# Patient Record
Sex: FEMALE | Race: WHITE | NOT HISPANIC OR LATINO | Employment: OTHER | URBAN - METROPOLITAN AREA
[De-identification: names, ages, dates, MRNs, and addresses within clinical notes are randomized per-mention and may not be internally consistent; named-entity substitution may affect disease eponyms.]

---

## 2017-02-02 ENCOUNTER — LAB VISIT (OUTPATIENT)
Dept: LAB | Facility: OTHER | Age: 82
End: 2017-02-02
Attending: INTERNAL MEDICINE
Payer: MEDICARE

## 2017-02-02 DIAGNOSIS — R53.83 LETHARGY: ICD-10-CM

## 2017-02-02 DIAGNOSIS — E53.8 VITAMIN B12 DEFICIENCY: ICD-10-CM

## 2017-02-02 LAB
ALBUMIN SERPL BCP-MCNC: 3.5 G/DL
ALP SERPL-CCNC: 64 U/L
ALT SERPL W/O P-5'-P-CCNC: 8 U/L
ANION GAP SERPL CALC-SCNC: 10 MMOL/L
AST SERPL-CCNC: 14 U/L
BASOPHILS # BLD AUTO: 0.01 K/UL
BASOPHILS NFR BLD: 0.1 %
BILIRUB SERPL-MCNC: 0.3 MG/DL
BUN SERPL-MCNC: 18 MG/DL
CALCIUM SERPL-MCNC: 8.9 MG/DL
CHLORIDE SERPL-SCNC: 106 MMOL/L
CO2 SERPL-SCNC: 23 MMOL/L
CREAT SERPL-MCNC: 0.8 MG/DL
DIFFERENTIAL METHOD: ABNORMAL
EOSINOPHIL # BLD AUTO: 0.3 K/UL
EOSINOPHIL NFR BLD: 4.1 %
ERYTHROCYTE [DISTWIDTH] IN BLOOD BY AUTOMATED COUNT: 16.7 %
EST. GFR  (AFRICAN AMERICAN): >60 ML/MIN/1.73 M^2
EST. GFR  (NON AFRICAN AMERICAN): >60 ML/MIN/1.73 M^2
GLUCOSE SERPL-MCNC: 94 MG/DL
HCT VFR BLD AUTO: 33 %
HGB BLD-MCNC: 10.3 G/DL
LYMPHOCYTES # BLD AUTO: 2 K/UL
LYMPHOCYTES NFR BLD: 29.6 %
MCH RBC QN AUTO: 27 PG
MCHC RBC AUTO-ENTMCNC: 31.2 %
MCV RBC AUTO: 86 FL
MONOCYTES # BLD AUTO: 0.4 K/UL
MONOCYTES NFR BLD: 5.8 %
NEUTROPHILS # BLD AUTO: 4.2 K/UL
NEUTROPHILS NFR BLD: 60.1 %
PLATELET # BLD AUTO: 320 K/UL
PMV BLD AUTO: 10.1 FL
POTASSIUM SERPL-SCNC: 4.5 MMOL/L
PROT SERPL-MCNC: 6.7 G/DL
RBC # BLD AUTO: 3.82 M/UL
SODIUM SERPL-SCNC: 139 MMOL/L
T4 FREE SERPL-MCNC: 0.89 NG/DL
TSH SERPL DL<=0.005 MIU/L-ACNC: 2.34 UIU/ML
VIT B12 SERPL-MCNC: 1683 PG/ML
WBC # BLD AUTO: 6.9 K/UL

## 2017-02-02 PROCEDURE — 83540 ASSAY OF IRON: CPT

## 2017-02-02 PROCEDURE — 84439 ASSAY OF FREE THYROXINE: CPT

## 2017-02-02 PROCEDURE — 84443 ASSAY THYROID STIM HORMONE: CPT

## 2017-02-02 PROCEDURE — 85025 COMPLETE CBC W/AUTO DIFF WBC: CPT

## 2017-02-02 PROCEDURE — 36415 COLL VENOUS BLD VENIPUNCTURE: CPT

## 2017-02-02 PROCEDURE — 80053 COMPREHEN METABOLIC PANEL: CPT

## 2017-02-02 PROCEDURE — 82728 ASSAY OF FERRITIN: CPT

## 2017-02-02 PROCEDURE — 82607 VITAMIN B-12: CPT

## 2017-02-03 LAB
FERRITIN SERPL-MCNC: 20 NG/ML
IRON SERPL-MCNC: 40 UG/DL
SATURATED IRON: 7 %
TOTAL IRON BINDING CAPACITY: 543 UG/DL
TRANSFERRIN SERPL-MCNC: 367 MG/DL

## 2017-03-11 ENCOUNTER — HOSPITAL ENCOUNTER (INPATIENT)
Facility: OTHER | Age: 82
LOS: 6 days | Discharge: HOME HEALTH | DRG: 177 | End: 2017-03-17
Attending: EMERGENCY MEDICINE | Admitting: HOSPITALIST
Payer: MEDICARE

## 2017-03-11 DIAGNOSIS — R00.1 BRADYCARDIA: ICD-10-CM

## 2017-03-11 DIAGNOSIS — I50.9 HEART FAILURE: ICD-10-CM

## 2017-03-11 DIAGNOSIS — E56.9 VITAMIN DEFICIENCY: ICD-10-CM

## 2017-03-11 DIAGNOSIS — R79.89 ELEVATED TROPONIN: ICD-10-CM

## 2017-03-11 DIAGNOSIS — R06.2 WHEEZING: ICD-10-CM

## 2017-03-11 DIAGNOSIS — E87.20 LACTIC ACID ACIDOSIS: ICD-10-CM

## 2017-03-11 DIAGNOSIS — I21.4 NSTEMI (NON-ST ELEVATED MYOCARDIAL INFARCTION): ICD-10-CM

## 2017-03-11 DIAGNOSIS — R05.9 COUGH: ICD-10-CM

## 2017-03-11 DIAGNOSIS — J96.01 ACUTE HYPOXEMIC RESPIRATORY FAILURE: ICD-10-CM

## 2017-03-11 DIAGNOSIS — M85.80 OSTEOPENIA: ICD-10-CM

## 2017-03-11 DIAGNOSIS — R79.89 HIGH SERUM LACTATE: ICD-10-CM

## 2017-03-11 DIAGNOSIS — R09.02 HYPOXIA: ICD-10-CM

## 2017-03-11 DIAGNOSIS — R06.03 RESPIRATORY DISTRESS: Primary | ICD-10-CM

## 2017-03-11 LAB
ALBUMIN SERPL BCP-MCNC: 3.5 G/DL
ALLENS TEST: ABNORMAL
ALP SERPL-CCNC: 66 U/L
ALT SERPL W/O P-5'-P-CCNC: 9 U/L
ANION GAP SERPL CALC-SCNC: 10 MMOL/L
AST SERPL-CCNC: 13 U/L
BASOPHILS # BLD AUTO: 0.01 K/UL
BASOPHILS # BLD AUTO: 0.01 K/UL
BASOPHILS NFR BLD: 0.2 %
BASOPHILS NFR BLD: 0.2 %
BILIRUB SERPL-MCNC: 0.2 MG/DL
BNP SERPL-MCNC: 140 PG/ML
BUN SERPL-MCNC: 18 MG/DL
CALCIUM SERPL-MCNC: 8.7 MG/DL
CHLORIDE SERPL-SCNC: 108 MMOL/L
CO2 SERPL-SCNC: 25 MMOL/L
CREAT SERPL-MCNC: 0.8 MG/DL
DELSYS: ABNORMAL
DIFFERENTIAL METHOD: ABNORMAL
DIFFERENTIAL METHOD: ABNORMAL
EOSINOPHIL # BLD AUTO: 0 K/UL
EOSINOPHIL # BLD AUTO: 0 K/UL
EOSINOPHIL NFR BLD: 0 %
EOSINOPHIL NFR BLD: 0 %
EP: 5
ERYTHROCYTE [DISTWIDTH] IN BLOOD BY AUTOMATED COUNT: 17.1 %
ERYTHROCYTE [DISTWIDTH] IN BLOOD BY AUTOMATED COUNT: 17.1 %
ERYTHROCYTE [SEDIMENTATION RATE] IN BLOOD BY WESTERGREN METHOD: 14 MM/H
EST. GFR  (AFRICAN AMERICAN): >60 ML/MIN/1.73 M^2
EST. GFR  (NON AFRICAN AMERICAN): >60 ML/MIN/1.73 M^2
FIO2: 40
FLUAV AG SPEC QL IA: NEGATIVE
FLUBV AG SPEC QL IA: NEGATIVE
GLUCOSE SERPL-MCNC: 125 MG/DL
HCO3 UR-SCNC: 19 MMOL/L (ref 24–28)
HCT VFR BLD AUTO: 28.8 %
HCT VFR BLD AUTO: 32.4 %
HGB BLD-MCNC: 8.6 G/DL
HGB BLD-MCNC: 9.6 G/DL
IP: 10
LACTATE SERPL-SCNC: 3.4 MMOL/L
LACTATE SERPL-SCNC: 3.5 MMOL/L
LYMPHOCYTES # BLD AUTO: 0.7 K/UL
LYMPHOCYTES # BLD AUTO: 1.1 K/UL
LYMPHOCYTES NFR BLD: 12.4 %
LYMPHOCYTES NFR BLD: 16.2 %
MCH RBC QN AUTO: 24.9 PG
MCH RBC QN AUTO: 25.4 PG
MCHC RBC AUTO-ENTMCNC: 29.6 %
MCHC RBC AUTO-ENTMCNC: 29.9 %
MCV RBC AUTO: 84 FL
MCV RBC AUTO: 85 FL
MIN VOL: 11.6
MODE: ABNORMAL
MONOCYTES # BLD AUTO: 0.1 K/UL
MONOCYTES # BLD AUTO: 0.5 K/UL
MONOCYTES NFR BLD: 1.2 %
MONOCYTES NFR BLD: 7.2 %
NEUTROPHILS # BLD AUTO: 5 K/UL
NEUTROPHILS # BLD AUTO: 5 K/UL
NEUTROPHILS NFR BLD: 75.9 %
NEUTROPHILS NFR BLD: 85.9 %
PCO2 BLDA: 37.6 MMHG (ref 35–45)
PH SMN: 7.31 [PH] (ref 7.35–7.45)
PLATELET # BLD AUTO: 257 K/UL
PLATELET # BLD AUTO: 257 K/UL
PLATELET # BLD AUTO: 294 K/UL
PMV BLD AUTO: 9.9 FL
PO2 BLDA: 114 MMHG (ref 80–100)
POC BE: -7 MMOL/L
POC SATURATED O2: 98 % (ref 95–100)
POTASSIUM SERPL-SCNC: 3.9 MMOL/L
PROT SERPL-MCNC: 6.9 G/DL
RBC # BLD AUTO: 3.39 M/UL
RBC # BLD AUTO: 3.85 M/UL
SAMPLE: ABNORMAL
SITE: ABNORMAL
SODIUM SERPL-SCNC: 143 MMOL/L
SP02: 38
SPECIMEN SOURCE: NORMAL
SPONT RATE: 29
TROPONIN I SERPL DL<=0.01 NG/ML-MCNC: 0.05 NG/ML
TROPONIN I SERPL DL<=0.01 NG/ML-MCNC: 0.07 NG/ML
WBC # BLD AUTO: 5.82 K/UL
WBC # BLD AUTO: 6.56 K/UL

## 2017-03-11 PROCEDURE — 83605 ASSAY OF LACTIC ACID: CPT

## 2017-03-11 PROCEDURE — 25000242 PHARM REV CODE 250 ALT 637 W/ HCPCS: Performed by: EMERGENCY MEDICINE

## 2017-03-11 PROCEDURE — 93010 ELECTROCARDIOGRAM REPORT: CPT | Mod: ,,, | Performed by: INTERNAL MEDICINE

## 2017-03-11 PROCEDURE — 85730 THROMBOPLASTIN TIME PARTIAL: CPT

## 2017-03-11 PROCEDURE — 93005 ELECTROCARDIOGRAM TRACING: CPT

## 2017-03-11 PROCEDURE — 87400 INFLUENZA A/B EACH AG IA: CPT | Mod: 59

## 2017-03-11 PROCEDURE — 25000003 PHARM REV CODE 250

## 2017-03-11 PROCEDURE — 80053 COMPREHEN METABOLIC PANEL: CPT

## 2017-03-11 PROCEDURE — 96375 TX/PRO/DX INJ NEW DRUG ADDON: CPT

## 2017-03-11 PROCEDURE — 99900035 HC TECH TIME PER 15 MIN (STAT)

## 2017-03-11 PROCEDURE — 27000190 HC CPAP FULL FACE MASK W/VALVE

## 2017-03-11 PROCEDURE — 36415 COLL VENOUS BLD VENIPUNCTURE: CPT

## 2017-03-11 PROCEDURE — 85025 COMPLETE CBC W/AUTO DIFF WBC: CPT | Mod: 91

## 2017-03-11 PROCEDURE — 96361 HYDRATE IV INFUSION ADD-ON: CPT

## 2017-03-11 PROCEDURE — 63600175 PHARM REV CODE 636 W HCPCS: Performed by: INTERNAL MEDICINE

## 2017-03-11 PROCEDURE — 93010 ELECTROCARDIOGRAM REPORT: CPT | Mod: 76,,, | Performed by: INTERNAL MEDICINE

## 2017-03-11 PROCEDURE — 83880 ASSAY OF NATRIURETIC PEPTIDE: CPT

## 2017-03-11 PROCEDURE — 27100107 HC POCKET PEAK FLOW METER

## 2017-03-11 PROCEDURE — 94640 AIRWAY INHALATION TREATMENT: CPT

## 2017-03-11 PROCEDURE — 84484 ASSAY OF TROPONIN QUANT: CPT | Mod: 91

## 2017-03-11 PROCEDURE — 85610 PROTHROMBIN TIME: CPT

## 2017-03-11 PROCEDURE — 96367 TX/PROPH/DG ADDL SEQ IV INF: CPT

## 2017-03-11 PROCEDURE — 94645 CONT INHLJ TX EACH ADDL HOUR: CPT

## 2017-03-11 PROCEDURE — 27000221 HC OXYGEN, UP TO 24 HOURS

## 2017-03-11 PROCEDURE — 20000000 HC ICU ROOM

## 2017-03-11 PROCEDURE — 25000003 PHARM REV CODE 250: Performed by: HOSPITALIST

## 2017-03-11 PROCEDURE — 25000003 PHARM REV CODE 250: Performed by: INTERNAL MEDICINE

## 2017-03-11 PROCEDURE — 63600175 PHARM REV CODE 636 W HCPCS: Performed by: HOSPITALIST

## 2017-03-11 PROCEDURE — 87040 BLOOD CULTURE FOR BACTERIA: CPT | Mod: 59

## 2017-03-11 PROCEDURE — 84484 ASSAY OF TROPONIN QUANT: CPT

## 2017-03-11 PROCEDURE — 63600175 PHARM REV CODE 636 W HCPCS: Performed by: EMERGENCY MEDICINE

## 2017-03-11 PROCEDURE — 99285 EMERGENCY DEPT VISIT HI MDM: CPT | Mod: 25

## 2017-03-11 PROCEDURE — 25000003 PHARM REV CODE 250: Performed by: EMERGENCY MEDICINE

## 2017-03-11 PROCEDURE — 82803 BLOOD GASES ANY COMBINATION: CPT

## 2017-03-11 PROCEDURE — 96365 THER/PROPH/DIAG IV INF INIT: CPT

## 2017-03-11 PROCEDURE — 94660 CPAP INITIATION&MGMT: CPT

## 2017-03-11 RX ORDER — HEPARIN SODIUM,PORCINE/D5W 25000/250
16 INTRAVENOUS SOLUTION INTRAVENOUS CONTINUOUS
Status: DISCONTINUED | OUTPATIENT
Start: 2017-03-12 | End: 2017-03-12

## 2017-03-11 RX ORDER — NITROGLYCERIN 20 MG/100ML
5 INJECTION INTRAVENOUS CONTINUOUS
Status: DISCONTINUED | OUTPATIENT
Start: 2017-03-12 | End: 2017-03-12

## 2017-03-11 RX ORDER — ONDANSETRON 2 MG/ML
8 INJECTION INTRAMUSCULAR; INTRAVENOUS
Status: DISCONTINUED | OUTPATIENT
Start: 2017-03-11 | End: 2017-03-11

## 2017-03-11 RX ORDER — CEFEPIME HYDROCHLORIDE 2 G/50ML
2 INJECTION, SOLUTION INTRAVENOUS
Status: DISCONTINUED | OUTPATIENT
Start: 2017-03-11 | End: 2017-03-13

## 2017-03-11 RX ORDER — MOXIFLOXACIN HYDROCHLORIDE 400 MG/250ML
400 INJECTION, SOLUTION INTRAVENOUS
Status: DISCONTINUED | OUTPATIENT
Start: 2017-03-11 | End: 2017-03-11

## 2017-03-11 RX ORDER — CARVEDILOL 3.12 MG/1
3.12 TABLET ORAL 2 TIMES DAILY
Status: DISCONTINUED | OUTPATIENT
Start: 2017-03-11 | End: 2017-03-13

## 2017-03-11 RX ORDER — ASPIRIN 325 MG
325 TABLET ORAL DAILY
Status: DISCONTINUED | OUTPATIENT
Start: 2017-03-12 | End: 2017-03-12

## 2017-03-11 RX ORDER — CARVEDILOL 3.12 MG/1
3.12 TABLET ORAL 2 TIMES DAILY
Status: DISCONTINUED | OUTPATIENT
Start: 2017-03-11 | End: 2017-03-11

## 2017-03-11 RX ORDER — IPRATROPIUM BROMIDE AND ALBUTEROL SULFATE 2.5; .5 MG/3ML; MG/3ML
3 SOLUTION RESPIRATORY (INHALATION) EVERY 4 HOURS
Status: DISCONTINUED | OUTPATIENT
Start: 2017-03-12 | End: 2017-03-17 | Stop reason: HOSPADM

## 2017-03-11 RX ORDER — ASPIRIN 325 MG
TABLET ORAL
Status: COMPLETED
Start: 2017-03-11 | End: 2017-03-11

## 2017-03-11 RX ORDER — DIAZEPAM 10 MG/2ML
1 INJECTION INTRAMUSCULAR
Status: COMPLETED | OUTPATIENT
Start: 2017-03-11 | End: 2017-03-11

## 2017-03-11 RX ORDER — CLINDAMYCIN PHOSPHATE 600 MG/50ML
600 INJECTION, SOLUTION INTRAVENOUS
Status: DISCONTINUED | OUTPATIENT
Start: 2017-03-11 | End: 2017-03-13

## 2017-03-11 RX ORDER — IPRATROPIUM BROMIDE AND ALBUTEROL SULFATE 2.5; .5 MG/3ML; MG/3ML
3 SOLUTION RESPIRATORY (INHALATION)
Status: COMPLETED | OUTPATIENT
Start: 2017-03-11 | End: 2017-03-11

## 2017-03-11 RX ORDER — METHYLPREDNISOLONE SOD SUCC 125 MG
125 VIAL (EA) INJECTION
Status: COMPLETED | OUTPATIENT
Start: 2017-03-11 | End: 2017-03-11

## 2017-03-11 RX ORDER — NITROGLYCERIN 0.4 MG/1
TABLET SUBLINGUAL
Status: COMPLETED
Start: 2017-03-11 | End: 2017-03-11

## 2017-03-11 RX ORDER — PANTOPRAZOLE SODIUM 40 MG/1
40 TABLET, DELAYED RELEASE ORAL DAILY
Status: DISCONTINUED | OUTPATIENT
Start: 2017-03-11 | End: 2017-03-17 | Stop reason: HOSPADM

## 2017-03-11 RX ORDER — ASPIRIN 325 MG
325 TABLET ORAL ONCE
Status: COMPLETED | OUTPATIENT
Start: 2017-03-11 | End: 2017-03-11

## 2017-03-11 RX ORDER — NITROGLYCERIN 0.4 MG/1
0.4 TABLET SUBLINGUAL EVERY 5 MIN PRN
Status: DISCONTINUED | OUTPATIENT
Start: 2017-03-11 | End: 2017-03-17 | Stop reason: HOSPADM

## 2017-03-11 RX ORDER — CLINDAMYCIN PHOSPHATE 150 MG/ML
600 INJECTION, SOLUTION INTRAVENOUS
Status: DISCONTINUED | OUTPATIENT
Start: 2017-03-11 | End: 2017-03-11

## 2017-03-11 RX ORDER — SODIUM CHLORIDE 9 MG/ML
INJECTION, SOLUTION INTRAVENOUS CONTINUOUS
Status: ACTIVE | OUTPATIENT
Start: 2017-03-11 | End: 2017-03-12

## 2017-03-11 RX ORDER — ONDANSETRON 2 MG/ML
8 INJECTION INTRAMUSCULAR; INTRAVENOUS
Status: COMPLETED | OUTPATIENT
Start: 2017-03-11 | End: 2017-03-11

## 2017-03-11 RX ORDER — ACETAMINOPHEN 325 MG/1
650 TABLET ORAL EVERY 6 HOURS PRN
Status: DISCONTINUED | OUTPATIENT
Start: 2017-03-11 | End: 2017-03-17 | Stop reason: HOSPADM

## 2017-03-11 RX ORDER — CEFEPIME HYDROCHLORIDE 2 G/1
2 INJECTION, POWDER, FOR SOLUTION INTRAVENOUS
Status: DISCONTINUED | OUTPATIENT
Start: 2017-03-11 | End: 2017-03-11

## 2017-03-11 RX ORDER — MAGNESIUM SULFATE HEPTAHYDRATE 40 MG/ML
2 INJECTION, SOLUTION INTRAVENOUS
Status: COMPLETED | OUTPATIENT
Start: 2017-03-11 | End: 2017-03-11

## 2017-03-11 RX ORDER — METOPROLOL TARTRATE 1 MG/ML
5 INJECTION, SOLUTION INTRAVENOUS ONCE
Status: COMPLETED | OUTPATIENT
Start: 2017-03-11 | End: 2017-03-11

## 2017-03-11 RX ORDER — ALBUTEROL SULFATE 2.5 MG/.5ML
10 SOLUTION RESPIRATORY (INHALATION)
Status: COMPLETED | OUTPATIENT
Start: 2017-03-11 | End: 2017-03-11

## 2017-03-11 RX ORDER — ATORVASTATIN CALCIUM 20 MG/1
80 TABLET, FILM COATED ORAL DAILY
Status: DISCONTINUED | OUTPATIENT
Start: 2017-03-11 | End: 2017-03-12

## 2017-03-11 RX ORDER — DIPHENHYDRAMINE HCL 25 MG
25 CAPSULE ORAL NIGHTLY PRN
Status: DISCONTINUED | OUTPATIENT
Start: 2017-03-11 | End: 2017-03-17 | Stop reason: HOSPADM

## 2017-03-11 RX ORDER — FUROSEMIDE 10 MG/ML
40 INJECTION INTRAMUSCULAR; INTRAVENOUS ONCE
Status: COMPLETED | OUTPATIENT
Start: 2017-03-11 | End: 2017-03-11

## 2017-03-11 RX ADMIN — NITROGLYCERIN 0.4 MG: 0.4 TABLET SUBLINGUAL at 10:03

## 2017-03-11 RX ADMIN — IPRATROPIUM BROMIDE AND ALBUTEROL SULFATE 3 ML: .5; 3 SOLUTION RESPIRATORY (INHALATION) at 07:03

## 2017-03-11 RX ADMIN — AZITHROMYCIN MONOHYDRATE 500 MG: 500 INJECTION, POWDER, LYOPHILIZED, FOR SOLUTION INTRAVENOUS at 09:03

## 2017-03-11 RX ADMIN — ASPIRIN 325 MG ORAL TABLET 325 MG: 325 PILL ORAL at 11:03

## 2017-03-11 RX ADMIN — CARVEDILOL 3.12 MG: 3.12 TABLET, FILM COATED ORAL at 11:03

## 2017-03-11 RX ADMIN — METHYLPREDNISOLONE SODIUM SUCCINATE 125 MG: 125 INJECTION, POWDER, FOR SOLUTION INTRAMUSCULAR; INTRAVENOUS at 06:03

## 2017-03-11 RX ADMIN — MAGNESIUM SULFATE IN WATER 2 G: 40 INJECTION, SOLUTION INTRAVENOUS at 08:03

## 2017-03-11 RX ADMIN — CLINDAMYCIN IN 5 PERCENT DEXTROSE 600 MG: 12 INJECTION, SOLUTION INTRAVENOUS at 10:03

## 2017-03-11 RX ADMIN — ONDANSETRON 8 MG: 2 INJECTION, SOLUTION INTRAMUSCULAR; INTRAVENOUS at 08:03

## 2017-03-11 RX ADMIN — SODIUM CHLORIDE 1000 ML: 0.9 INJECTION, SOLUTION INTRAVENOUS at 08:03

## 2017-03-11 RX ADMIN — DIAZEPAM 1 MG: 5 INJECTION, SOLUTION INTRAMUSCULAR; INTRAVENOUS at 08:03

## 2017-03-11 RX ADMIN — ALBUTEROL SULFATE 10 MG: 2.5 SOLUTION RESPIRATORY (INHALATION) at 08:03

## 2017-03-11 RX ADMIN — SODIUM CHLORIDE: 0.9 INJECTION, SOLUTION INTRAVENOUS at 10:03

## 2017-03-11 RX ADMIN — PANTOPRAZOLE SODIUM 40 MG: 40 TABLET, DELAYED RELEASE ORAL at 10:03

## 2017-03-11 RX ADMIN — DIPHENHYDRAMINE HYDROCHLORIDE 25 MG: 25 CAPSULE ORAL at 10:03

## 2017-03-11 RX ADMIN — Medication 325 MG: at 11:03

## 2017-03-11 RX ADMIN — CEFEPIME HYDROCHLORIDE 2 G: 2 INJECTION, SOLUTION INTRAVENOUS at 11:03

## 2017-03-11 RX ADMIN — FUROSEMIDE 40 MG: 10 INJECTION, SOLUTION INTRAMUSCULAR; INTRAVENOUS at 11:03

## 2017-03-11 RX ADMIN — METOPROLOL TARTRATE 5 MG: 1 INJECTION, SOLUTION INTRAVENOUS at 11:03

## 2017-03-11 RX ADMIN — SODIUM CHLORIDE 1000 ML: 0.9 INJECTION, SOLUTION INTRAVENOUS at 06:03

## 2017-03-11 RX ADMIN — ATORVASTATIN CALCIUM 80 MG: 20 TABLET, FILM COATED ORAL at 11:03

## 2017-03-11 RX ADMIN — IPRATROPIUM BROMIDE AND ALBUTEROL SULFATE 3 ML: .5; 3 SOLUTION RESPIRATORY (INHALATION) at 06:03

## 2017-03-11 RX ADMIN — IPRATROPIUM BROMIDE AND ALBUTEROL SULFATE 3 ML: .5; 3 SOLUTION RESPIRATORY (INHALATION) at 11:03

## 2017-03-11 NOTE — IP AVS SNAPSHOT
[facility] ([facility])  [address] [place] [id number]  Phone: [phone]           Patient Discharge Instructions     Our goal is to set you up for success. This packet includes information on your condition, medications, and your home care. It will help you to care for yourself so you don't get sicker and need to go back to the hospital.     Please ask your nurse if you have any questions.        There are many details to remember when preparing to leave the hospital. Here is what you will need to do:    1. Take your medicine. If you are prescribed medications, review your Medication List in the following pages. You may have new medications to  at the pharmacy and others that you'll need to stop taking. Review the instructions for how and when to take your medications. Talk with your doctor or nurses if you are unsure of what to do.     2. Go to your follow-up appointments. Specific follow-up information is listed in the following pages. Your may be contacted by a transition nurse or clinical provider about future appointments. Be sure we have all of the phone numbers to reach you, if needed. Please contact your provider's office if you are unable to make an appointment.     3. Watch for warning signs. Your doctor or nurse will give you detailed warning signs to watch for and when to call for assistance. These instructions may also include educational information about your condition. If you experience any of warning signs to your health, call your doctor.               ** Verify the list of medication(s) below is accurate and up to date. Carry this with you in case of emergency. If your medications have changed, please notify your healthcare provider.             Medication List      START taking these medications        Additional Info                      oxybutynin 5 MG Tab   Commonly known as:  DITROPAN   Quantity:  90 tablet   Refills:  0   Dose:  5 mg    Last time this was  "given:  5 mg on 3/12/2017 11:39 AM   Instructions:  Take 1 tablet (5 mg total) by mouth 3 (three) times daily as needed (bladder spasms).     Begin Date    AM    Noon    PM    Bedtime         CHANGE how you take these medications        Additional Info                      predniSONE 10 MG tablet   Commonly known as:  DELTASONE   Quantity:  30 tablet   Refills:  0   Dose:  10 mg   What changed:    - medication strength  - how much to take  - additional instructions    Instructions:  Take 1 tablet (10 mg total) by mouth once daily. 4 daily 3 days, 3 daily 3 days, 2 daily 3 days, 1 tammy 3 days     Begin Date    AM    Noon    PM    Bedtime         CONTINUE taking these medications        Additional Info                      albuterol-ipratropium 2.5mg-0.5mg/3mL 0.5 mg-3 mg(2.5 mg base)/3 mL nebulizer solution   Commonly known as:  DUO-NEB   Quantity:  100 mL   Refills:  0   Dose:  3 mL    Last time this was given:  3 mLs on 3/17/2017  3:15 PM   Instructions:  Take 3 mLs by nebulization every 4 (four) hours as needed for Wheezing. Rescue     Begin Date    AM    Noon    PM    Bedtime       amlodipine 2.5 MG tablet   Commonly known as:  NORVASC   Quantity:  90 tablet   Refills:  1    Last time this was given:  2.5 mg on 3/17/2017  9:31 AM   Instructions:  TAKE 1 TABLET DAILY     Begin Date    AM    Noon    PM    Bedtime       aspirin 81 MG Chew   Refills:  0   Dose:  81 mg    Instructions:  Take 81 mg by mouth once daily.     Begin Date    AM    Noon    PM    Bedtime       BD LUER-SELVIN SYRINGE 3 mL 23 x 1" Syrg   Refills:  0   Generic drug:  syringe with needle      Begin Date    AM    Noon    PM    Bedtime       benzonatate 200 MG capsule   Commonly known as:  TESSALON   Quantity:  30 capsule   Refills:  1   Dose:  200 mg    Last time this was given:  100 mg on 3/17/2017  9:32 AM   Instructions:  Take 1 capsule (200 mg total) by mouth 3 (three) times daily as needed for Cough.     Begin Date    AM    Noon    PM    Bedtime "       cyanocobalamin 1,000 mcg/mL injection   Quantity:  10 mL   Refills:  3   Dose:  1000 mcg    Instructions:  Inject 1 mL (1,000 mcg total) into the muscle once a week. Dispense syringes as well.     Begin Date    AM    Noon    PM    Bedtime       dicyclomine 10 MG capsule   Commonly known as:  BENTYL   Quantity:  30 capsule   Refills:  0    Instructions:  take 1 capsule by mouth twice a day if needed     Begin Date    AM    Noon    PM    Bedtime       escitalopram oxalate 10 MG tablet   Commonly known as:  LEXAPRO   Quantity:  90 tablet   Refills:  0    Instructions:  TAKE 1 TABLET DAILY     Begin Date    AM    Noon    PM    Bedtime       fluticasone 110 mcg/actuation inhaler   Commonly known as:  FLOVENT HFA   Quantity:  12 g   Refills:  0   Dose:  1 puff    Instructions:  Inhale 1 puff into the lungs 2 (two) times daily. Controller     Begin Date    AM    Noon    PM    Bedtime       inhalation device   Commonly known as:  BREATHERITE RIGID SPACER-MASK   Quantity:  1 Device   Refills:  0    Instructions:  Use as directed for inhalation.     Begin Date    AM    Noon    PM    Bedtime       metoprolol succinate 25 MG 24 hr tablet   Commonly known as:  TOPROL-XL   Refills:  0   Dose:  1 tablet    Last time this was given:  25 mg on 3/17/2017  9:31 AM   Instructions:  Take 1 tablet by mouth once daily.     Begin Date    AM    Noon    PM    Bedtime       OMEGA 3 ORAL   Refills:  0    Instructions:  Take by mouth.     Begin Date    AM    Noon    PM    Bedtime       omeprazole 20 MG capsule   Commonly known as:  PRILOSEC   Quantity:  90 capsule   Refills:  1   Dose:  20 mg    Instructions:  Take 1 capsule (20 mg total) by mouth once daily.     Begin Date    AM    Noon    PM    Bedtime       risedronate 150 MG Tab   Commonly known as:  ACTONEL   Quantity:  1 tablet   Refills:  3   Dose:  150 mg    Instructions:  Take 1 tablet (150 mg total) by mouth every 30 days.     Begin Date    AM    Noon    PM    Bedtime          STOP taking these medications     doxycycline 100 MG EC tablet   Commonly known as:  DORYX       lorazepam 0.5 MG tablet   Commonly known as:  ATIVAN            Where to Get Your Medications      These medications were sent to [facility] [place] [address] [place] - [address] [facility]  [facility] [address]     Phone:  [phone]     oxybutynin 5 MG Tab    predniSONE 10 MG tablet                  Please bring to all follow up appointments:    1. A copy of your discharge instructions.  2. All medicines you are currently taking in their original bottles.  3. Identification and insurance card.    Please arrive 15 minutes ahead of scheduled appointment time.    Please call 24 hours in advance if you must reschedule your appointment and/or time.        Your Scheduled Appointments     Mar 28, 2017  2:15 PM CDT   Consult with JULIO Gabriel - Neurology ([address] )    [address]  [facility] [address] [phone]              Follow-up Information     Follow up with Blanca Quinones MD In 1 week.    Specialty:  Internal Medicine    Contact information:    [address] [place] [address]  [facility] [id number] [phone]        Referrals     Future Orders    Ambulatory referral to Outpatient Case Management     Questions:    Does the patient have a chronic or uncontrolled disease process?:      Does the patient have a new diagnosis of a catastrophic or life altering illness/treatment?:      Does the patient have any psycho-social issues that may affect their ability to adhere to treatment plan?:      Does patient have any behaviors or circumstances that may impede ability to adhere to treatment plan?:      Is patient at risk for admission/readmission?:          Discharge Instructions     Future Orders    Activity as tolerated     Diet general     Questions:    Total calories:      Fat restriction, if any:      Protein restriction, if any:       "Na restriction, if any:      Fluid restriction:      Additional restrictions:      OXYGEN FOR HOME USE     Questions:    Liter Flow:  2    Duration:  Continuous    Qualifying SpO2:  87 sa02    Testing done at:  Rest    Route:  nasal cannula    Portable mode:  pulse dose acceptable    Device:  home concentrator with portable unit    Length of need (in months):  99 mos    Patient condition with qualifying saturation:      Height:  5' 2" (1.575 m)    Weight:  68.3 kg (150 lb 9.2 oz)    Does patient have medical equipment at home?:  cane, straight    rollator    shower chair    Alternative treatment measures have been tried or considered and deemed clinically ineffective.:  Yes    VENTILATOR FOR HOME USE     Comments:    Trilogy for home use  Titrate pt for comfort    Questions:    Height:  5' 2" (1.575 m)    Weight:  68.3 kg (150 lb 9.2 oz)    Does patient have medical equipment at home?:  cane, straight    rollator    shower chair    Length of need (1-99 months):  99    Interface needed:  Nasal mask    Mode:  AVAPS-AE    Rate:  12 Comment - auto     Tidal Volume:  300    PEEP - EPAP:  5.0 CM/H2O Comment - 5-10    Pressure support - IPAP:  10 Comment - 5-10    FiO2%:      Humidifier needed?:  No        Discharge Instructions       Your feedback is important to us.  If you should receive a survey in the next few days, please share your experience with us.        Discharge References/Attachments     ANEMIA, IRON-DEFICIENCY (ADULT) (ENGLISH)    BARIUM SWALLOW, MODIFIED (ENGLISH)    DYSPHAGIA DIET (ENGLISH)    URINARY TRACT INFECTIONS IN WOMEN (ENGLISH)    URINARY INCONTINENCE, FEMALE (ADULT) (ENGLISH)        Primary Diagnosis     Your primary diagnosis was:  Acute Hypoxemic Respiratory Failure      Admission Information     Date & Time Provider Department CSN    3/11/2017  5:10 PM Eligio Arenas MD [facility] [id number]      Care Providers     Provider Role Specialty Primary office phone    Eligio RAMIREZ " "MD Dagoberto Attending Provider Hospitalist [phone]    Will Wyatt MD Consulting Physician  Cardiology [phone]    Tiago Ribeiro MD Consulting Physician  Gastroenterology [phone]      Your Vitals Were     BP Pulse Temp Resp Height Weight    160/73 (BP Location: Right arm, Patient Position: Lying, BP Method: Automatic) 77 98 °F (36.7 °C) (Oral) 18 5' 2" (1.575 m) 68.3 kg (150 lb 9.2 oz)    SpO2 BMI             98% 27.54 kg/m2         Recent Lab Values        8/19/2016                          10:12 AM           A1C 5.1           Comment for A1C at 10:12 AM on 8/19/2016:  According to ADA guidelines, hemoglobin A1C <7.0% represents  optimal control in non-pregnant diabetic patients.  Different  metrics may apply to specific populations.   Standards of Medical Care in Diabetes - 2016.  For the purpose of screening for the presence of diabetes:  <5.7%     Consistent with the absence of diabetes  5.7-6.4%  Consistent with increasing risk for diabetes   (prediabetes)  >or=6.5%  Consistent with diabetes  Currently no consensus exists for use of hemoglobin A1C  for diagnosis of diabetes for children.        Pending Labs     Order Current Status    Prepare RBC 1 Unit In process    Prepare RBC Preliminary result      Allergies as of 3/17/2017        Reactions    Penicillins       [facility]     [facility] Nurse Care Line - 24/7 Assistance  Unless otherwise directed by your provider, please contact [facility], our nurse care line that is available for 24/7 assistance.     Registered nurses in the [facility] provide clinical advisement, health education, appointment booking, and other advisory services.  Call for this free service at [phone].        Advance Directives     An advance directive is a document which, in the event you are no longer able to make decisions for yourself, tells your healthcare team what kind of treatment you do or do not want to receive, or who you " would like to make those decisions for you.  If you do not currently have an advance directive, [facility] encourages you to create one.  For more information call:  [phone]), [phone] ([phone]),  or log on to [email or web address].        Language Assistance Services     ATTENTION: Language assistance services are available, free of charge. Please call [phone].      ATENCIÓN: Si habla farzana, tiene a moses disposición servicios gratuitos de asistencia lingüística. Llame al [phone].     [facility] Ý: N?u b?n nói Ti?ng Vi?t, có các d?ch v? h? tr? ngôn ng? mi?n phí dành cho b?n. G?i s? [phone].        Blood Transfusion Reaction Signs and Symptoms     The blood you have received has been matched for you as carefully as possible. Most patients who receive a blood transfusion do not experience problems. However, there can be a delayed reaction that happens a few weeks after your blood transfusion. Contact your physician immediately if you experience any NEW SYMPTOMS listed below:     Fever greater than 100.4 degrees    Chills   Yellow color to your skin or eyes(Jaundice)   Back pain, chest pain, or pain at the infusion site   Weakness (more than usual)   Discomfort or uneasiness more than usual (Malaise)   Nausea or vomiting   Shortness of breath, wheezing, or coughing   Higher or lower blood pressure than normal   Skin rash, itching, skin redness, or localized swelling (example: hands or feet)   Urinating less than normal   Urine appears reddish or orange and is darker than normal      Remember that some these signs may already exist for you--such as having chronic back pain or high blood pressure. You only need to look for and report to your doctor any new occurrences since your blood transfusion that are of concern.        [facility] Sign-Up     Activating your [facility] account is as easy as 1-2-3!     1) Visit [email or web address], select Sign Up Now, enter this  activation code and your date of birth, then select Next.  [email or web address]  Expires: 3/19/2017  3:44 PM      2) Create a username and password to use when you visit [facility] in the future and select a security question in case you lose your password and select Next.    3) Enter your e-mail address and click Sign Up!    Additional Information  If you have questions, please e-mail [email or web address] or call [phone] to talk to our [facility] staff. Remember, [facility] is NOT to be used for urgent needs. For medical emergencies, dial 911.          [facility] complies with applicable Federal civil rights laws and does not discriminate on the basis of race, color, national origin, age, disability, or sex.

## 2017-03-12 PROBLEM — J96.01 ACUTE HYPOXEMIC RESPIRATORY FAILURE: Status: ACTIVE | Noted: 2017-03-12

## 2017-03-12 PROBLEM — D50.9 IRON DEFICIENCY ANEMIA: Status: ACTIVE | Noted: 2017-03-12

## 2017-03-12 PROBLEM — R13.10 DIFFICULTY SWALLOWING: Status: ACTIVE | Noted: 2017-03-12

## 2017-03-12 PROBLEM — R79.89 ELEVATED TROPONIN: Status: ACTIVE | Noted: 2017-03-12

## 2017-03-12 PROBLEM — E87.20 LACTIC ACID ACIDOSIS: Status: ACTIVE | Noted: 2017-03-12

## 2017-03-12 LAB
ABO + RH BLD: NORMAL
ANION GAP SERPL CALC-SCNC: 6 MMOL/L
APTT BLDCRRT: 63.7 SEC
APTT BLDCRRT: <21 SEC
BACTERIA #/AREA URNS HPF: ABNORMAL /HPF
BASOPHILS # BLD AUTO: 0 K/UL
BASOPHILS NFR BLD: 0 %
BILIRUB UR QL STRIP: NEGATIVE
BLD GP AB SCN CELLS X3 SERPL QL: NORMAL
BLD PROD TYP BPU: NORMAL
BLOOD UNIT EXPIRATION DATE: NORMAL
BLOOD UNIT TYPE CODE: 7300
BLOOD UNIT TYPE: NORMAL
BUN SERPL-MCNC: 15 MG/DL
CALCIUM SERPL-MCNC: 7.7 MG/DL
CHLORIDE SERPL-SCNC: 109 MMOL/L
CK MB SERPL-MCNC: 6.4 NG/ML
CK MB SERPL-RTO: 7.9 %
CK SERPL-CCNC: 81 U/L
CK SERPL-CCNC: 81 U/L
CLARITY UR: ABNORMAL
CO2 SERPL-SCNC: 25 MMOL/L
CODING SYSTEM: NORMAL
COLOR UR: ABNORMAL
CREAT SERPL-MCNC: 0.7 MG/DL
DIFFERENTIAL METHOD: ABNORMAL
DISPENSE STATUS: NORMAL
EOSINOPHIL # BLD AUTO: 0 K/UL
EOSINOPHIL NFR BLD: 0 %
ERYTHROCYTE [DISTWIDTH] IN BLOOD BY AUTOMATED COUNT: 17 %
EST. GFR  (AFRICAN AMERICAN): >60 ML/MIN/1.73 M^2
EST. GFR  (NON AFRICAN AMERICAN): >60 ML/MIN/1.73 M^2
GLUCOSE SERPL-MCNC: 134 MG/DL
GLUCOSE UR QL STRIP: NEGATIVE
HCT VFR BLD AUTO: 26.2 %
HGB BLD-MCNC: 7.8 G/DL
HGB UR QL STRIP: ABNORMAL
HYALINE CASTS #/AREA URNS LPF: 0 /LPF
INR PPP: 0.9
KETONES UR QL STRIP: NEGATIVE
LACTATE SERPL-SCNC: 0.9 MMOL/L
LACTATE SERPL-SCNC: 4.3 MMOL/L
LEUKOCYTE ESTERASE UR QL STRIP: NEGATIVE
LYMPHOCYTES # BLD AUTO: 0.9 K/UL
LYMPHOCYTES NFR BLD: 21.1 %
MAGNESIUM SERPL-MCNC: 2.1 MG/DL
MCH RBC QN AUTO: 25.1 PG
MCHC RBC AUTO-ENTMCNC: 29.8 %
MCV RBC AUTO: 84 FL
MICROSCOPIC COMMENT: ABNORMAL
MONOCYTES # BLD AUTO: 0.1 K/UL
MONOCYTES NFR BLD: 3.2 %
NEUTROPHILS # BLD AUTO: 3.3 K/UL
NEUTROPHILS NFR BLD: 75 %
NITRITE UR QL STRIP: NEGATIVE
PH UR STRIP: 7 [PH] (ref 5–8)
PLATELET # BLD AUTO: 216 K/UL
PMV BLD AUTO: 9.6 FL
POTASSIUM SERPL-SCNC: 3.9 MMOL/L
PROT UR QL STRIP: ABNORMAL
PROTHROMBIN TIME: 10.4 SEC
RBC # BLD AUTO: 3.11 M/UL
RBC #/AREA URNS HPF: >100 /HPF (ref 0–4)
SODIUM SERPL-SCNC: 140 MMOL/L
SP GR UR STRIP: 1.02 (ref 1–1.03)
TRANS ERYTHROCYTES VOL PATIENT: NORMAL ML
TROPONIN I SERPL DL<=0.01 NG/ML-MCNC: 0.85 NG/ML
URN SPEC COLLECT METH UR: ABNORMAL
UROBILINOGEN UR STRIP-ACNC: NEGATIVE EU/DL
WBC # BLD AUTO: 4.36 K/UL
WBC #/AREA URNS HPF: 0 /HPF (ref 0–5)

## 2017-03-12 PROCEDURE — 87086 URINE CULTURE/COLONY COUNT: CPT

## 2017-03-12 PROCEDURE — 86900 BLOOD TYPING SEROLOGIC ABO: CPT

## 2017-03-12 PROCEDURE — 87086 URINE CULTURE/COLONY COUNT: CPT | Mod: 59

## 2017-03-12 PROCEDURE — 99223 1ST HOSP IP/OBS HIGH 75: CPT | Mod: GC,,, | Performed by: INTERNAL MEDICINE

## 2017-03-12 PROCEDURE — 83735 ASSAY OF MAGNESIUM: CPT

## 2017-03-12 PROCEDURE — 25000003 PHARM REV CODE 250: Performed by: INTERNAL MEDICINE

## 2017-03-12 PROCEDURE — 25000242 PHARM REV CODE 250 ALT 637 W/ HCPCS: Performed by: HOSPITALIST

## 2017-03-12 PROCEDURE — 36415 COLL VENOUS BLD VENIPUNCTURE: CPT

## 2017-03-12 PROCEDURE — 83605 ASSAY OF LACTIC ACID: CPT

## 2017-03-12 PROCEDURE — 25000003 PHARM REV CODE 250: Performed by: HOSPITALIST

## 2017-03-12 PROCEDURE — 85730 THROMBOPLASTIN TIME PARTIAL: CPT

## 2017-03-12 PROCEDURE — 63600175 PHARM REV CODE 636 W HCPCS: Performed by: HOSPITALIST

## 2017-03-12 PROCEDURE — 83605 ASSAY OF LACTIC ACID: CPT | Mod: 91

## 2017-03-12 PROCEDURE — 84484 ASSAY OF TROPONIN QUANT: CPT

## 2017-03-12 PROCEDURE — 86901 BLOOD TYPING SEROLOGIC RH(D): CPT

## 2017-03-12 PROCEDURE — 20000000 HC ICU ROOM

## 2017-03-12 PROCEDURE — 85025 COMPLETE CBC W/AUTO DIFF WBC: CPT

## 2017-03-12 PROCEDURE — 63600175 PHARM REV CODE 636 W HCPCS: Performed by: INTERNAL MEDICINE

## 2017-03-12 PROCEDURE — 86920 COMPATIBILITY TEST SPIN: CPT

## 2017-03-12 PROCEDURE — 25000003 PHARM REV CODE 250: Performed by: PHYSICIAN ASSISTANT

## 2017-03-12 PROCEDURE — 81000 URINALYSIS NONAUTO W/SCOPE: CPT

## 2017-03-12 PROCEDURE — 80048 BASIC METABOLIC PNL TOTAL CA: CPT

## 2017-03-12 PROCEDURE — 94640 AIRWAY INHALATION TREATMENT: CPT

## 2017-03-12 PROCEDURE — 82553 CREATINE MB FRACTION: CPT

## 2017-03-12 PROCEDURE — P9021 RED BLOOD CELLS UNIT: HCPCS

## 2017-03-12 PROCEDURE — 27000221 HC OXYGEN, UP TO 24 HOURS

## 2017-03-12 PROCEDURE — 99223 1ST HOSP IP/OBS HIGH 75: CPT | Mod: AI,,, | Performed by: HOSPITALIST

## 2017-03-12 RX ORDER — ATORVASTATIN CALCIUM 10 MG/1
10 TABLET, FILM COATED ORAL DAILY
Status: DISCONTINUED | OUTPATIENT
Start: 2017-03-13 | End: 2017-03-17 | Stop reason: HOSPADM

## 2017-03-12 RX ORDER — HYDROCODONE BITARTRATE AND ACETAMINOPHEN 500; 5 MG/1; MG/1
TABLET ORAL
Status: DISCONTINUED | OUTPATIENT
Start: 2017-03-12 | End: 2017-03-17 | Stop reason: HOSPADM

## 2017-03-12 RX ORDER — OXYBUTYNIN CHLORIDE 5 MG/1
5 TABLET ORAL 3 TIMES DAILY PRN
Status: DISCONTINUED | OUTPATIENT
Start: 2017-03-12 | End: 2017-03-17 | Stop reason: HOSPADM

## 2017-03-12 RX ORDER — HEPARIN SODIUM,PORCINE/D5W 25000/250
16 INTRAVENOUS SOLUTION INTRAVENOUS CONTINUOUS
Status: DISCONTINUED | OUTPATIENT
Start: 2017-03-12 | End: 2017-03-12

## 2017-03-12 RX ORDER — DIAZEPAM 10 MG/2ML
2.5 INJECTION INTRAMUSCULAR ONCE
Status: COMPLETED | OUTPATIENT
Start: 2017-03-12 | End: 2017-03-12

## 2017-03-12 RX ORDER — BENZONATATE 100 MG/1
100 CAPSULE ORAL 3 TIMES DAILY PRN
Status: DISCONTINUED | OUTPATIENT
Start: 2017-03-12 | End: 2017-03-17 | Stop reason: HOSPADM

## 2017-03-12 RX ORDER — DIAZEPAM 10 MG/2ML
2.5 INJECTION INTRAMUSCULAR ONCE
Status: DISCONTINUED | OUTPATIENT
Start: 2017-03-12 | End: 2017-03-12

## 2017-03-12 RX ORDER — HYDROCODONE BITARTRATE AND ACETAMINOPHEN 7.5; 325 MG/15ML; MG/15ML
5 SOLUTION ORAL EVERY 6 HOURS PRN
Status: DISCONTINUED | OUTPATIENT
Start: 2017-03-12 | End: 2017-03-17 | Stop reason: HOSPADM

## 2017-03-12 RX ORDER — LIDOCAINE HYDROCHLORIDE 20 MG/ML
JELLY TOPICAL ONCE
Status: COMPLETED | OUTPATIENT
Start: 2017-03-12 | End: 2017-03-12

## 2017-03-12 RX ORDER — HEPARIN SODIUM 5000 [USP'U]/ML
5000 INJECTION, SOLUTION INTRAVENOUS; SUBCUTANEOUS EVERY 8 HOURS
Status: DISCONTINUED | OUTPATIENT
Start: 2017-03-12 | End: 2017-03-17 | Stop reason: HOSPADM

## 2017-03-12 RX ADMIN — CLINDAMYCIN IN 5 PERCENT DEXTROSE 600 MG: 12 INJECTION, SOLUTION INTRAVENOUS at 04:03

## 2017-03-12 RX ADMIN — HYDROCODONE BITARTRATE AND ACETAMINOPHEN 5 ML: 7.5; 325 SOLUTION ORAL at 01:03

## 2017-03-12 RX ADMIN — METHYLPREDNISOLONE SODIUM SUCCINATE 40 MG: 40 INJECTION, POWDER, FOR SOLUTION INTRAMUSCULAR; INTRAVENOUS at 09:03

## 2017-03-12 RX ADMIN — IPRATROPIUM BROMIDE AND ALBUTEROL SULFATE 3 ML: .5; 3 SOLUTION RESPIRATORY (INHALATION) at 04:03

## 2017-03-12 RX ADMIN — HEPARIN SODIUM 5000 UNITS: 5000 INJECTION, SOLUTION INTRAVENOUS; SUBCUTANEOUS at 04:03

## 2017-03-12 RX ADMIN — IPRATROPIUM BROMIDE AND ALBUTEROL SULFATE 3 ML: .5; 3 SOLUTION RESPIRATORY (INHALATION) at 11:03

## 2017-03-12 RX ADMIN — BENZONATATE 100 MG: 100 CAPSULE ORAL at 10:03

## 2017-03-12 RX ADMIN — CEFEPIME HYDROCHLORIDE 2 G: 2 INJECTION, SOLUTION INTRAVENOUS at 11:03

## 2017-03-12 RX ADMIN — PANTOPRAZOLE SODIUM 40 MG: 40 TABLET, DELAYED RELEASE ORAL at 09:03

## 2017-03-12 RX ADMIN — DIPHENHYDRAMINE HYDROCHLORIDE 25 MG: 25 CAPSULE ORAL at 10:03

## 2017-03-12 RX ADMIN — OXYBUTYNIN CHLORIDE 5 MG: 5 TABLET ORAL at 11:03

## 2017-03-12 RX ADMIN — METHYLPREDNISOLONE SODIUM SUCCINATE 40 MG: 40 INJECTION, POWDER, FOR SOLUTION INTRAMUSCULAR; INTRAVENOUS at 04:03

## 2017-03-12 RX ADMIN — LIDOCAINE HYDROCHLORIDE: 20 JELLY TOPICAL at 07:03

## 2017-03-12 RX ADMIN — IPRATROPIUM BROMIDE AND ALBUTEROL SULFATE 3 ML: .5; 3 SOLUTION RESPIRATORY (INHALATION) at 08:03

## 2017-03-12 RX ADMIN — HEPARIN SODIUM 5000 UNITS: 5000 INJECTION, SOLUTION INTRAVENOUS; SUBCUTANEOUS at 09:03

## 2017-03-12 RX ADMIN — CLINDAMYCIN IN 5 PERCENT DEXTROSE 600 MG: 12 INJECTION, SOLUTION INTRAVENOUS at 06:03

## 2017-03-12 RX ADMIN — ASPIRIN 325 MG ORAL TABLET 325 MG: 325 PILL ORAL at 09:03

## 2017-03-12 RX ADMIN — HEPARIN SODIUM AND DEXTROSE 16 UNITS/KG/HR: 10000; 5 INJECTION INTRAVENOUS at 12:03

## 2017-03-12 RX ADMIN — CLINDAMYCIN IN 5 PERCENT DEXTROSE 600 MG: 12 INJECTION, SOLUTION INTRAVENOUS at 11:03

## 2017-03-12 RX ADMIN — METHYLPREDNISOLONE SODIUM SUCCINATE 40 MG: 40 INJECTION, POWDER, FOR SOLUTION INTRAMUSCULAR; INTRAVENOUS at 06:03

## 2017-03-12 RX ADMIN — DIAZEPAM 2.5 MG: 5 INJECTION, SOLUTION INTRAMUSCULAR; INTRAVENOUS at 01:03

## 2017-03-12 RX ADMIN — AZITHROMYCIN MONOHYDRATE 500 MG: 500 INJECTION, POWDER, LYOPHILIZED, FOR SOLUTION INTRAVENOUS at 10:03

## 2017-03-12 RX ADMIN — CARVEDILOL 3.12 MG: 3.12 TABLET, FILM COATED ORAL at 09:03

## 2017-03-12 NOTE — CONSULTS
"Reason for Consult:  anemia    HPI:  Pt is a 88 y.o. female who presented with worsening cough and dyspnea. She has been treated as an outpatient with antibiotics and steroids. Upon admission it was noted that she has become increasingly anemic. Studies support iron deficiency. She states that she had a recent EGD/ colonoscopy by Dr. Bruno that was normal. She has a family h/o crc (father, paternal aunt and maternal aunt)- "it didn't kill any of them". She denies abd pain. No melena/ hematochezia. No changes in bowel habits. No dysphagia, anorexia, early satiety nor weight loss.     Past Medical History:   Diagnosis Date    GERD (gastroesophageal reflux disease)     Hypertension        Past Surgical History:   Procedure Laterality Date    APPENDECTOMY      HYSTERECTOMY      TOTAL KNEE ARTHROPLASTY Bilateral        History reviewed. No pertinent family history.    Social History     Social History    Marital status:      Spouse name: N/A    Number of children: N/A    Years of education: N/A     Occupational History    Not on file.     Social History Main Topics    Smoking status: Never Smoker    Smokeless tobacco: Not on file    Alcohol use 0.6 oz/week     1 Glasses of wine, 0 Standard drinks or equivalent per week      Comment: occasionally    Drug use: No    Sexual activity: No     Other Topics Concern    Not on file     Social History Narrative       Endoscopic History:  As above    Review of patient's allergies indicates:   Allergen Reactions    Penicillins        No current facility-administered medications on file prior to encounter.      Current Outpatient Prescriptions on File Prior to Encounter   Medication Sig Dispense Refill    albuterol-ipratropium 2.5mg-0.5mg/3mL (DUO-NEB) 0.5 mg-3 mg(2.5 mg base)/3 mL nebulizer solution Take 3 mLs by nebulization every 4 (four) hours as needed for Wheezing. Rescue 100 mL 0    amlodipine (NORVASC) 2.5 MG tablet TAKE 1 TABLET DAILY 90 tablet 1 " "   aspirin 81 MG Chew Take 81 mg by mouth once daily.      BD LUER-SELVIN SYRINGE 3 mL 23 x 1" Syrg   0    benzonatate (TESSALON) 200 MG capsule Take 1 capsule (200 mg total) by mouth 3 (three) times daily as needed for Cough. 30 capsule 1    cyanocobalamin 1,000 mcg/mL injection Inject 1 mL (1,000 mcg total) into the muscle once a week. Dispense syringes as well. 10 mL 3    dicyclomine (BENTYL) 10 MG capsule take 1 capsule by mouth twice a day if needed 30 capsule 0    doxycycline (DORYX) 100 MG EC tablet Take 1 tablet (100 mg total) by mouth 2 (two) times daily. 14 tablet 0    escitalopram oxalate (LEXAPRO) 10 MG tablet TAKE 1 TABLET DAILY 90 tablet 0    fluticasone (FLOVENT HFA) 110 mcg/actuation inhaler Inhale 1 puff into the lungs 2 (two) times daily. Controller 12 g 0    inhalation device (BREATHERITE RIGID SPACER-MASK) Use as directed for inhalation. 1 Device 0    metoprolol succinate (TOPROL-XL) 25 MG 24 hr tablet Take 1 tablet by mouth once daily.      OMEGA-3S/DHA/EPA/FISH OIL (OMEGA 3 ORAL) Take by mouth.      omeprazole (PRILOSEC) 20 MG capsule Take 1 capsule (20 mg total) by mouth once daily. 90 capsule 1    predniSONE (DELTASONE) 20 MG tablet Take 1 tablet (20 mg total) by mouth once daily. 2 PILLS A DAY FOR 3 DAYS THEN 1 1/2 PILLS A DAY FOR 3 DAYS THEN ONE PILL A DAY FOR 3 DAYS THEN HALF PILL  A DAY FOR 3 DAYS THEN STOP. 30 tablet 0    lorazepam (ATIVAN) 0.5 MG tablet Take 1 tablet (0.5 mg total) by mouth every 6 (six) hours as needed for Anxiety. 20 tablet 0    risedronate (ACTONEL) 150 MG Tab Take 1 tablet (150 mg total) by mouth every 30 days. 1 tablet 3     Scheduled Meds:   albuterol-ipratropium 2.5mg-0.5mg/3mL  3 mL Nebulization Q4H    aspirin  325 mg Oral Daily    atorvastatin  80 mg Oral Daily    azithromycin  500 mg Intravenous Q24H    carvedilol  3.125 mg Oral BID    ceFEPime (MAXIPIME) IVPB  2 g Intravenous Q12H    clindamycin (CLEOCIN) IVPB  600 mg Intravenous Q8H    " methylPREDNISolone sodium succinate  40 mg Intravenous Q8H    pantoprazole  40 mg Oral Daily     Continuous Infusions:   heparin (porcine) in D5W 16 Units/kg/hr (03/12/17 0043)    nitroGLYCERIN       PRN Meds:.acetaminophen, diphenhydrAMINE, heparin (PORCINE), heparin (PORCINE), nitroGLYCERIN    Review of Systems:  CONSTITUTIONAL: Negative for weakness, fever, weight loss, weight gain.  HEENT: Eyes: Negative for double/blurred vision. Ears: Negative pain or loss of hearing. Nose:Negative for nasal congestion. Negative for rhinorrhea Mouth: Negative for dry mouth/pain Throat: Negative for masses or hoarseness.  CARDIOVASCULAR: Negative for chest pain or palpitations.  RESPIRATORY:+SOB, cough, wheezing  GASTROINTESTINAL: See HPI  GENITOURINARY: Negative for dysuria/hematuria.  MUSCULOSKELETAL: Negative for osteoarthritis, muscle pain.  SKIN: Negative for rashes/lesions.  NEUROLOGIC: Negative for headaches, numbness/tingling.  ENDOCRINE: Negative for diabetes or thyroid abnormalities.  HEMATOLOGIC:+ foranemia or blood dyscrasias.    Patient Vitals for the past 24 hrs:   BP Temp Temp src Pulse Resp SpO2 Height Weight   03/12/17 0946 - - - (!) 59 - - - -   03/12/17 0836 - - - (!) 55 (!) 26 98 % - -   03/12/17 0500 (!) 126/59 - - 64 (!) 43 100 % - -   03/12/17 0430 121/69 - - (!) 56 (!) 21 99 % - -   03/12/17 0422 - - - (!) 56 (!) 21 100 % - -   03/12/17 0400 124/62 - - 64 (!) 27 97 % - -   03/12/17 0330 (!) 103/56 - - 64 (!) 22 99 % - -   03/12/17 0325 (!) 104/58 - - 66 11 99 % - -   03/12/17 0320 (!) 90/54 - - 60 15 98 % - -   03/12/17 0315 (!) 93/50 - - 62 (!) 22 99 % - -   03/12/17 0310 (!) 102/57 - - - - - - -   03/12/17 0305 (!) 106/58 - - - - - - -   03/12/17 0300 - 98 °F (36.7 °C) Oral - - - - -   03/12/17 0200 (!) 89/53 - - 64 (!) 23 98 % - -   03/12/17 0155 (!) 90/50 - - 66 (!) 24 98 % - -   03/12/17 0150 (!) 93/52 - - 68 (!) 24 98 % - -   03/12/17 0145 101/60 - - 72 (!) 38 98 % - -   03/12/17 0140 112/63 - -  "76 (!) 41 (!) 93 % - -   03/12/17 0135 106/71 - - 74 (!) 29 98 % - -   03/12/17 0130 110/61 - - 74 (!) 28 98 % - -   03/12/17 0125 (!) 114/57 - - 74 (!) 33 100 % - -   03/12/17 0120 (!) 114/55 - - 76 (!) 29 (!) 90 % - -   03/12/17 0115 (!) 116/55 - - 76 (!) 38 95 % - -   03/12/17 0110 133/65 - - 76 (!) 30 99 % - -   03/12/17 0105 113/79 - - 78 (!) 34 99 % - -   03/12/17 0100 118/74 - - 76 (!) 27 99 % - -   03/12/17 0055 115/62 - - 76 (!) 29 99 % - -   03/12/17 0050 (!) 109/58 - - 78 (!) 39 99 % - -   03/12/17 0045 (!) 119/59 - - 76 (!) 38 99 % - -   03/12/17 0040 118/61 - - 80 (!) 32 96 % - -   03/12/17 0035 116/62 - - 78 (!) 46 98 % - -   03/12/17 0030 117/62 98.5 °F (36.9 °C) Oral 80 (!) 29 99 % - -   03/12/17 0026 - - - - - - 5' 2" (1.575 m) 65.5 kg (144 lb 6.4 oz)   03/11/17 2330 - - - 72 (!) 32 96 % - -   03/11/17 2251 (!) 166/70 - - 96 (!) 47 (!) 90 % - -   03/11/17 2210 - - - - - (!) 93 % - -   03/11/17 2200 (!) 116/55 98.5 °F (36.9 °C) Oral 85 (!) 32 100 % 5' 2" (1.575 m) 65.5 kg (144 lb 6.4 oz)   03/11/17 2131 - 98.2 °F (36.8 °C) Axillary - - - - -   03/11/17 2050 - - - 96 17 96 % - -   03/11/17 2041 - - - 98 10 97 % - -   03/11/17 2035 131/75 - - - - (!) 94 % - -   03/11/17 2014 - - - 97 (!) 28 95 % - -   03/11/17 1948 (!) 148/75 - - 98 (!) 23 96 % - -   03/11/17 1946 - - - 100 (!) 27 (!) 89 % - -   03/11/17 1945 - - - 100 (!) 26 (!) 87 % - -   03/11/17 1934 - - - 98 19 96 % - -   03/11/17 1909 - - - 88 (!) 24 98 % - -   03/11/17 1900 - - - 88 (!) 24 95 % - -   03/11/17 1858 - - - 88 12 (!) 94 % - -   03/11/17 1729 - - - (!) 112 - 95 % - -   03/11/17 1728 - - - 98 - (!) 91 % - -   03/11/17 1726 - - - 98 - (!) 91 % - -   03/11/17 1724 124/62 - - - - - - -   03/11/17 1708 108/62 98.6 °F (37 °C) Oral 103 19 (!) 93 % 5' 3" (1.6 m) 74.8 kg (165 lb)       Gen: Well developed, well nourished, no apparent distress, cooperative- reading Sunday paper  HEENT: Anicteric, PERRLA, normocephalic, neck symmetrical  CV: " S1, S2, no murmers/rubs, non-displaced PMI  Lungs: auscultated anteriorly with rhonchi and occasional exp wheeze  Abd: Soft, NT, ND, normal BS's, no HSM  Ext: warm, dry  Neuro: CN II-XII grossly intact, no asterixis.  Skin: No rashes/lesions, normal texture  Psych: AA&O x 4, normal affect    Labs:    Recent Labs  Lab 03/11/17  1858 03/12/17  0700   CALCIUM 8.7 7.7*   PROT 6.9  --     140   K 3.9 3.9   CO2 25 25    109   BUN 18 15   CREATININE 0.8 0.7   ALKPHOS 66  --    ALT 9*  --    AST 13  --    BILITOT 0.2  --      Recent Results (from the past 336 hour(s))   CBC auto differential    Collection Time: 03/12/17  7:00 AM   Result Value Ref Range    WBC 4.36 3.90 - 12.70 K/uL    Hemoglobin 7.8 (L) 12.0 - 16.0 g/dL    Hematocrit 26.2 (L) 37.0 - 48.5 %    Platelets 216 150 - 350 K/uL   CBC auto differential    Collection Time: 03/11/17 11:08 PM   Result Value Ref Range    WBC 5.82 3.90 - 12.70 K/uL    Hemoglobin 8.6 (L) 12.0 - 16.0 g/dL    Hematocrit 28.8 (L) 37.0 - 48.5 %    Platelets 257 150 - 350 K/uL   CBC auto differential    Collection Time: 03/11/17  5:57 PM   Result Value Ref Range    WBC 6.56 3.90 - 12.70 K/uL    Hemoglobin 9.6 (L) 12.0 - 16.0 g/dL    Hematocrit 32.4 (L) 37.0 - 48.5 %    Platelets 294 150 - 350 K/uL       Recent Labs  Lab 03/11/17  2308 03/12/17  0700   INR 0.9  --    APTT <21.0 63.7*       Imaging:  Thoracic aorta fullness- no change from prior    Assessment:  Pt. Is a 88 y.o. female with worsening iron deficiency anemia, respiratory insufficiency and abnl cardiac enzymes    Recommendations:  1. Obtain prior endoscopy records  2. Transfuse prn  3. Continue PPI  4. No plans for endoscopy at this time unless therapeutic intervention is anticipated      I would like to take this opportunity to thank you for this consult.  If you have any questions or concerns, please do not hesitate to contact me.

## 2017-03-12 NOTE — ED NOTES
"Spoke with pt for ambulation down [place] to access O2 saturation on room air, pt refuses to ambulate stating "I feel too weak and I just dont feel like it right now", MD aware and will further evaluate   "

## 2017-03-12 NOTE — SUBJECTIVE & OBJECTIVE
"Past Medical History:   Diagnosis Date    GERD (gastroesophageal reflux disease)     Hypertension        Past Surgical History:   Procedure Laterality Date    APPENDECTOMY      HYSTERECTOMY      TOTAL KNEE ARTHROPLASTY Bilateral        Review of patient's allergies indicates:   Allergen Reactions    Penicillins        No current facility-administered medications on file prior to encounter.      Current Outpatient Prescriptions on File Prior to Encounter   Medication Sig    albuterol-ipratropium 2.5mg-0.5mg/3mL (DUO-NEB) 0.5 mg-3 mg(2.5 mg base)/3 mL nebulizer solution Take 3 mLs by nebulization every 4 (four) hours as needed for Wheezing. Rescue    amlodipine (NORVASC) 2.5 MG tablet TAKE 1 TABLET DAILY    aspirin 81 MG Chew Take 81 mg by mouth once daily.    BD LUER-SLEVIN SYRINGE 3 mL 23 x 1" Syrg     benzonatate (TESSALON) 200 MG capsule Take 1 capsule (200 mg total) by mouth 3 (three) times daily as needed for Cough.    cyanocobalamin 1,000 mcg/mL injection Inject 1 mL (1,000 mcg total) into the muscle once a week. Dispense syringes as well.    dicyclomine (BENTYL) 10 MG capsule take 1 capsule by mouth twice a day if needed    doxycycline (DORYX) 100 MG EC tablet Take 1 tablet (100 mg total) by mouth 2 (two) times daily.    escitalopram oxalate (LEXAPRO) 10 MG tablet TAKE 1 TABLET DAILY    fluticasone (FLOVENT HFA) 110 mcg/actuation inhaler Inhale 1 puff into the lungs 2 (two) times daily. Controller    inhalation device (BREATHERITE RIGID SPACER-MASK) Use as directed for inhalation.    metoprolol succinate (TOPROL-XL) 25 MG 24 hr tablet Take 1 tablet by mouth once daily.    OMEGA-3S/DHA/EPA/FISH OIL (OMEGA 3 ORAL) Take by mouth.    omeprazole (PRILOSEC) 20 MG capsule Take 1 capsule (20 mg total) by mouth once daily.    predniSONE (DELTASONE) 20 MG tablet Take 1 tablet (20 mg total) by mouth once daily. 2 PILLS A DAY FOR 3 DAYS THEN 1 1/2 PILLS A DAY FOR 3 DAYS THEN ONE PILL A DAY FOR 3 DAYS " THEN HALF PILL  A DAY FOR 3 DAYS THEN STOP.    lorazepam (ATIVAN) 0.5 MG tablet Take 1 tablet (0.5 mg total) by mouth every 6 (six) hours as needed for Anxiety.    risedronate (ACTONEL) 150 MG Tab Take 1 tablet (150 mg total) by mouth every 30 days.     Family History     None        Social History Main Topics    Smoking status: Never Smoker    Smokeless tobacco: Not on file    Alcohol use 0.6 oz/week     1 Glasses of wine, 0 Standard drinks or equivalent per week      Comment: occasionally    Drug use: No    Sexual activity: No     Review of Systems   Constitution: Positive for weakness and malaise/fatigue.   HENT: Negative for hoarse voice and nosebleeds.    Eyes: Negative for pain, vision loss in left eye and vision loss in right eye.   Cardiovascular: Positive for chest pain and dyspnea on exertion. Negative for irregular heartbeat, near-syncope, orthopnea, palpitations, paroxysmal nocturnal dyspnea and syncope.   Respiratory: Positive for cough, shortness of breath, sputum production and wheezing. Negative for hemoptysis, sleep disturbances due to breathing and snoring.    Endocrine: Negative for cold intolerance and heat intolerance.   Hematologic/Lymphatic: Negative for adenopathy and bleeding problem.   Skin: Negative for color change and rash.   Musculoskeletal: Negative for falls.   Gastrointestinal: Negative for flatus, heartburn, hematemesis, hematochezia, jaundice, melena, nausea and vomiting.   Genitourinary: Negative for flank pain and hematuria.   Neurological: Negative for dizziness, focal weakness, light-headedness and loss of balance.   Psychiatric/Behavioral: Negative for altered mental status, depression and memory loss. The patient is not nervous/anxious.    Allergic/Immunologic: Negative for hives and persistent infections.     Objective:     Vital Signs (Most Recent):  Temp: 98 °F (36.7 °C) (03/12/17 0300)  Pulse: (!) 55 (03/12/17 1138)  Resp: (!) 30 (03/12/17 1138)  BP: 127/65  (03/12/17 1000)  SpO2: 99 % (03/12/17 1138) Vital Signs (24h Range):  Temp:  [98 °F (36.7 °C)-98.6 °F (37 °C)] 98 °F (36.7 °C)  Pulse:  [] 55  Resp:  [10-49] 30  SpO2:  [87 %-100 %] 99 %  BP: ()/(50-89) 127/65     Weight: 65.5 kg (144 lb 6.4 oz)  Body mass index is 26.41 kg/(m^2).    SpO2: 99 %  O2 Device (Oxygen Therapy): nasal cannula      Intake/Output Summary (Last 24 hours) at 03/12/17 1224  Last data filed at 03/12/17 0710   Gross per 24 hour   Intake              100 ml   Output             1350 ml   Net            -1250 ml       Lines/Drains/Airways     Drain                 Urethral Catheter 03/12/17 0015 Straight-tip less than 1 day          Peripheral Intravenous Line                 Peripheral IV - Single Lumen 03/11/17 1738 Right Forearm less than 1 day         Peripheral IV - Single Lumen 03/11/17 2132 Left Antecubital less than 1 day                Physical Exam   Constitutional: She is oriented to person, place, and time. She appears well-developed and well-nourished. She appears ill. She appears distressed.   HENT:   Head: Normocephalic and atraumatic.   Nose: Nose normal.   Eyes: Right eye exhibits no discharge. Left eye exhibits no discharge. Right conjunctiva is not injected. Left conjunctiva is not injected. Right pupil is round. Left pupil is round. Pupils are equal.   Neck: Neck supple. No JVD present. Carotid bruit is not present. No thyromegaly present.   Cardiovascular: Normal rate, regular rhythm, S1 normal and S2 normal.   No extrasystoles are present. PMI is not displaced.  Exam reveals no gallop.    No murmur heard.  Pulses:       Radial pulses are 2+ on the right side, and 2+ on the left side.        Femoral pulses are 2+ on the right side, and 2+ on the left side.       Dorsalis pedis pulses are 1+ on the right side, and 1+ on the left side.        Posterior tibial pulses are 1+ on the right side, and 1+ on the left side.   Pulmonary/Chest: She is in respiratory distress.  She has no decreased breath sounds. She has wheezes. She has rhonchi. She has no rales.   Abdominal: Soft. Normal appearance. There is no hepatosplenomegaly. There is no tenderness.   Musculoskeletal:        Right ankle: She exhibits no swelling, no ecchymosis and no deformity.        Left ankle: She exhibits no swelling, no ecchymosis and no deformity.   Lymphadenopathy:        Head (right side): No submandibular adenopathy present.        Head (left side): No submandibular adenopathy present.     She has no cervical adenopathy.   Neurological: She is alert and oriented to person, place, and time. She is not disoriented. No cranial nerve deficit.   Skin: Skin is warm, dry and intact. No rash noted. She is not diaphoretic. There is pallor. Nails show no clubbing.   Psychiatric: She has a normal mood and affect. Her speech is normal and behavior is normal. Judgment and thought content normal. Cognition and memory are normal.       Current Medications:     albuterol-ipratropium 2.5mg-0.5mg/3mL  3 mL Nebulization Q4H    aspirin  325 mg Oral Daily    atorvastatin  80 mg Oral Daily    azithromycin  500 mg Intravenous Q24H    carvedilol  3.125 mg Oral BID    ceFEPime (MAXIPIME) IVPB  2 g Intravenous Q12H    clindamycin (CLEOCIN) IVPB  600 mg Intravenous Q8H    methylPREDNISolone sodium succinate  40 mg Intravenous Q8H    pantoprazole  40 mg Oral Daily     Current Laboratory Results:    Recent Results (from the past 24 hour(s))   CBC auto differential    Collection Time: 03/11/17  5:57 PM   Result Value Ref Range    WBC 6.56 3.90 - 12.70 K/uL    RBC 3.85 (L) 4.00 - 5.40 M/uL    Hemoglobin 9.6 (L) 12.0 - 16.0 g/dL    Hematocrit 32.4 (L) 37.0 - 48.5 %    MCV 84 82 - 98 fL    MCH 24.9 (L) 27.0 - 31.0 pg    MCHC 29.6 (L) 32.0 - 36.0 %    RDW 17.1 (H) 11.5 - 14.5 %    Platelets 294 150 - 350 K/uL    MPV 9.9 9.2 - 12.9 fL    Gran # 5.0 1.8 - 7.7 K/uL    Lymph # 1.1 1.0 - 4.8 K/uL    Mono # 0.5 0.3 - 1.0 K/uL    Eos #  0.0 0.0 - 0.5 K/uL    Baso # 0.01 0.00 - 0.20 K/uL    Gran% 75.9 (H) 38.0 - 73.0 %    Lymph% 16.2 (L) 18.0 - 48.0 %    Mono% 7.2 4.0 - 15.0 %    Eosinophil% 0.0 0.0 - 8.0 %    Basophil% 0.2 0.0 - 1.9 %    Differential Method Automated    Brain natriuretic peptide    Collection Time: 03/11/17  5:57 PM   Result Value Ref Range     (H) 0 - 99 pg/mL   Influenza antigen Nasopharyngeal Swab    Collection Time: 03/11/17  6:02 PM   Result Value Ref Range    Influenza A Ag, EIA Negative Negative    Influenza B Ag, EIA Negative Negative    Flu A & B Source Nasopharyngeal Swab    Comprehensive metabolic panel    Collection Time: 03/11/17  6:58 PM   Result Value Ref Range    Sodium 143 136 - 145 mmol/L    Potassium 3.9 3.5 - 5.1 mmol/L    Chloride 108 95 - 110 mmol/L    CO2 25 23 - 29 mmol/L    Glucose 125 (H) 70 - 110 mg/dL    BUN, Bld 18 8 - 23 mg/dL    Creatinine 0.8 0.5 - 1.4 mg/dL    Calcium 8.7 8.7 - 10.5 mg/dL    Total Protein 6.9 6.0 - 8.4 g/dL    Albumin 3.5 3.5 - 5.2 g/dL    Total Bilirubin 0.2 0.1 - 1.0 mg/dL    Alkaline Phosphatase 66 55 - 135 U/L    AST 13 10 - 40 U/L    ALT 9 (L) 10 - 44 U/L    Anion Gap 10 8 - 16 mmol/L    eGFR if African American >60 >60 mL/min/1.73 m^2    eGFR if non African American >60 >60 mL/min/1.73 m^2   Lactic acid, plasma    Collection Time: 03/11/17  6:58 PM   Result Value Ref Range    Lactate (Lactic Acid) 3.4 (H) 0.5 - 2.2 mmol/L   Troponin I    Collection Time: 03/11/17  6:58 PM   Result Value Ref Range    Troponin I 0.047 (H) 0.000 - 0.026 ng/mL   Lactic acid, plasma    Collection Time: 03/11/17  8:21 PM   Result Value Ref Range    Lactate (Lactic Acid) 3.5 (HH) 0.5 - 2.2 mmol/L   ISTAT PROCEDURE    Collection Time: 03/11/17  9:04 PM   Result Value Ref Range    POC PH 7.311 (L) 7.35 - 7.45    POC PCO2 37.6 35 - 45 mmHg    POC PO2 114 (H) 80 - 100 mmHg    POC HCO3 19.0 (L) 24 - 28 mmol/L    POC BE -7 -2 to 2 mmol/L    POC SATURATED O2 98 95 - 100 %    Rate 14     Sample  ARTERIAL     Site RR     Allens Test Pass     DelSys CPAP/BiPAP     Mode BiPAP     FiO2 40     Spont Rate 29     Min Vol 11.6     Sp02 38     IP 10     EP 5    Troponin I    Collection Time: 03/11/17 11:08 PM   Result Value Ref Range    Troponin I 0.071 (H) 0.000 - 0.026 ng/mL   APTT    Collection Time: 03/11/17 11:08 PM   Result Value Ref Range    aPTT <21.0 21.0 - 32.0 sec   Protime-INR    Collection Time: 03/11/17 11:08 PM   Result Value Ref Range    Prothrombin Time 10.4 9.0 - 12.5 sec    INR 0.9 0.8 - 1.2   Platelet count    Collection Time: 03/11/17 11:08 PM   Result Value Ref Range    Platelets 257 150 - 350 K/uL    MPV 9.9 9.2 - 12.9 fL   CBC auto differential    Collection Time: 03/11/17 11:08 PM   Result Value Ref Range    WBC 5.82 3.90 - 12.70 K/uL    RBC 3.39 (L) 4.00 - 5.40 M/uL    Hemoglobin 8.6 (L) 12.0 - 16.0 g/dL    Hematocrit 28.8 (L) 37.0 - 48.5 %    MCV 85 82 - 98 fL    MCH 25.4 (L) 27.0 - 31.0 pg    MCHC 29.9 (L) 32.0 - 36.0 %    RDW 17.1 (H) 11.5 - 14.5 %    Platelets 257 150 - 350 K/uL    MPV 9.9 9.2 - 12.9 fL    Gran # 5.0 1.8 - 7.7 K/uL    Lymph # 0.7 (L) 1.0 - 4.8 K/uL    Mono # 0.1 (L) 0.3 - 1.0 K/uL    Eos # 0.0 0.0 - 0.5 K/uL    Baso # 0.01 0.00 - 0.20 K/uL    Gran% 85.9 (H) 38.0 - 73.0 %    Lymph% 12.4 (L) 18.0 - 48.0 %    Mono% 1.2 (L) 4.0 - 15.0 %    Eosinophil% 0.0 0.0 - 8.0 %    Basophil% 0.2 0.0 - 1.9 %    Differential Method Automated    Lactic acid, plasma    Collection Time: 03/12/17 12:35 AM   Result Value Ref Range    Lactate (Lactic Acid) 4.3 (HH) 0.5 - 2.2 mmol/L   Magnesium    Collection Time: 03/12/17 12:35 AM   Result Value Ref Range    Magnesium 2.1 1.6 - 2.6 mg/dL   Basic metabolic panel    Collection Time: 03/12/17  7:00 AM   Result Value Ref Range    Sodium 140 136 - 145 mmol/L    Potassium 3.9 3.5 - 5.1 mmol/L    Chloride 109 95 - 110 mmol/L    CO2 25 23 - 29 mmol/L    Glucose 134 (H) 70 - 110 mg/dL    BUN, Bld 15 8 - 23 mg/dL    Creatinine 0.7 0.5 - 1.4 mg/dL     Calcium 7.7 (L) 8.7 - 10.5 mg/dL    Anion Gap 6 (L) 8 - 16 mmol/L    eGFR if African American >60 >60 mL/min/1.73 m^2    eGFR if non African American >60 >60 mL/min/1.73 m^2   CBC auto differential    Collection Time: 03/12/17  7:00 AM   Result Value Ref Range    WBC 4.36 3.90 - 12.70 K/uL    RBC 3.11 (L) 4.00 - 5.40 M/uL    Hemoglobin 7.8 (L) 12.0 - 16.0 g/dL    Hematocrit 26.2 (L) 37.0 - 48.5 %    MCV 84 82 - 98 fL    MCH 25.1 (L) 27.0 - 31.0 pg    MCHC 29.8 (L) 32.0 - 36.0 %    RDW 17.0 (H) 11.5 - 14.5 %    Platelets 216 150 - 350 K/uL    MPV 9.6 9.2 - 12.9 fL    Gran # 3.3 1.8 - 7.7 K/uL    Lymph # 0.9 (L) 1.0 - 4.8 K/uL    Mono # 0.1 (L) 0.3 - 1.0 K/uL    Eos # 0.0 0.0 - 0.5 K/uL    Baso # 0.00 0.00 - 0.20 K/uL    Gran% 75.0 (H) 38.0 - 73.0 %    Lymph% 21.1 18.0 - 48.0 %    Mono% 3.2 (L) 4.0 - 15.0 %    Eosinophil% 0.0 0.0 - 8.0 %    Basophil% 0.0 0.0 - 1.9 %    Differential Method Automated    APTT    Collection Time: 03/12/17  7:00 AM   Result Value Ref Range    aPTT 63.7 (H) 21.0 - 32.0 sec   CK-MB    Collection Time: 03/12/17  7:55 AM   Result Value Ref Range    CPK 81 20 - 180 U/L    CPK MB 6.4 0.1 - 6.5 ng/mL    MB% 7.9 (H) 0.0 - 5.0 %   CK    Collection Time: 03/12/17  7:55 AM   Result Value Ref Range    CPK 81 20 - 180 U/L   Troponin I    Collection Time: 03/12/17  7:55 AM   Result Value Ref Range    Troponin I 0.855 (H) 0.000 - 0.026 ng/mL   Urinalysis    Collection Time: 03/12/17 11:44 AM   Result Value Ref Range    Specimen UA Urine, Catheterized     Color, UA Brown (A) Yellow, Straw, Arabella    Appearance, UA Hazy (A) Clear    pH, UA 7.0 5.0 - 8.0    Specific Gravity, UA 1.025 1.005 - 1.030    Protein, UA 2+ (A) Negative    Glucose, UA Negative Negative    Ketones, UA Negative Negative    Bilirubin (UA) Negative Negative    Occult Blood UA 3+ (A) Negative    Nitrite, UA Negative Negative    Urobilinogen, UA Negative <2.0 EU/dL    Leukocytes, UA Negative Negative   Urinalysis Microscopic    Collection  Time: 03/12/17 11:44 AM   Result Value Ref Range    RBC, UA >100 (H) 0 - 4 /hpf    WBC, UA 0 0 - 5 /hpf    Bacteria, UA Rare None-Occ /hpf    Hyaline Casts, UA 0 0-1/lpf /lpf    Microscopic Comment SEE COMMENT      Current Imaging Results:    Imaging Results         X-Ray Chest 1 View (Final result) Result time:  03/11/17 18:06:38    Final result by Cristian Summers MD (03/11/17 18:06:38)    Impression:        Stable appearance from 2015      Electronically signed by: Dr. Cristian Summers MD  Date:     03/11/17  Time:    18:06     Narrative:    PORTABLE AP CHEST:      Comparison: The 11/7/15    Findings:     Thoracic aorta is prominent, and while underlying thoracic aortic aneurysm not excluded, the appearance of the cardia mediastinum is stable from 2015.Prominence of interstitial markings unchanged, upper lobe distribution predominantly.  No new focal lobar consolidation. The cardiac silhouette is unchanged. The pulmonary vasculature is unremarkable. There is no pleural effusion or pneumothorax. There are no acute bony abnormalities.            ECG: NSR. Non specific ST T wave changes.

## 2017-03-12 NOTE — H&P
[facility] Hospital Medicine  History & Physical    Patient Name: Irina Polk  MRN: [id number]  Admission Date: 3/11/2017  Attending Physician: Suhail Ramesh MD   Primary Care Provider: Blanca Quinones MD         Patient information was obtained from patient, relative(s) and ER records.     Subjective:     Principal Problem:Acute hypoxemic respiratory failure    Chief Complaint:   Chief Complaint   Patient presents with    Cough     Pt has had cough and congestion for the last few days. Pt saw PCP yesterday and started on Prednisone. Pt advised if not getting better to come to ED.         HPI: Mr. Polk was brought to the ER with c/o cough, SOB and congestion x few days.  She has had cough and respiratory distress on & off  Since November.  She has has been treated with two courses of antibiotics and prednisone. She also was treated for UTI with antibiotics in last 3 months. Cough and SOB are not accompanied by fever or chills. She has been having intermittent difficulty with swallowing leading to coughing bouts - last one 2 days ago, and prior to that 15 days ago.  She is life long non smoker, and does not have any underlying known lung disease.      Past Medical History:   Diagnosis Date    GERD (gastroesophageal reflux disease)     Hypertension        Past Surgical History:   Procedure Laterality Date    APPENDECTOMY      HYSTERECTOMY      TOTAL KNEE ARTHROPLASTY Bilateral        Review of patient's allergies indicates:   Allergen Reactions    Penicillins        No current facility-administered medications on file prior to encounter.      Current Outpatient Prescriptions on File Prior to Encounter   Medication Sig    albuterol-ipratropium 2.5mg-0.5mg/3mL (DUO-NEB) 0.5 mg-3 mg(2.5 mg base)/3 mL nebulizer solution Take 3 mLs by nebulization every 4 (four) hours as needed for Wheezing. Rescue    amlodipine (NORVASC) 2.5 MG tablet TAKE 1 TABLET DAILY    aspirin 81 MG Chew Take  "81 mg by mouth once daily.    BD LUER-SELVIN SYRINGE 3 mL 23 x 1" Syrg     benzonatate (TESSALON) 200 MG capsule Take 1 capsule (200 mg total) by mouth 3 (three) times daily as needed for Cough.    cyanocobalamin 1,000 mcg/mL injection Inject 1 mL (1,000 mcg total) into the muscle once a week. Dispense syringes as well.    dicyclomine (BENTYL) 10 MG capsule take 1 capsule by mouth twice a day if needed    doxycycline (DORYX) 100 MG EC tablet Take 1 tablet (100 mg total) by mouth 2 (two) times daily.    escitalopram oxalate (LEXAPRO) 10 MG tablet TAKE 1 TABLET DAILY    fluticasone (FLOVENT HFA) 110 mcg/actuation inhaler Inhale 1 puff into the lungs 2 (two) times daily. Controller    inhalation device (BREATHERITE RIGID SPACER-MASK) Use as directed for inhalation.    metoprolol succinate (TOPROL-XL) 25 MG 24 hr tablet Take 1 tablet by mouth once daily.    OMEGA-3S/DHA/EPA/FISH OIL (OMEGA 3 ORAL) Take by mouth.    omeprazole (PRILOSEC) 20 MG capsule Take 1 capsule (20 mg total) by mouth once daily.    predniSONE (DELTASONE) 20 MG tablet Take 1 tablet (20 mg total) by mouth once daily. 2 PILLS A DAY FOR 3 DAYS THEN 1 1/2 PILLS A DAY FOR 3 DAYS THEN ONE PILL A DAY FOR 3 DAYS THEN HALF PILL  A DAY FOR 3 DAYS THEN STOP.    lorazepam (ATIVAN) 0.5 MG tablet Take 1 tablet (0.5 mg total) by mouth every 6 (six) hours as needed for Anxiety.    risedronate (ACTONEL) 150 MG Tab Take 1 tablet (150 mg total) by mouth every 30 days.     Family History     None        Social History Main Topics    Smoking status: Never Smoker    Smokeless tobacco: Not on file    Alcohol use 0.6 oz/week     0 Standard drinks or equivalent, 1 Glasses of wine per week      Comment: occasionally    Drug use: No    Sexual activity: Not on file     Review of Systems   Constitutional: Positive for fatigue. Negative for chills, diaphoresis and fever.   HENT: Positive for congestion. Negative for ear discharge, ear pain and facial swelling.  "   Eyes: Negative for pain, discharge and itching.   Respiratory: Positive for cough, choking, chest tightness, shortness of breath and wheezing. Negative for apnea.    Cardiovascular: Negative for chest pain, palpitations and leg swelling.   Gastrointestinal: Negative for abdominal distention, abdominal pain, anal bleeding and blood in stool.   Endocrine: Negative for cold intolerance, heat intolerance and polydipsia.   Genitourinary: Negative for difficulty urinating, dyspareunia and dysuria.   Musculoskeletal: Negative for arthralgias and back pain.   Skin: Negative for color change and pallor.   Allergic/Immunologic: Negative for environmental allergies and food allergies.   Neurological: Negative for dizziness, facial asymmetry and headaches.   Psychiatric/Behavioral: Negative for agitation.     Objective:     Vital Signs (Most Recent):  Temp: 98.2 °F (36.8 °C) (03/11/17 2131)  Pulse: 72 (03/11/17 2330)  Resp: (!) 32 (03/11/17 2330)  BP: (!) 166/70 (03/11/17 2251)  SpO2: 96 % (03/11/17 2330) Vital Signs (24h Range):  Temp:  [98.2 °F (36.8 °C)-98.6 °F (37 °C)] 98.2 °F (36.8 °C)  Pulse:  [] 72  Resp:  [10-47] 32  SpO2:  [87 %-98 %] 96 %  BP: (108-166)/(62-75) 166/70     Weight: 65.5 kg (144 lb 6.4 oz)  Body mass index is 26.41 kg/(m^2).    Physical Exam   Constitutional: She appears distressed.   HENT:   Head: Normocephalic and atraumatic.   Mouth/Throat: Oropharynx is clear and moist.   Eyes: Pupils are equal, round, and reactive to light. Right eye exhibits no discharge. Left eye exhibits no discharge. No scleral icterus.   Neck: Normal range of motion. Neck supple. No JVD present. No tracheal deviation present.   Cardiovascular: Regular rhythm and normal heart sounds.  Exam reveals no gallop and no friction rub.    No murmur heard.  Tachycardia    Pulmonary/Chest: No stridor. She is in respiratory distress. She has wheezes. She has rales. She exhibits no tenderness.   bilat diminished air movement, with  expiratory wheezing,    Abdominal: Soft. There is no tenderness.   Musculoskeletal:   Trace edema   Lymphadenopathy:     She has no cervical adenopathy.   Neurological: She is alert.   Skin: Skin is warm and dry. She is not diaphoretic.   Nursing note and vitals reviewed.      Significant Labs: All pertinent labs within the past 24 hours have been reviewed.    Significant Imaging:   CXR - unchanged from prior. - which was from Nov 2015.       Assessment/Plan:     Active Diagnoses:    Diagnosis Date Noted POA    PRINCIPAL PROBLEM:  Acute hypoxemic respiratory failure [J96.01] 03/12/2017 Unknown    Respiratory distress [R06.00] 03/11/2017 Yes    HTN (hypertension) [I10] 08/11/2015 Yes      Problems Resolved During this Admission:    Diagnosis Date Noted Date Resolved POA     VTE Risk Mitigation         Ordered     Place sequential compression device  Until discontinued      03/11/17 2128        Acute hypoxemic respiratory failure   - suspect aspiration pneumonitis, Vs severe reactive air way disease Vs obstructive bronchial lesion   - IV abx - cefempime, clindamcyin, zithromax   - received BiPAP in ER and continous nebulilzer treatment   - prednisone   - swallow evaluation to rule of regurgitation   - consider lung CT/Pulmonary consult  Pending swallow evaluation     elvated troponin - 0.047 / BNP - 140  - EKG - non specific ST-T changes   - continue to trend     SCDs  NPO    Admit to ICU  Full code     Sherry Phelan MD  Department of Hospital Medicine   [facility]

## 2017-03-12 NOTE — PROGRESS NOTES
Received pt on BIPAP with documented settings. Pt was later placed on 3LNC; SPO2 97%. Treatments given and tolerated well. No changes made. Will continue to monitor.

## 2017-03-12 NOTE — PLAN OF CARE
Problem: Patient Care Overview  Goal: Plan of Care Review  Outcome: Ongoing (interventions implemented as appropriate)  Patient received from ED on oxygen and later placed on Bipap on documented settings. Will continue to monitor.

## 2017-03-12 NOTE — ASSESSMENT & PLAN NOTE
- sl drop on H&H overnight; transfuse PRBC's (patient agrees and consents to one unit)  - Hx Fe def  - cont PPI  - GI no plans for endoscopy at this time  Lab Results   Component Value Date    IRON 35 03/10/2017    TIBC 548 (H) 03/10/2017    FERRITIN 19 (L) 03/10/2017

## 2017-03-12 NOTE — PROGRESS NOTES
Patient had uneventful day. Complained of discomfort periodically- snyder, IV , SCDs and non-skid socks all removed in an effort to relieve pain. Transported to CT scan via wheelchair, tolerated well. Family at bedside throughout day- transitioned to NC in AM from bipap, tolerated well. Will continue to monitor closely.

## 2017-03-12 NOTE — PLAN OF CARE
Discharge planner met with patient at the bedside, accompanied by her daughter. Patient lives alone w/ a 24-hr sitter and a  for physical therapy. Patient daughter denies any recent inpatient admissions within the last 30 days. Patient will discharge back home w/ sitter services at the time of discharge. PATRIC Mars      3/12/17 1002    Discharge Assessment   Assessment Type Discharge Planning Assessment   Confirmed/corrected address and phone number on facesheet? Yes   Assessment information obtained from? Patient and daughter   Communicated expected length of stay with patient/caregiver no   Type of Healthcare Directive Received Dolly STEELE   If Healthcare Directive is received, is it scanned into Epic? On file    Prior to hospitilization cognitive status: Alert/Oriented   Prior to hospitalization functional status: Assistive Equipment   Current cognitive status: AAOX3   Current Functional Status: Unable to assess    Arrived From home or self-care   Lives With alone   Able to Return to Prior Arrangements yes   Is patient able to care for self after discharge? No   How many people do you have in your home that can help with your care after discharge? 1   Who are your caregiver(s) and their phone number(s)? Carmen Newton, [phone]   Readmission Within The Last 30 Days no previous admission in last 30 days   Patient currently receives home health services? No   Does the patient currently use HME? Yes    Patient currently receives private duty nursing? No   Equipment Currently Used at Home Wheel chair, walker, cane, bsc, nebulizer   Do you have any problems affording any of your prescribed medications? No    Is the patient taking medications as prescribed? yes   Do you have any financial concerns preventing you from receiving the healthcare you need? No   Does the patient have transportation to healthcare appointments? Yes   Transportation Available family    Discharge Plan A Home with  family   Patient/Family In Agreement With Plan Yes

## 2017-03-12 NOTE — PROGRESS NOTES
[facility] Hospital Medicine  Progress Note    Patient Name: Irina Polk  MRN: [id number]  Patient Class: IP- Inpatient   Admission Date: 3/11/2017  Length of Stay: 1 days  Attending Physician: Suhail Ramesh MD  Primary Care Provider: Blanca Quinones MD        Subjective:     Principal Problem:Acute hypoxemic respiratory failure    HPI:  89y/o female with history of HTN and GERD, life long nonsmoker presents to ER with c/o cough, SOB and congestion for a few days. Per family, patient has been experiencing URI sxs since November with increased dry non-productive cough and associated wheezing. She was evaluated by PCP and treated with combination of inhaled bronchodilators, tessalon, abx, but continued to have recurrent episodes with only transient improvement. PCP evaluated her on Friday and provided additional steroid therapy but w/o improvement.           Hospital Course:  Admitted with hypoxic resp failure placed on NIPPV, bronchodilators, steroids, course notable for NSTEMI, per Dr. Wyatt suspect secondary to pulmonary disease with distress with anemia. Doubt primary ACS.         Interval History: off bipap    Review of Systems   Constitutional: Positive for fatigue. Negative for chills and fever.   HENT: Positive for congestion. Negative for mouth sores, sneezing, sore throat and trouble swallowing.    Eyes: Negative for photophobia and visual disturbance.   Respiratory: Positive for cough and shortness of breath. Negative for wheezing and stridor.    Cardiovascular: Negative for chest pain, palpitations and leg swelling.   Gastrointestinal: Negative for abdominal distention, abdominal pain, diarrhea, nausea and vomiting.   Endocrine: Negative for polydipsia and polyphagia.   Genitourinary: Negative for difficulty urinating, hematuria and urgency.   Musculoskeletal: Negative for arthralgias and myalgias.   Skin: Negative for color change.   Neurological: Negative for dizziness,  seizures and headaches.   Psychiatric/Behavioral: Negative for agitation.     Objective:     Vital Signs (Most Recent):  Temp: 98 °F (36.7 °C) (03/12/17 0300)  Pulse: (!) 55 (03/12/17 1138)  Resp: (!) 30 (03/12/17 1138)  BP: 127/65 (03/12/17 1000)  SpO2: 99 % (03/12/17 1138) Vital Signs (24h Range):  Temp:  [98 °F (36.7 °C)-98.6 °F (37 °C)] 98 °F (36.7 °C)  Pulse:  [] 55  Resp:  [10-49] 30  SpO2:  [87 %-100 %] 99 %  BP: ()/(50-89) 127/65     Weight: 65.5 kg (144 lb 6.4 oz)  Body mass index is 26.41 kg/(m^2).    Intake/Output Summary (Last 24 hours) at 03/12/17 1333  Last data filed at 03/12/17 0710   Gross per 24 hour   Intake              100 ml   Output             1350 ml   Net            -1250 ml      Physical Exam   Constitutional: She is oriented to person, place, and time. She appears well-developed and well-nourished. No distress.   Elderly female   HENT:   Head: Normocephalic and atraumatic.   Mouth/Throat: Oropharynx is clear and moist. No oropharyngeal exudate.   Eyes: Conjunctivae are normal. Right eye exhibits no discharge. Left eye exhibits no discharge. No scleral icterus.   Neck: No JVD present.   Cardiovascular: Intact distal pulses.    bradycardia   Pulmonary/Chest: No stridor. She has wheezes (end exp).   Scattered rhonchi, coarse Bs b/l   Abdominal: Soft. Bowel sounds are normal. She exhibits no distension. There is no tenderness.   Musculoskeletal: Normal range of motion. She exhibits no edema, tenderness or deformity.   Neurological: She is alert and oriented to person, place, and time. No cranial nerve deficit.   Skin: Skin is warm and dry. She is not diaphoretic.   Psychiatric: She has a normal mood and affect.   Nursing note and vitals reviewed.      Significant Labs:   ABGs:   Recent Labs  Lab 03/11/17  2104   PH 7.311*   PCO2 37.6   HCO3 19.0*   POCSATURATED 98   BE -7     BMP:   Recent Labs  Lab 03/12/17  0035 03/12/17  0700   GLU  --  134*   NA  --  140   K  --  3.9   CL  --   109   CO2  --  25   BUN  --  15   CREATININE  --  0.7   CALCIUM  --  7.7*   MG 2.1  --      CBC:   Recent Labs  Lab 03/11/17  1757 03/11/17  2308 03/12/17  0700   WBC 6.56 5.82 4.36   HGB 9.6* 8.6* 7.8*   HCT 32.4* 28.8* 26.2*    257  257 216     Troponin:   Recent Labs  Lab 03/11/17  1858 03/11/17  2308 03/12/17  0755   TROPONINI 0.047* 0.071* 0.855*     Urine Culture: No results for input(s): LABURIN in the last 48 hours.  Urine Studies:   Recent Labs  Lab 03/12/17  1144   COLORU Brown*   APPEARANCEUA Hazy*   PHUR 7.0   SPECGRAV 1.025   PROTEINUA 2+*   GLUCUA Negative   KETONESU Negative   BILIRUBINUA Negative   OCCULTUA 3+*   NITRITE Negative   UROBILINOGEN Negative   LEUKOCYTESUR Negative   RBCUA >100*   WBCUA 0   BACTERIA Rare   HYALINECASTS 0     All pertinent labs within the past 24 hours have been reviewed.    Significant Imaging: I have reviewed all pertinent imaging results/findings within the past 24 hours. CXR  Thoracic aorta is prominent, and while underlying thoracic aortic aneurysm not excluded, the appearance of the cardia mediastinum is stable from 2015.Prominence of interstitial markings unchanged, upper lobe distribution predominantly.  No new focal lobar consolidation. The cardiac silhouette is unchanged. The pulmonary vasculature is unremarkable. There is no pleural effusion or pneumothorax. There are no acute bony abnormalities.    Assessment/Plan:      * Acute hypoxemic respiratory failure  - cont bronchodilators  - cont steroids  - follow CT chest  - ECHO  - Speech eval and treat  - resp cultures  - cont Zithromax, cefepime, clinda (suspected possible aspiration)  - follow cultures  - Bipap PRN  - wean O2 as tolerated      HTN (hypertension)  - on toprol 25 mg and amlodipine 2.5 mg daily at home  - BP meds held yesterday d/t hypotension  - monitor       Hyperlipidemia  - cont atorvastatin  - check lipid      Iron deficiency anemia  - sl drop on H&H overnight; transfuse PRBC's (patient  agrees and consents to one unit)  - Hx Fe def  - cont PPI  - GI no plans for endoscopy at this time  Lab Results   Component Value Date    IRON 35 03/10/2017    TIBC 548 (H) 03/10/2017    FERRITIN 19 (L) 03/10/2017         Difficulty swallowing  - speech eval      Elevated troponin  - NSTEMI;  Troponin 0.9, nonspecific ST T wave changes.  - per Cardiology secondary to pulmonary disease with distress with anemia  - Doubt primary ACS  - heparin drip dc'd  - check ECHO       Lactic acid acidosis  - lactic acid 4.1  - on abx  - follow cultures  - trend lactic acid      VTE Risk Mitigation         Ordered     heparin (porcine) injection 5,000 Units  Every 8 hours     Route:  Subcutaneous        03/12/17 1349     Place sequential compression device  Until discontinued      03/11/17 0737          Jennifer Wiseman PA-C  Department of Hospital Medicine   [facility]

## 2017-03-12 NOTE — CONSULTS
Pulmonary & Critical Care Medicine 3/12/2017   Consultation Note    Staff- Marlee   Fellow- Coral     Reason for Consultation: Dyspnea     HPI: This is an 87y/o female with history of HTN and GERD, life long nonsmoker currently admitted to the ICU. At the time of my evaluation, her daughter Lilliam is present at bedside and provides majority of history. In speaking with patient and family it appears that she was in her [facility] until around mid November. At that time, she had some URI like symptoms and began with increased dry non-productive cough and associated wheezing. She was evaluated by PCP and treated with combination of inhaled bronchodilators, tessalon, abx. Some transient improvement noted, but never fully returned to baseline. She continued to experience these recurrent episodes of cough, SOB and wheezing over the last several months and has had several rounds of additional therapy with again, only transient improvement. Most recently she was noted on Thursday to have return of symptoms. PCP evaluated her on Friday and provided additional steroid therapy. She continued to worsen despite medications and yesterday appeared more fatigued and dyspneic so was brought by family to ED for additional evaluation. On arrival she was noted to have increased WOB, hypoxia and respiratory distress. Placed on clinda, azithro and rocephin and administered nebs and steroids.  Did develop some anterior chest pain and left shoulder pain overnight. New anterolateral ST depressions and slight elevation in troponin. Initiated on beta blocker, NTG infusion and full dose AC for management of NSTEMI. No pain this AM. Required NIPPV overnight, but now on NC. Feels improved and per family looks more comfortable         Additional History:    -- Life long non-tobacco use.   -- lives alone and . No recent moves and in current home >7 years   -- No pets or new exposures.   -- No history of asthma or childhood lung disease.   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 No clear exposures.   -- Does admit to intermittent reflux/GERD and occasional coughing/choking on food       Past Medical History:   Diagnosis Date    GERD (gastroesophageal reflux disease)     Hypertension      Past Surgical History:   Procedure Laterality Date    APPENDECTOMY      HYSTERECTOMY      TOTAL KNEE ARTHROPLASTY Bilateral        History reviewed. No pertinent family history.  Drug Allergies:   Review of patient's allergies indicates:   Allergen Reactions    Penicillins      Current Infusions:   heparin (porcine) in D5W 16 Units/kg/hr (03/12/17 0043)    nitroGLYCERIN       Scheduled Medications:     albuterol-ipratropium 2.5mg-0.5mg/3mL  3 mL Nebulization Q4H    aspirin  325 mg Oral Daily    atorvastatin  80 mg Oral Daily    azithromycin  500 mg Intravenous Q24H    carvedilol  3.125 mg Oral BID    ceFEPime (MAXIPIME) IVPB  2 g Intravenous Q12H    clindamycin (CLEOCIN) IVPB  600 mg Intravenous Q8H    methylPREDNISolone sodium succinate  40 mg Intravenous Q8H    pantoprazole  40 mg Oral Daily     PRN Medications:   acetaminophen, diphenhydrAMINE, heparin (PORCINE), heparin (PORCINE), nitroGLYCERIN, oxybutynin    Review of Systems:   A comprehensive 12-point review of systems was performed, and is negative except for those items mentioned above in the HPI section of this note.     Vital Signs:    Temp:  [98 °F (36.7 °C)-98.6 °F (37 °C)] 98 °F (36.7 °C)  Pulse:  [] 58  Resp:  [10-49] 29  SpO2:  [87 %-100 %] 97 %  BP: ()/(50-89) 127/65       Fluid Balance:     Intake/Output Summary (Last 24 hours) at 03/12/17 1047  Last data filed at 03/12/17 0710   Gross per 24 hour   Intake              100 ml   Output             1350 ml   Net            -1250 ml       Physical Exam:     GEN- NAD AAOx3 elderly. NC in place, reading news paper.   HEENT- ATNC, PERRLA, EOMI, OP-Cl. No JVD, LAD or bruit noted. Trachea Midline.   CV- Reg, jose at  59 bpm.  No M/R/G  RESP- coarse rhonchi and scattered end expiratory wheezing noted.    GI- S/ND. Positive BS X 4. No HSM Noted. Mild TTP noted in the suprapubic lower abd region. No R/G noted.   BACK- Spine midline. No step off, crepitus or deformity noted. No midline TTP.   Ext- MAEW, No deformity. No edema or rashes noted. She has mild TTP right lateral Mal. Small area of ecchymosis noted.    Neuro- Strength 5/5 symmetric. No focal neurologic deficits noted.       Personal Review and Summary of Prior Diagnostics    Laboratory Studies:     Recent Labs  Lab 03/11/17  2104   PH 7.311*   PCO2 37.6   PO2 114*   HCO3 19.0*   POCSATURATED 98   BE -7     Hb 9.5---7.8     Lactic 3.4--3.5--4.3     Troponin- 0.047----0.855     BNP- 140       Recent Labs  Lab 03/12/17  0700   WBC 4.36   RBC 3.11*   HGB 7.8*   HCT 26.2*      MCV 84   MCH 25.1*   MCHC 29.8*       Recent Labs  Lab 03/12/17  0035 03/12/17  0700   NA  --  140   K  --  3.9   CL  --  109   CO2  --  25   BUN  --  15   CREATININE  --  0.7   MG 2.1  --        Microbiology Data:   Microbiology Results (last 7 days)     Procedure Component Value Units Date/Time    Urine culture [[id number]]     Order Status:  No result Specimen:  Urine from Urine, Catheterized     Blood culture x two cultures. Draw prior to antibiotics. [[id number]] Collected:  03/11/17 2022    Order Status:  Sent Specimen:  Blood from Peripheral, Upper Arm, Left Updated:  03/12/17 0843    Narrative:       Aerobic and anaerobic    Blood culture x two cultures. Draw prior to antibiotics. [[id number]] Collected:  03/11/17 2122    Order Status:  Sent Specimen:  Blood from Peripheral, Hand, Right Updated:  03/12/17 0843    Narrative:       Aerobic and anaerobic    Urine culture [[id number]] Collected:  03/12/17 0017    Order Status:  Sent Specimen:  Urine from Urine, Catheterized Updated:  03/12/17 0833    Blood Culture #1 **CANNOT BE ORDERED STAT** [[id number]]     Order Status:  Canceled Specimen:   Blood     Blood Culture #2 **CANNOT BE ORDERED STAT** [[id number]]     Order Status:  Canceled Specimen:  Blood         Summary of Chest Imaging Personally Reviewed: Normal cardiac size. She has some fullness in the AP window. Bilateral increased linear reticular interstitial markings in bilateral lung fields. No shaista consolidation noted. Normal lung volumes. Overall, her  is stable compared to imaging on Nov 2016     CT imaging- None     2D Echo: None    PFT's: None     Impression & Recommendations    1. Hypoxemic respiratory failure- In speaking with patient, she has experienced recurrent symptoms characterized by dyspnea, cough, and wheezing suggestive of a bronchitis like picture since November.  Despite multiple rounds of abx, tamiflu, nebs and steroids Noted improvement is little and only transient in nature. At present no URI features, afebrile. Life long non-smoker and no clear triggers or precipitants. Does endorse some GERD like features and other choking etc that may point toward underlying aspiration. Lung exam is notable for coarse rhonchi and and wheezing. Her chest imaging has normal lung volumes, but increased interstitial linear markings which are relatively unchanged dating back to 2015, and may point toward more chronic pulmonary parenchymal disease.     -- Would obtain non contrast CT chest to further characterize   -- Check 2D echo   -- Agree with scheduled bronchodilators Q4h and steroids for now   -- Abx pending cultures. Check procalcitonin   -- Check sputum cultures   -- Wean Supplemental O2 to maintain sats >90%   -- NIPPV prn   -- Check swallow study, continue PPI     2. Chest pain, elevated troponin- Pain resolved and off NTG infusion. She is currently being managed as NSTEMI. Slow rise in troponin which is more likely related to demand and underlying illness. Check Echo. Cards following     3. Lactic Acidosis- Elevated to 4 this AM. Hemodynamic appear adequate and not requiring  vasopressor therapy. Clinically euvolemic. No clear source of infection to date, but cultures pending and on broad pectrum coverage. Awaiting UA and culture as well. Continue to trend, supportive care for now.     4. Anemia- CANDELARIA. Slight decrease in Hb overnight. No active bleeding noted. Hold transfusion unless <7.     5. Abdominal Pain- Burning sensation in suprapubic region. Onset corresponds to snyder cath placement. Exam with mild lower TTP, but without any localizing or peritoneal findings noted. Check UA. Serial abdominal exams.    6. DVT PPX-  TEDs/SCDs at present. Heparin Gtt         Gustavo Moncada M.D.  [facility] Pulmonary/Critical Care Fellow- PGY VII   [phone]

## 2017-03-12 NOTE — CONSULTS
"[facility]  Cardiology  Consult Note    Patient Name: Irina Polk  MRN: [id number]  Admission Date: 3/11/2017  Hospital Length of Stay: 1 days  Code Status: Full Code   Attending Provider: Suhail Ramesh MD   Consulting Provider: Will Wyatt MD  Primary Care Physician: Blanca Quinones MD  Principal Problem:Acute hypoxemic respiratory failure    Patient information was obtained from patient and past medical records.     Consults  Subjective:     Chief Complaint:  Shortness of Breath and CP    HPI:   89 yo female with severe pulmonary disease. Over the last week she has had a lot of coughing and SOB. Presents to [facility] with wheezing and admitted. Early this am some chest tightness.    Past Medical History:   Diagnosis Date    GERD (gastroesophageal reflux disease)     Hypertension        Past Surgical History:   Procedure Laterality Date    APPENDECTOMY      HYSTERECTOMY      TOTAL KNEE ARTHROPLASTY Bilateral        Review of patient's allergies indicates:   Allergen Reactions    Penicillins        No current facility-administered medications on file prior to encounter.      Current Outpatient Prescriptions on File Prior to Encounter   Medication Sig    albuterol-ipratropium 2.5mg-0.5mg/3mL (DUO-NEB) 0.5 mg-3 mg(2.5 mg base)/3 mL nebulizer solution Take 3 mLs by nebulization every 4 (four) hours as needed for Wheezing. Rescue    amlodipine (NORVASC) 2.5 MG tablet TAKE 1 TABLET DAILY    aspirin 81 MG Chew Take 81 mg by mouth once daily.    BD LUER-SELVIN SYRINGE 3 mL 23 x 1" Syrg     benzonatate (TESSALON) 200 MG capsule Take 1 capsule (200 mg total) by mouth 3 (three) times daily as needed for Cough.    cyanocobalamin 1,000 mcg/mL injection Inject 1 mL (1,000 mcg total) into the muscle once a week. Dispense syringes as well.    dicyclomine (BENTYL) 10 MG capsule take 1 capsule by mouth twice a day if needed    doxycycline (DORYX) 100 MG EC tablet Take 1 tablet (100 mg total) by " mouth 2 (two) times daily.    escitalopram oxalate (LEXAPRO) 10 MG tablet TAKE 1 TABLET DAILY    fluticasone (FLOVENT HFA) 110 mcg/actuation inhaler Inhale 1 puff into the lungs 2 (two) times daily. Controller    inhalation device (BREATHERITE RIGID SPACER-MASK) Use as directed for inhalation.    metoprolol succinate (TOPROL-XL) 25 MG 24 hr tablet Take 1 tablet by mouth once daily.    OMEGA-3S/DHA/EPA/FISH OIL (OMEGA 3 ORAL) Take by mouth.    omeprazole (PRILOSEC) 20 MG capsule Take 1 capsule (20 mg total) by mouth once daily.    predniSONE (DELTASONE) 20 MG tablet Take 1 tablet (20 mg total) by mouth once daily. 2 PILLS A DAY FOR 3 DAYS THEN 1 1/2 PILLS A DAY FOR 3 DAYS THEN ONE PILL A DAY FOR 3 DAYS THEN HALF PILL  A DAY FOR 3 DAYS THEN STOP.    lorazepam (ATIVAN) 0.5 MG tablet Take 1 tablet (0.5 mg total) by mouth every 6 (six) hours as needed for Anxiety.    risedronate (ACTONEL) 150 MG Tab Take 1 tablet (150 mg total) by mouth every 30 days.     Family History     None        Social History Main Topics    Smoking status: Never Smoker    Smokeless tobacco: Not on file    Alcohol use 0.6 oz/week     1 Glasses of wine, 0 Standard drinks or equivalent per week      Comment: occasionally    Drug use: No    Sexual activity: No     Review of Systems   Constitution: Positive for weakness and malaise/fatigue.   HENT: Negative for hoarse voice and nosebleeds.    Eyes: Negative for pain, vision loss in left eye and vision loss in right eye.   Cardiovascular: Positive for chest pain and dyspnea on exertion. Negative for irregular heartbeat, near-syncope, orthopnea, palpitations, paroxysmal nocturnal dyspnea and syncope.   Respiratory: Positive for cough, shortness of breath, sputum production and wheezing. Negative for hemoptysis, sleep disturbances due to breathing and snoring.    Endocrine: Negative for cold intolerance and heat intolerance.   Hematologic/Lymphatic: Negative for adenopathy and bleeding  problem.   Skin: Negative for color change and rash.   Musculoskeletal: Negative for falls.   Gastrointestinal: Negative for flatus, heartburn, hematemesis, hematochezia, jaundice, melena, nausea and vomiting.   Genitourinary: Negative for flank pain and hematuria.   Neurological: Negative for dizziness, focal weakness, light-headedness and loss of balance.   Psychiatric/Behavioral: Negative for altered mental status, depression and memory loss. The patient is not nervous/anxious.    Allergic/Immunologic: Negative for hives and persistent infections.     Objective:     Vital Signs (Most Recent):  Temp: 98 °F (36.7 °C) (03/12/17 0300)  Pulse: (!) 55 (03/12/17 1138)  Resp: (!) 30 (03/12/17 1138)  BP: 127/65 (03/12/17 1000)  SpO2: 99 % (03/12/17 1138) Vital Signs (24h Range):  Temp:  [98 °F (36.7 °C)-98.6 °F (37 °C)] 98 °F (36.7 °C)  Pulse:  [] 55  Resp:  [10-49] 30  SpO2:  [87 %-100 %] 99 %  BP: ()/(50-89) 127/65     Weight: 65.5 kg (144 lb 6.4 oz)  Body mass index is 26.41 kg/(m^2).    SpO2: 99 %  O2 Device (Oxygen Therapy): nasal cannula      Intake/Output Summary (Last 24 hours) at 03/12/17 1224  Last data filed at 03/12/17 0710   Gross per 24 hour   Intake              100 ml   Output             1350 ml   Net            -1250 ml       Lines/Drains/Airways     Drain                 Urethral Catheter 03/12/17 0015 Straight-tip less than 1 day          Peripheral Intravenous Line                 Peripheral IV - Single Lumen 03/11/17 1738 Right Forearm less than 1 day         Peripheral IV - Single Lumen 03/11/17 2132 Left Antecubital less than 1 day                Physical Exam   Constitutional: She is oriented to person, place, and time. She appears well-developed and well-nourished. She appears ill. She appears distressed.   HENT:   Head: Normocephalic and atraumatic.   Nose: Nose normal.   Eyes: Right eye exhibits no discharge. Left eye exhibits no discharge. Right conjunctiva is not injected. Left  conjunctiva is not injected. Right pupil is round. Left pupil is round. Pupils are equal.   Neck: Neck supple. No JVD present. Carotid bruit is not present. No thyromegaly present.   Cardiovascular: Normal rate, regular rhythm, S1 normal and S2 normal.   No extrasystoles are present. PMI is not displaced.  Exam reveals no gallop.    No murmur heard.  Pulses:       Radial pulses are 2+ on the right side, and 2+ on the left side.        Femoral pulses are 2+ on the right side, and 2+ on the left side.       Dorsalis pedis pulses are 1+ on the right side, and 1+ on the left side.        Posterior tibial pulses are 1+ on the right side, and 1+ on the left side.   Pulmonary/Chest: She is in respiratory distress. She has no decreased breath sounds. She has wheezes. She has rhonchi. She has no rales.   Abdominal: Soft. Normal appearance. There is no hepatosplenomegaly. There is no tenderness.   Musculoskeletal:        Right ankle: She exhibits no swelling, no ecchymosis and no deformity.        Left ankle: She exhibits no swelling, no ecchymosis and no deformity.   Lymphadenopathy:        Head (right side): No submandibular adenopathy present.        Head (left side): No submandibular adenopathy present.     She has no cervical adenopathy.   Neurological: She is alert and oriented to person, place, and time. She is not disoriented. No cranial nerve deficit.   Skin: Skin is warm, dry and intact. No rash noted. She is not diaphoretic. There is pallor. Nails show no clubbing.   Psychiatric: She has a normal mood and affect. Her speech is normal and behavior is normal. Judgment and thought content normal. Cognition and memory are normal.       Current Medications:     albuterol-ipratropium 2.5mg-0.5mg/3mL  3 mL Nebulization Q4H    aspirin  325 mg Oral Daily    atorvastatin  80 mg Oral Daily    azithromycin  500 mg Intravenous Q24H    carvedilol  3.125 mg Oral BID    ceFEPime (MAXIPIME) IVPB  2 g Intravenous Q12H     clindamycin (CLEOCIN) IVPB  600 mg Intravenous Q8H    methylPREDNISolone sodium succinate  40 mg Intravenous Q8H    pantoprazole  40 mg Oral Daily     Current Laboratory Results:    Recent Results (from the past 24 hour(s))   CBC auto differential    Collection Time: 03/11/17  5:57 PM   Result Value Ref Range    WBC 6.56 3.90 - 12.70 K/uL    RBC 3.85 (L) 4.00 - 5.40 M/uL    Hemoglobin 9.6 (L) 12.0 - 16.0 g/dL    Hematocrit 32.4 (L) 37.0 - 48.5 %    MCV 84 82 - 98 fL    MCH 24.9 (L) 27.0 - 31.0 pg    MCHC 29.6 (L) 32.0 - 36.0 %    RDW 17.1 (H) 11.5 - 14.5 %    Platelets 294 150 - 350 K/uL    MPV 9.9 9.2 - 12.9 fL    Gran # 5.0 1.8 - 7.7 K/uL    Lymph # 1.1 1.0 - 4.8 K/uL    Mono # 0.5 0.3 - 1.0 K/uL    Eos # 0.0 0.0 - 0.5 K/uL    Baso # 0.01 0.00 - 0.20 K/uL    Gran% 75.9 (H) 38.0 - 73.0 %    Lymph% 16.2 (L) 18.0 - 48.0 %    Mono% 7.2 4.0 - 15.0 %    Eosinophil% 0.0 0.0 - 8.0 %    Basophil% 0.2 0.0 - 1.9 %    Differential Method Automated    Brain natriuretic peptide    Collection Time: 03/11/17  5:57 PM   Result Value Ref Range     (H) 0 - 99 pg/mL   Influenza antigen Nasopharyngeal Swab    Collection Time: 03/11/17  6:02 PM   Result Value Ref Range    Influenza A Ag, EIA Negative Negative    Influenza B Ag, EIA Negative Negative    Flu A & B Source Nasopharyngeal Swab    Comprehensive metabolic panel    Collection Time: 03/11/17  6:58 PM   Result Value Ref Range    Sodium 143 136 - 145 mmol/L    Potassium 3.9 3.5 - 5.1 mmol/L    Chloride 108 95 - 110 mmol/L    CO2 25 23 - 29 mmol/L    Glucose 125 (H) 70 - 110 mg/dL    BUN, Bld 18 8 - 23 mg/dL    Creatinine 0.8 0.5 - 1.4 mg/dL    Calcium 8.7 8.7 - 10.5 mg/dL    Total Protein 6.9 6.0 - 8.4 g/dL    Albumin 3.5 3.5 - 5.2 g/dL    Total Bilirubin 0.2 0.1 - 1.0 mg/dL    Alkaline Phosphatase 66 55 - 135 U/L    AST 13 10 - 40 U/L    ALT 9 (L) 10 - 44 U/L    Anion Gap 10 8 - 16 mmol/L    eGFR if African American >60 >60 mL/min/1.73 m^2    eGFR if non African  American >60 >60 mL/min/1.73 m^2   Lactic acid, plasma    Collection Time: 03/11/17  6:58 PM   Result Value Ref Range    Lactate (Lactic Acid) 3.4 (H) 0.5 - 2.2 mmol/L   Troponin I    Collection Time: 03/11/17  6:58 PM   Result Value Ref Range    Troponin I 0.047 (H) 0.000 - 0.026 ng/mL   Lactic acid, plasma    Collection Time: 03/11/17  8:21 PM   Result Value Ref Range    Lactate (Lactic Acid) 3.5 (HH) 0.5 - 2.2 mmol/L   ISTAT PROCEDURE    Collection Time: 03/11/17  9:04 PM   Result Value Ref Range    POC PH 7.311 (L) 7.35 - 7.45    POC PCO2 37.6 35 - 45 mmHg    POC PO2 114 (H) 80 - 100 mmHg    POC HCO3 19.0 (L) 24 - 28 mmol/L    POC BE -7 -2 to 2 mmol/L    POC SATURATED O2 98 95 - 100 %    Rate 14     Sample ARTERIAL     Site RR     Allens Test Pass     DelSys CPAP/BiPAP     Mode BiPAP     FiO2 40     Spont Rate 29     Min Vol 11.6     Sp02 38     IP 10     EP 5    Troponin I    Collection Time: 03/11/17 11:08 PM   Result Value Ref Range    Troponin I 0.071 (H) 0.000 - 0.026 ng/mL   APTT    Collection Time: 03/11/17 11:08 PM   Result Value Ref Range    aPTT <21.0 21.0 - 32.0 sec   Protime-INR    Collection Time: 03/11/17 11:08 PM   Result Value Ref Range    Prothrombin Time 10.4 9.0 - 12.5 sec    INR 0.9 0.8 - 1.2   Platelet count    Collection Time: 03/11/17 11:08 PM   Result Value Ref Range    Platelets 257 150 - 350 K/uL    MPV 9.9 9.2 - 12.9 fL   CBC auto differential    Collection Time: 03/11/17 11:08 PM   Result Value Ref Range    WBC 5.82 3.90 - 12.70 K/uL    RBC 3.39 (L) 4.00 - 5.40 M/uL    Hemoglobin 8.6 (L) 12.0 - 16.0 g/dL    Hematocrit 28.8 (L) 37.0 - 48.5 %    MCV 85 82 - 98 fL    MCH 25.4 (L) 27.0 - 31.0 pg    MCHC 29.9 (L) 32.0 - 36.0 %    RDW 17.1 (H) 11.5 - 14.5 %    Platelets 257 150 - 350 K/uL    MPV 9.9 9.2 - 12.9 fL    Gran # 5.0 1.8 - 7.7 K/uL    Lymph # 0.7 (L) 1.0 - 4.8 K/uL    Mono # 0.1 (L) 0.3 - 1.0 K/uL    Eos # 0.0 0.0 - 0.5 K/uL    Baso # 0.01 0.00 - 0.20 K/uL    Gran% 85.9 (H) 38.0 -  73.0 %    Lymph% 12.4 (L) 18.0 - 48.0 %    Mono% 1.2 (L) 4.0 - 15.0 %    Eosinophil% 0.0 0.0 - 8.0 %    Basophil% 0.2 0.0 - 1.9 %    Differential Method Automated    Lactic acid, plasma    Collection Time: 03/12/17 12:35 AM   Result Value Ref Range    Lactate (Lactic Acid) 4.3 (HH) 0.5 - 2.2 mmol/L   Magnesium    Collection Time: 03/12/17 12:35 AM   Result Value Ref Range    Magnesium 2.1 1.6 - 2.6 mg/dL   Basic metabolic panel    Collection Time: 03/12/17  7:00 AM   Result Value Ref Range    Sodium 140 136 - 145 mmol/L    Potassium 3.9 3.5 - 5.1 mmol/L    Chloride 109 95 - 110 mmol/L    CO2 25 23 - 29 mmol/L    Glucose 134 (H) 70 - 110 mg/dL    BUN, Bld 15 8 - 23 mg/dL    Creatinine 0.7 0.5 - 1.4 mg/dL    Calcium 7.7 (L) 8.7 - 10.5 mg/dL    Anion Gap 6 (L) 8 - 16 mmol/L    eGFR if African American >60 >60 mL/min/1.73 m^2    eGFR if non African American >60 >60 mL/min/1.73 m^2   CBC auto differential    Collection Time: 03/12/17  7:00 AM   Result Value Ref Range    WBC 4.36 3.90 - 12.70 K/uL    RBC 3.11 (L) 4.00 - 5.40 M/uL    Hemoglobin 7.8 (L) 12.0 - 16.0 g/dL    Hematocrit 26.2 (L) 37.0 - 48.5 %    MCV 84 82 - 98 fL    MCH 25.1 (L) 27.0 - 31.0 pg    MCHC 29.8 (L) 32.0 - 36.0 %    RDW 17.0 (H) 11.5 - 14.5 %    Platelets 216 150 - 350 K/uL    MPV 9.6 9.2 - 12.9 fL    Gran # 3.3 1.8 - 7.7 K/uL    Lymph # 0.9 (L) 1.0 - 4.8 K/uL    Mono # 0.1 (L) 0.3 - 1.0 K/uL    Eos # 0.0 0.0 - 0.5 K/uL    Baso # 0.00 0.00 - 0.20 K/uL    Gran% 75.0 (H) 38.0 - 73.0 %    Lymph% 21.1 18.0 - 48.0 %    Mono% 3.2 (L) 4.0 - 15.0 %    Eosinophil% 0.0 0.0 - 8.0 %    Basophil% 0.0 0.0 - 1.9 %    Differential Method Automated    APTT    Collection Time: 03/12/17  7:00 AM   Result Value Ref Range    aPTT 63.7 (H) 21.0 - 32.0 sec   CK-MB    Collection Time: 03/12/17  7:55 AM   Result Value Ref Range    CPK 81 20 - 180 U/L    CPK MB 6.4 0.1 - 6.5 ng/mL    MB% 7.9 (H) 0.0 - 5.0 %   CK    Collection Time: 03/12/17  7:55 AM   Result Value Ref Range     CPK 81 20 - 180 U/L   Troponin I    Collection Time: 03/12/17  7:55 AM   Result Value Ref Range    Troponin I 0.855 (H) 0.000 - 0.026 ng/mL   Urinalysis    Collection Time: 03/12/17 11:44 AM   Result Value Ref Range    Specimen UA Urine, Catheterized     Color, UA Brown (A) Yellow, Straw, Arabella    Appearance, UA Hazy (A) Clear    pH, UA 7.0 5.0 - 8.0    Specific Gravity, UA 1.025 1.005 - 1.030    Protein, UA 2+ (A) Negative    Glucose, UA Negative Negative    Ketones, UA Negative Negative    Bilirubin (UA) Negative Negative    Occult Blood UA 3+ (A) Negative    Nitrite, UA Negative Negative    Urobilinogen, UA Negative <2.0 EU/dL    Leukocytes, UA Negative Negative   Urinalysis Microscopic    Collection Time: 03/12/17 11:44 AM   Result Value Ref Range    RBC, UA >100 (H) 0 - 4 /hpf    WBC, UA 0 0 - 5 /hpf    Bacteria, UA Rare None-Occ /hpf    Hyaline Casts, UA 0 0-1/lpf /lpf    Microscopic Comment SEE COMMENT      Current Imaging Results:    Imaging Results         X-Ray Chest 1 View (Final result) Result time:  03/11/17 18:06:38    Final result by Cristian Summers MD (03/11/17 18:06:38)    Impression:        Stable appearance from 2015      Electronically signed by: Dr. Cristian Summers MD  Date:     03/11/17  Time:    18:06     Narrative:    PORTABLE AP CHEST:      Comparison: The 11/7/15    Findings:     Thoracic aorta is prominent, and while underlying thoracic aortic aneurysm not excluded, the appearance of the cardia mediastinum is stable from 2015.Prominence of interstitial markings unchanged, upper lobe distribution predominantly.  No new focal lobar consolidation. The cardiac silhouette is unchanged. The pulmonary vasculature is unremarkable. There is no pleural effusion or pneumothorax. There are no acute bony abnormalities.            ECG: NSR. Non specific ST T wave changes.      Assessment and Plan:     1. Shortness of Breath   Severe pulmonary disease.    2. Non ST Segment Elevation Myocardial  Infarction   3/12/2017: NSTEMI. Troponin 0.9. Minor nonspecific ST T wave changes.   Suspect secondary to pulmonary disease with distress with anemia.   Doubt primary ACS.   Do Echo.   Follow troponins.   I will stop heparin.   I\ would favor PRBCs to hct close to 30%.      VTE Risk Mitigation         Ordered     Place sequential compression device  Until discontinued      03/11/17 3604          Thank you for your consult.    I will follow-up with patient. Please contact us if you have any additional questions.    Will Wyatt MD  Cardiology   [facility]

## 2017-03-12 NOTE — ASSESSMENT & PLAN NOTE
- NSTEMI;  Troponin 0.9, nonspecific ST T wave changes.  - per Cardiology secondary to pulmonary disease with distress with anemia  - Doubt primary ACS  - heparin drip dc'd  - check ECHO

## 2017-03-12 NOTE — SUBJECTIVE & OBJECTIVE
Interval History: off bipap    Review of Systems   Constitutional: Positive for fatigue. Negative for chills and fever.   HENT: Positive for congestion. Negative for mouth sores, sneezing, sore throat and trouble swallowing.    Eyes: Negative for photophobia and visual disturbance.   Respiratory: Positive for cough and shortness of breath. Negative for wheezing and stridor.    Cardiovascular: Negative for chest pain, palpitations and leg swelling.   Gastrointestinal: Negative for abdominal distention, abdominal pain, diarrhea, nausea and vomiting.   Endocrine: Negative for polydipsia and polyphagia.   Genitourinary: Negative for difficulty urinating, hematuria and urgency.   Musculoskeletal: Negative for arthralgias and myalgias.   Skin: Negative for color change.   Neurological: Negative for dizziness, seizures and headaches.   Psychiatric/Behavioral: Negative for agitation.     Objective:     Vital Signs (Most Recent):  Temp: 98 °F (36.7 °C) (03/12/17 0300)  Pulse: (!) 55 (03/12/17 1138)  Resp: (!) 30 (03/12/17 1138)  BP: 127/65 (03/12/17 1000)  SpO2: 99 % (03/12/17 1138) Vital Signs (24h Range):  Temp:  [98 °F (36.7 °C)-98.6 °F (37 °C)] 98 °F (36.7 °C)  Pulse:  [] 55  Resp:  [10-49] 30  SpO2:  [87 %-100 %] 99 %  BP: ()/(50-89) 127/65     Weight: 65.5 kg (144 lb 6.4 oz)  Body mass index is 26.41 kg/(m^2).    Intake/Output Summary (Last 24 hours) at 03/12/17 1333  Last data filed at 03/12/17 0710   Gross per 24 hour   Intake              100 ml   Output             1350 ml   Net            -1250 ml      Physical Exam   Constitutional: She is oriented to person, place, and time. She appears well-developed and well-nourished. No distress.   Elderly female   HENT:   Head: Normocephalic and atraumatic.   Mouth/Throat: Oropharynx is clear and moist. No oropharyngeal exudate.   Eyes: Conjunctivae are normal. Right eye exhibits no discharge. Left eye exhibits no discharge. No scleral icterus.   Neck: No JVD  present.   Cardiovascular: Intact distal pulses.    bradycardia   Pulmonary/Chest: No stridor. She has wheezes (end exp).   Scattered rhonchi, coarse Bs b/l   Abdominal: Soft. Bowel sounds are normal. She exhibits no distension. There is no tenderness.   Musculoskeletal: Normal range of motion. She exhibits no edema, tenderness or deformity.   Neurological: She is alert and oriented to person, place, and time. No cranial nerve deficit.   Skin: Skin is warm and dry. She is not diaphoretic.   Psychiatric: She has a normal mood and affect.   Nursing note and vitals reviewed.      Significant Labs:   ABGs:   Recent Labs  Lab 03/11/17  2104   PH 7.311*   PCO2 37.6   HCO3 19.0*   POCSATURATED 98   BE -7     BMP:   Recent Labs  Lab 03/12/17  0035 03/12/17  0700   GLU  --  134*   NA  --  140   K  --  3.9   CL  --  109   CO2  --  25   BUN  --  15   CREATININE  --  0.7   CALCIUM  --  7.7*   MG 2.1  --      CBC:   Recent Labs  Lab 03/11/17  1757 03/11/17  2308 03/12/17  0700   WBC 6.56 5.82 4.36   HGB 9.6* 8.6* 7.8*   HCT 32.4* 28.8* 26.2*    257  257 216     Troponin:   Recent Labs  Lab 03/11/17  1858 03/11/17  2308 03/12/17  0755   TROPONINI 0.047* 0.071* 0.855*     Urine Culture: No results for input(s): LABURIN in the last 48 hours.  Urine Studies:   Recent Labs  Lab 03/12/17  1144   COLORU Brown*   APPEARANCEUA Hazy*   PHUR 7.0   SPECGRAV 1.025   PROTEINUA 2+*   GLUCUA Negative   KETONESU Negative   BILIRUBINUA Negative   OCCULTUA 3+*   NITRITE Negative   UROBILINOGEN Negative   LEUKOCYTESUR Negative   RBCUA >100*   WBCUA 0   BACTERIA Rare   HYALINECASTS 0     All pertinent labs within the past 24 hours have been reviewed.    Significant Imaging: I have reviewed all pertinent imaging results/findings within the past 24 hours. CXR  Thoracic aorta is prominent, and while underlying thoracic aortic aneurysm not excluded, the appearance of the cardia mediastinum is stable from 2015.Prominence of interstitial markings  unchanged, upper lobe distribution predominantly.  No new focal lobar consolidation. The cardiac silhouette is unchanged. The pulmonary vasculature is unremarkable. There is no pleural effusion or pneumothorax. There are no acute bony abnormalities.

## 2017-03-12 NOTE — EICU
Called to evaluate pt with dyspnea and heaviness in upper chest/neck in setting of admission to ICU with respiratory distress- attributed to ?bronchitis.   EKG with lateral ST segment depression, a change from baseline at same rate a few hours earlier.  Mild troponemia. 0.071  NSTEMI- aspirin, heparin, ntg, beta blocker, statin given. Pain resolved.  F/u EKG improved.  Etiology of increased work of breathing unclear- on abx/steroids/bd- one dose of diuretic given / cont bipap as tolerated.  Discussed with Dr. Wyatt who agrees with plan.    Noam Garber MD

## 2017-03-12 NOTE — ASSESSMENT & PLAN NOTE
- cont bronchodilators  - cont steroids  - follow CT chest  - ECHO  - Speech eval and treat  - resp cultures  - cont Zithromax, cefepime, clinda (suspected possible aspiration)  - follow cultures  - Bipap PRN  - wean O2 as tolerated

## 2017-03-12 NOTE — ASSESSMENT & PLAN NOTE
- on toprol 25 mg and amlodipine 2.5 mg daily at home  - BP meds held yesterday d/t hypotension  - monitor

## 2017-03-12 NOTE — ED PROVIDER NOTES
Encounter Date: 3/11/2017    SCRIBE #1 NOTE: I, Aislinnhung Douglass, am scribing for, and in the presence of, Dr. Perea.       History     Chief Complaint   Patient presents with    Cough     Pt has had cough and congestion for the last few days. Pt saw PCP yesterday and started on Prednisone. Pt advised if not getting better to come to ED.      Review of patient's allergies indicates:   Allergen Reactions    Penicillins      HPI Comments: Time seen by provider: 6:06 PM    This is a 88 y.o. female who presents with complaint of a progressively worsening cough that has persisted for the past few days.  As per her daughter, the patient has had fatigue, congestion, a productive cough with clear, white sputum, SOB, wheezing, HA, and generalized weakness.  The patient mentions no fever, chills, or changes in appetite.  Her daughter claims that the patient has found no relief with albuterol nebulizer treatments nor with steroids that she obtained yesterday from her PCP, Dr. Quinones.  She mentions that the patient had a similar persistent cough a few months ago.  The patient admits to history of HTN and GERD, but she denies history of COPD and bronchitis.     The history is provided by the patient.     Past Medical History:   Diagnosis Date    GERD (gastroesophageal reflux disease)     Hypertension      Past Surgical History:   Procedure Laterality Date    APPENDECTOMY      HYSTERECTOMY      TOTAL KNEE ARTHROPLASTY Bilateral      History reviewed. No pertinent family history.  Social History   Substance Use Topics    Smoking status: Never Smoker    Smokeless tobacco: None    Alcohol use 0.6 oz/week     0 Standard drinks or equivalent, 1 Glasses of wine per week      Comment: occasionally     Review of Systems   Constitutional: Positive for fatigue. Negative for activity change, appetite change, chills, diaphoresis and fever.   HENT: Positive for congestion. Negative for sore throat and trouble swallowing.     Eyes: Negative for photophobia and visual disturbance.   Respiratory: Positive for cough, shortness of breath and wheezing. Negative for chest tightness.    Cardiovascular: Negative for chest pain.   Gastrointestinal: Negative for abdominal pain, nausea and vomiting.   Endocrine: Negative for polydipsia and polyuria.   Genitourinary: Negative for difficulty urinating and flank pain.   Musculoskeletal: Negative for back pain and neck pain.   Skin: Negative for rash.   Neurological: Positive for weakness and headaches.   Psychiatric/Behavioral: Negative for confusion.       Physical Exam   Initial Vitals   BP Pulse Resp Temp SpO2   03/11/17 1708 03/11/17 1708 03/11/17 1708 03/11/17 1708 03/11/17 1708   108/62 103 19 98.6 °F (37 °C) 93 %     Physical Exam    Nursing note and vitals reviewed.  Constitutional: She appears well-developed and well-nourished. She is cooperative.  Non-toxic appearance. No distress.   HENT:   Head: Normocephalic and atraumatic.   Mouth/Throat: Oropharynx is clear and moist.   Eyes: EOM are normal. Pupils are equal, round, and reactive to light.   Mild conjunctival pallor.   Neck: Normal range of motion and full passive range of motion without pain. Neck supple. No thyromegaly present. No stridor present. No JVD present.   Cardiovascular: Normal rate, regular rhythm, normal heart sounds and normal pulses.   Pulmonary/Chest: She is in respiratory distress. She has wheezes. She has rhonchi.   Mild respiratory distress. Diffuse wheezing with prolonged respirations.  Crackles to left lung base.     Abdominal: Soft. Normal appearance and bowel sounds are normal. She exhibits no distension and no mass. There is no tenderness.   Musculoskeletal: Normal range of motion. She exhibits edema.   Symmetric, non-pitting edema to the ankles bilaterally.    Neurological: She is alert and oriented to person, place, and time. She has normal strength. No cranial nerve deficit or sensory deficit.   Skin: Skin  is warm, dry and intact. No rash noted.   Psychiatric: She has a normal mood and affect. Her speech is normal and behavior is normal. Judgment and thought content normal.         ED Course   Critical Care  Date/Time: 3/11/2017 9:46 PM  Performed by: JUDY PARSONS  Authorized by: JUDY PARSONS   Direct patient critical care time: 40 minutes  Ordering / reviewing critical care time: 5 minutes  Documentation critical care time: 5 minutes  Consulting other physicians critical care time: 5 minutes  Total critical care time (exclusive of procedural time) : 55 minutes  Critical care was necessary to treat or prevent imminent or life-threatening deterioration of the following conditions: sepsis and respiratory failure.  Critical care was time spent personally by me on the following activities: development of treatment plan with patient or surrogate, discussions with consultants, evaluation of patient's response to treatment, examination of patient, obtaining history from patient or surrogate, ordering and review of laboratory studies, ordering and performing treatments and interventions, ordering and review of radiographic studies, pulse oximetry, re-evaluation of patient's condition and review of old charts.        Labs Reviewed   CBC W/ AUTO DIFFERENTIAL - Abnormal; Notable for the following:        Result Value    RBC 3.85 (*)     Hemoglobin 9.6 (*)     Hematocrit 32.4 (*)     MCH 24.9 (*)     MCHC 29.6 (*)     RDW 17.1 (*)     Gran% 75.9 (*)     Lymph% 16.2 (*)     All other components within normal limits   COMPREHENSIVE METABOLIC PANEL - Abnormal; Notable for the following:     Glucose 125 (*)     ALT 9 (*)     All other components within normal limits    Narrative:     Recoll. [id number] by [facility] at 03/11/2017 18:41, reason: specimen   hemolyzed, spoke with CLARI Barry   LACTIC ACID, PLASMA - Abnormal; Notable for the following:     Lactate (Lactic Acid) 3.4 (*)     All other components within normal limits     Narrative:     Recoll. [id number] by [facility] at 03/11/2017 18:42, reason: specimen   hemolyzed, spoke with T Jhonny   B-TYPE NATRIURETIC PEPTIDE - Abnormal; Notable for the following:      (*)     All other components within normal limits   TROPONIN I - Abnormal; Notable for the following:     Troponin I 0.047 (*)     All other components within normal limits    Narrative:     Recoll. [id number] by [facility] at 03/11/2017 18:41, reason: specimen   hemolyzed, spoke with T Jhonny   LACTIC ACID, PLASMA - Abnormal; Notable for the following:     Lactate (Lactic Acid) 3.5 (*)     All other components within normal limits    Narrative:     Lactic Acid   critical result(s) called and verbal readback obtained   from   anjana blair, 03/11/2017 21:00   ISTAT PROCEDURE - Abnormal; Notable for the following:     POC PH 7.311 (*)     POC PO2 114 (*)     POC HCO3 19.0 (*)     All other components within normal limits   CULTURE, BLOOD    Narrative:     Aerobic and anaerobic   CULTURE, BLOOD    Narrative:     Aerobic and anaerobic   INFLUENZA A AND B ANTIGEN   B-TYPE NATRIURETIC PEPTIDE   TROPONIN I     Imaging Results         X-Ray Chest 1 View (Final result) Result time:  03/11/17 18:06:38    Final result by Cristian Summers MD (03/11/17 18:06:38)    Impression:        Stable appearance from 2015      Electronically signed by: Dr. Cristian Summers MD  Date:     03/11/17  Time:    18:06     Narrative:    PORTABLE AP CHEST:      Comparison: The 11/7/15    Findings:     Thoracic aorta is prominent, and while underlying thoracic aortic aneurysm not excluded, the appearance of the cardia mediastinum is stable from 2015.Prominence of interstitial markings unchanged, upper lobe distribution predominantly.  No new focal lobar consolidation. The cardiac silhouette is unchanged. The pulmonary vasculature is unremarkable. There is no pleural effusion or pneumothorax. There are no acute bony abnormalities.              EKG Readings: (Independently  Interpreted)   EKG, normal sinus rate 93, first-degree AV block, T-wave flattening II, V4-v6.  Unchanged from October 2016       X-Rays:   Independently Interpreted Readings:   Chest X-Ray: X-Ray Chest 1 View:  Increased interstitial markings throughout. Trachea midline. No cardiomegaly. No pleural effusion. No pneumothorax.      Medical Decision Making:   Initial Assessment:   Urgent evaluation of 80-year-old female with history of hypertension, presenting with persistent cough and shortness of breath by daughter for concern of worsening respiratory distress.  Patient has been evaluated by her primary care provider, and has attempted steroids and nebulizer treatments, without relief.  Patient has no known history of COPD, or smoking, and awaiting evaluation by pulmonology.  Patient denies fevers, no sputum production, denies active chest pain at this time.  Patient presents with hypoxia 88 on room air, tachycardia, afebrile. On exam patient is in moderate respiratory distress with increased work of breathing, diffuse wheezing, prolonged expirations, crackles on left lung base.  Differential includes pneumonia, COPD, pleural effusion, CHF, ACS.  We'll plan to administer additional IV fluids, chest x-ray, Solu-Medrol, nebulizers, and reassess.  Clinical Tests:   Lab Tests: Ordered and Reviewed  Radiological Study: Ordered and Reviewed  Medical Tests: Ordered and Reviewed  ED Management:  Labs notable for chest x-ray without acute abnormalities, doubt CHF given  and chest x-ray.  Stable anemia present.  Elevated lactate 3.5, borderline troponin likely secondary to strain 0.047    Upon lab findings, decision made to empirically cover for pneumonia in light of hypoxia and elevated lactate, miryam paged.    8:08 PM.  Patient now very anxious.  Endorses nausea.  Mildly tremulous.  Suspect symptoms are secondary to increased beta agonist treatment.  Given persistent wheezing and tachypnea we'll attempt BiPAP,  administer magnesium, Zofran, and plan to admitt to ICU.            Scribe Attestation:   Scribe #1: I performed the above scribed service and the documentation accurately describes the services I performed. I attest to the accuracy of the note.    Attending Attestation:           Physician Attestation for Scribe:  Physician Attestation Statement for Scribe #1: I, Dr. Perea, reviewed documentation, as scribed by Aislinn Douglass in my presence, and it is both accurate and complete.                 ED Course     Clinical Impression:     1. Respiratory distress    2. Cough    3. Wheezing    4. Hypoxia    5. Elevated troponin    6. High serum lactate        Disposition:   Disposition: Admitted  Condition: Critical       Frida Perea MD  03/11/17 5349

## 2017-03-12 NOTE — ED NOTES
MD at bedside, pt removed from supplemental O2 resulting in oxygen saturations dropping to 87%, RR increased with wheezing noted, respiratory called for further breathing tx's

## 2017-03-13 ENCOUNTER — OUTPATIENT CASE MANAGEMENT (OUTPATIENT)
Dept: ADMINISTRATIVE | Facility: OTHER | Age: 82
End: 2017-03-13

## 2017-03-13 PROBLEM — I21.4 NSTEMI (NON-ST ELEVATED MYOCARDIAL INFARCTION): Status: ACTIVE | Noted: 2017-03-13

## 2017-03-13 PROBLEM — I71.20 ANEURYSM OF THORACIC AORTA: Status: ACTIVE | Noted: 2017-03-13

## 2017-03-13 LAB
ANION GAP SERPL CALC-SCNC: 8 MMOL/L
AORTIC VALVE REGURGITATION: NORMAL
BACTERIA UR CULT: NO GROWTH
BASOPHILS # BLD AUTO: 0.01 K/UL
BASOPHILS NFR BLD: 0.1 %
BUN SERPL-MCNC: 23 MG/DL
CALCIUM SERPL-MCNC: 8 MG/DL
CHLORIDE SERPL-SCNC: 107 MMOL/L
CHOLEST/HDLC SERPL: 4.7 {RATIO}
CO2 SERPL-SCNC: 24 MMOL/L
CREAT SERPL-MCNC: 0.8 MG/DL
DIASTOLIC DYSFUNCTION: NO
DIFFERENTIAL METHOD: ABNORMAL
EOSINOPHIL # BLD AUTO: 0 K/UL
EOSINOPHIL NFR BLD: 0 %
ERYTHROCYTE [DISTWIDTH] IN BLOOD BY AUTOMATED COUNT: 16.5 %
EST. GFR  (AFRICAN AMERICAN): >60 ML/MIN/1.73 M^2
EST. GFR  (NON AFRICAN AMERICAN): >60 ML/MIN/1.73 M^2
ESTIMATED PA SYSTOLIC PRESSURE: 20.64
GLOBAL PERICARDIAL EFFUSION: NORMAL
GLUCOSE SERPL-MCNC: 115 MG/DL
HCT VFR BLD AUTO: 29.2 %
HDL/CHOLESTEROL RATIO: 21.5 %
HDLC SERPL-MCNC: 177 MG/DL
HDLC SERPL-MCNC: 38 MG/DL
HGB BLD-MCNC: 8.9 G/DL
LACTATE SERPL-SCNC: 1.3 MMOL/L
LDLC SERPL CALC-MCNC: 119.6 MG/DL
LYMPHOCYTES # BLD AUTO: 1.3 K/UL
LYMPHOCYTES NFR BLD: 15.9 %
MCH RBC QN AUTO: 25.5 PG
MCHC RBC AUTO-ENTMCNC: 30.5 %
MCV RBC AUTO: 84 FL
MONOCYTES # BLD AUTO: 0.3 K/UL
MONOCYTES NFR BLD: 4.1 %
NEUTROPHILS # BLD AUTO: 6.6 K/UL
NEUTROPHILS NFR BLD: 79.3 %
NONHDLC SERPL-MCNC: 139 MG/DL
PLATELET # BLD AUTO: 239 K/UL
PMV BLD AUTO: 9.9 FL
POTASSIUM SERPL-SCNC: 4.2 MMOL/L
PROCALCITONIN SERPL IA-MCNC: <0.09 NG/ML
RBC # BLD AUTO: 3.49 M/UL
RETIRED EF AND QEF - SEE NOTES: 60 (ref 55–65)
SODIUM SERPL-SCNC: 139 MMOL/L
TRICUSPID VALVE REGURGITATION: NORMAL
TRIGL SERPL-MCNC: 97 MG/DL
TROPONIN I SERPL DL<=0.01 NG/ML-MCNC: 0.46 NG/ML
WBC # BLD AUTO: 8.34 K/UL

## 2017-03-13 PROCEDURE — 36415 COLL VENOUS BLD VENIPUNCTURE: CPT

## 2017-03-13 PROCEDURE — 99233 SBSQ HOSP IP/OBS HIGH 50: CPT | Mod: GC,,, | Performed by: INTERNAL MEDICINE

## 2017-03-13 PROCEDURE — 20000000 HC ICU ROOM

## 2017-03-13 PROCEDURE — 25000242 PHARM REV CODE 250 ALT 637 W/ HCPCS: Performed by: HOSPITALIST

## 2017-03-13 PROCEDURE — 84145 PROCALCITONIN (PCT): CPT

## 2017-03-13 PROCEDURE — 83605 ASSAY OF LACTIC ACID: CPT

## 2017-03-13 PROCEDURE — 80048 BASIC METABOLIC PNL TOTAL CA: CPT

## 2017-03-13 PROCEDURE — 94640 AIRWAY INHALATION TREATMENT: CPT

## 2017-03-13 PROCEDURE — 36430 TRANSFUSION BLD/BLD COMPNT: CPT

## 2017-03-13 PROCEDURE — 25000003 PHARM REV CODE 250: Performed by: PHYSICIAN ASSISTANT

## 2017-03-13 PROCEDURE — 93010 ELECTROCARDIOGRAM REPORT: CPT | Mod: ,,, | Performed by: INTERNAL MEDICINE

## 2017-03-13 PROCEDURE — 99233 SBSQ HOSP IP/OBS HIGH 50: CPT | Mod: ,,, | Performed by: HOSPITALIST

## 2017-03-13 PROCEDURE — 27000221 HC OXYGEN, UP TO 24 HOURS

## 2017-03-13 PROCEDURE — 63600175 PHARM REV CODE 636 W HCPCS: Performed by: HOSPITALIST

## 2017-03-13 PROCEDURE — 80061 LIPID PANEL: CPT

## 2017-03-13 PROCEDURE — 25000003 PHARM REV CODE 250: Performed by: INTERNAL MEDICINE

## 2017-03-13 PROCEDURE — 93005 ELECTROCARDIOGRAM TRACING: CPT

## 2017-03-13 PROCEDURE — 25000003 PHARM REV CODE 250: Performed by: HOSPITALIST

## 2017-03-13 PROCEDURE — 85025 COMPLETE CBC W/AUTO DIFF WBC: CPT

## 2017-03-13 PROCEDURE — 94660 CPAP INITIATION&MGMT: CPT

## 2017-03-13 PROCEDURE — 94799 UNLISTED PULMONARY SVC/PX: CPT

## 2017-03-13 PROCEDURE — 93306 TTE W/DOPPLER COMPLETE: CPT

## 2017-03-13 PROCEDURE — 63600175 PHARM REV CODE 636 W HCPCS: Performed by: PHYSICIAN ASSISTANT

## 2017-03-13 PROCEDURE — 84484 ASSAY OF TROPONIN QUANT: CPT

## 2017-03-13 PROCEDURE — 92611 MOTION FLUOROSCOPY/SWALLOW: CPT

## 2017-03-13 RX ORDER — FUROSEMIDE 10 MG/ML
20 INJECTION INTRAMUSCULAR; INTRAVENOUS ONCE
Status: COMPLETED | OUTPATIENT
Start: 2017-03-13 | End: 2017-03-13

## 2017-03-13 RX ORDER — MOXIFLOXACIN HYDROCHLORIDE 400 MG/250ML
400 INJECTION, SOLUTION INTRAVENOUS
Status: DISCONTINUED | OUTPATIENT
Start: 2017-03-13 | End: 2017-03-17 | Stop reason: HOSPADM

## 2017-03-13 RX ADMIN — IPRATROPIUM BROMIDE AND ALBUTEROL SULFATE 3 ML: .5; 3 SOLUTION RESPIRATORY (INHALATION) at 03:03

## 2017-03-13 RX ADMIN — HEPARIN SODIUM 5000 UNITS: 5000 INJECTION, SOLUTION INTRAVENOUS; SUBCUTANEOUS at 10:03

## 2017-03-13 RX ADMIN — PANTOPRAZOLE SODIUM 40 MG: 40 TABLET, DELAYED RELEASE ORAL at 09:03

## 2017-03-13 RX ADMIN — HYDROCODONE BITARTRATE AND ACETAMINOPHEN 5 ML: 7.5; 325 SOLUTION ORAL at 10:03

## 2017-03-13 RX ADMIN — METHYLPREDNISOLONE SODIUM SUCCINATE 40 MG: 40 INJECTION, POWDER, FOR SOLUTION INTRAMUSCULAR; INTRAVENOUS at 10:03

## 2017-03-13 RX ADMIN — FUROSEMIDE 20 MG: 10 INJECTION, SOLUTION INTRAMUSCULAR; INTRAVENOUS at 01:03

## 2017-03-13 RX ADMIN — IPRATROPIUM BROMIDE AND ALBUTEROL SULFATE 3 ML: .5; 3 SOLUTION RESPIRATORY (INHALATION) at 07:03

## 2017-03-13 RX ADMIN — ATORVASTATIN CALCIUM 10 MG: 10 TABLET, FILM COATED ORAL at 09:03

## 2017-03-13 RX ADMIN — ACETAMINOPHEN 650 MG: 325 TABLET ORAL at 12:03

## 2017-03-13 RX ADMIN — HEPARIN SODIUM 5000 UNITS: 5000 INJECTION, SOLUTION INTRAVENOUS; SUBCUTANEOUS at 05:03

## 2017-03-13 RX ADMIN — HYDROCODONE BITARTRATE AND ACETAMINOPHEN 5 ML: 7.5; 325 SOLUTION ORAL at 01:03

## 2017-03-13 RX ADMIN — IPRATROPIUM BROMIDE AND ALBUTEROL SULFATE 3 ML: .5; 3 SOLUTION RESPIRATORY (INHALATION) at 10:03

## 2017-03-13 RX ADMIN — CEFEPIME HYDROCHLORIDE 2 G: 2 INJECTION, SOLUTION INTRAVENOUS at 11:03

## 2017-03-13 RX ADMIN — IPRATROPIUM BROMIDE AND ALBUTEROL SULFATE 3 ML: .5; 3 SOLUTION RESPIRATORY (INHALATION) at 06:03

## 2017-03-13 RX ADMIN — HEPARIN SODIUM 5000 UNITS: 5000 INJECTION, SOLUTION INTRAVENOUS; SUBCUTANEOUS at 01:03

## 2017-03-13 RX ADMIN — BENZONATATE 100 MG: 100 CAPSULE ORAL at 09:03

## 2017-03-13 RX ADMIN — BENZONATATE 100 MG: 100 CAPSULE ORAL at 10:03

## 2017-03-13 RX ADMIN — IPRATROPIUM BROMIDE AND ALBUTEROL SULFATE 3 ML: .5; 3 SOLUTION RESPIRATORY (INHALATION) at 11:03

## 2017-03-13 RX ADMIN — CLINDAMYCIN IN 5 PERCENT DEXTROSE 600 MG: 12 INJECTION, SOLUTION INTRAVENOUS at 05:03

## 2017-03-13 RX ADMIN — MOXIFLOXACIN HYDROCHLORIDE 400 MG: 400 INJECTION, SOLUTION INTRAVENOUS at 01:03

## 2017-03-13 RX ADMIN — METHYLPREDNISOLONE SODIUM SUCCINATE 40 MG: 40 INJECTION, POWDER, FOR SOLUTION INTRAMUSCULAR; INTRAVENOUS at 05:03

## 2017-03-13 NOTE — PLAN OF CARE
Problem: Patient Care Overview  Goal: Plan of Care Review  Outcome: Ongoing (interventions implemented as appropriate)  No changes made at this time. Will continue to monitor.

## 2017-03-13 NOTE — ASSESSMENT & PLAN NOTE
- cont bronchodilators  - cont steroids, taper   - ECHO pending  - Speech eval and treat/ modified barium swallow today  CT chest: Patchy ground glass opacification of the bilateral lungs in a somewhat perihilar distribution  - PCT < 0.09 -  Change Abx to Avelox  - lactic acid normalized likely d/t hypoxia   - follow cultures BC NGTD  - resp culture not collected   - Bipap PRN  - wean O2 as tolerated  - case d/w Dr. Spaulding and Pulm CC

## 2017-03-13 NOTE — ASSESSMENT & PLAN NOTE
- cont atorvastatin     3/13/2017 04:56   Cholesterol 177   HDL 38 (L)   LDL Cholesterol 119.6   Total Cholesterol/HDL Ratio 4.7   Triglycerides 97

## 2017-03-13 NOTE — SUBJECTIVE & OBJECTIVE
Interval History: feels better off Bipap    Review of Systems   Constitutional: Positive for fatigue. Negative for chills and fever.   HENT: Positive for congestion. Negative for mouth sores, sneezing, sore throat and trouble swallowing.    Eyes: Negative for photophobia and visual disturbance.   Respiratory: Positive for cough and shortness of breath. Negative for wheezing and stridor.    Cardiovascular: Negative for chest pain, palpitations and leg swelling.   Gastrointestinal: Negative for abdominal distention, abdominal pain, diarrhea, nausea and vomiting.   Endocrine: Negative for polydipsia and polyphagia.   Genitourinary: Negative for difficulty urinating, hematuria and urgency.   Musculoskeletal: Negative for arthralgias and myalgias.   Skin: Negative for color change.   Neurological: Negative for dizziness, seizures and headaches.   Psychiatric/Behavioral: Negative for agitation.     Objective:     Vital Signs (Most Recent):  Temp: 98.4 °F (36.9 °C) (03/13/17 1115)  Pulse: (!) 58 (03/13/17 1200)  Resp: (!) 27 (03/13/17 1200)  BP: 131/69 (03/13/17 1200)  SpO2: 97 % (03/13/17 1200) Vital Signs (24h Range):  Temp:  [97.7 °F (36.5 °C)-98.7 °F (37.1 °C)] 98.4 °F (36.9 °C)  Pulse:  [46-86] 58  Resp:  [18-59] 27  SpO2:  [96 %-100 %] 97 %  BP: (114-143)/() 131/69     Weight: 68.3 kg (150 lb 9.2 oz)  Body mass index is 27.54 kg/(m^2).    Intake/Output Summary (Last 24 hours) at 03/13/17 1248  Last data filed at 03/13/17 1134   Gross per 24 hour   Intake          2196.26 ml   Output                0 ml   Net          2196.26 ml      Physical Exam   Constitutional: She is oriented to person, place, and time. She appears well-developed and well-nourished. No distress.   Elderly female   HENT:   Head: Normocephalic and atraumatic.   Mouth/Throat: Oropharynx is clear and moist. No oropharyngeal exudate.   Eyes: Conjunctivae are normal. Right eye exhibits no discharge. Left eye exhibits no discharge. No scleral  icterus.   Neck: No JVD present.   Cardiovascular: Intact distal pulses.    bradycardia   Pulmonary/Chest: No stridor. She has wheezes (end exp).   Scattered rhonchi, coarse Bs b/l   Abdominal: Soft. Bowel sounds are normal. She exhibits no distension. There is no tenderness.   Musculoskeletal: Normal range of motion. She exhibits no edema, tenderness or deformity.   Neurological: She is alert and oriented to person, place, and time. No cranial nerve deficit.   Skin: Skin is warm and dry. She is not diaphoretic.   Psychiatric: She has a normal mood and affect.   Nursing note and vitals reviewed.      Significant Labs:   BMP:   Recent Labs  Lab 03/12/17  0035  03/13/17  0456   GLU  --   < > 115*   NA  --   < > 139   K  --   < > 4.2   CL  --   < > 107   CO2  --   < > 24   BUN  --   < > 23   CREATININE  --   < > 0.8   CALCIUM  --   < > 8.0*   MG 2.1  --   --    < > = values in this interval not displayed.  CMP:   Recent Labs  Lab 03/11/17  1858 03/12/17  0700 03/13/17  0456    140 139   K 3.9 3.9 4.2    109 107   CO2 25 25 24   * 134* 115*   BUN 18 15 23   CREATININE 0.8 0.7 0.8   CALCIUM 8.7 7.7* 8.0*   PROT 6.9  --   --    ALBUMIN 3.5  --   --    BILITOT 0.2  --   --    ALKPHOS 66  --   --    AST 13  --   --    ALT 9*  --   --    ANIONGAP 10 6* 8   EGFRNONAA >60 >60 >60     Lactic Acid:   Recent Labs  Lab 03/12/17  0035 03/12/17  1400 03/13/17  0456   LACTATE 4.3* 0.9 1.3     All pertinent labs within the past 24 hours have been reviewed.    Significant Imaging: I have reviewed all pertinent imaging results/findings within the past 24 hours.   CT chest #1. Patchy ground glass opacification of the bilateral lungs in a somewhat perihilar distribution.  The finding is nonspecific and could reflect pulmonary edema.  Pneumonitis from infection or inflammation is not excluded.    #2.  Diffusely aneurysmal thoracic aorta.    #3.  Cardiomegaly and coronary atherosclerosis.    #4.  Mediastinal  lymphadenopathy.    #5.  Large hiatal hernia.

## 2017-03-13 NOTE — PROGRESS NOTES
[facility] Hospital Medicine  Progress Note    Patient Name: Irina Polk  MRN: [id number]  Patient Class: IP- Inpatient   Admission Date: 3/11/2017  Length of Stay: 2 days  Attending Physician: Suhail Ramesh MD  Primary Care Provider: Blanca Quinones MD        Subjective:     Principal Problem:Acute hypoxemic respiratory failure    HPI:  87y/o female with history of HTN and GERD, life long nonsmoker presents to ER with c/o cough, SOB and congestion for a few days. Per family, patient has been experiencing URI sxs since November with increased dry non-productive cough and associated wheezing. She was evaluated by PCP and treated with combination of inhaled bronchodilators, tessalon, abx, but continued to have recurrent episodes with only transient improvement. PCP evaluated her on Friday and provided additional steroid therapy but w/o improvement.           Hospital Course:  Admitted with hypoxic resp failure placed on NIPPV, bronchodilators, steroids, course notable for NSTEMI, per Dr. Wyatt suspect secondary to pulmonary disease with distress with anemia. Doubt primary ACS.         Interval History: feels better off Bipap    Review of Systems   Constitutional: Positive for fatigue. Negative for chills and fever.   HENT: Positive for congestion. Negative for mouth sores, sneezing, sore throat and trouble swallowing.    Eyes: Negative for photophobia and visual disturbance.   Respiratory: Positive for cough and shortness of breath. Negative for wheezing and stridor.    Cardiovascular: Negative for chest pain, palpitations and leg swelling.   Gastrointestinal: Negative for abdominal distention, abdominal pain, diarrhea, nausea and vomiting.   Endocrine: Negative for polydipsia and polyphagia.   Genitourinary: Negative for difficulty urinating, hematuria and urgency.   Musculoskeletal: Negative for arthralgias and myalgias.   Skin: Negative for color change.   Neurological: Negative for  dizziness, seizures and headaches.   Psychiatric/Behavioral: Negative for agitation.     Objective:     Vital Signs (Most Recent):  Temp: 98.4 °F (36.9 °C) (03/13/17 1115)  Pulse: (!) 58 (03/13/17 1200)  Resp: (!) 27 (03/13/17 1200)  BP: 131/69 (03/13/17 1200)  SpO2: 97 % (03/13/17 1200) Vital Signs (24h Range):  Temp:  [97.7 °F (36.5 °C)-98.7 °F (37.1 °C)] 98.4 °F (36.9 °C)  Pulse:  [46-86] 58  Resp:  [18-59] 27  SpO2:  [96 %-100 %] 97 %  BP: (114-143)/() 131/69     Weight: 68.3 kg (150 lb 9.2 oz)  Body mass index is 27.54 kg/(m^2).    Intake/Output Summary (Last 24 hours) at 03/13/17 1248  Last data filed at 03/13/17 1134   Gross per 24 hour   Intake          2196.26 ml   Output                0 ml   Net          2196.26 ml      Physical Exam   Constitutional: She is oriented to person, place, and time. She appears well-developed and well-nourished. No distress.   Elderly female   HENT:   Head: Normocephalic and atraumatic.   Mouth/Throat: Oropharynx is clear and moist. No oropharyngeal exudate.   Eyes: Conjunctivae are normal. Right eye exhibits no discharge. Left eye exhibits no discharge. No scleral icterus.   Neck: No JVD present.   Cardiovascular: Intact distal pulses.    bradycardia   Pulmonary/Chest: No stridor. She has wheezes (end exp).   Scattered rhonchi, coarse Bs b/l   Abdominal: Soft. Bowel sounds are normal. She exhibits no distension. There is no tenderness.   Musculoskeletal: Normal range of motion. She exhibits no edema, tenderness or deformity.   Neurological: She is alert and oriented to person, place, and time. No cranial nerve deficit.   Skin: Skin is warm and dry. She is not diaphoretic.   Psychiatric: She has a normal mood and affect.   Nursing note and vitals reviewed.      Significant Labs:   BMP:   Recent Labs  Lab 03/12/17  0035  03/13/17  0456   GLU  --   < > 115*   NA  --   < > 139   K  --   < > 4.2   CL  --   < > 107   CO2  --   < > 24   BUN  --   < > 23   CREATININE  --   < >  0.8   CALCIUM  --   < > 8.0*   MG 2.1  --   --    < > = values in this interval not displayed.  CMP:   Recent Labs  Lab 03/11/17  1858 03/12/17  0700 03/13/17  0456    140 139   K 3.9 3.9 4.2    109 107   CO2 25 25 24   * 134* 115*   BUN 18 15 23   CREATININE 0.8 0.7 0.8   CALCIUM 8.7 7.7* 8.0*   PROT 6.9  --   --    ALBUMIN 3.5  --   --    BILITOT 0.2  --   --    ALKPHOS 66  --   --    AST 13  --   --    ALT 9*  --   --    ANIONGAP 10 6* 8   EGFRNONAA >60 >60 >60     Lactic Acid:   Recent Labs  Lab 03/12/17  0035 03/12/17  1400 03/13/17  0456   LACTATE 4.3* 0.9 1.3     All pertinent labs within the past 24 hours have been reviewed.    Significant Imaging: I have reviewed all pertinent imaging results/findings within the past 24 hours.   CT chest #1. Patchy ground glass opacification of the bilateral lungs in a somewhat perihilar distribution.  The finding is nonspecific and could reflect pulmonary edema.  Pneumonitis from infection or inflammation is not excluded.    #2.  Diffusely aneurysmal thoracic aorta.    #3.  Cardiomegaly and coronary atherosclerosis.    #4.  Mediastinal lymphadenopathy.    #5.  Large hiatal hernia.        Assessment/Plan:      * Acute hypoxemic respiratory failure  - cont bronchodilators  - cont steroids, taper   - ECHO pending  - Speech eval and treat/ modified barium swallow today  CT chest: Patchy ground glass opacification of the bilateral lungs in a somewhat perihilar distribution  - PCT < 0.09 -  Change Abx to Avelox  - lactic acid normalized likely d/t hypoxia   - follow cultures BC NGTD  - resp culture not collected   - Bipap PRN  - wean O2 as tolerated  - case d/w Dr. Spaulding and Pulm CC      HTN (hypertension)  - on toprol 25 mg and amlodipine 2.5 mg daily at home  - BP meds held yesterday d/t hypotension; improving BP, consider restarting home meds  - monitor       Hyperlipidemia  - cont atorvastatin     3/13/2017 04:56   Cholesterol 177   HDL 38 (L)   LDL  Cholesterol 119.6   Total Cholesterol/HDL Ratio 4.7   Triglycerides 97            Iron deficiency anemia  - s/p PRBC's 3/12/2017 good response  - Hx Fe def  - cont PPI  - GI no plans for endoscopy at this time  Lab Results   Component Value Date    IRON 35 03/10/2017    TIBC 548 (H) 03/10/2017    FERRITIN 19 (L) 03/10/2017         Difficulty swallowing  - speech eval  - modified barium today      Lactic acid acidosis  - lactic acid normalized  - likely d/t hypoxia, resp distress      NSTEMI (non-ST elevated myocardial infarction)  - NSTEMI;  Troponin 0.9, nonspecific ST T wave changes.  - per Cardiology secondary to pulmonary disease with distress with anemia  - Doubt primary ACS  - heparin drip dc'd  - follow ECHO  - cardio following    VTE Risk Mitigation         Ordered     heparin (porcine) injection 5,000 Units  Every 8 hours     Route:  Subcutaneous        03/12/17 1349     Place sequential compression device  Until discontinued      03/11/17 9623          Jennifer Wiseman PA-C  Department of Hospital Medicine   [facility]

## 2017-03-13 NOTE — PROGRESS NOTES
Subjective:       Patient ID: Irina Polk is a 88 y.o. female.    Chief Complaint:  Cough (Pt has had cough and congestion for the last few days. Pt saw PCP yesterday and started on Prednisone. Pt advised if not getting better to come to ED. )       History of Present Illness  Pt on mask no overt gi bleed    Review of Systems  Cardiovascular: negative      Objective:     Vitals:    03/13/17 1300 03/13/17 1400 03/13/17 1500 03/13/17 1515   BP: (!) 163/82 (!) 140/75 (!) 140/69    BP Location:       Patient Position:       BP Method:       Pulse: 64 62 78 68   Resp: (!) 31 (!) 29 (!) 34 (!) 31   Temp:    98.5 °F (36.9 °C)   TempSrc:    Oral   SpO2: 96% 95% 96% 97%   Weight:       Height:          Abdomen: soft, non-tender; bowel sounds normal; no masses,  no organomegaly    Data Review   Recent Results (from the past 336 hour(s))   CBC auto differential    Collection Time: 03/13/17  4:56 AM   Result Value Ref Range    WBC 8.34 3.90 - 12.70 K/uL    Hemoglobin 8.9 (L) 12.0 - 16.0 g/dL    Hematocrit 29.2 (L) 37.0 - 48.5 %    Platelets 239 150 - 350 K/uL   CBC auto differential    Collection Time: 03/12/17  7:00 AM   Result Value Ref Range    WBC 4.36 3.90 - 12.70 K/uL    Hemoglobin 7.8 (L) 12.0 - 16.0 g/dL    Hematocrit 26.2 (L) 37.0 - 48.5 %    Platelets 216 150 - 350 K/uL   CBC auto differential    Collection Time: 03/11/17 11:08 PM   Result Value Ref Range    WBC 5.82 3.90 - 12.70 K/uL    Hemoglobin 8.6 (L) 12.0 - 16.0 g/dL    Hematocrit 28.8 (L) 37.0 - 48.5 %    Platelets 257 150 - 350 K/uL        Recent Labs  Lab 03/11/17  2308 03/12/17  0700   APTT <21.0 63.7*   INR 0.9  --        Recent Labs  Lab 03/10/17  1636 03/11/17  1858 03/12/17  0700 03/13/17  0456    143 140 139   K 4.3 3.9 3.9 4.2    108 109 107   CO2 28 25 25 24   BUN 15 18 15 23   CREATININE 0.8 0.8 0.7 0.8   CALCIUM 8.7 8.7 7.7* 8.0*   PROT 6.7 6.9  --   --    BILITOT 0.3 0.2  --   --    ALKPHOS 59 66  --   --    ALT 8* 9*  --   --     AST 15 13  --   --     No results found for: HAV, HEPAIGM, HEPBIGM, HEPBCAB, HBEAG, HEPCAB No results found for: AFP   No results found for: CEA     Recent Labs  Lab 03/11/17  2308 03/12/17  0700   APTT <21.0 63.7*   INR 0.9  --         Assessment:     1. Respiratory distress    2. Cough    3. Wheezing    4. Hypoxia    5. Elevated troponin    6. High serum lactate    7. Lactic acid acidosis    8. Acute hypoxemic respiratory failure    9. Heart failure        Plan:     1) Serial h/h  2) esophagram today

## 2017-03-13 NOTE — PROGRESS NOTES
Pt returned to ICU8 from swallow study. Pt updated that speech recommends a dental soft diet with honey thickened liquid.     Dr Ramesh updated.

## 2017-03-13 NOTE — PLAN OF CARE
Problem: Patient Care Overview  Goal: Plan of Care Review  Outcome: Ongoing (interventions implemented as appropriate)  Patient with visible work of breathing, placed on bipap despite patient denying shortness of breath, prn meds administered for persistent cough now able to rest comfortably. One unit PRBCs administered for Hgb of 7.8. Continued stress incontinence, changing on regular intervals. Patient care provider at bedside assisting with care.

## 2017-03-13 NOTE — ASSESSMENT & PLAN NOTE
- NSTEMI;  Troponin 0.9, nonspecific ST T wave changes.  - per Cardiology secondary to pulmonary disease with distress with anemia  - Doubt primary ACS  - heparin drip dc'd  - follow ECHO  - cardio following

## 2017-03-13 NOTE — ASSESSMENT & PLAN NOTE
- s/p PRBC's 3/12/2017 good response  - Hx Fe def  - cont PPI  - GI no plans for endoscopy at this time  Lab Results   Component Value Date    IRON 35 03/10/2017    TIBC 548 (H) 03/10/2017    FERRITIN 19 (L) 03/10/2017

## 2017-03-13 NOTE — ASSESSMENT & PLAN NOTE
- on toprol 25 mg and amlodipine 2.5 mg daily at home  - BP meds held yesterday d/t hypotension; improving BP, consider restarting home meds  - monitor

## 2017-03-13 NOTE — PLAN OF CARE
Problem: Patient Care Overview  Goal: Plan of Care Review  Outcome: Ongoing (interventions implemented as appropriate)  Patient received on oxygen and later placed on Bipap on documented settings. No changes made at this time. Will continue to monitor.

## 2017-03-13 NOTE — PROGRESS NOTES
[facility]  Cardiology  Progress Note    Patient Name: Irina Polk  MRN: [id number]  Admission Date: 3/11/2017  Hospital Length of Stay: 2 days  Code Status: Full Code   Attending Physician: Suhail Ramesh MD   Primary Care Physician: Blanca Quinones MD  Expected Discharge Date: 3/13/2017  Principal Problem:Acute hypoxemic respiratory failure    Subjective:     Brief HPI:    87 yo female with severe pulmonary disease. Over the last week she has had a lot of coughing and SOB. Presents to [facility] with wheezing and admitted. Early this am some chest tightness.    Hospital Course:     Treated for bronchitis.    Received PRBCs.    Interval History:     No CP.    Review of Systems   Cardiovascular: Negative for chest pain and palpitations.   Respiratory: Positive for shortness of breath. Negative for wheezing.    Gastrointestinal: Negative for abdominal pain.     Objective:     Vital Signs (Most Recent):  Temp: 98.5 °F (36.9 °C) (03/13/17 1515)  Pulse: 60 (03/13/17 1720)  Resp: (!) 33 (03/13/17 1720)  BP: (!) 147/64 (03/13/17 1720)  SpO2: 97 % (03/13/17 1720) Vital Signs (24h Range):  Temp:  [97.7 °F (36.5 °C)-98.7 °F (37.1 °C)] 98.5 °F (36.9 °C)  Pulse:  [46-86] 60  Resp:  [19-57] 33  SpO2:  [95 %-100 %] 97 %  BP: (114-163)/() 147/64     Weight: 68.3 kg (150 lb 9.2 oz)  Body mass index is 27.54 kg/(m^2).    SpO2: 97 %  O2 Device (Oxygen Therapy): nasal cannula      Intake/Output Summary (Last 24 hours) at 03/13/17 1817  Last data filed at 03/13/17 1518   Gross per 24 hour   Intake          2236.26 ml   Output              400 ml   Net          1836.26 ml       Lines/Drains/Airways     Peripheral Intravenous Line                 Peripheral IV - Single Lumen 03/11/17 1738 Right Forearm 2 days         Peripheral IV - Single Lumen 03/12/17 2200 Right Wrist less than 1 day                Physical Exam   Constitutional: She is oriented to person, place, and time. She appears well-developed and  well-nourished. She appears ill. No distress.   Eyes: Right eye exhibits no discharge. Left eye exhibits no discharge. Right conjunctiva is not injected. Left conjunctiva is not injected. Right pupil is round. Left pupil is round. Pupils are equal.   Neck: No JVD present. Carotid bruit is not present.   Cardiovascular: Normal rate, regular rhythm, S1 normal and S2 normal.    Murmur heard.  Pulmonary/Chest: Effort normal. She has no decreased breath sounds. She has no wheezes. She has rhonchi. She has no rales.   Abdominal: Soft. Normal appearance. There is no tenderness.   Musculoskeletal:        Right ankle: She exhibits no swelling.        Left ankle: She exhibits no swelling.   Lymphadenopathy:        Head (right side): No submandibular adenopathy present.        Head (left side): No submandibular adenopathy present.     She has no cervical adenopathy.   Neurological: She is alert and oriented to person, place, and time. No cranial nerve deficit or sensory deficit.   Skin: Skin is warm and dry. No rash noted.   Psychiatric: She has a normal mood and affect. Her speech is normal and behavior is normal.     Current Medications:     albuterol-ipratropium 2.5mg-0.5mg/3mL  3 mL Nebulization Q4H    atorvastatin  10 mg Oral Daily    heparin (porcine)  5,000 Units Subcutaneous Q8H    methylPREDNISolone sodium succinate  40 mg Intravenous Q12H    moxifloxacin  400 mg Intravenous Q24H    pantoprazole  40 mg Oral Daily     Current Laboratory Results:    Recent Results (from the past 24 hour(s))   Basic metabolic panel    Collection Time: 03/13/17  4:56 AM   Result Value Ref Range    Sodium 139 136 - 145 mmol/L    Potassium 4.2 3.5 - 5.1 mmol/L    Chloride 107 95 - 110 mmol/L    CO2 24 23 - 29 mmol/L    Glucose 115 (H) 70 - 110 mg/dL    BUN, Bld 23 8 - 23 mg/dL    Creatinine 0.8 0.5 - 1.4 mg/dL    Calcium 8.0 (L) 8.7 - 10.5 mg/dL    Anion Gap 8 8 - 16 mmol/L    eGFR if African American >60 >60 mL/min/1.73 m^2    eGFR  if non African American >60 >60 mL/min/1.73 m^2   Troponin I    Collection Time: 03/13/17  4:56 AM   Result Value Ref Range    Troponin I 0.462 (H) 0.000 - 0.026 ng/mL   Lipid panel    Collection Time: 03/13/17  4:56 AM   Result Value Ref Range    Cholesterol 177 120 - 199 mg/dL    Triglycerides 97 30 - 150 mg/dL    HDL 38 (L) 40 - 75 mg/dL    LDL Cholesterol 119.6 63.0 - 159.0 mg/dL    HDL/Chol Ratio 21.5 20.0 - 50.0 %    Total Cholesterol/HDL Ratio 4.7 2.0 - 5.0    Non-HDL Cholesterol 139 mg/dL   CBC auto differential    Collection Time: 03/13/17  4:56 AM   Result Value Ref Range    WBC 8.34 3.90 - 12.70 K/uL    RBC 3.49 (L) 4.00 - 5.40 M/uL    Hemoglobin 8.9 (L) 12.0 - 16.0 g/dL    Hematocrit 29.2 (L) 37.0 - 48.5 %    MCV 84 82 - 98 fL    MCH 25.5 (L) 27.0 - 31.0 pg    MCHC 30.5 (L) 32.0 - 36.0 %    RDW 16.5 (H) 11.5 - 14.5 %    Platelets 239 150 - 350 K/uL    MPV 9.9 9.2 - 12.9 fL    Gran # 6.6 1.8 - 7.7 K/uL    Lymph # 1.3 1.0 - 4.8 K/uL    Mono # 0.3 0.3 - 1.0 K/uL    Eos # 0.0 0.0 - 0.5 K/uL    Baso # 0.01 0.00 - 0.20 K/uL    Gran% 79.3 (H) 38.0 - 73.0 %    Lymph% 15.9 (L) 18.0 - 48.0 %    Mono% 4.1 4.0 - 15.0 %    Eosinophil% 0.0 0.0 - 8.0 %    Basophil% 0.1 0.0 - 1.9 %    Differential Method Automated    Lactic acid, plasma    Collection Time: 03/13/17  4:56 AM   Result Value Ref Range    Lactate (Lactic Acid) 1.3 0.5 - 2.2 mmol/L   Procalcitonin    Collection Time: 03/13/17  9:02 AM   Result Value Ref Range    Procalcitonin <0.09 <0.25 ng/mL   2D echo with color flow doppler    Collection Time: 03/13/17 10:15 AM   Result Value Ref Range    EF 60 55 - 65    Diastolic Dysfunction No     Aortic Valve Regurgitation TRIVIAL     Est. PA Systolic Pressure 20.64     Pericardial Effusion NONE     Tricuspid Valve Regurgitation TRIVIAL      Current Imaging Results:    Imaging Results         Fl Modified Barium Swallow Speech (In process)         CT Chest Without Contrast (Final result) Result time:  03/12/17 15:43:07     Final result by John Doll MD (03/12/17 15:43:07)    Impression:        #1. Patchy ground glass opacification of the bilateral lungs in a somewhat perihilar distribution.  The finding is nonspecific and could reflect pulmonary edema.  Pneumonitis from infection or inflammation is not excluded.    #2.  Diffusely aneurysmal thoracic aorta.    #3.  Cardiomegaly and coronary atherosclerosis.    #4.  Mediastinal lymphadenopathy.    #5.  Large hiatal hernia.      Electronically signed by: JOHN DOLL MD  Date:     03/12/17  Time:    15:43     Narrative:    HISTORY: Evaluate lung parenchyma    COMPARISON: None    FINDINGS: Axial images of the thorax were obtained without the use of IV contrast.    The ascending thoracic aorta is aneurysmal measuring 4 cm.  The descending thoracic aorta is aneurysmal measuring 3.1 cm.  The pulmonary artery is prominent.  There is coronary atherosclerosis.  The heart is enlarged.  There are prominent precarinal lymph nodes with the largest measuring 1 cm in short axis.    There is patchy ground glass opacification in a somewhat perihilar distribution.  Bilateral lungs are otherwise unremarkable.  No pneumothorax or pleural fluid.    The visualized abdominal structures are otherwise unremarkable.  There is a large hiatal hernia with the upper body and fundus of the stomach in the inferior thorax.    There is degenerative change of the spine.  There is a grossly stable lower thoracic compression fracture. The osseous structures are otherwise unremarkable.  There is an air-fluid level in the esophagus.            X-Ray Chest 1 View (Final result) Result time:  03/11/17 18:06:38    Final result by Cristian Summers MD (03/11/17 18:06:38)    Impression:        Stable appearance from 2015      Electronically signed by: Dr. Cristian Summers MD  Date:     03/11/17  Time:    18:06     Narrative:    PORTABLE AP CHEST:      Comparison: The 11/7/15    Findings:     Thoracic aorta is  prominent, and while underlying thoracic aortic aneurysm not excluded, the appearance of the cardia mediastinum is stable from 2015.Prominence of interstitial markings unchanged, upper lobe distribution predominantly.  No new focal lobar consolidation. The cardiac silhouette is unchanged. The pulmonary vasculature is unremarkable. There is no pleural effusion or pneumothorax. There are no acute bony abnormalities.          Date of Procedure: 03/13/2017      TEST DESCRIPTION   Technical Quality: This is a technically adequate study.     Aorta: The aortic root is normal in size, measuring 2.3 cm at sinotubular junction and 2.8 cm at Sinuses of Valsalva. The proximal ascending aorta is normal in size, measuring 2.4 cm across.     Left Atrium: The left atrial volume index is normal, measuring 33.23 cc/m2.     Left Ventricle: The left ventricle is normal in size, with an end-diastolic diameter of 3.5 cm, and an end-systolic diameter of 2.4 cm. Wall thickness is upper limit of normal in size, with the septum and the posterior wall each measuring 1.1 cm across.   Relative wall thickness was increased at 0.63, and the LV mass index was 79.5 g/m2 consistent with concentric remodeling. Global left ventricular systolic function appears normal. Visually estimated ejection fraction is 60-65%. The LV Doppler derived   stroke volume equals 71.0 ccs.   The E/e'(lat) is 11, consistent with normal diastolic function.     Right Atrium: The right atrium is normal in size, measuring 4.9 cm in length and 2.5 cm in width in the apical view.     Right Ventricle: The right ventricle is normal in size measuring 3.5 cm at the base in the apical right ventricle-focused view. Global right ventricular systolic function appears normal. The estimated PA systolic pressure is 21 mmHg.     Aortic Valve:  The aortic valve is normal in structure. Additionally, there is trivial aortic regurgitation.     Mitral Valve:  The mitral valve is normal in  structure. The pressure half time is 98 msec. The calculated mitral valve area is 2.24 cm2.     Tricuspid Valve:  The tricuspid valve is mildly sclerotic. There is trivial tricuspid regurgitation.     Pulmonary Valve:  The pulmonic valve is normal in structure.     Pericardium: There is no evidence of pericardial effusion.      IVC: IVC is normal in size and collapses > 50% with a sniff, suggesting normal right atrial pressure of 3 mmHg.     Intracavitary: There is no evidence of intracavitary mass or thrombus. There is no evidence of vegetation.     CONCLUSIONS     1 - Concentric remodeling.     2 - Normal left ventricular systolic function (EF 60-65%).     This document has been electronically    SIGNED BY: Will Wyatt MD On: 03/13/2017 17:37      Assessment and Plan:     Problem List:    Active Diagnoses:    Diagnosis Date Noted POA    PRINCIPAL PROBLEM:  Acute hypoxemic respiratory failure [J96.01] 03/12/2017 Yes    NSTEMI (non-ST elevated myocardial infarction) [I21.4] 03/13/2017 Yes    Aneurysm of thoracic aorta [I71.2] 03/13/2017 Yes    Iron deficiency anemia [D50.9] 03/12/2017 Yes    Difficulty swallowing [R13.10] 03/12/2017 Yes    Elevated troponin [R79.89] 03/12/2017 Yes    Lactic acid acidosis [E87.2] 03/12/2017 Yes    Respiratory distress [R06.00] 03/11/2017 Yes    HTN (hypertension) [I10] 08/11/2015 Yes    Hyperlipidemia [E78.5] 08/11/2015 Yes      Problems Resolved During this Admission:    Diagnosis Date Noted Date Resolved POA     Assessment and Plan:    1. Shortness of Breath              Severe pulmonary disease.     2. Non ST Segment Elevation Myocardial Infarction              3/12/2017: NSTEMI. Troponin 0.9. Minor nonspecific ST T wave changes.   3/13/2017: Echo: Normal left ventricular size and systolic function.              Suspect secondary to pulmonary disease with distress with anemia.              Doubt primary ACS.                    3. Anemia   3/12/2017: Transfused 1 unit  PRBCs        VTE Risk Mitigation         Ordered     heparin (porcine) injection 5,000 Units  Every 8 hours     Route:  Subcutaneous        03/12/17 1349     Place sequential compression device  Until discontinued      03/11/17 2561          Will Wyatt MD  Cardiology  [facility]

## 2017-03-13 NOTE — PROCEDURES
Modifed Barium Swallow Study  Speech Start Time: 1600  Speech Stop Time: 1645  Speech Total (min): 45 min    SLP Treatment Date: 03/13/17    Reason for Referral  Patient was referred for a Modified Barium Swallow Study to assess the efficiency of his/her swallow function, rule out aspiration and make recommendations regarding safe dietary consistencies, effective compensatory strategies, and safe eating environment.     Diagnosis   Acute hypoxemic respiratory failure    Past Medical History:   Diagnosis Date    GERD (gastroesophageal reflux disease)     Hypertension      Past Surgical History:   Procedure Laterality Date    APPENDECTOMY      HYSTERECTOMY      TOTAL KNEE ARTHROPLASTY Bilateral         General Precautions: aspiration, fall  Current Respiratory Status: nasal cannula    Recommendations      Oral Peripheral Examination  Oral Musculature Evaluation  Oral Musculature: WFL  Dentition: present and adequate  Mucosal Quality: good  Mandibular Strength and Mobility: WFL  Oral Labial Strength and Mobility: WFL  Volitional Cough: ineffective, weak  Volitional Swallow: delayed upon palpation  Voice Prior to PO Intake: clear  Oral Musculature Comments: ROM and strength appear functional    Consistencies Assessed  Thin liquid 3 ml via spoon; nectar thick liquid 5 ml via spoon; honey thick liquid 3ml, 5ml, via spoon and 10 ml sip; puree; solid.    Oral Preparation / Oral Phase  The pt exhibited mild oral holding of all consistencies; this may have been 2' anticipation of swallowing as pt was mildly anxious regarding test.  When initiated, oral prep and transit of liquids and puree was deemed generally functional; no premature loss; with adequate clearance.  The pt exhibited mildly prolonged oral prep of solid, again with mild holding noted.  Mild oral residue was noted post swallow of solid; this was spontaneously cleared by the patient with a second swallow.      Pharyngeal Phase  During the pharyngeal phase of  the swallow, a consistent delay in initiating the swallow was observed, with pooling consistently to the level of the pyriform sinuses on every presentation.  Due to the duration of the pharyngeal delay, deep penetration was observed to the level of the true cords prior to initiation of the swallow with thin barium, as it entered the open airway, with shaista, silent aspiration occurring during and post swallow.  ST had to cue pt to elicit a cough, which was ineffective in clearing the aspirated material.  With trials of nectar thick liquids via spoon, deep penetration to the true cords continued to occur, with no sensation, and questionable trace aspiration.  The pt exhibited difficulty attending to and following compensatory strategies to reduce penetration/aspiration.  With teaspoons honey thick liquids, a delay in initiating the swallow was noted; however, this appeared slightly shorter in duration, with NO observed penetration or aspiration.  There was trace- mild residue at the base of tongue, in the valleculae, and on the posterior pharyngeal wall; this was generally cleared with a second swallow.  With puree and solid- delay initiating swallow also observed- however, reduced in duration, with generally good pharyngeal clearance and no observed penetration/aspiration.  Oral stasis which gradually made its way into the pyriform sinuses post swallow of solids was spontaneously cleared by pt.     Cervical Esophageal Phase    Not assessed; refer to radiologist report  Impressions  The pt presents with mild oral dysphagia and mod pharyngeal dysphagia, c/b mild oral holding, mildly prolonged oral prep, and mild residue which was cleared, as well as a consistent pharyngeal swallow response delay noted, with trace, silent aspiration of thin and suspected trace aspiration nectar liquid.  The pt was not able to recall/follow any compensatory strategies.  No overt penetration or aspiration observed with honey thick  liquids, puree, or solid.  The pt may be at increased risk for aspiration overall due to decreased sensation/ silent aspiration.  Recommend good oral care; mechanical soft diet with honey thick liquids; ST to follow for diet tolerance and safety.     Prognosis/Plan/Education    Care Plan   SLP Goals     Not on file           G-Codes

## 2017-03-13 NOTE — NURSING
Spoke with Dr Shirlene IYER re patient resp status. Appears to be short of breath (patient denies) visible retractions, tachypneic, c/o sore throat and persistent cough despite administration of tessalon perles. Will give hydrocodone syrup in attempt to help with sore throat and apply bipap. Continue to monitor.

## 2017-03-13 NOTE — NURSING
EICU-patient has persistent non productive cough, per aide report patient taking mucinex, tessalon perles and breathing treatments at home.   Awaiting orders

## 2017-03-13 NOTE — PROGRESS NOTES
Pulmonary & Critical Care Medicine   Follow Up Progress Note 3/13/2017     Staff- Teena   Fellow- Coral     Subjective:   Placed on intermittent NIPPV overnight for noted increased respiratory effort. Patient denies symptoms at that time and this AM, she notes that she feels improved overall. No additional events noted. Burning suprapubic pain much improved with removal of indwelling snyder.      Vital Signs:   Temp:  [97.4 °F (36.3 °C)-98.7 °F (37.1 °C)] 98 °F (36.7 °C)  Pulse:  [46-86] 56  Resp:  [18-59] 43  SpO2:  [95 %-100 %] 98 %  BP: (114-154)/() 131/106     Fluid Balance:     Intake/Output Summary (Last 24 hours) at 03/13/17 0736  Last data filed at 03/13/17 0600   Gross per 24 hour   Intake          1968.26 ml   Output              125 ml   Net          1843.26 ml     +593cc since admission     Physical Exam:      GEN- NAD AAOx3 elderly. NC in place Comfortable   HEENT- ATNC, PERRLA, EOMI, OP-Cl. No JVD, LAD or bruit noted. Trachea Midline.   CV- Reg, jose.  No M/R/G  RESP- coarse rhonchi and scattered end expiratory wheezing noted in posterior bases. Improved air movement and less wheezing this AM.    GI- S/ND. Positive BS X 4. No HSM Noted. Mild TTP noted in the suprapubic lower abd region. No R/G noted.    Ext- MAEW, No deformity. No edema or rashes noted. She has mild TTP right lateral Mal. Small area of ecchymosis noted.    Neuro- Strength 5/5 symmetric. No focal neurologic deficits noted.     Personal Review and Summary of Interval Diagnostics    Laboratory Studies:     Recent Labs  Lab 03/13/17  0456   WBC 8.34   RBC 3.49*   HGB 8.9*   HCT 29.2*      MCV 84   MCH 25.5*   MCHC 30.5*       Recent Labs  Lab 03/13/17  0456      K 4.2      CO2 24   BUN 23   CREATININE 0.8       Microbiology Data:   Microbiology Results (last 7 days)     Procedure Component Value Units Date/Time    Urine culture [[id number]] Collected:  03/12/17 1144    Order Status:  Sent Specimen:  Urine from  Urine, Catheterized Updated:  03/12/17 2029    Blood culture x two cultures. Draw prior to antibiotics. [[id number]] Collected:  03/11/17 2022    Order Status:  Completed Specimen:  Blood from Peripheral, Upper Arm, Left Updated:  03/12/17 1745     Blood Culture, Routine No Growth to date    Narrative:       Aerobic and anaerobic    Blood culture x two cultures. Draw prior to antibiotics. [[id number]] Collected:  03/11/17 2122    Order Status:  Completed Specimen:  Blood from Peripheral, Hand, Right Updated:  03/12/17 1745     Blood Culture, Routine No Growth to date    Narrative:       Aerobic and anaerobic    AFB Culture & Smear [[id number]]     Order Status:  No result Specimen:  Respiratory from Sputum, Expectorated     Culture, Respiratory with Gram Stain [[id number]]     Order Status:  No result Specimen:  Respiratory from Sputum     Urine culture [[id number]] Collected:  03/12/17 0017    Order Status:  Sent Specimen:  Urine from Urine, Catheterized Updated:  03/12/17 0833    Blood Culture #1 **CANNOT BE ORDERED STAT** [[id number]]     Order Status:  Canceled Specimen:  Blood     Blood Culture #2 **CANNOT BE ORDERED STAT** [[id number]]     Order Status:  Canceled Specimen:  Blood         Chest Imaging:     CT chest- Scattered GG predominantly UL with scattered mild bronchiectatic areas. A few area of interstitial thickening noted     Echo- Pending     Infusions:  None        Scheduled Medications:    albuterol-ipratropium 2.5mg-0.5mg/3mL  3 mL Nebulization Q4H    atorvastatin  10 mg Oral Daily    azithromycin  500 mg Intravenous Q24H    carvedilol  3.125 mg Oral BID    ceFEPime (MAXIPIME) IVPB  2 g Intravenous Q12H    clindamycin (CLEOCIN) IVPB  600 mg Intravenous Q8H    heparin (porcine)  5,000 Units Subcutaneous Q8H    methylPREDNISolone sodium succinate  40 mg Intravenous Q8H    pantoprazole  40 mg Oral Daily       Impression & Recommendations    1. Hypoxemic respiratory failure- etiology unclear  (reactive airway disease, recurrent aspiration, early ILD). CT imaging reviewed and with patchy areas of GGO and some intralobular septal thickening noted, suggestive of possible early ILD like picture. Mild bronchiectasis without nodular changes or tree in bud appearance. Clinically appears to be responding to therapy.     -- Check 2D echo to exclude cardiac component.   -- scheduled bronchodilators Q4h and steroids for now   -- Check procalcitonin. Likely OK to start with de-escalation of abx.    -- Check sputum cultures   -- Wean Supplemental O2 to maintain sats >90%   -- NIPPV prn   -- Check barium swallow study, continue PPI   -- Her CT changes are certainly not typical of aspiration or pulm edema given predominant upper lung zone location. If echo and swallow evaluation unrevealing will likely plan for bronchoscopy      2. Chest pain, elevated troponin- Pain resolved and off NTG infusion. Slow rise in troponin which is more likely related to demand and underlying illness. Check Echo. Cards following      3. Lactic Acidosis-Normalized. Likely secondary to hypoxia and respiratory distress      4. Anemia- CANDELARIA. Slight decrease in Hb overnight. No active bleeding noted. Hold transfusion unless <7.      5. DVT PPX- TEDs/SCDs at present. Heparin SQ Q8H      Gustavo Moncada M.D.  [facility] Pulmonary/Critical Care Fellow- PGY VII   [phone]

## 2017-03-14 PROBLEM — T17.998A ASPIRATION OF LIQUID: Status: ACTIVE | Noted: 2017-03-14

## 2017-03-14 PROBLEM — R13.10 DIFFICULTY SWALLOWING: Status: RESOLVED | Noted: 2017-03-12 | Resolved: 2017-03-14

## 2017-03-14 LAB
BACTERIA UR CULT: NO GROWTH
BASOPHILS # BLD AUTO: 0.01 K/UL
BASOPHILS NFR BLD: 0.1 %
DIFFERENTIAL METHOD: ABNORMAL
EOSINOPHIL # BLD AUTO: 0 K/UL
EOSINOPHIL NFR BLD: 0 %
ERYTHROCYTE [DISTWIDTH] IN BLOOD BY AUTOMATED COUNT: 16.9 %
HCT VFR BLD AUTO: 32.2 %
HGB BLD-MCNC: 9.9 G/DL
LYMPHOCYTES # BLD AUTO: 1.2 K/UL
LYMPHOCYTES NFR BLD: 15.5 %
MCH RBC QN AUTO: 25.8 PG
MCHC RBC AUTO-ENTMCNC: 30.7 %
MCV RBC AUTO: 84 FL
MONOCYTES # BLD AUTO: 0.3 K/UL
MONOCYTES NFR BLD: 4 %
NEUTROPHILS # BLD AUTO: 6.2 K/UL
NEUTROPHILS NFR BLD: 79.4 %
PLATELET # BLD AUTO: 247 K/UL
PMV BLD AUTO: 10 FL
RBC # BLD AUTO: 3.83 M/UL
TROPONIN I SERPL DL<=0.01 NG/ML-MCNC: 0.28 NG/ML
WBC # BLD AUTO: 7.75 K/UL

## 2017-03-14 PROCEDURE — 63600175 PHARM REV CODE 636 W HCPCS: Performed by: PHYSICIAN ASSISTANT

## 2017-03-14 PROCEDURE — 93005 ELECTROCARDIOGRAM TRACING: CPT

## 2017-03-14 PROCEDURE — 97530 THERAPEUTIC ACTIVITIES: CPT

## 2017-03-14 PROCEDURE — 97535 SELF CARE MNGMENT TRAINING: CPT

## 2017-03-14 PROCEDURE — 97162 PT EVAL MOD COMPLEX 30 MIN: CPT

## 2017-03-14 PROCEDURE — 85025 COMPLETE CBC W/AUTO DIFF WBC: CPT

## 2017-03-14 PROCEDURE — 25000242 PHARM REV CODE 250 ALT 637 W/ HCPCS: Performed by: HOSPITALIST

## 2017-03-14 PROCEDURE — 25000003 PHARM REV CODE 250: Performed by: PHYSICIAN ASSISTANT

## 2017-03-14 PROCEDURE — 99233 SBSQ HOSP IP/OBS HIGH 50: CPT | Mod: ,,,

## 2017-03-14 PROCEDURE — 94799 UNLISTED PULMONARY SVC/PX: CPT

## 2017-03-14 PROCEDURE — 25000003 PHARM REV CODE 250: Performed by: INTERNAL MEDICINE

## 2017-03-14 PROCEDURE — 36415 COLL VENOUS BLD VENIPUNCTURE: CPT

## 2017-03-14 PROCEDURE — 97165 OT EVAL LOW COMPLEX 30 MIN: CPT

## 2017-03-14 PROCEDURE — 99233 SBSQ HOSP IP/OBS HIGH 50: CPT | Mod: GC,,, | Performed by: INTERNAL MEDICINE

## 2017-03-14 PROCEDURE — 11000001 HC ACUTE MED/SURG PRIVATE ROOM

## 2017-03-14 PROCEDURE — 94640 AIRWAY INHALATION TREATMENT: CPT

## 2017-03-14 PROCEDURE — 84484 ASSAY OF TROPONIN QUANT: CPT

## 2017-03-14 PROCEDURE — 93010 ELECTROCARDIOGRAM REPORT: CPT | Mod: ,,, | Performed by: INTERNAL MEDICINE

## 2017-03-14 PROCEDURE — 25000003 PHARM REV CODE 250: Performed by: HOSPITALIST

## 2017-03-14 PROCEDURE — 27000221 HC OXYGEN, UP TO 24 HOURS

## 2017-03-14 PROCEDURE — 94660 CPAP INITIATION&MGMT: CPT

## 2017-03-14 RX ADMIN — PANTOPRAZOLE SODIUM 40 MG: 40 TABLET, DELAYED RELEASE ORAL at 10:03

## 2017-03-14 RX ADMIN — IPRATROPIUM BROMIDE AND ALBUTEROL SULFATE 3 ML: .5; 3 SOLUTION RESPIRATORY (INHALATION) at 03:03

## 2017-03-14 RX ADMIN — DIPHENHYDRAMINE HYDROCHLORIDE 25 MG: 25 CAPSULE ORAL at 09:03

## 2017-03-14 RX ADMIN — HEPARIN SODIUM 5000 UNITS: 5000 INJECTION, SOLUTION INTRAVENOUS; SUBCUTANEOUS at 01:03

## 2017-03-14 RX ADMIN — IPRATROPIUM BROMIDE AND ALBUTEROL SULFATE 3 ML: .5; 3 SOLUTION RESPIRATORY (INHALATION) at 11:03

## 2017-03-14 RX ADMIN — METHYLPREDNISOLONE SODIUM SUCCINATE 40 MG: 40 INJECTION, POWDER, FOR SOLUTION INTRAMUSCULAR; INTRAVENOUS at 10:03

## 2017-03-14 RX ADMIN — BENZONATATE 100 MG: 100 CAPSULE ORAL at 09:03

## 2017-03-14 RX ADMIN — HEPARIN SODIUM 5000 UNITS: 5000 INJECTION, SOLUTION INTRAVENOUS; SUBCUTANEOUS at 09:03

## 2017-03-14 RX ADMIN — METHYLPREDNISOLONE SODIUM SUCCINATE 40 MG: 40 INJECTION, POWDER, FOR SOLUTION INTRAMUSCULAR; INTRAVENOUS at 09:03

## 2017-03-14 RX ADMIN — IPRATROPIUM BROMIDE AND ALBUTEROL SULFATE 3 ML: .5; 3 SOLUTION RESPIRATORY (INHALATION) at 07:03

## 2017-03-14 RX ADMIN — HEPARIN SODIUM 5000 UNITS: 5000 INJECTION, SOLUTION INTRAVENOUS; SUBCUTANEOUS at 05:03

## 2017-03-14 RX ADMIN — BENZONATATE 100 MG: 100 CAPSULE ORAL at 10:03

## 2017-03-14 RX ADMIN — IPRATROPIUM BROMIDE AND ALBUTEROL SULFATE 3 ML: .5; 3 SOLUTION RESPIRATORY (INHALATION) at 08:03

## 2017-03-14 RX ADMIN — MOXIFLOXACIN HYDROCHLORIDE 400 MG: 400 INJECTION, SOLUTION INTRAVENOUS at 01:03

## 2017-03-14 RX ADMIN — ATORVASTATIN CALCIUM 10 MG: 10 TABLET, FILM COATED ORAL at 10:03

## 2017-03-14 NOTE — SUBJECTIVE & OBJECTIVE
Interval History:     Review of Systems   Constitutional: Positive for fatigue. Negative for chills and fever.   HENT: Negative for congestion, mouth sores, sneezing, sore throat and trouble swallowing.    Eyes: Negative for photophobia and visual disturbance.   Respiratory: Positive for shortness of breath. Negative for cough, wheezing and stridor.    Cardiovascular: Negative for chest pain, palpitations and leg swelling.   Gastrointestinal: Negative for abdominal distention, abdominal pain, diarrhea, nausea and vomiting.   Endocrine: Negative for polydipsia and polyphagia.   Genitourinary: Negative for difficulty urinating, hematuria and urgency.   Musculoskeletal: Negative for arthralgias and myalgias.   Skin: Negative for color change.   Neurological: Negative for dizziness, seizures and headaches.   Psychiatric/Behavioral: Negative for agitation.     Objective:     Vital Signs (Most Recent):  Temp: 98.4 °F (36.9 °C) (03/14/17 0740)  Pulse: (!) 56 (03/14/17 0800)  Resp: (!) 28 (03/14/17 0754)  BP: 128/60 (03/14/17 0700)  SpO2: 99 % (03/14/17 0754) Vital Signs (24h Range):  Temp:  [97.7 °F (36.5 °C)-98.6 °F (37 °C)] 98.4 °F (36.9 °C)  Pulse:  [46-78] 56  Resp:  [18-40] 28  SpO2:  [95 %-100 %] 99 %  BP: (116-168)/(56-87) 128/60     Weight: 68.3 kg (150 lb 9.2 oz)  Body mass index is 27.54 kg/(m^2).    Intake/Output Summary (Last 24 hours) at 03/14/17 0949  Last data filed at 03/14/17 0740   Gross per 24 hour   Intake              480 ml   Output              500 ml   Net              -20 ml      Physical Exam   Constitutional: She is oriented to person, place, and time. She appears well-developed and well-nourished. No distress.   Elderly female   HENT:   Head: Normocephalic and atraumatic.   Mouth/Throat: Oropharynx is clear and moist. No oropharyngeal exudate.   Eyes: Conjunctivae are normal. Right eye exhibits no discharge. Left eye exhibits no discharge. No scleral icterus.   Neck: No JVD present.    Cardiovascular: Intact distal pulses.    bradycardia   Pulmonary/Chest: No stridor. She has wheezes (end exp).   Scattered rhonchi, coarse Bs b/l   Abdominal: Soft. Bowel sounds are normal. She exhibits no distension. There is no tenderness.   Musculoskeletal: Normal range of motion. She exhibits no edema, tenderness or deformity.   Neurological: She is alert and oriented to person, place, and time. No cranial nerve deficit.   Skin: Skin is warm and dry. She is not diaphoretic.   Psychiatric: She has a normal mood and affect.   Nursing note and vitals reviewed.      Significant Labs: All pertinent labs within the past 24 hours have been reviewed.    Significant Imaging: I have reviewed and interpreted all pertinent imaging results/findings within the past 24 hours.

## 2017-03-14 NOTE — PLAN OF CARE
Problem: Patient Care Overview  Goal: Plan of Care Review  Outcome: Ongoing (interventions implemented as appropriate)  Patient remains tachypneic, denies shortness of breath. Attempted to use BIPAP through night, off and on as tolerated by patient. VSS, slightly elevated SPB ranging from the 140s-160.

## 2017-03-14 NOTE — PLAN OF CARE
Problem: Patient Care Overview  Goal: Plan of Care Review  Outcome: Outcome(s) achieved Date Met:  03/14/17  Pt remains on 2LNC. IS done Bipap at bedside. Tx given and tolerated well.

## 2017-03-14 NOTE — PROGRESS NOTES
[facility]  Cardiology  Progress Note    Patient Name: Irina Polk  MRN: [id number]  Admission Date: 3/11/2017  Hospital Length of Stay: 3 days  Code Status: Full Code   Attending Physician: Eligio Arenas MD   Primary Care Physician: Blanca Quinones MD  Expected Discharge Date: 3/13/2017  Principal Problem:Acute hypoxemic respiratory failure    Subjective:     Brief HPI:    89 yo female with severe pulmonary disease. Over the last week she has had a lot of coughing and SOB. Presents to [facility] with wheezing and admitted. Early in the am of presentation some chest tightness.    Hospital Course:     Treated for bronchitis.    Received PRBCs.    Interval History:     No CP. Breathing easier.    Review of Systems   Cardiovascular: Negative for chest pain and palpitations.   Respiratory: Positive for cough and shortness of breath. Negative for wheezing.    Gastrointestinal: Negative for abdominal pain.     Objective:     Vital Signs (Most Recent):  Temp: 98.4 °F (36.9 °C) (03/14/17 0740)  Pulse: (!) 56 (03/14/17 0800)  Resp: (!) 28 (03/14/17 0754)  BP: 128/60 (03/14/17 0700)  SpO2: 99 % (03/14/17 0754) Vital Signs (24h Range):  Temp:  [97.7 °F (36.5 °C)-98.6 °F (37 °C)] 98.4 °F (36.9 °C)  Pulse:  [46-78] 56  Resp:  [18-40] 28  SpO2:  [95 %-100 %] 99 %  BP: (116-168)/(56-87) 128/60     Weight: 68.3 kg (150 lb 9.2 oz)  Body mass index is 27.54 kg/(m^2).    SpO2: 99 %  O2 Device (Oxygen Therapy): nasal cannula      Intake/Output Summary (Last 24 hours) at 03/14/17 1024  Last data filed at 03/14/17 0740   Gross per 24 hour   Intake              480 ml   Output              500 ml   Net              -20 ml       Lines/Drains/Airways     Peripheral Intravenous Line                 Peripheral IV - Single Lumen 03/11/17 1738 Right Forearm 2 days         Peripheral IV - Single Lumen 03/12/17 2200 Right Wrist 1 day                Physical Exam   Constitutional: She is oriented to person, place, and  time. She appears well-developed and well-nourished. She appears ill. No distress.   Eyes: Right eye exhibits no discharge. Left eye exhibits no discharge. Right conjunctiva is not injected. Left conjunctiva is not injected. Right pupil is round. Left pupil is round. Pupils are equal.   Neck: No JVD present. Carotid bruit is not present.   Cardiovascular: Normal rate, regular rhythm, S1 normal and S2 normal.    Murmur heard.  Pulmonary/Chest: Effort normal. She has no decreased breath sounds. She has no wheezes. She has rhonchi. She has no rales.   Abdominal: Soft. Normal appearance. There is no tenderness.   Musculoskeletal:        Right ankle: She exhibits no swelling.        Left ankle: She exhibits no swelling.   Lymphadenopathy:        Head (right side): No submandibular adenopathy present.        Head (left side): No submandibular adenopathy present.     She has no cervical adenopathy.   Neurological: She is alert and oriented to person, place, and time. No cranial nerve deficit or sensory deficit.   Skin: Skin is warm and dry. No rash noted.   Psychiatric: She has a normal mood and affect. Her speech is normal and behavior is normal.     Current Medications:     albuterol-ipratropium 2.5mg-0.5mg/3mL  3 mL Nebulization Q4H    atorvastatin  10 mg Oral Daily    heparin (porcine)  5,000 Units Subcutaneous Q8H    methylPREDNISolone sodium succinate  40 mg Intravenous Q12H    moxifloxacin  400 mg Intravenous Q24H    pantoprazole  40 mg Oral Daily     Current Laboratory Results:    Recent Results (from the past 24 hour(s))   Troponin I    Collection Time: 03/14/17  4:08 AM   Result Value Ref Range    Troponin I 0.281 (H) 0.000 - 0.026 ng/mL   CBC auto differential    Collection Time: 03/14/17  4:08 AM   Result Value Ref Range    WBC 7.75 3.90 - 12.70 K/uL    RBC 3.83 (L) 4.00 - 5.40 M/uL    Hemoglobin 9.9 (L) 12.0 - 16.0 g/dL    Hematocrit 32.2 (L) 37.0 - 48.5 %    MCV 84 82 - 98 fL    MCH 25.8 (L) 27.0 - 31.0  pg    MCHC 30.7 (L) 32.0 - 36.0 %    RDW 16.9 (H) 11.5 - 14.5 %    Platelets 247 150 - 350 K/uL    MPV 10.0 9.2 - 12.9 fL    Gran # 6.2 1.8 - 7.7 K/uL    Lymph # 1.2 1.0 - 4.8 K/uL    Mono # 0.3 0.3 - 1.0 K/uL    Eos # 0.0 0.0 - 0.5 K/uL    Baso # 0.01 0.00 - 0.20 K/uL    Gran% 79.4 (H) 38.0 - 73.0 %    Lymph% 15.5 (L) 18.0 - 48.0 %    Mono% 4.0 4.0 - 15.0 %    Eosinophil% 0.0 0.0 - 8.0 %    Basophil% 0.1 0.0 - 1.9 %    Differential Method Automated      Current Imaging Results:    Imaging Results         Fl Modified Barium Swallow Speech (In process)         CT Chest Without Contrast (Final result) Result time:  03/12/17 15:43:07    Final result by John Doll MD (03/12/17 15:43:07)    Impression:        #1. Patchy ground glass opacification of the bilateral lungs in a somewhat perihilar distribution.  The finding is nonspecific and could reflect pulmonary edema.  Pneumonitis from infection or inflammation is not excluded.    #2.  Diffusely aneurysmal thoracic aorta.    #3.  Cardiomegaly and coronary atherosclerosis.    #4.  Mediastinal lymphadenopathy.    #5.  Large hiatal hernia.      Electronically signed by: JOHN DOLL MD  Date:     03/12/17  Time:    15:43     Narrative:    HISTORY: Evaluate lung parenchyma    COMPARISON: None    FINDINGS: Axial images of the thorax were obtained without the use of IV contrast.    The ascending thoracic aorta is aneurysmal measuring 4 cm.  The descending thoracic aorta is aneurysmal measuring 3.1 cm.  The pulmonary artery is prominent.  There is coronary atherosclerosis.  The heart is enlarged.  There are prominent precarinal lymph nodes with the largest measuring 1 cm in short axis.    There is patchy ground glass opacification in a somewhat perihilar distribution.  Bilateral lungs are otherwise unremarkable.  No pneumothorax or pleural fluid.    The visualized abdominal structures are otherwise unremarkable.  There is a large hiatal hernia with the upper body and  fundus of the stomach in the inferior thorax.    There is degenerative change of the spine.  There is a grossly stable lower thoracic compression fracture. The osseous structures are otherwise unremarkable.  There is an air-fluid level in the esophagus.            X-Ray Chest 1 View (Final result) Result time:  03/11/17 18:06:38    Final result by Cristian Summers MD (03/11/17 18:06:38)    Impression:        Stable appearance from 2015      Electronically signed by: Dr. Cristian Summers MD  Date:     03/11/17  Time:    18:06     Narrative:    PORTABLE AP CHEST:      Comparison: The 11/7/15    Findings:     Thoracic aorta is prominent, and while underlying thoracic aortic aneurysm not excluded, the appearance of the cardia mediastinum is stable from 2015.Prominence of interstitial markings unchanged, upper lobe distribution predominantly.  No new focal lobar consolidation. The cardiac silhouette is unchanged. The pulmonary vasculature is unremarkable. There is no pleural effusion or pneumothorax. There are no acute bony abnormalities.            3/13/2017: Echo:      1 - Concentric remodeling.     2 - Normal left ventricular systolic function (EF 60-65%).     This document has been electronically    SIGNED BY: Will Wyatt MD On: 03/13/2017 17:37      Assessment and Plan:     Problem List:    Active Diagnoses:    Diagnosis Date Noted POA    PRINCIPAL PROBLEM:  Acute hypoxemic respiratory failure [J96.01] 03/12/2017 Yes    Aspiration of liquid [T17.998A] 03/14/2017 Yes    NSTEMI (non-ST elevated myocardial infarction) [I21.4] 03/13/2017 Yes    Aneurysm of thoracic aorta [I71.2] 03/13/2017 Yes    Iron deficiency anemia [D50.9] 03/12/2017 Yes    Elevated troponin [R79.89] 03/12/2017 Yes    Lactic acid acidosis [E87.2] 03/12/2017 Yes    Respiratory distress [R06.00] 03/11/2017 Yes    HTN (hypertension) [I10] 08/11/2015 Yes    Hyperlipidemia [E78.5] 08/11/2015 Yes      Problems Resolved During this Admission:     Diagnosis Date Noted Date Resolved POA    Difficulty swallowing [R13.10] 03/12/2017 03/14/2017 Yes     Assessment and Plan:    1. Shortness of Breath              Severe pulmonary disease.     2. Non ST Segment Elevation Myocardial Infarction              3/12/2017: NSTEMI. Troponin 0.9. Minor nonspecific ST T wave changes.   3/13/2017: Echo: Normal left ventricular size and systolic function.              Suspect secondary to pulmonary disease with distress as well due to anemia.              Doubt primary ACS.                    3. Anemia   3/12/2017: Transfused 1 unit PRBCs    VTE Risk Mitigation         Ordered     heparin (porcine) injection 5,000 Units  Every 8 hours     Route:  Subcutaneous        03/12/17 1349     Place sequential compression device  Until discontinued      03/11/17 0605          Will Wyatt MD  Cardiology  [facility]

## 2017-03-14 NOTE — NURSING TRANSFER
Nursing Transfer Note      3/14/2017     Transfer To:  from ICU 8, report given to ANA Green    Transfer via stretcher    Transfer with to O2, cardiac monitoring    Transported by ANA Agee and ANA Dean     Medicines sent: None     Chart send with patient: Yes    Notified: daughter at bedside at time of transfer    Patient reassessed at: upon arrival to floor    Upon arrival to floor: cardiac monitor applied, patient oriented to room, call bell in reach and bed in lowest position

## 2017-03-14 NOTE — PROGRESS NOTES
Pulmonary & Critical Care Medicine   Follow Up Progress Note- 3/14/2017     Staff- Teena   Fellow- Coral     Subjective:   No overnight events. Feels improved. Daughter and sitter at bedside     Vital Signs:   Temp:  [97.7 °F (36.5 °C)-98.6 °F (37 °C)] 97.7 °F (36.5 °C)  Pulse:  [46-78] 46  Resp:  [18-35] 18  SpO2:  [95 %-100 %] 98 %  BP: (116-168)/() 116/56     Fluid Balance:     Intake/Output Summary (Last 24 hours) at 03/14/17 0632  Last data filed at 03/13/17 1518   Gross per 24 hour   Intake              778 ml   Output              400 ml   Net              378 ml     +971cc since admission     Physical Exam:   GEN- NAD AAOx3 elderly. NC in place Comfortable   HEENT- ATNC, PERRLA, EOMI, OP-Cl. No JVD, LAD or bruit noted. Trachea Midline.   CV- Reg, jose. No M/R/G  RESP- coarse rhonchi and scattered end expiratory wheezing noted in posterior bases. Improved air movement and less wheezing this AM.    GI- S/ND. Positive BS X 4. No HSM Noted. Mild TTP noted in the suprapubic lower abd region. No R/G noted.   Ext- MAEW, No deformity. No edema or rashes noted. She has mild TTP right lateral Mal. Small area of ecchymosis noted.   Neuro- Strength 5/5 symmetric. No focal neurologic deficits noted.     Personal Review and Summary of Interval Diagnostics    Laboratory Studies:     Recent Labs  Lab 03/14/17  0408   WBC 7.75   RBC 3.83*   HGB 9.9*   HCT 32.2*      MCV 84   MCH 25.8*   MCHC 30.7*       Microbiology Data:   Microbiology Results (last 7 days)     Procedure Component Value Units Date/Time    Urine culture [[id number]] Collected:  03/12/17 0017    Order Status:  Completed Specimen:  Urine from Urine, Catheterized Updated:  03/13/17 1217     Urine Culture, Routine No growth    Blood culture x two cultures. Draw prior to antibiotics. [[id number]] Collected:  03/11/17 2022    Order Status:  Completed Specimen:  Blood from Peripheral, Upper Arm, Left Updated:  03/13/17 1212     Blood Culture,  Routine No Growth to date     Blood Culture, Routine No Growth to date    Narrative:       Aerobic and anaerobic    Blood culture x two cultures. Draw prior to antibiotics. [[id number]] Collected:  03/11/17 2122    Order Status:  Completed Specimen:  Blood from Peripheral, Hand, Right Updated:  03/13/17 1212     Blood Culture, Routine No Growth to date     Blood Culture, Routine No Growth to date    Narrative:       Aerobic and anaerobic    Urine culture [[id number]] Collected:  03/12/17 1144    Order Status:  Sent Specimen:  Urine from Urine, Catheterized Updated:  03/12/17 2029    AFB Culture & Smear [[id number]]     Order Status:  No result Specimen:  Respiratory from Sputum, Expectorated     Culture, Respiratory with Gram Stain [[id number]]     Order Status:  No result Specimen:  Respiratory from Sputum     Blood Culture #1 **CANNOT BE ORDERED STAT** [[id number]]     Order Status:  Canceled Specimen:  Blood     Blood Culture #2 **CANNOT BE ORDERED STAT** [[id number]]     Order Status:  Canceled Specimen:  Blood         Chest Imaging: No new imaging     Barium Swallow- revealed mild oral and moderate pharyngeal dysphagia.  There was a consistent delay noted in initiating the pharyngeal swallow, with trace-mild silent (no initial cough) aspiration of thin liquid, and suspected aspiration of nectar thick liquid    Echo-   EF 55 - 65 60   Diastolic Dysfunction  No   Aortic Valve Regurgitation  TRIVIAL   Est. PA Systolic Pressure  20.64   Pericardial Effusion  NONE   Tricuspid Valve Regurgitation  TRIVIAL     Infusions:       Scheduled Medications:    albuterol-ipratropium 2.5mg-0.5mg/3mL  3 mL Nebulization Q4H    atorvastatin  10 mg Oral Daily    heparin (porcine)  5,000 Units Subcutaneous Q8H    methylPREDNISolone sodium succinate  40 mg Intravenous Q12H    moxifloxacin  400 mg Intravenous Q24H    pantoprazole  40 mg Oral Daily     Impression & Recommendations    1. Hypoxemic respiratory failure- etiology unclear  (reactive airway disease, recurrent aspiration, early ILD). CT imaging reviewed and with patchy areas of GGO and some intralobular septal thickening noted, suggestive of possible early ILD like picture. Mild bronchiectasis without nodular changes or tree in bud appearance. Her CT changes are predominantly in the upper lung zones which are not typical of edema/aspiration.  Clinically appears to be responding to therapy.      -- scheduled bronchodilators Q4h and steroids for now   -- Procalcitonin negative. Would complete 7 day course for atypical pathogens with avelox as you are doing.    -- Check sputum cultures, AFB if she expectorates   -- Wean Supplemental O2 to maintain sats >90%   -- Can use prn comfort flow NC if needed.    -- Modified diet and aspiration precautions given barium swallow findings.   -- She is currently refusing bronchoscopy. Given her improvement with current therapy, would plan for slow taper of steroids. Discussion with daughter regarding need for close follow up in pulmonary clinic. If symptoms still present can re-evaluate need for bronchoscopy at that time     2. Chest pain, elevated troponin- Pain resolved and off NTG infusion. Slow rise in troponin which is more likely related to demand and underlying illness. Cards following Echo with Nl EF, no diastolic dysfunction noted. Normal PAP       3. Lactic Acidosis-Normalized. Likely secondary to hypoxia and respiratory distress       4. Anemia- CANDELARIA. Slight decrease in Hb overnight. No active bleeding noted. Hold transfusion unless <7.       5. DVT PPX- TEDs/SCDs at present. Heparin SQ Q8H       Likely OK to step out of ICU     Gustavo Moncada M.D.  [facility] Pulmonary/Critical Care Fellow- PGY VII   [phone]

## 2017-03-14 NOTE — ASSESSMENT & PLAN NOTE
- cont bronchodilators  - cont steroids, taper   CT chest: Patchy ground glass opacification of the bilateral lungs in a somewhat perihilar distribution  - PCT < 0.09 -  Change Abx to Avelox  - lactic acid normalized likely d/t hypoxia   - Bipap PRN  - wean O2 as tolerated  - case d/w Dr. Spaulding and Pulm CC  Ok to transfer to floor

## 2017-03-14 NOTE — PT/OT/SLP EVAL
Occupational Therapy  Evaluation & Treatment    Irina Polk   MRN: [id number]   Admitting Diagnosis: Acute hypoxemic respiratory failure    OT Date of Treatment: 03/14/17   OT Start Time: 0934  OT Stop Time: 1011  OT Total Time (min): 37 min    Billable Minutes:  Evaluation 12  Self Care/Home Management 16  Therapeutic Activity 9    Diagnosis: Acute hypoxemic respiratory failure       Past Medical History:   Diagnosis Date    GERD (gastroesophageal reflux disease)     Hypertension       Past Surgical History:   Procedure Laterality Date    APPENDECTOMY      HYSTERECTOMY      TOTAL KNEE ARTHROPLASTY Bilateral        Referring physician: NILES Arenas   Date referred to OT: 3/14/2017    General Precautions: Standard, aspiration, fall  Orthopedic Precautions: N/A  Braces: N/A    Do you have any cultural, spiritual, Rastafari conflicts, given your current situation?: None Specified     Patient History:  Living Environment  Lives With: alone (has 24 nursing care)  Living Arrangements: house  Home Layout: Bathroom on 2nd floor, Bedroom on 2nd floor  Living Environment Comment:  (Pt lives in 3 story home with 0 KATIA. Pt has 24/7 caregiver assistance. Pt has stairs within the home as well as an elevator, reports she takes elevator half the time. Pt reports needing help with bathing/dressing sometimes but mostly I with self-care)  Equipment Currently Used at Home:  (built in shower chair in [facility], built in handles/raised toliet for commode, ambulated with no AD)    Prior level of function:   Bed Mobility/Transfers: independent  Grooming: independent  Bathing: needs assist (on occasion)  Upper Body Dressing: needs assist (on occasion)  Lower Body Dressing: needs assist (on occasion)  Toileting: needs device  Home Management Skills: unable to perform        Dominant hand: right    Subjective:  Communicated with nursing prior to session.  Pt agreeable to therapy. Pt found supine in bed with caregiver present.     Chief  Complaint: going home  Patient/Family stated goals: returning to PLOF    Pain Ratin/10              Pain Rating Post-Intervention: 0/10    Objective:  Patient found with: peripheral IV, pulse ox (continuous), telemetry, oxygen    Cognitive Exam:  Oriented to: Person, Place, Time and Situation  Follows Commands/attention: Follows two-step commands  Communication: clear/fluent  Memory:  No Deficits noted  Safety awareness/insight to disability: impaired  Coping skills/emotional control: Labile    Visual/perceptual:  Intact    Physical Exam:  Postural examination/scapula alignment: Rounded shoulder, Head forward and No postural abnormalities identified  Skin integrity: Visible skin intact  Edema: None noted     Sensation:   Intact    Upper Extremity Range of Motion:  Right Upper Extremity: WFL  Left Upper Extremity: WFL    Upper Extremity Strength:  Right Upper Extremity: WFL  Left Upper Extremity: WFL   Strength: WFL    Fine motor coordination:   Intact    Gross motor coordination: WFL    Functional Mobility:  Bed Mobility:  Rolling/Turning Right: Stand by assistance  Scooting/Bridging: Stand by Assistance  Supine to Sit: Contact Guard Assistance  Sit to Supine: Stand by Assistance    Transfers:  Sit <> Stand Assistance: Contact Guard Assistance  Sit <> Stand Assistive Device: Rolling Walker    Functional Ambulation: Pt ambulated to the sink and around hospital room with RW, with Minimum Assistance. Pt demonstrated instability and poor balance.     Activities of Daily Living:     LE Dressing Level of Assistance: Total assistance (Total A to don briefs, don hospital socks with Min A)  Grooming Position: Standing at sink  Grooming Level of Assistance: Minimum assistance (brush teeth at the sink, needed support to maintain safely)    Balance:   Static Sit: FAIR-: Maintains without assist but inconsistent   Dynamic Sit: FAIR+: Maintains balance through MINIMAL excursions of active trunk motion  Static Stand: FAIR:  "Maintains without assist but unable to take challenges  Dynamic stand: POOR: N/A    Therapeutic Activities and Exercises:  Transfers, bed mobility, RW safety, sitting balance and tolerance, standing balance and tolerance    AM-PAC 6 CLICK ADL  How much help from another person does this patient currently need?  1 = Unable, Total/Dependent Assistance  2 = A lot, Maximum/Moderate Assistance  3 = A little, Minimum/Contact Guard/Supervision  4 = None, Modified [place]/Independent    Putting on and taking off regular lower body clothing? : 2  Bathing (including washing, rinsing, drying)?: 2  Toileting, which includes using toilet, bedpan, or urinal? : 2  Putting on and taking off regular upper body clothing?: 3  Taking care of personal grooming such as brushing teeth?: 3  Eating meals?: 4  Total Score: 16    AM-PAC Raw Score CMS "G-Code Modifier Level of Impairment Assistance   6 % Total / Unable   7 - 9 CM 80 - 100% Maximal Assist   10 - 14 CL 60 - 80% Moderate Assist   15 - 19 CK 40 - 60% Moderate Assist   20 - 22 CJ 20 - 40% Minimal Assist   23 CI 1-20% SBA / CGA   24 CH 0% Independent/ Mod I       Patient left supine with all lines intact, call button in reach and bed alarm on    Assessment:  Irina Polk is a 88 y.o. female with a medical diagnosis of Acute hypoxemic respiratory failure and presents with weakness, impaired self care skills, impaired functional mobility, impaired balance, decreased safety awareness. Pt presented with tremors in bilateral hands and feet at rest and with activity. OT evaluation completed and treatment initiated. Pt would continue to benefit from skilled OT services to improve safety and independence in ADLs.     Rehab identified problem list/impairments: Rehab identified problem list/impairments: weakness, impaired self care skills, impaired functional mobilty, impaired balance, decreased safety awareness    Rehab potential is good.    Activity tolerance: " Good    Discharge recommendations: Discharge Facility/Level Of Care Needs: home health PT, home health OT     Barriers to discharge: Barriers to Discharge: None    Equipment recommendations: walker, rolling     GOALS:   Occupational Therapy Goals        Problem: Occupational Therapy Goal    Goal Priority Disciplines Outcome Interventions   Occupational Therapy Goal     OT, PT/OT Ongoing (interventions implemented as appropriate)    Description:  Goals to be met by 4/11/17:    Grooming activity standing at the sink with CGA.  LB dressing with Minimum Assistance (don briefs or pants).   Assess toileting.   Transfer to bedside commode with SBA.                PLAN:  Patient to be seen 6 x/week to address the above listed problems via self-care/home management, therapeutic exercises, therapeutic activities  Plan of Care expires: 04/11/17  Plan of Care reviewed with: patient, caregiver         FINESSE Godwin  03/14/2017

## 2017-03-14 NOTE — PLAN OF CARE
"Problem: Patient Care Overview  Goal: Plan of Care Review  Outcome: Ongoing (interventions implemented as appropriate)  Pt remains free from falls, injury and skin breakdown. VSS. RR 20s - 30s, pt complains of SOB upon activity but is comfortable at rest on 3LNC. 2DEcho & Barium swallow study completed. Pt and family updated on POC regarding new diet and aspiration precautions, further education needed. Strict I&Os unable to be recorded - pt has stress incontinence & needed to be changed often. Family and personal sitter insist on having pt wear home depends, education provided on risks of UTI. Small tear noted to vagina, likely the source of her painful urination. Pt turned Q2H. Activity encouraged but pt declines, states she is "too tired & needs to rest" - PT/OT ordered. Personal sitter at bedside.       "

## 2017-03-14 NOTE — PLAN OF CARE
Problem: Physical Therapy Goal  Goal: Physical Therapy Goal  Goals to be met by: 3/30/17     Patient will increase functional independence with mobility by performin. Supine to sit with modified independence.   2. Sit to supine with modified independence.   3. Sit<>stand transfer with supervision using rollator or rolling walker.   4. Gait > 50 feet with supervision using rollator or rolling walker.   5. Ascend/descend curb steps with rollator or rolling walker with min A.   Outcome: Ongoing (interventions implemented as appropriate)  PT evaluation completed. Pt requires min A for transfers and small steps at bedside, although overall activity tolerance limited by c/o fatigue. Noted H/H 7.8/26.2. Will continue to follow and progress as tolerated. Please see progress note for detailed plan of care and recommendations.

## 2017-03-14 NOTE — PT/OT/SLP EVAL
Physical Therapy  Evaluation and Treatment    Irina Polk   MRN: [id number]   Admitting Diagnosis: Acute hypoxemic respiratory failure    PT Received On: 03/14/17  PT Start Time: 1149     PT Stop Time: 1206    PT Total Time (min): 17 min       Billable Minutes:  Evaluation 9 and Therapeutic Activity 8    Diagnosis: Acute hypoxemic respiratory failure      Past Medical History:   Diagnosis Date    GERD (gastroesophageal reflux disease)     Hypertension       Past Surgical History:   Procedure Laterality Date    APPENDECTOMY      HYSTERECTOMY      TOTAL KNEE ARTHROPLASTY Bilateral        Referring physician: Gadiel  Date referred to PT: 3/13/17    General Precautions: Standard, aspiration (fall risk)  Orthopedic Precautions: N/A   Braces: N/A       Do you have any cultural, spiritual, Muslim conflicts, given your current situation?: none specified    Patient History:  Living Environment Comment: Per patient and sitter present: Pt lives alone with 24/7 sitters. She lives in a 3 story house with elevator access to second floor bedroom/bathroom with walk-in shower and grab bar with shower chair she uses for seated bathing with supervision. She requires prn assist for dressing. Sitters also assist with cooking, cleaning, laundry, and transportation. Pt wears adult diapers for occasional stress urinary incontinence, but is able to use toilet for the most part for voiding and perform latonya-care independently. She sleeps in an adjustable bed. No falls in the last year. She performs household ambulation without a device and community ambulation with single point cane or rollator, depending on how she feels.   Equipment Currently Used at Home: cane, straight, rollator, shower chair  DME owned (not currently used): manual w/c    Previous Level of Function:  Ambulation Skills: needs device (prn)  Transfer Skills: needs device  ADL Skills: needs device and assist    Subjective:  Communicated with nurse prior to  "session.  Pt stated, "I'm so cold."   Sitter present and assisting with giving history.     Chief Complaint: feeling cold  Patient goals: sleep    Pain Ratin/10   Pain Rating Post-Intervention: 0/10    Objective:   Patient found with: telemetry, peripheral IV, oxygen, blood pressure cuff, pulse ox (continuous) (2L via nasal cannula) supine in bed with HOB elevated     Cognitive Exam:  Oriented to: Person, partial to place ([facility]), partial to time (), partial to situation.     Follows Commands/attention: Follows one-step commands approximately 75% of time limited by impaired alertness  Communication: able to express wants and needs, delayed response time  Safety awareness/insight to disability: impaired    Physical Exam:  Postural examination/scapula alignment: Rounded shoulder and Abnormal trunk flexion    Skin integrity: Visible skin intact  Edema: None noted     Sensation:   Denied paresthesias    Lower Extremity Range of Motion:  Right Lower Extremity: WFL  Left Lower Extremity: WFL    Lower Extremity Strength:  Right Lower Extremity: WFL  Left Lower Extremity: WFL     No coordination or tone impairments identified.      Functional Mobility:  Bed Mobility:  Supine to Sit: Contact Guard Assistance (HOB elevated, extended time to perform)  Sit to Supine: Contact Guard Assistance    Transfers:  Sit <> Stand Assistance: Minimum Assistance (x 2 trials, blocking at feet to prevent sliding )  Sit <> Stand Assistive Device: No Assistive Device    Gait:   Gait Distance: x 4 lateral steps at bedside with R UE use of bed rail, distance and activity limited by fatigue  Assistance 1: Minimum assistance  Gait Assistive Device:  (bed rail)      Balance:   Static Sit: GOOD: Takes MODERATE challenges from all directions  Dynamic Sit: GOOD: Maintains balance through MODERATE excursions of active trunk movement  Static Stand: POOR+: Needs MINIMAL assist to maintain  Dynamic stand: POOR: N/A      Vital " signs: Heart rate: 59 bpm; SpO2: 97% on 2L; Blood pressure: 158/81 mmHg    AM-PAC 6 CLICK MOBILITY  How much help from another person does this patient currently need?   1 = Unable, Total/Dependent Assistance  2 = A lot, Maximum/Moderate Assistance  3 = A little, Minimum/Contact Guard/Supervision  4 = None, Modified [place]/Independent    Turning over in bed (including adjusting bedclothes, sheets and blankets)?: 4  Sitting down on and standing up from a chair with arms (e.g., wheelchair, bedside commode, etc.): 3  Moving from lying on back to sitting on the side of the bed?: 3  Moving to and from a bed to a chair (including a wheelchair)?: 3  Need to walk in hospital room?: 2  Climbing 3-5 steps with a railing?: 1  Total Score: 16     AM-PAC Raw Score CMS G-Code Modifier Level of Impairment Assistance   6 % Total / Unable   7 - 9 CM 80 - 100% Maximal Assist   10 - 14 CL 60 - 80% Moderate Assist   15 - 19 CK 40 - 60% Moderate Assist   20 - 22 CJ 20 - 40% Minimal Assist   23 CI 1-20% SBA / CGA   24 CH 0% Independent/ Mod I     Patient left supine with all lines intact, call button in reach, bed alarm on and nurse notified.    Assessment:   Irina Polk is a 88 y.o. female with a medical diagnosis of Acute hypoxemic respiratory failure PT evaluation completed. Pt requires min A for transfers and small steps at bedside, although overall activity tolerance limited by c/o fatigue. Noted H/H 7.8/26.2. Pt has decreased independence with functional mobility presenting with below impairments. Pt would benefit from continued skilled PT to maximize independence and safety with functional mobility.      Rehab identified problem list/impairments: Rehab identified problem list/impairments: impaired functional mobilty, impaired self care skills, weakness, impaired endurance, impaired balance    Rehab potential is good.    Activity tolerance: Fair    Discharge recommendations: Discharge Facility/Level Of Care  Needs: home health PT, home health OT (continue  caregivers)     Barriers to discharge: Barriers to Discharge: None    Equipment recommendations: Equipment Needed After Discharge:  (Pt has single point cane and rollator, will need to assess gait with current devices for safety)     GOALS:   Physical Therapy Goals        Problem: Physical Therapy Goal    Goal Priority Disciplines Outcome Goal Variances Interventions   Physical Therapy Goal     PT/OT, PT Ongoing (interventions implemented as appropriate)     Description:  Goals to be met by: 3/30/17     Patient will increase functional independence with mobility by performin. Supine to sit with modified independence.   2. Sit to supine with modified independence.   3. Sit<>stand transfer with supervision using rollator or rolling walker.   4. Gait > 50 feet with supervision using rollator or rolling walker.   5. Ascend/descend curb steps with rollator or rolling walker with min A.                 PLAN:    Patient to be seen 6 x/week to address the above listed problems via gait training, therapeutic activities, therapeutic exercises, neuromuscular re-education  Plan of Care expires: 17  Plan of Care reviewed with: patient, caregiver (ab)          Latonya Carrasco, PT  2017

## 2017-03-14 NOTE — PLAN OF CARE
Problem: Patient Care Overview  Goal: Plan of Care Review  Outcome: Ongoing (interventions implemented as appropriate)  Patient received on oxygen and later placed on Bipap on documented settings. Patient unable to tolerate Bipap, placed back on oxygen. No changes made at this time. Will continue to monitor.

## 2017-03-14 NOTE — PROGRESS NOTES
Please note the following patient has been assigned to Cesia Toro RN CCM,  with Outpatient Complex Care Mgmt for screening.    Please contact [facility] at ext [phone] with questions.    Thank you    Jennifer Jay, SSC

## 2017-03-14 NOTE — PROGRESS NOTES
[facility] Hospital Medicine  Progress Note    Patient Name: Irina Polk  MRN: [id number]  Patient Class: IP- Inpatient   Admission Date: 3/11/2017  Length of Stay: 3 days  Attending Physician: Eligio Arenas MD  Primary Care Provider: Blanca Quinones MD        Subjective:     Principal Problem:Acute hypoxemic respiratory failure    HPI:  89y/o female with history of HTN and GERD, life long nonsmoker presents to ER with c/o cough, SOB and congestion for a few days. Per family, patient has been experiencing URI sxs since November with increased dry non-productive cough and associated wheezing. She was evaluated by PCP and treated with combination of inhaled bronchodilators, tessalon, abx, but continued to have recurrent episodes with only transient improvement. PCP evaluated her on Friday and provided additional steroid therapy but w/o improvement.           Hospital Course:  Admitted with hypoxic resp failure placed on NIPPV, bronchodilators, steroids, course notable for NSTEMI, per Dr. Wyatt suspect secondary to pulmonary disease with distress with anemia. Doubt primary ACS.    Still some wheezing but improved.  Developed junctional rhythm but still in 50's.    Ok to transfer to floor.           Interval History:     Review of Systems   Constitutional: Positive for fatigue. Negative for chills and fever.   HENT: Negative for congestion, mouth sores, sneezing, sore throat and trouble swallowing.    Eyes: Negative for photophobia and visual disturbance.   Respiratory: Positive for shortness of breath. Negative for cough, wheezing and stridor.    Cardiovascular: Negative for chest pain, palpitations and leg swelling.   Gastrointestinal: Negative for abdominal distention, abdominal pain, diarrhea, nausea and vomiting.   Endocrine: Negative for polydipsia and polyphagia.   Genitourinary: Negative for difficulty urinating, hematuria and urgency.   Musculoskeletal: Negative for  arthralgias and myalgias.   Skin: Negative for color change.   Neurological: Negative for dizziness, seizures and headaches.   Psychiatric/Behavioral: Negative for agitation.     Objective:     Vital Signs (Most Recent):  Temp: 98.4 °F (36.9 °C) (03/14/17 0740)  Pulse: (!) 56 (03/14/17 0800)  Resp: (!) 28 (03/14/17 0754)  BP: 128/60 (03/14/17 0700)  SpO2: 99 % (03/14/17 0754) Vital Signs (24h Range):  Temp:  [97.7 °F (36.5 °C)-98.6 °F (37 °C)] 98.4 °F (36.9 °C)  Pulse:  [46-78] 56  Resp:  [18-40] 28  SpO2:  [95 %-100 %] 99 %  BP: (116-168)/(56-87) 128/60     Weight: 68.3 kg (150 lb 9.2 oz)  Body mass index is 27.54 kg/(m^2).    Intake/Output Summary (Last 24 hours) at 03/14/17 0949  Last data filed at 03/14/17 0740   Gross per 24 hour   Intake              480 ml   Output              500 ml   Net              -20 ml      Physical Exam   Constitutional: She is oriented to person, place, and time. She appears well-developed and well-nourished. No distress.   Elderly female   HENT:   Head: Normocephalic and atraumatic.   Mouth/Throat: Oropharynx is clear and moist. No oropharyngeal exudate.   Eyes: Conjunctivae are normal. Right eye exhibits no discharge. Left eye exhibits no discharge. No scleral icterus.   Neck: No JVD present.   Cardiovascular: Intact distal pulses.    bradycardia   Pulmonary/Chest: No stridor. She has wheezes (end exp).   Scattered rhonchi, coarse Bs b/l   Abdominal: Soft. Bowel sounds are normal. She exhibits no distension. There is no tenderness.   Musculoskeletal: Normal range of motion. She exhibits no edema, tenderness or deformity.   Neurological: She is alert and oriented to person, place, and time. No cranial nerve deficit.   Skin: Skin is warm and dry. She is not diaphoretic.   Psychiatric: She has a normal mood and affect.   Nursing note and vitals reviewed.      Significant Labs: All pertinent labs within the past 24 hours have been reviewed.    Significant Imaging: I have reviewed and  interpreted all pertinent imaging results/findings within the past 24 hours.    Assessment/Plan:      * Acute hypoxemic respiratory failure  - cont bronchodilators  - cont steroids, taper   CT chest: Patchy ground glass opacification of the bilateral lungs in a somewhat perihilar distribution  - PCT < 0.09 -  Change Abx to Avelox  - lactic acid normalized likely d/t hypoxia   - Bipap PRN  - wean O2 as tolerated  - case d/w Dr. Spaulding and Pulm CC  Ok to transfer to floor      NSTEMI (non-ST elevated myocardial infarction)  - NSTEMI;  Troponin 0.9, nonspecific ST T wave changes.  - per Cardiology secondary to pulmonary disease with distress with anemia  - Doubt primary ACS  - heparin drip dc'd  - follow ECHO  - cardio following    Aneurysm of thoracic aorta  stable      Aspiration of liquid  Failed MBS  Diet changed to mechanical soft with honey thick liquids      HTN (hypertension)  - on toprol 25 mg and amlodipine 2.5 mg daily at home  - BP meds held yesterday d/t hypotension; improving BP, consider restarting home meds  - monitor       Hyperlipidemia  - cont atorvastatin     3/13/2017 04:56   Cholesterol 177   HDL 38 (L)   LDL Cholesterol 119.6   Total Cholesterol/HDL Ratio 4.7   Triglycerides 97            VTE Risk Mitigation         Ordered     heparin (porcine) injection 5,000 Units  Every 8 hours     Route:  Subcutaneous        03/12/17 1349     Place sequential compression device  Until discontinued      03/11/17 1790          Josh Larsen PA-C  Department of Hospital Medicine   [facility]

## 2017-03-14 NOTE — PROGRESS NOTES
Subjective:       Patient ID: Irina Polk is a 88 y.o. female.    Chief Complaint:  Cough (Pt has had cough and congestion for the last few days. Pt saw PCP yesterday and started on Prednisone. Pt advised if not getting better to come to ED. )       History of Present Illness  MBSS study results noted and ST rec  Pt denies any n/v no overt gi bleed    Review of Systems  Cardiovascular: negative      Objective:     Vitals:    03/14/17 0615 03/14/17 0700 03/14/17 0730 03/14/17 0754   BP:  128/60     BP Location:       Patient Position:       BP Method:       Pulse: (!) 52 (!) 48 (!) 46 68   Resp: (!) 39 (!) 38 (!) 40 (!) 28   Temp:       TempSrc:       SpO2: 98% 98% 98% 99%   Weight:       Height:          Abdomen: soft mild distension    Data Review   Recent Results (from the past 336 hour(s))   CBC auto differential    Collection Time: 03/14/17  4:08 AM   Result Value Ref Range    WBC 7.75 3.90 - 12.70 K/uL    Hemoglobin 9.9 (L) 12.0 - 16.0 g/dL    Hematocrit 32.2 (L) 37.0 - 48.5 %    Platelets 247 150 - 350 K/uL   CBC auto differential    Collection Time: 03/13/17  4:56 AM   Result Value Ref Range    WBC 8.34 3.90 - 12.70 K/uL    Hemoglobin 8.9 (L) 12.0 - 16.0 g/dL    Hematocrit 29.2 (L) 37.0 - 48.5 %    Platelets 239 150 - 350 K/uL   CBC auto differential    Collection Time: 03/12/17  7:00 AM   Result Value Ref Range    WBC 4.36 3.90 - 12.70 K/uL    Hemoglobin 7.8 (L) 12.0 - 16.0 g/dL    Hematocrit 26.2 (L) 37.0 - 48.5 %    Platelets 216 150 - 350 K/uL        Recent Labs  Lab 03/11/17  2308 03/12/17  0700   APTT <21.0 63.7*   INR 0.9  --        Recent Labs  Lab 03/10/17  1636 03/11/17  1858 03/12/17  0700 03/13/17  0456    143 140 139   K 4.3 3.9 3.9 4.2    108 109 107   CO2 28 25 25 24   BUN 15 18 15 23   CREATININE 0.8 0.8 0.7 0.8   CALCIUM 8.7 8.7 7.7* 8.0*   PROT 6.7 6.9  --   --    BILITOT 0.3 0.2  --   --    ALKPHOS 59 66  --   --    ALT 8* 9*  --   --    AST 15 13  --   --     No results  found for: HAV, HEPAIGM, HEPBIGM, HEPBCAB, HBEAG, HEPCAB No results found for: AFP   No results found for: CEA     Recent Labs  Lab 03/11/17  2308 03/12/17  0700   APTT <21.0 63.7*   INR 0.9  --         Assessment:     1. Respiratory distress    2. Cough    3. Wheezing    4. Hypoxia    5. Elevated troponin    6. High serum lactate    7. Lactic acid acidosis    8. Acute hypoxemic respiratory failure    9. Heart failure        Plan:     1) ST  2) Monitor h/h closely

## 2017-03-14 NOTE — PLAN OF CARE
Problem: SLP Goal  Goal: SLP Goal  Outcome: Ongoing (interventions implemented as appropriate)  1. The pt will tolerate a mechanical soft diet with honey thick liquids, with adequate oral prep and clearance and no overt s/s aspiration, given supervision for safe swallowing strategies.  2. The pt will tolerate small amounts of nectar thick liquids (3ml) via spoon, using compensatory strategies (chin tuck, supraglottic swallow) with no overt s/s aspiration, given min cues, following good oral care.   MBSS was completed today- this revealed aspiration of thin, penetration of nectar with suspected trace aspiration. A [facility] soft diet with honey thick liquids was recommended. ST to follow to determine if pt is able to safely and successfully perform compensatory strategies.

## 2017-03-14 NOTE — PLAN OF CARE
Problem: SLP Goal  Goal: SLP Goal  Outcome: Ongoing (interventions implemented as appropriate)  1. The pt will tolerate a mechanical soft diet with honey thick liquids utilizing safe swallowing precautions, with adequate oral prep and clearance, and no overt s/s aspiration, given supervision.   2. The pt will tolerate small 3ml trials of cold, thin water via spoon or small ice chips using compensatory strategies (chin tuck, supraglottic swallow) with ST only at this time following good oral care, with no overt s/s aspiration.

## 2017-03-14 NOTE — PLAN OF CARE
Problem: Patient Care Overview  Goal: Plan of Care Review  Outcome: Ongoing (interventions implemented as appropriate)  Pt was weaned from 3L NC to 2 L NC - pt did not require BiPAP support during shift. Denies SOB at rest but complained with activity, recovered quickly. Pt continues to be tachypneic, RR 20s. PRN tessalon cuco given X 1 for dry cough, mod relief obtained. Pt, daughters, and caregiver were educated about necessity to thicken liquids to honey thick consistency. Aspiration precautions maintained & family educated. Speech unable to work with pt today. Pt, daughters and caregiver were educated on the increase of risk of UTI associated with the use adult briefs. Family verbalize understanding but still wish to use adult diapers. PO intake encouraged, pt did not like hospital dental soft diet. Daughter was encouraged to bring pt soft mechanical foods that pt likes to eat to encourage caloric intake. Activity in bed encouraged, but pt reports she is fatigued and declines activity. Participated in Q2H turns. Incontinence care given often and barrier cream applied frequently. Pt was transported via stretcher to  308 with caregiver and daughter, Sanaz, at the bedside.

## 2017-03-14 NOTE — PLAN OF CARE
Problem: Occupational Therapy Goal  Goal: Occupational Therapy Goal  Goals to be met by 4/11/17:    Grooming activity standing at the sink with CGA.  LB dressing with Minimum Assistance (don briefs or pants).   Assess toileting.   Transfer to bedside commode with SBA.   Outcome: Ongoing (interventions implemented as appropriate)  OT evaluation completed and treatment initiated. Pt would continue to benefit from skilled OT services to improve safety and independence in self-care tasks. Discharge plan is home health PT and OT. DME needs include RW.

## 2017-03-15 PROCEDURE — 25000242 PHARM REV CODE 250 ALT 637 W/ HCPCS: Performed by: HOSPITALIST

## 2017-03-15 PROCEDURE — 97530 THERAPEUTIC ACTIVITIES: CPT

## 2017-03-15 PROCEDURE — 97535 SELF CARE MNGMENT TRAINING: CPT

## 2017-03-15 PROCEDURE — 25000003 PHARM REV CODE 250: Performed by: HOSPITALIST

## 2017-03-15 PROCEDURE — 99233 SBSQ HOSP IP/OBS HIGH 50: CPT | Mod: ,,,

## 2017-03-15 PROCEDURE — 99900035 HC TECH TIME PER 15 MIN (STAT)

## 2017-03-15 PROCEDURE — 93010 ELECTROCARDIOGRAM REPORT: CPT | Mod: ,,, | Performed by: INTERNAL MEDICINE

## 2017-03-15 PROCEDURE — 11000001 HC ACUTE MED/SURG PRIVATE ROOM

## 2017-03-15 PROCEDURE — 63600175 PHARM REV CODE 636 W HCPCS: Performed by: PHYSICIAN ASSISTANT

## 2017-03-15 PROCEDURE — 93005 ELECTROCARDIOGRAM TRACING: CPT

## 2017-03-15 PROCEDURE — 87205 SMEAR GRAM STAIN: CPT

## 2017-03-15 PROCEDURE — 87070 CULTURE OTHR SPECIMN AEROBIC: CPT

## 2017-03-15 PROCEDURE — 27000221 HC OXYGEN, UP TO 24 HOURS

## 2017-03-15 PROCEDURE — 92526 ORAL FUNCTION THERAPY: CPT

## 2017-03-15 PROCEDURE — 94799 UNLISTED PULMONARY SVC/PX: CPT

## 2017-03-15 PROCEDURE — 25000003 PHARM REV CODE 250: Performed by: PHYSICIAN ASSISTANT

## 2017-03-15 PROCEDURE — 25000003 PHARM REV CODE 250

## 2017-03-15 PROCEDURE — 94640 AIRWAY INHALATION TREATMENT: CPT

## 2017-03-15 PROCEDURE — 25000003 PHARM REV CODE 250: Performed by: INTERNAL MEDICINE

## 2017-03-15 RX ORDER — AMLODIPINE BESYLATE 2.5 MG/1
2.5 TABLET ORAL DAILY
Status: DISCONTINUED | OUTPATIENT
Start: 2017-03-15 | End: 2017-03-17 | Stop reason: HOSPADM

## 2017-03-15 RX ORDER — METOPROLOL SUCCINATE 25 MG/1
25 TABLET, EXTENDED RELEASE ORAL DAILY
Status: DISCONTINUED | OUTPATIENT
Start: 2017-03-15 | End: 2017-03-17 | Stop reason: HOSPADM

## 2017-03-15 RX ADMIN — METHYLPREDNISOLONE SODIUM SUCCINATE 40 MG: 40 INJECTION, POWDER, FOR SOLUTION INTRAMUSCULAR; INTRAVENOUS at 09:03

## 2017-03-15 RX ADMIN — ACETAMINOPHEN 650 MG: 325 TABLET ORAL at 10:03

## 2017-03-15 RX ADMIN — HEPARIN SODIUM 5000 UNITS: 5000 INJECTION, SOLUTION INTRAVENOUS; SUBCUTANEOUS at 09:03

## 2017-03-15 RX ADMIN — METOPROLOL SUCCINATE 25 MG: 25 TABLET, EXTENDED RELEASE ORAL at 02:03

## 2017-03-15 RX ADMIN — IPRATROPIUM BROMIDE AND ALBUTEROL SULFATE 3 ML: .5; 3 SOLUTION RESPIRATORY (INHALATION) at 08:03

## 2017-03-15 RX ADMIN — IPRATROPIUM BROMIDE AND ALBUTEROL SULFATE 3 ML: .5; 3 SOLUTION RESPIRATORY (INHALATION) at 11:03

## 2017-03-15 RX ADMIN — IPRATROPIUM BROMIDE AND ALBUTEROL SULFATE 3 ML: .5; 3 SOLUTION RESPIRATORY (INHALATION) at 04:03

## 2017-03-15 RX ADMIN — HEPARIN SODIUM 5000 UNITS: 5000 INJECTION, SOLUTION INTRAVENOUS; SUBCUTANEOUS at 05:03

## 2017-03-15 RX ADMIN — MOXIFLOXACIN HYDROCHLORIDE 400 MG: 400 INJECTION, SOLUTION INTRAVENOUS at 02:03

## 2017-03-15 RX ADMIN — PANTOPRAZOLE SODIUM 40 MG: 40 TABLET, DELAYED RELEASE ORAL at 09:03

## 2017-03-15 RX ADMIN — ATORVASTATIN CALCIUM 10 MG: 10 TABLET, FILM COATED ORAL at 09:03

## 2017-03-15 RX ADMIN — AMLODIPINE BESYLATE 2.5 MG: 2.5 TABLET ORAL at 02:03

## 2017-03-15 RX ADMIN — IPRATROPIUM BROMIDE AND ALBUTEROL SULFATE 3 ML: .5; 3 SOLUTION RESPIRATORY (INHALATION) at 03:03

## 2017-03-15 RX ADMIN — DIPHENHYDRAMINE HYDROCHLORIDE 25 MG: 25 CAPSULE ORAL at 09:03

## 2017-03-15 RX ADMIN — BENZONATATE 100 MG: 100 CAPSULE ORAL at 09:03

## 2017-03-15 RX ADMIN — HEPARIN SODIUM 5000 UNITS: 5000 INJECTION, SOLUTION INTRAVENOUS; SUBCUTANEOUS at 02:03

## 2017-03-15 RX ADMIN — NITROGLYCERIN 0.4 MG: 0.4 TABLET SUBLINGUAL at 03:03

## 2017-03-15 NOTE — PLAN OF CARE
Problem: Physical Therapy Goal  Goal: Physical Therapy Goal  Goals to be met by: 3/30/17     Patient will increase functional independence with mobility by performin. Supine to sit with modified independence.   2. Sit to supine with modified independence.   3. Sit<>stand transfer with supervision using rollator or rolling walker.   4. Gait > 50 feet with supervision using rollator or rolling walker.   5. Ascend/descend curb steps with rollator or rolling walker with min A.    Outcome: Ongoing (interventions implemented as appropriate)  Sats at rest on O2 98% and heart rate at rest 66.  Pt amb in hallway with RW and A.  During amb telemetry monitor went off-pt tachycardic.  Return to room and pt to bs chair.  Once back in chair sats at 94% and heart rate down to 76.  Progressing toward goals.    REC:  Home with HH  DME:  RW-pt has rollator

## 2017-03-15 NOTE — PLAN OF CARE
Problem: Patient Care Overview  Goal: Plan of Care Review  Outcome: Ongoing (interventions implemented as appropriate)  No significant events overnight. Remains free from fall and injury. Incontinence care performed. VSS on 2L O2 throughout the night. No further complaints of CP. Positions self with assistance; q2h turning tolerated. Denies pain. Tele maintained; all alarms active and audible. Aspiration cautions maintained. Honey thickened liquids provided. PRN meds administered for cough. Plan of care reviewed with patient and all questions answered. 24 hour sitter at bedside. Bed low, locked w/ bed alarm on. Call light within reach. Purposeful rounding performed. Resting comfortably in bed, no other complaints at this time.

## 2017-03-15 NOTE — PROGRESS NOTES
SW, briefly, met with pt at bedside, sitter, Ms. Newton present; pt was starting breakfast and respiratory came in; SW will f/u with pt after breakfast and breathing treatment, to reassess needs.

## 2017-03-15 NOTE — PLAN OF CARE
Problem: Patient Care Overview  Goal: Plan of Care Review  Outcome: Ongoing (interventions implemented as appropriate)  Loose cough but no apparent distress.

## 2017-03-15 NOTE — NURSING
Patient complains of chest pain. 1 dose nitroglycerin administered. Patient states relief after 1 dose. Patient states did not need any more. MD notified. Will continue to monitor.

## 2017-03-15 NOTE — ASSESSMENT & PLAN NOTE
- cont bronchodilators  - cont steroids, taper   CT chest: Patchy ground glass opacification of the bilateral lungs in a somewhat perihilar distribution  - PCT < 0.09 -  Change Abx to Avelox  - lactic acid normalized likely d/t hypoxia   - Bipap PRN  - wean O2 as tolerated  - case d/w Dr. Spaulding and Pulm CC

## 2017-03-15 NOTE — PROGRESS NOTES
[facility] Hospital Medicine  Progress Note    Patient Name: Irina Polk  MRN: [id number]  Patient Class: IP- Inpatient   Admission Date: 3/11/2017  Length of Stay: 4 days  Attending Physician: Eligio Arenas MD  Primary Care Provider: Blanca Quinones MD        Subjective:     Principal Problem:Acute hypoxemic respiratory failure    HPI:  87y/o female with history of HTN and GERD, life long nonsmoker presents to ER with c/o cough, SOB and congestion for a few days. Per family, patient has been experiencing URI sxs since November with increased dry non-productive cough and associated wheezing. She was evaluated by PCP and treated with combination of inhaled bronchodilators, tessalon, abx, but continued to have recurrent episodes with only transient improvement. PCP evaluated her on Friday and provided additional steroid therapy but w/o improvement.           Hospital Course:  Admitted with hypoxic resp failure placed on NIPPV, bronchodilators, steroids, course notable for NSTEMI, per Dr. Wyatt suspect secondary to pulmonary disease with distress with anemia. Doubt primary ACS.    Still wheezing.  Alert.  Up with PT           Interval History: failed swallow study with thin liquids    Review of Systems   Constitutional: Positive for fatigue. Negative for chills and fever.   HENT: Negative for congestion, mouth sores, sneezing, sore throat and trouble swallowing.    Eyes: Negative for photophobia and visual disturbance.   Respiratory: Positive for shortness of breath. Negative for cough, wheezing and stridor.    Cardiovascular: Negative for chest pain, palpitations and leg swelling.   Gastrointestinal: Negative for abdominal distention, abdominal pain, diarrhea, nausea and vomiting.   Endocrine: Negative for polydipsia and polyphagia.   Genitourinary: Negative for difficulty urinating, hematuria and urgency.   Musculoskeletal: Negative for arthralgias and myalgias.   Skin: Negative  for color change.   Neurological: Negative for dizziness, seizures and headaches.   Psychiatric/Behavioral: Negative for agitation.     Objective:     Vital Signs (Most Recent):  Temp: 98.2 °F (36.8 °C) (03/15/17 1225)  Pulse: 71 (03/15/17 1400)  Resp: 16 (03/15/17 1225)  BP: (!) 140/89 (03/15/17 1225)  SpO2: 96 % (03/15/17 1225) Vital Signs (24h Range):  Temp:  [97.9 °F (36.6 °C)-98.3 °F (36.8 °C)] 98.2 °F (36.8 °C)  Pulse:  [49-81] 71  Resp:  [16-36] 16  SpO2:  [95 %-100 %] 96 %  BP: (123-181)/(68-89) 140/89     Weight: 68.3 kg (150 lb 9.2 oz)  Body mass index is 27.54 kg/(m^2).  No intake or output data in the 24 hours ending 03/15/17 1526   Physical Exam   Constitutional: She is oriented to person, place, and time. She appears well-developed and well-nourished. No distress.   Elderly female   HENT:   Head: Normocephalic and atraumatic.   Mouth/Throat: Oropharynx is clear and moist. No oropharyngeal exudate.   Eyes: Conjunctivae are normal. Right eye exhibits no discharge. Left eye exhibits no discharge. No scleral icterus.   Neck: No JVD present.   Cardiovascular: Intact distal pulses.    bradycardia   Pulmonary/Chest: Effort normal. No stridor. No respiratory distress. She has wheezes (end exp). She has no rales.   Wheezing throughout on expiration   Abdominal: Soft. Bowel sounds are normal. She exhibits no distension. There is no tenderness.   Musculoskeletal: Normal range of motion. She exhibits no edema, tenderness or deformity.   Neurological: She is alert and oriented to person, place, and time. No cranial nerve deficit.   Skin: Skin is warm and dry. She is not diaphoretic.   Psychiatric: She has a normal mood and affect.   Nursing note and vitals reviewed.      Significant Labs:   Recent Lab Results       03/15/17  1011      Gram Stain (Respiratory) <10 epithelial cells per low power field.      Few WBC's      Few yeast      Rare Gram positive cocci           Significant Imaging: I have reviewed and  interpreted all pertinent imaging results/findings within the past 24 hours.    Assessment/Plan:      * Acute hypoxemic respiratory failure  - cont bronchodilators  - cont steroids, taper   CT chest: Patchy ground glass opacification of the bilateral lungs in a somewhat perihilar distribution  - PCT < 0.09 -  Change Abx to Avelox  - lactic acid normalized likely d/t hypoxia   - Bipap PRN  - wean O2 as tolerated  - case d/w Dr. Spaulding and Pulm CC        Elevated troponin  - NSTEMI;  Troponin 0.9, nonspecific ST T wave changes.  - per Cardiology secondary to pulmonary disease with distress with anemia  - Doubt primary ACS  - heparin drip dc'd  - check ECHO       Aspiration of liquid  Failed MBS  Diet changed to mechanical soft   Discussed with patient, family, and PCP.  Decision to feed thin liquids despite risks as a comfort measure in this elderly patient  No hospice at this time      HTN (hypertension)  - on toprol 25 mg and amlodipine 2.5 mg daily at home  -restarted       VTE Risk Mitigation         Ordered     heparin (porcine) injection 5,000 Units  Every 8 hours     Route:  Subcutaneous        03/12/17 1349     Place sequential compression device  Until discontinued      03/11/17 9910          Josh Larsen PA-C  Department of Hospital Medicine   [facility]

## 2017-03-15 NOTE — PLAN OF CARE
Problem: Patient Care Overview  Goal: Plan of Care Review  Outcome: Ongoing (interventions implemented as appropriate)  No significant events overnight. Remains free from fall and injury. Incontinence care performed. VSS on 2L O2 throughout the night. No further complaints of CP. Positions self with assistance; q2h turning tolerated. Denies pain. Tele maintained; all alarms active and audible. Aspiration cautions maintained.Code changed to DNR, allowed any liquid (no thickener).  Caregiver at bedside. Plan of care reviewed with patient and all questions answered. 24 hour sitter at bedside. Bed low, locked w/ bed alarm on. Call light within reach. Purposeful rounding performed. Resting comfortably in bed, no other complaints at this time.

## 2017-03-15 NOTE — PT/OT/SLP PROGRESS
"Occupational Therapy  Treatment    Irina Polk   MRN: [id number]   Admitting Diagnosis: Acute hypoxemic respiratory failure    OT Date of Treatment: 03/15/17   OT Start Time: 09  OT Stop Time: 1023  OT Total Time (min): 40 min    Billable Minutes:  Self Care/Home Management 24 and Therapeutic Activity 16    General Precautions: Standard, aspiration, fall  Orthopedic Precautions: N/A  Braces: N/A    Do you have any cultural, spiritual, Spiritism conflicts, given your current situation?: None Specified    Subjective:  Communicated with nursing prior to session.  "Oh, I just don't want to."    Pain Ratin/10              Pain Rating Post-Intervention: 0/10    Objective:  Patient found with: oxygen, telemetry     Functional Mobility:  Bed Mobility:  Supine to Sit: Supervision    Transfers:   Sit <> Stand Assistance: Contact Guard Assistance  Sit <> Stand Assistive Device: Rolling Walker  Bed <> Chair Transfer Assistance: Contact Guard Assistance  Bed <> Chair Assistive Device: Rolling Walker  Toilet Transfer Assistance: Minimum Assistance (low toilet)  Toilet Transfer Assistive Device: Rolling Walker, grab bar    Functional Ambulation: with rolling walker CGA for safety     Activities of Daily Living:  Feeding Level of Assistance: Set-up Assistance (drinking juice from a cup. Pt with decreased apetitte stating "everything is nasty, you wouldn't like it either.")    LE Dressing Level of Assistance: Maximum assistance (donning brief and slippers)  Grooming Position: Standing at sink  Grooming Level of Assistance: Contact guard assistance (CGA progressing to close supervision with no LOB. Pt c/o of feeling fatiged completing G/H at sink after toileting. )  Toileting Where Assessed: Toilet  Toileting Level of Assistance:  (supervision for latonya-care and max A for donning brief from toilet. Pt required min A for pulling brief over hips. )      Balance:   Static Sit: GOOD-: Takes MODERATE challenges from all directions " but inconsistently  Dynamic Sit: GOOD-: Maintains balance through MODERATE excursions of active trunk movement,     Static Stand: FAIR: Maintains without assist but unable to take challenges  Dynamic stand: FAIR: Needs CONTACT GUARD during gait    Therapeutic Activities and Exercises:  Bed mobility, static and dynamic standing with ADL's, functional mobility around room, and pursed lip breathing.     AM-PAC 6 CLICK ADL   How much help from another person does this patient currently need?   1 = Unable, Total/Dependent Assistance  2 = A lot, Maximum/Moderate Assistance  3 = A little, Minimum/Contact Guard/Supervision  4 = None, Modified [place]/Independent    Putting on and taking off regular lower body clothing? : 2  Bathing (including washing, rinsing, drying)?: 2  Toileting, which includes using toilet, bedpan, or urinal? : 2  Putting on and taking off regular upper body clothing?: 3  Taking care of personal grooming such as brushing teeth?: 3  Eating meals?: 4  Total Score: 16     AM-PAC Raw Score CMS G-Code Modifier Level of Impairment Assistance   6 % Total / Unable   7 - 9 CM 80 - 100% Maximal Assist   10 - 14 CL 60 - 80% Moderate Assist   15 - 19 CK 40 - 60% Moderate Assist   20 - 22 CJ 20 - 40% Minimal Assist   23 CI 1-20% SBA / CGA   24 CH 0% Independent/ Mod I     Patient left up in chair with all lines intact, call button in reach, nursing notified and sitter present    ASSESSMENT:  Irina Polk is a 88 y.o. female with a medical diagnosis of Acute hypoxemic respiratory failure Pt continues to make progress towards goals. She required multiple verbal cuing for participating in tasks and agreeable to sit up in chair for over an hour today. Pt completed G/H at sink with CGA progressing to SBA. Pt would benefit from skilled occupational therapy intervention for increased activity tolerance and independence in ADL's.    Rehab identified problem list/impairments: Rehab identified problem  list/impairments: weakness, gait instability, impaired endurance, impaired balance, impaired functional mobilty, impaired self care skills, decreased safety awareness, impaired coordination    Rehab potential is good.    Activity tolerance: Good    Discharge recommendations: Discharge Facility/Level Of Care Needs: home with home health, home health OT, home health PT (with continued 24/7 caregivers)     Barriers to discharge: Barriers to Discharge: None    Equipment recommendations:  (Pending PT assesment for gait with available DME)     GOALS:   Occupational Therapy Goals        Problem: Occupational Therapy Goal    Goal Priority Disciplines Outcome Interventions   Occupational Therapy Goal     OT, PT/OT Ongoing (interventions implemented as appropriate)    Description:  Goals to be met by 4/11/17:    Grooming activity standing at the sink with CGA. (MET 3/15/17)  LB dressing with Minimum Assistance (don briefs or pants).   Assess toileting. (MET 3/15/17)  Transfer to bedside chair with SBA.       New Goal:   G/H standing at sink with Modified [place].   Toileting with MIN A.               Plan:  Patient to be seen 6 x/week to address the above listed problems via self-care/home management, therapeutic activities, therapeutic exercises  Plan of Care expires: 04/11/17  Plan of Care reviewed with: patient         Kristin CLINE Shabana, OT  03/15/2017

## 2017-03-15 NOTE — PROGRESS NOTES
[facility]  Cardiology  Progress Note    Patient Name: Irina oPlk  MRN: [id number]  Admission Date: 3/11/2017  Hospital Length of Stay: 4 days  Code Status: Full Code   Attending Physician: Eligio Arenas MD   Primary Care Physician: Blanca Quinones MD  Expected Discharge Date: 3/13/2017  Principal Problem:Acute hypoxemic respiratory failure    Subjective:     Brief HPI:    89 yo female with severe pulmonary disease. Over the last week she has had a lot of coughing and SOB. Presents to [facility] with wheezing and admitted. Early in the am of presentation some chest tightness.    Hospital Course:     Treated for bronchitis.    Received PRBCs.    Interval History:     No CP. Breathing is fair. A lot of wheezing. Coughing.    Review of Systems   Constitution: Positive for weakness and malaise/fatigue.   Cardiovascular: Negative for chest pain and palpitations.   Respiratory: Positive for cough and shortness of breath. Negative for hemoptysis and wheezing.    Gastrointestinal: Negative for abdominal pain.     Objective:     Vital Signs (Most Recent):  Temp: 98.2 °F (36.8 °C) (03/15/17 0800)  Pulse: 77 (03/15/17 1000)  Resp: 18 (03/15/17 0850)  BP: (!) 181/79 (03/15/17 0800)  SpO2: 97 % (03/15/17 0850) Vital Signs (24h Range):  Temp:  [97.9 °F (36.6 °C)-98.3 °F (36.8 °C)] 98.2 °F (36.8 °C)  Pulse:  [49-87] 77  Resp:  [16-36] 18  SpO2:  [94 %-100 %] 97 %  BP: (123-181)/(68-83) 181/79     Weight: 68.3 kg (150 lb 9.2 oz)  Body mass index is 27.54 kg/(m^2).    SpO2: 97 %  O2 Device (Oxygen Therapy): nasal cannula      Intake/Output Summary (Last 24 hours) at 03/15/17 1035  Last data filed at 03/14/17 1339   Gross per 24 hour   Intake              250 ml   Output                0 ml   Net              250 ml       Lines/Drains/Airways     Peripheral Intravenous Line                 Peripheral IV - Single Lumen 03/11/17 1738 Right Forearm 3 days         Peripheral IV - Single Lumen 03/12/17 3472  Right Wrist 2 days                Physical Exam   Constitutional: She is oriented to person, place, and time. She appears well-developed and well-nourished. She appears ill. No distress.   Eyes: Right eye exhibits no discharge. Left eye exhibits no discharge. Right conjunctiva is not injected. Left conjunctiva is not injected. Right pupil is round. Left pupil is round. Pupils are equal.   Neck: No JVD present. Carotid bruit is not present.   Cardiovascular: Normal rate, regular rhythm, S1 normal and S2 normal.    Murmur heard.  Pulmonary/Chest: Effort normal. She has no decreased breath sounds. She has wheezes. She has rhonchi. She has no rales.   Abdominal: Soft. Normal appearance. There is no tenderness.   Musculoskeletal:        Right ankle: She exhibits no swelling.        Left ankle: She exhibits no swelling.   Lymphadenopathy:        Head (right side): No submandibular adenopathy present.        Head (left side): No submandibular adenopathy present.     She has no cervical adenopathy.   Neurological: She is alert and oriented to person, place, and time. No cranial nerve deficit or sensory deficit.   Skin: Skin is warm and dry. No rash noted.   Psychiatric: She has a normal mood and affect. Her speech is normal and behavior is normal.     Current Medications:     albuterol-ipratropium 2.5mg-0.5mg/3mL  3 mL Nebulization Q4H    atorvastatin  10 mg Oral Daily    heparin (porcine)  5,000 Units Subcutaneous Q8H    methylPREDNISolone sodium succinate  40 mg Intravenous Q12H    moxifloxacin  400 mg Intravenous Q24H    pantoprazole  40 mg Oral Daily     Current Laboratory Results:    No results found for this or any previous visit (from the past 24 hour(s)).  Current Imaging Results:    Imaging Results         Fl Modified Barium Swallow Speech (In process)         CT Chest Without Contrast (Final result) Result time:  03/12/17 15:43:07    Final result by John Kong MD (03/12/17 15:43:07)    Impression:         #1. Patchy ground glass opacification of the bilateral lungs in a somewhat perihilar distribution.  The finding is nonspecific and could reflect pulmonary edema.  Pneumonitis from infection or inflammation is not excluded.    #2.  Diffusely aneurysmal thoracic aorta.    #3.  Cardiomegaly and coronary atherosclerosis.    #4.  Mediastinal lymphadenopathy.    #5.  Large hiatal hernia.      Electronically signed by: SANTINO DOLL MD  Date:     03/12/17  Time:    15:43     Narrative:    HISTORY: Evaluate lung parenchyma    COMPARISON: None    FINDINGS: Axial images of the thorax were obtained without the use of IV contrast.    The ascending thoracic aorta is aneurysmal measuring 4 cm.  The descending thoracic aorta is aneurysmal measuring 3.1 cm.  The pulmonary artery is prominent.  There is coronary atherosclerosis.  The heart is enlarged.  There are prominent precarinal lymph nodes with the largest measuring 1 cm in short axis.    There is patchy ground glass opacification in a somewhat perihilar distribution.  Bilateral lungs are otherwise unremarkable.  No pneumothorax or pleural fluid.    The visualized abdominal structures are otherwise unremarkable.  There is a large hiatal hernia with the upper body and fundus of the stomach in the inferior thorax.    There is degenerative change of the spine.  There is a grossly stable lower thoracic compression fracture. The osseous structures are otherwise unremarkable.  There is an air-fluid level in the esophagus.            X-Ray Chest 1 View (Final result) Result time:  03/11/17 18:06:38    Final result by Cristian Summers MD (03/11/17 18:06:38)    Impression:        Stable appearance from 2015      Electronically signed by: Dr. Cristian Summers MD  Date:     03/11/17  Time:    18:06     Narrative:    PORTABLE AP CHEST:      Comparison: The 11/7/15    Findings:     Thoracic aorta is prominent, and while underlying thoracic aortic aneurysm not excluded, the  appearance of the cardia mediastinum is stable from 2015.Prominence of interstitial markings unchanged, upper lobe distribution predominantly.  No new focal lobar consolidation. The cardiac silhouette is unchanged. The pulmonary vasculature is unremarkable. There is no pleural effusion or pneumothorax. There are no acute bony abnormalities.            3/13/2017: Echo:      1 - Concentric remodeling.     2 - Normal left ventricular systolic function (EF 60-65%).     Assessment and Plan:     Problem List:    Active Diagnoses:    Diagnosis Date Noted POA    PRINCIPAL PROBLEM:  Acute hypoxemic respiratory failure [J96.01] 03/12/2017 Yes    Aspiration of liquid [T17.998A] 03/14/2017 Yes    NSTEMI (non-ST elevated myocardial infarction) [I21.4] 03/13/2017 Yes    Aneurysm of thoracic aorta [I71.2] 03/13/2017 Yes    Iron deficiency anemia [D50.9] 03/12/2017 Yes    Elevated troponin [R79.89] 03/12/2017 Yes    Lactic acid acidosis [E87.2] 03/12/2017 Yes    Respiratory distress [R06.00] 03/11/2017 Yes    HTN (hypertension) [I10] 08/11/2015 Yes    Hyperlipidemia [E78.5] 08/11/2015 Yes      Problems Resolved During this Admission:    Diagnosis Date Noted Date Resolved POA    Difficulty swallowing [R13.10] 03/12/2017 03/14/2017 Yes     Assessment and Plan:    1. Shortness of Breath              Severe pulmonary disease.     2. Non ST Segment Elevation Myocardial Infarction              3/12/2017: NSTEMI. Troponin 0.9. Minor nonspecific ST T wave changes.   3/13/2017: Echo: Normal left ventricular size and systolic function.              Suspect secondary to pulmonary disease with distress as well as due to anemia.              Doubt primary ACS.   No further chest pain.                    3. Anemia   3/12/2017: Transfused 1 unit PRBCs    VTE Risk Mitigation         Ordered     heparin (porcine) injection 5,000 Units  Every 8 hours     Route:  Subcutaneous        03/12/17 1349     Place sequential compression device   Until discontinued      03/11/17 2128          Will Wyatt MD  Cardiology  [facility]

## 2017-03-15 NOTE — PLAN OF CARE
Problem: Occupational Therapy Goal  Goal: Occupational Therapy Goal  Goals to be met by 4/11/17:    Grooming activity standing at the sink with CGA. (MET 3/15/17)  LB dressing with Minimum Assistance (don briefs or pants).   Assess toileting. (MET 3/15/17)  Transfer to bedside chair with SBA.     New Goal:   G/H standing at sink with Modified [place].   Toileting with MIN A.   Outcome: Ongoing (interventions implemented as appropriate)  Pt continues to make progress towards goals. She required multiple verbal cuing for participating in tasks and agreeable to sit up in chair for over an hour today. Pt completed G/H at sink with CGA progressing to SBA.  Pt would benefit from skilled occupational therapy intervention for increased activity tolerance and independence in ADL's.

## 2017-03-15 NOTE — PLAN OF CARE
Problem: Patient Care Overview  Goal: Plan of Care Review  Outcome: Ongoing (interventions implemented as appropriate)  Patient received on 2L NC SAT 97%, Q4 TX given. IS performed, will continue to monitor.

## 2017-03-15 NOTE — PT/OT/SLP PROGRESS
"Physical Therapy  Treatment    Irina Polk   MRN: [id number]   Admitting Diagnosis: Acute hypoxemic respiratory failure    PT Received On: 03/15/17  PT Start Time: 1636     PT Stop Time: 1700    PT Total Time (min): 24 min       Billable Minutes:  Therapeutic Activity 24    Treatment Type: Treatment  PT/PTA: PT             General Precautions: Standard, aspiration, fall  Orthopedic Precautions: N/A   Braces:      Do you have any cultural, spiritual, Anabaptist conflicts, given your current situation?: none specified    Subjective:" so now that I walked can I go home?"  Communicated with nursing prior to session.      Pain Ratin/10              Pain Rating Post-Intervention: 0/10    Objective:   Patient found with: peripheral IV, oxygen, telemetry    Functional Mobility:  Bed Mobility:   Supine to Sit: Stand by Assistance  Sit to Supine:  (left up in chair)    Transfers:  Sit <> Stand Assistance: Contact Guard Assistance  Sit <> Stand Assistive Device: Rolling Walker    Gait:   Gait Distance: 120' with cues for correct use of RW-dont push too far in front   Assistance 1: Contact Guard Assistance  Gait Assistive Device: Rolling walker  Gait Pattern: reciprocal  Gait Deviation(s): decreased lopez, decreased step length, decreased stride length    Stairs:  Not assessed    Balance:   Static Sit: GOOD-: Takes MODERATE challenges from all directions but inconsistently  Dynamic Sit: GOOD-: Maintains balance through MODERATE excursions of active trunk movement,     Static Stand: FAIR: Maintains without assist but unable to take challenges  Dynamic stand: FAIR: Needs CONTACT GUARD during gait     Therapeutic Activities and Exercises:  Sats at rest on O2 98% and heart rate at rest 66.  Pt amb in hallway with RW and A.  During amb telemetry monitor went off-pt tachycardic.  Return to room and pt to bs chair.  Once back in chair sats at 94% and heart rate down to 76.        AM-PAC 6 CLICK MOBILITY  How much help from " another person does this patient currently need?   1 = Unable, Total/Dependent Assistance  2 = A lot, Maximum/Moderate Assistance  3 = A little, Minimum/Contact Guard/Supervision  4 = None, Modified [place]/Independent    Turning over in bed (including adjusting bedclothes, sheets and blankets)?: 4  Sitting down on and standing up from a chair with arms (e.g., wheelchair, bedside commode, etc.): 3  Moving from lying on back to sitting on the side of the bed?: 3  Moving to and from a bed to a chair (including a wheelchair)?: 3  Need to walk in hospital room?: 3  Climbing 3-5 steps with a railing?: 2  Total Score: 18    AM-PAC Raw Score CMS G-Code Modifier Level of Impairment Assistance   6 % Total / Unable   7 - 9 CM 80 - 100% Maximal Assist   10 - 14 CL 60 - 80% Moderate Assist   15 - 19 CK 40 - 60% Moderate Assist   20 - 22 CJ 20 - 40% Minimal Assist   23 CI 1-20% SBA / CGA   24 CH 0% Independent/ Mod I     Patient left up in chair with all lines intact, call button in reach, nurse notified and CG present.    Assessment:  Irina Polk is a 88 y.o. female with a medical diagnosis of Acute hypoxemic respiratory failure and presents with Progressing toward goals..  Able to amb in hallway with A.  Per chart have removed honey thick consitency restriction despite asp precautions. .    Rehab identified problem list/impairments: Rehab identified problem list/impairments: weakness, impaired cardiopulmonary response to activity, impaired endurance, impaired self care skills, impaired functional mobilty, impaired balance, decreased safety awareness    Rehab potential is good.    Activity tolerance: Good    Discharge recommendations: Discharge Facility/Level Of Care Needs: home with home health, home health PT, home health OT     Barriers to discharge: Barriers to Discharge: None    Equipment recommendations: Equipment Needed After Discharge: walker, rolling     GOALS:   Physical Therapy Goals         Problem: Physical Therapy Goal    Goal Priority Disciplines Outcome Goal Variances Interventions   Physical Therapy Goal     PT/OT, PT Ongoing (interventions implemented as appropriate)     Description:  Goals to be met by: 3/30/17     Patient will increase functional independence with mobility by performin. Supine to sit with modified independence.   2. Sit to supine with modified independence.   3. Sit<>stand transfer with supervision using rollator or rolling walker.   4. Gait > 50 feet with supervision using rollator or rolling walker.   5. Ascend/descend curb steps with rollator or rolling walker with min A.                 PLAN:    Patient to be seen 6 x/week  to address the above listed problems via therapeutic activities, gait training, therapeutic exercises  Plan of Care expires: 17  Plan of Care reviewed with: patient, caregiver         Saira Gonzalez, PT  03/15/2017

## 2017-03-15 NOTE — ASSESSMENT & PLAN NOTE
Failed MBS  Diet changed to mechanical soft   Discussed with patient, family, and PCP.  Decision to feed thin liquids despite risks as a comfort measure in this elderly patient  No hospice at this time

## 2017-03-15 NOTE — PT/OT/SLP PROGRESS
"Speech Language Pathology  Treatment    Irina Polk   MRN: [id number]   Admitting Diagnosis: Acute hypoxemic respiratory failure    Diet recommendations: Solid Diet Level: Dental Soft  Liquid Diet Level: Honey Thick Feed only when awake/alert, HOB to 90 degrees, Small bites/sips, Alternating bites/sips, 1 bite/sip at a time and Remain upright 30 minutes post meal    SLP Treatment Date: 03/15/17  Speech Start Time: 1750     Speech Stop Time: 181     Speech Total (min): 22 min       TREATMENT BILLABLE MINUTES:  Treatment Swallowing Dysfunction 22    Has the patient been evaluated by SLP for swallowing? : Yes  Keep patient NPO?: No   General Precautions: Standard, fall, aspiration  Current Respiratory Status: nasal cannula       Subjective:  "I don't want that thickener any more"    Pain Ratin/10              Pain Rating Post-Intervention: 0/10    Objective:   Patient found with: peripheral IV, oxygen, telemetry.  She was seated upright in chair at b/s with sitter present.  Sitter stated dysphagia has occurred recently, within the past few months.  Sitter called pt's daughter, Dolly, by telephone.  ST spoke at length with patient's daughter.  Explained results of MBSS as well as silent aspiration on thin liquids.  Discussed risks of aspiration.  ST noted bilateral resting tremor in hands; confirmed with daughter.  Discussed possibility of neuro consult with daughter as pt demonstrates bilateral tremor as well as significantly delayed initiation of swallow; daughter in favor.  Discussed need for good oral care to reduce risk for aspiration.     The pt agreed to attempt chin tuck with ST "only if it has no thickener in it".  ST presented three teaspoons of plain, thin water via spoon with use of chin tuck.  The pt required max cues to coordinate/implement chin tuck.  A cough with wheeze was noted following thin liquids, even with chin tuck, which may indicate possible + s/s airway threat.  ST attempted three " "teaspoons of nectar thick liquids via spoon using chin tuck.  The pt requires significant cueing to implement chin tuck as she becomes anxious.  "I don't want to do that."  NO overt coughing, choking, or change in voice noted with chin tuck + nectar liquids.  However, pt expressed wishes to not use strategies or use thickener at this time.      Nursing entered room and alerted  that the pt's liquids will now be changed to thin as per patient and family wishes, for comfort measures.  ST educated pt on use of good oral care to reduce risk for aspiration.  As the patient and family do not wish for any further compensatory strategies or dysphagia therapy at this time, and have been educated,  the patient will be discharged.  Recommend continued good oral care and small, controlled sips thin liquids to maximize safety.                                   Assessment:  Irina Polk is a 88 y.o. female with a medical diagnosis of Acute hypoxemic respiratory failure and presents with continued oropharyngeal dysphagia with + s/s airway threat with thin liquids.   has educated the patient and her family regarding results of MBSS (silent aspiration) and risks of aspiration.  They have verbalized understanding.  The pt does not wish to use compensatory strategies or participate in dysphagia therapy at this time; liquids changed to thin. Cont to rec good oral care and small, controlled sips thin for maximum safety.  The pt will be discharged from  at this time.    Discharge recommendations:       Goals:   SLP Goals        Problem: SLP Goal    Goal Priority Disciplines Outcome   SLP Goal     SLP Ongoing (interventions implemented as appropriate)              Plan:   Patient to be seen Therapy Frequency: 5 x/week   Plan of Care expires: 04/12/17  Plan of Care reviewed with: patient, daughter, caregiver  SLP Follow-up?: No              Fauzia Long CCC-SLP  03/15/2017    "

## 2017-03-15 NOTE — SUBJECTIVE & OBJECTIVE
Interval History: failed swallow study with thin liquids    Review of Systems   Constitutional: Positive for fatigue. Negative for chills and fever.   HENT: Negative for congestion, mouth sores, sneezing, sore throat and trouble swallowing.    Eyes: Negative for photophobia and visual disturbance.   Respiratory: Positive for shortness of breath. Negative for cough, wheezing and stridor.    Cardiovascular: Negative for chest pain, palpitations and leg swelling.   Gastrointestinal: Negative for abdominal distention, abdominal pain, diarrhea, nausea and vomiting.   Endocrine: Negative for polydipsia and polyphagia.   Genitourinary: Negative for difficulty urinating, hematuria and urgency.   Musculoskeletal: Negative for arthralgias and myalgias.   Skin: Negative for color change.   Neurological: Negative for dizziness, seizures and headaches.   Psychiatric/Behavioral: Negative for agitation.     Objective:     Vital Signs (Most Recent):  Temp: 98.2 °F (36.8 °C) (03/15/17 1225)  Pulse: 71 (03/15/17 1400)  Resp: 16 (03/15/17 1225)  BP: (!) 140/89 (03/15/17 1225)  SpO2: 96 % (03/15/17 1225) Vital Signs (24h Range):  Temp:  [97.9 °F (36.6 °C)-98.3 °F (36.8 °C)] 98.2 °F (36.8 °C)  Pulse:  [49-81] 71  Resp:  [16-36] 16  SpO2:  [95 %-100 %] 96 %  BP: (123-181)/(68-89) 140/89     Weight: 68.3 kg (150 lb 9.2 oz)  Body mass index is 27.54 kg/(m^2).  No intake or output data in the 24 hours ending 03/15/17 1526   Physical Exam   Constitutional: She is oriented to person, place, and time. She appears well-developed and well-nourished. No distress.   Elderly female   HENT:   Head: Normocephalic and atraumatic.   Mouth/Throat: Oropharynx is clear and moist. No oropharyngeal exudate.   Eyes: Conjunctivae are normal. Right eye exhibits no discharge. Left eye exhibits no discharge. No scleral icterus.   Neck: No JVD present.   Cardiovascular: Intact distal pulses.    bradycardia   Pulmonary/Chest: Effort normal. No stridor. No  respiratory distress. She has wheezes (end exp). She has no rales.   Wheezing throughout on expiration   Abdominal: Soft. Bowel sounds are normal. She exhibits no distension. There is no tenderness.   Musculoskeletal: Normal range of motion. She exhibits no edema, tenderness or deformity.   Neurological: She is alert and oriented to person, place, and time. No cranial nerve deficit.   Skin: Skin is warm and dry. She is not diaphoretic.   Psychiatric: She has a normal mood and affect.   Nursing note and vitals reviewed.      Significant Labs:   Recent Lab Results       03/15/17  1011      Gram Stain (Respiratory) <10 epithelial cells per low power field.      Few WBC's      Few yeast      Rare Gram positive cocci           Significant Imaging: I have reviewed and interpreted all pertinent imaging results/findings within the past 24 hours.

## 2017-03-16 LAB
ALLENS TEST: ABNORMAL
DELSYS: ABNORMAL
FIO2: 21
HCO3 UR-SCNC: 23.8 MMOL/L (ref 24–28)
MODE: ABNORMAL
PCO2 BLDA: 36.8 MMHG (ref 35–45)
PH SMN: 7.42 [PH] (ref 7.35–7.45)
PO2 BLDA: 75 MMHG (ref 80–100)
POC BE: -1 MMOL/L
POC SATURATED O2: 95 % (ref 95–100)
SAMPLE: ABNORMAL
SITE: ABNORMAL
SP02: 96

## 2017-03-16 PROCEDURE — 99233 SBSQ HOSP IP/OBS HIGH 50: CPT | Mod: ,,,

## 2017-03-16 PROCEDURE — 25000242 PHARM REV CODE 250 ALT 637 W/ HCPCS: Performed by: HOSPITALIST

## 2017-03-16 PROCEDURE — 99900035 HC TECH TIME PER 15 MIN (STAT)

## 2017-03-16 PROCEDURE — 36600 WITHDRAWAL OF ARTERIAL BLOOD: CPT

## 2017-03-16 PROCEDURE — 25000003 PHARM REV CODE 250: Performed by: INTERNAL MEDICINE

## 2017-03-16 PROCEDURE — 63600175 PHARM REV CODE 636 W HCPCS: Performed by: PHYSICIAN ASSISTANT

## 2017-03-16 PROCEDURE — 94640 AIRWAY INHALATION TREATMENT: CPT

## 2017-03-16 PROCEDURE — 82803 BLOOD GASES ANY COMBINATION: CPT

## 2017-03-16 PROCEDURE — 11000001 HC ACUTE MED/SURG PRIVATE ROOM

## 2017-03-16 PROCEDURE — 25000003 PHARM REV CODE 250: Performed by: HOSPITALIST

## 2017-03-16 PROCEDURE — 93010 ELECTROCARDIOGRAM REPORT: CPT | Mod: ,,, | Performed by: INTERNAL MEDICINE

## 2017-03-16 PROCEDURE — 27000221 HC OXYGEN, UP TO 24 HOURS

## 2017-03-16 PROCEDURE — 25000003 PHARM REV CODE 250: Performed by: PHYSICIAN ASSISTANT

## 2017-03-16 PROCEDURE — 93005 ELECTROCARDIOGRAM TRACING: CPT

## 2017-03-16 PROCEDURE — 25000003 PHARM REV CODE 250

## 2017-03-16 RX ORDER — PREDNISONE 20 MG/1
20 TABLET ORAL DAILY
Qty: 30 TABLET | Refills: 0 | Status: SHIPPED | OUTPATIENT
Start: 2017-03-17 | End: 2017-03-17

## 2017-03-16 RX ORDER — OXYBUTYNIN CHLORIDE 5 MG/1
5 TABLET ORAL 3 TIMES DAILY PRN
Qty: 90 TABLET | Refills: 0 | Status: ON HOLD | OUTPATIENT
Start: 2017-03-16 | End: 2018-07-28 | Stop reason: HOSPADM

## 2017-03-16 RX ORDER — SIMETHICONE 80 MG
1 TABLET,CHEWABLE ORAL 3 TIMES DAILY PRN
Status: DISCONTINUED | OUTPATIENT
Start: 2017-03-16 | End: 2017-03-17 | Stop reason: HOSPADM

## 2017-03-16 RX ADMIN — BENZONATATE 100 MG: 100 CAPSULE ORAL at 08:03

## 2017-03-16 RX ADMIN — IPRATROPIUM BROMIDE AND ALBUTEROL SULFATE 3 ML: .5; 3 SOLUTION RESPIRATORY (INHALATION) at 11:03

## 2017-03-16 RX ADMIN — ACETAMINOPHEN 650 MG: 325 TABLET ORAL at 08:03

## 2017-03-16 RX ADMIN — METOPROLOL SUCCINATE 25 MG: 25 TABLET, EXTENDED RELEASE ORAL at 08:03

## 2017-03-16 RX ADMIN — IPRATROPIUM BROMIDE AND ALBUTEROL SULFATE 3 ML: .5; 3 SOLUTION RESPIRATORY (INHALATION) at 03:03

## 2017-03-16 RX ADMIN — SIMETHICONE CHEW TAB 80 MG 80 MG: 80 TABLET ORAL at 12:03

## 2017-03-16 RX ADMIN — IPRATROPIUM BROMIDE AND ALBUTEROL SULFATE 3 ML: .5; 3 SOLUTION RESPIRATORY (INHALATION) at 07:03

## 2017-03-16 RX ADMIN — ACETAMINOPHEN 650 MG: 325 TABLET ORAL at 05:03

## 2017-03-16 RX ADMIN — ATORVASTATIN CALCIUM 10 MG: 10 TABLET, FILM COATED ORAL at 08:03

## 2017-03-16 RX ADMIN — HEPARIN SODIUM 5000 UNITS: 5000 INJECTION, SOLUTION INTRAVENOUS; SUBCUTANEOUS at 01:03

## 2017-03-16 RX ADMIN — PANTOPRAZOLE SODIUM 40 MG: 40 TABLET, DELAYED RELEASE ORAL at 08:03

## 2017-03-16 RX ADMIN — HEPARIN SODIUM 5000 UNITS: 5000 INJECTION, SOLUTION INTRAVENOUS; SUBCUTANEOUS at 09:03

## 2017-03-16 RX ADMIN — DIPHENHYDRAMINE HYDROCHLORIDE 25 MG: 25 CAPSULE ORAL at 09:03

## 2017-03-16 RX ADMIN — AMLODIPINE BESYLATE 2.5 MG: 2.5 TABLET ORAL at 08:03

## 2017-03-16 RX ADMIN — BENZONATATE 100 MG: 100 CAPSULE ORAL at 05:03

## 2017-03-16 RX ADMIN — METHYLPREDNISOLONE SODIUM SUCCINATE 40 MG: 40 INJECTION, POWDER, FOR SOLUTION INTRAMUSCULAR; INTRAVENOUS at 08:03

## 2017-03-16 RX ADMIN — METHYLPREDNISOLONE SODIUM SUCCINATE 40 MG: 40 INJECTION, POWDER, FOR SOLUTION INTRAMUSCULAR; INTRAVENOUS at 09:03

## 2017-03-16 RX ADMIN — IPRATROPIUM BROMIDE AND ALBUTEROL SULFATE 3 ML: .5; 3 SOLUTION RESPIRATORY (INHALATION) at 04:03

## 2017-03-16 RX ADMIN — MOXIFLOXACIN HYDROCHLORIDE 400 MG: 400 INJECTION, SOLUTION INTRAVENOUS at 01:03

## 2017-03-16 NOTE — DISCHARGE SUMMARY
[facility] Hospital Medicine  Discharge Summary      Patient Name: Irina Polk  MRN: [id number]  Admission Date: 3/11/2017  Hospital Length of Stay: 5 days  Discharge Date and Time:  03/16/2017 3:00 PM  Attending Physician: Eligio Arenas MD   Discharging Provider: Josh Larsen PA-C  Primary Care Provider: Blanca Quinones MD      HPI:   87y/o female with history of HTN and GERD, life long nonsmoker presents to ER with c/o cough, SOB and congestion for a few days. Per family, patient has been experiencing URI sxs since November with increased dry non-productive cough and associated wheezing. She was evaluated by PCP and treated with combination of inhaled bronchodilators, tessalon, abx, but continued to have recurrent episodes with only transient improvement. PCP evaluated her on Friday and provided additional steroid therapy but w/o improvement.           * No surgery found *      Indwelling Lines/Drains at time of discharge:   Lines/Drains/Airways          No matching active lines, drains, or airways        Hospital Course:   Admitted with hypoxic resp failure placed on NIPPV, bronchodilators, steroids, course notable for NSTEMI, per Dr. Wyatt suspect secondary to pulmonary disease with distress with anemia. Doubt primary ACS.    Slowly improved but will need cpap/bipap q hs at home.  This was arranged.  Has round the clock care at home    Palliative feeds ordered despite aspiration after family meeting, knowing risks involved.  They are not yet ready to employ hospice.  Time will tell if she gets frequent pneumonias.      Bipap for hs ordered at home    Dr Quinones aware and will follow up at home          Consults:   Consults         Status Ordering Provider     Inpatient consult to Cardiology  Once     Provider:  Will Wyatt MD    Completed NOEMI MART     Inpatient consult to Gastroenterology  Once     Provider:  Tiago Ribeiro MD    Completed NOEMI MART           Significant Diagnostic Studies: Labs:   BMP: No results for input(s): GLU, NA, K, CL, CO2, BUN, CREATININE, CALCIUM, MG in the last 48 hours., CMP No results for input(s): NA, K, CL, CO2, GLU, BUN, CREATININE, CALCIUM, PROT, ALBUMIN, BILITOT, ALKPHOS, AST, ALT, ANIONGAP, ESTGFRAFRICA, EGFRNONAA in the last 48 hours., CBC No results for input(s): WBC, HGB, HCT, PLT in the last 48 hours. and Troponin   Recent Labs  Lab 03/14/17  0408   TROPONINI 0.281*     Microbiology:   Blood Culture   Lab Results   Component Value Date    LABBLOO No Growth to date 03/11/2017    LABBLOO No Growth to date 03/11/2017    LABBLOO No Growth to date 03/11/2017    LABBLOO No Growth to date 03/11/2017    LABBLOO No Growth to date 03/11/2017    and Urine Culture    Lab Results   Component Value Date    LABURIN No growth 03/12/2017       Pending Diagnostic Studies:     Procedure Component Value Units Date/Time    Fl Modified Barium Swallow Speech [[id number]] Resulted:  03/13/17 1623    Order Status:  Sent Lab Status:  In process Updated:  03/13/17 1635        Final Active Diagnoses:    Diagnosis Date Noted POA    PRINCIPAL PROBLEM:  Acute hypoxemic respiratory failure [J96.01] 03/12/2017 Yes    HTN (hypertension) [I10] 08/11/2015 Yes    Aspiration of liquid [T17.998A] 03/14/2017 Yes    NSTEMI (non-ST elevated myocardial infarction) [I21.4] 03/13/2017 Yes    Aneurysm of thoracic aorta [I71.2] 03/13/2017 Yes    Elevated troponin [R79.89] 03/12/2017 Yes    Lactic acid acidosis [E87.2] 03/12/2017 Yes      Problems Resolved During this Admission:    Diagnosis Date Noted Date Resolved POA    Difficulty swallowing [R13.10] 03/12/2017 03/14/2017 Yes      * Acute hypoxemic respiratory failure  - cont bronchodilators  - cont steroids, taper   CT chest: Patchy ground glass opacification of the bilateral lungs in a somewhat perihilar distribution     - Bipap q HS  - wean O2 as tolerated  - case d/w Dr. Spaulding and Pulm CC        Elevated  "troponin  - NSTEMI;  Troponin 0.9, nonspecific ST T wave changes.  - per Cardiology secondary to pulmonary disease with distress with anemia  - Doubt primary ACS  - heparin drip dc'd  - check ECHO       Lactic acid acidosis  - lactic acid normalized  - likely d/t hypoxia, resp distress      NSTEMI (non-ST elevated myocardial infarction)  - NSTEMI;  Troponin 0.9, nonspecific ST T wave changes.  - per Cardiology secondary to pulmonary disease with distress with anemia  - Doubt primary ACS  - heparin drip dc'd  - follow ECHO  - cardio following    Aneurysm of thoracic aorta  stable      Aspiration of liquid  Failed MBS  Diet changed to mechanical soft   Discussed with patient, family, and PCP.  Decision to feed thin liquids despite risks as a comfort measure in this elderly patient  No hospice at this time      HTN (hypertension)  - on toprol 25 mg and amlodipine 2.5 mg daily at home  -restarted         Discharged Condition: fair    Disposition: Home or Self Care    Follow Up:  Follow-up Information     Follow up with Blanca Quinones MD In 1 week.    Specialty:  Internal Medicine    Contact information:    [address] [place] [id number]  [phone]          Patient Instructions:     VENTILATOR FOR HOME USE   Order Comments: Trilogy for home use  Titrate pt for comfort   Order Specific Question Answer Comments   Height: 5' 2" (1.575 m)    Weight: 68.3 kg (150 lb 9.2 oz)    Does patient have medical equipment at home? cane, straight    Does patient have medical equipment at home? rollator    Does patient have medical equipment at home? shower chair    Length of need (1-99 months): 99    Interface needed: Nasal mask    Mode: AVAPS-AE    Rate: 12 auto    Tidal Volume 300    PEEP - EPAP 5.0 CM/H2O 5-10   Pressure support - IPAP 10 5-10   Humidifier needed? No      Ambulatory referral to Outpatient Case Management   Referral Priority: Routine Referral Type: Consultation   Referral Reason: Specialty " "Services Required    Number of Visits Requested: 1      Diet general     Activity as tolerated       Medications:  Reconciled Home Medications:   Current Discharge Medication List      START taking these medications    Details   oxybutynin (DITROPAN) 5 MG Tab Take 1 tablet (5 mg total) by mouth 3 (three) times daily as needed (bladder spasms).  Qty: 90 tablet, Refills: 0         CONTINUE these medications which have CHANGED    Details   predniSONE (DELTASONE) 20 MG tablet Take 1 tablet (20 mg total) by mouth once daily. 2 PILLS A DAY FOR 3 DAYS THEN 1 1/2 PILLS A DAY FOR 3 DAYS THEN ONE PILL A DAY FOR 3 DAYS THEN HALF PILL  A DAY FOR 3 DAYS THEN STOP.  Qty: 30 tablet, Refills: 0         CONTINUE these medications which have NOT CHANGED    Details   albuterol-ipratropium 2.5mg-0.5mg/3mL (DUO-NEB) 0.5 mg-3 mg(2.5 mg base)/3 mL nebulizer solution Take 3 mLs by nebulization every 4 (four) hours as needed for Wheezing. Rescue  Qty: 100 mL, Refills: 0      amlodipine (NORVASC) 2.5 MG tablet TAKE 1 TABLET DAILY  Qty: 90 tablet, Refills: 1      aspirin 81 MG Chew Take 81 mg by mouth once daily.      BD LUER-SELVIN SYRINGE 3 mL 23 x 1" Syrg Refills: 0      benzonatate (TESSALON) 200 MG capsule Take 1 capsule (200 mg total) by mouth 3 (three) times daily as needed for Cough.  Qty: 30 capsule, Refills: 1      cyanocobalamin 1,000 mcg/mL injection Inject 1 mL (1,000 mcg total) into the muscle once a week. Dispense syringes as well.  Qty: 10 mL, Refills: 3      dicyclomine (BENTYL) 10 MG capsule take 1 capsule by mouth twice a day if needed  Qty: 30 capsule, Refills: 0      escitalopram oxalate (LEXAPRO) 10 MG tablet TAKE 1 TABLET DAILY  Qty: 90 tablet, Refills: 0      fluticasone (FLOVENT HFA) 110 mcg/actuation inhaler Inhale 1 puff into the lungs 2 (two) times daily. Controller  Qty: 12 g, Refills: 0      inhalation device (BREATHERITE RIGID SPACER-MASK) Use as directed for inhalation.  Qty: 1 Device, Refills: 0      metoprolol " succinate (TOPROL-XL) 25 MG 24 hr tablet Take 1 tablet by mouth once daily.      OMEGA-3S/DHA/EPA/FISH OIL (OMEGA 3 ORAL) Take by mouth.      omeprazole (PRILOSEC) 20 MG capsule Take 1 capsule (20 mg total) by mouth once daily.  Qty: 90 capsule, Refills: 1      risedronate (ACTONEL) 150 MG Tab Take 1 tablet (150 mg total) by mouth every 30 days.  Qty: 1 tablet, Refills: 3         STOP taking these medications       doxycycline (DORYX) 100 MG EC tablet Comments:   Reason for Stopping:         lorazepam (ATIVAN) 0.5 MG tablet Comments:   Reason for Stopping:             Time spent on the discharge of patient: 60 minutes    Josh Larsen PA-C  Department of Hospital Medicine  [facility]

## 2017-03-16 NOTE — PLAN OF CARE
Problem: Patient Care Overview  Goal: Plan of Care Review  Outcome: Ongoing (interventions implemented as appropriate)  Plan of care reviewed with patient and caregiver, VSS.  Telemetry monitor maintained.  Incontinence care provided when needed.  Tylenol given PRN for pain.  Patient free from fall/ trauma.  Patient turned q 2hours.  Bed lowered and locked.  Call bell within reach.  No needs at this time.  Will continue to monitor.

## 2017-03-16 NOTE — PROGRESS NOTES
Anitha WRENER, spoke to Aj, pt doesn't qualify for Trilogy, blood gas normal, may qualify for home O2, NABILA Kingston charge nurse, perform sat test on room air, results 91 room air.  NABILA notified SHYAM Moreno.    NABILA spoke to pt's baldomero Vivar regarding ability to pay for O2, if needed.

## 2017-03-16 NOTE — PLAN OF CARE
Problem: Patient Care Overview  Goal: Plan of Care Review  Outcome: Ongoing (interventions implemented as appropriate)  Patient on oxygen; aerosol treatments given as scheduled. V60 BiPAP at bedside. No change in therapy.

## 2017-03-16 NOTE — PT/OT/SLP PROGRESS
Occupational Therapy      Irinalan Polk  MRN: [id number]    Patient not seen today secondary to pt awaiting discharge home and declining therapy session this PM. Will follow-up next date if pt not discharged as anticipated. Pt and sitter report pt has all necessary DME at home and will resume HH services.    LINO Zimmer  3/16/2017

## 2017-03-16 NOTE — ASSESSMENT & PLAN NOTE
- cont bronchodilators  - cont steroids, taper   CT chest: Patchy ground glass opacification of the bilateral lungs in a somewhat perihilar distribution     - Bipap q HS  - wean O2 as tolerated  - case d/w Dr. Spaulding and Pulm CC

## 2017-03-16 NOTE — PLAN OF CARE
Problem: Patient Care Overview  Goal: Plan of Care Review  Outcome: Ongoing (interventions implemented as appropriate)  No significant events overnight. Remains free from fall and injury. Incontinence care performed. VSS on 2L O2 throughout the night. Positions self with assistance; q2h turning tolerated. Denies pain. Tele maintained; all alarms active and audible.  PRN meds administered for cough. Plan of care reviewed with patient and all questions answered. 24 hour sitter at bedside. Bed low, locked w/ bed alarm on. Call light within reach. Purposeful rounding performed. Resting comfortably in bed, no other complaints at this time.

## 2017-03-16 NOTE — PROGRESS NOTES
[facility]  Cardiology  Progress Note    Patient Name: Irina Polk  MRN: [id number]  Admission Date: 3/11/2017  Hospital Length of Stay: 5 days  Code Status: DNR   Attending Physician: Eligio Arenas MD   Primary Care Physician: Blanca Quinones MD  Expected Discharge Date: 3/13/2017  Principal Problem:Acute hypoxemic respiratory failure    Subjective:     Brief HPI:    89 yo female with severe pulmonary disease. Over the last week she has had a lot of coughing and SOB. Presents to [facility] with wheezing and admitted. Early in the am of presentation some chest tightness.    Hospital Course:     Treated for bronchitis.    Received PRBCs.    Interval History:     No CP. Breathing is fair. Still coughing and wheezing.     Review of Systems   Constitution: Positive for weakness and malaise/fatigue.   Cardiovascular: Positive for dyspnea on exertion. Negative for chest pain and palpitations.   Respiratory: Positive for cough, shortness of breath and wheezing. Negative for hemoptysis.    Gastrointestinal: Negative for abdominal pain.     Objective:     Vital Signs (Most Recent):  Temp: 98.2 °F (36.8 °C) (03/16/17 0745)  Pulse: 72 (03/16/17 0800)  Resp: 18 (03/16/17 0745)  BP: (!) 180/83 (03/16/17 0745)  SpO2: 97 % (03/16/17 0745) Vital Signs (24h Range):  Temp:  [98.1 °F (36.7 °C)-98.4 °F (36.9 °C)] 98.2 °F (36.8 °C)  Pulse:  [54-96] 72  Resp:  [14-22] 18  SpO2:  [95 %-99 %] 97 %  BP: (140-180)/(70-89) 180/83     Weight: 68.3 kg (150 lb 9.2 oz)  Body mass index is 27.54 kg/(m^2).    SpO2: 97 %  O2 Device (Oxygen Therapy): nasal cannula    No intake or output data in the 24 hours ending 03/16/17 1024    Lines/Drains/Airways     Peripheral Intravenous Line                 Peripheral IV - Single Lumen 03/11/17 1738 Right Forearm 4 days         Peripheral IV - Single Lumen 03/12/17 2200 Right Wrist 3 days                Physical Exam   Constitutional: She is oriented to person, place, and time. She  appears well-developed and well-nourished. She appears ill. No distress.   Eyes: Right eye exhibits no discharge. Left eye exhibits no discharge. Right conjunctiva is not injected. Left conjunctiva is not injected. Right pupil is round. Left pupil is round. Pupils are equal.   Neck: No JVD present. Carotid bruit is not present.   Cardiovascular: Normal rate, regular rhythm, S1 normal and S2 normal.    Murmur heard.  Pulmonary/Chest: Effort normal. She has no decreased breath sounds. She has wheezes. She has rhonchi. She has no rales.   Abdominal: Soft. Normal appearance. There is no hepatosplenomegaly. There is no tenderness.   Musculoskeletal:        Right ankle: She exhibits no swelling.        Left ankle: She exhibits no swelling.   Lymphadenopathy:        Head (right side): No submandibular adenopathy present.        Head (left side): No submandibular adenopathy present.     She has no cervical adenopathy.   Neurological: She is alert and oriented to person, place, and time. No cranial nerve deficit or sensory deficit.   Skin: Skin is warm and dry. No rash noted. She is not diaphoretic. No cyanosis. Nails show no clubbing.   Psychiatric: She has a normal mood and affect. Her speech is normal and behavior is normal.     Current Medications:     albuterol-ipratropium 2.5mg-0.5mg/3mL  3 mL Nebulization Q4H    amlodipine  2.5 mg Oral Daily    atorvastatin  10 mg Oral Daily    heparin (porcine)  5,000 Units Subcutaneous Q8H    methylPREDNISolone sodium succinate  40 mg Intravenous Q12H    metoprolol succinate  25 mg Oral Daily    moxifloxacin  400 mg Intravenous Q24H    pantoprazole  40 mg Oral Daily     Current Laboratory Results:    No results found for this or any previous visit (from the past 24 hour(s)).  Current Imaging Results:    Imaging Results         Fl Modified Barium Swallow Speech (In process)         CT Chest Without Contrast (Final result) Result time:  03/12/17 15:43:07    Final result by  John Doll MD (03/12/17 15:43:07)    Impression:        #1. Patchy ground glass opacification of the bilateral lungs in a somewhat perihilar distribution.  The finding is nonspecific and could reflect pulmonary edema.  Pneumonitis from infection or inflammation is not excluded.    #2.  Diffusely aneurysmal thoracic aorta.    #3.  Cardiomegaly and coronary atherosclerosis.    #4.  Mediastinal lymphadenopathy.    #5.  Large hiatal hernia.      Electronically signed by: JOHN DOLL MD  Date:     03/12/17  Time:    15:43     Narrative:    HISTORY: Evaluate lung parenchyma    COMPARISON: None    FINDINGS: Axial images of the thorax were obtained without the use of IV contrast.    The ascending thoracic aorta is aneurysmal measuring 4 cm.  The descending thoracic aorta is aneurysmal measuring 3.1 cm.  The pulmonary artery is prominent.  There is coronary atherosclerosis.  The heart is enlarged.  There are prominent precarinal lymph nodes with the largest measuring 1 cm in short axis.    There is patchy ground glass opacification in a somewhat perihilar distribution.  Bilateral lungs are otherwise unremarkable.  No pneumothorax or pleural fluid.    The visualized abdominal structures are otherwise unremarkable.  There is a large hiatal hernia with the upper body and fundus of the stomach in the inferior thorax.    There is degenerative change of the spine.  There is a grossly stable lower thoracic compression fracture. The osseous structures are otherwise unremarkable.  There is an air-fluid level in the esophagus.            X-Ray Chest 1 View (Final result) Result time:  03/11/17 18:06:38    Final result by Cristian Summers MD (03/11/17 18:06:38)    Impression:        Stable appearance from 2015      Electronically signed by: Dr. Cristian Summers MD  Date:     03/11/17  Time:    18:06     Narrative:    PORTABLE AP CHEST:      Comparison: The 11/7/15    Findings:     Thoracic aorta is prominent, and while  underlying thoracic aortic aneurysm not excluded, the appearance of the cardia mediastinum is stable from 2015.Prominence of interstitial markings unchanged, upper lobe distribution predominantly.  No new focal lobar consolidation. The cardiac silhouette is unchanged. The pulmonary vasculature is unremarkable. There is no pleural effusion or pneumothorax. There are no acute bony abnormalities.            3/13/2017: Echo:      1 - Concentric remodeling.     2 - Normal left ventricular systolic function (EF 60-65%).     Assessment and Plan:     Problem List:    Active Diagnoses:    Diagnosis Date Noted POA    PRINCIPAL PROBLEM:  Acute hypoxemic respiratory failure [J96.01] 03/12/2017 Yes    Aspiration of liquid [T17.998A] 03/14/2017 Yes    NSTEMI (non-ST elevated myocardial infarction) [I21.4] 03/13/2017 Yes    Aneurysm of thoracic aorta [I71.2] 03/13/2017 Yes    Elevated troponin [R79.89] 03/12/2017 Yes    Lactic acid acidosis [E87.2] 03/12/2017 Yes    HTN (hypertension) [I10] 08/11/2015 Yes      Problems Resolved During this Admission:    Diagnosis Date Noted Date Resolved POA    Difficulty swallowing [R13.10] 03/12/2017 03/14/2017 Yes     Assessment and Plan:    1. Shortness of Breath              Severe pulmonary disease.   Slowly improving.     2. Non ST Segment Elevation Myocardial Infarction              3/12/2017: NSTEMI. Troponin 0.9. Minor nonspecific ST T wave changes.   3/13/2017: Echo: Normal left ventricular size and systolic function.              Suspect secondary to pulmonary disease with distress as well as due to anemia.              Doubt primary ACS.   No further chest pain.                    3. Anemia   3/12/2017: Transfused 1 unit PRBCs    VTE Risk Mitigation         Ordered     heparin (porcine) injection 5,000 Units  Every 8 hours     Route:  Subcutaneous        03/12/17 1349     Place sequential compression device  Until discontinued      03/11/17 7663          Will Wyatt  MD  Cardiology  [facility]

## 2017-03-16 NOTE — PROGRESS NOTES
Subjective:       Patient ID: Irina Pokl is a 88 y.o. female.    Chief Complaint:  Cough (Pt has had cough and congestion for the last few days. Pt saw PCP yesterday and started on Prednisone. Pt advised if not getting better to come to ED. )       History of Present Illness  Pt doing better denies any gi bleed  Does not want any further endoscopic w/u.    Review of Systems  Respiratory: negative      Objective:     Vitals:    03/16/17 0700 03/16/17 0730 03/16/17 0745 03/16/17 0800   BP:   (!) 180/83    BP Location:   Right arm    Patient Position:   Lying    BP Method:   Automatic    Pulse: 63 62 62 72   Resp:  18 18    Temp:   98.2 °F (36.8 °C)    TempSrc:   Oral    SpO2:  98% 97%    Weight:       Height:          Abdomen: soft, non-tender; bowel sounds normal; no masses,  no organomegaly    Data Review   Recent Results (from the past 336 hour(s))   CBC auto differential    Collection Time: 03/14/17  4:08 AM   Result Value Ref Range    WBC 7.75 3.90 - 12.70 K/uL    Hemoglobin 9.9 (L) 12.0 - 16.0 g/dL    Hematocrit 32.2 (L) 37.0 - 48.5 %    Platelets 247 150 - 350 K/uL   CBC auto differential    Collection Time: 03/13/17  4:56 AM   Result Value Ref Range    WBC 8.34 3.90 - 12.70 K/uL    Hemoglobin 8.9 (L) 12.0 - 16.0 g/dL    Hematocrit 29.2 (L) 37.0 - 48.5 %    Platelets 239 150 - 350 K/uL   CBC auto differential    Collection Time: 03/12/17  7:00 AM   Result Value Ref Range    WBC 4.36 3.90 - 12.70 K/uL    Hemoglobin 7.8 (L) 12.0 - 16.0 g/dL    Hematocrit 26.2 (L) 37.0 - 48.5 %    Platelets 216 150 - 350 K/uL        Recent Labs  Lab 03/11/17  2308 03/12/17  0700   APTT <21.0 63.7*   INR 0.9  --        Recent Labs  Lab 03/10/17  1636 03/11/17  1858 03/12/17  0700 03/13/17  0456    143 140 139   K 4.3 3.9 3.9 4.2    108 109 107   CO2 28 25 25 24   BUN 15 18 15 23   CREATININE 0.8 0.8 0.7 0.8   CALCIUM 8.7 8.7 7.7* 8.0*   PROT 6.7 6.9  --   --    BILITOT 0.3 0.2  --   --    ALKPHOS 59 66  --   --     ALT 8* 9*  --   --    AST 15 13  --   --     No results found for: HAV, HEPAIGM, HEPBIGM, HEPBCAB, HBEAG, HEPCAB No results found for: AFP   No results found for: CEA     Recent Labs  Lab 03/11/17  2308 03/12/17  0700   APTT <21.0 63.7*   INR 0.9  --         Assessment:     1. Respiratory distress    2. Cough    3. Wheezing    4. Hypoxia    5. Elevated troponin    6. High serum lactate    7. Lactic acid acidosis    8. Acute hypoxemic respiratory failure    9. Heart failure    10. NSTEMI (non-ST elevated myocardial infarction)    11. Osteopenia    12. Vitamin deficiency    13. Bradycardia        Plan:     1) serial h/h  i will sign off   reconsult prn  thx

## 2017-03-16 NOTE — PLAN OF CARE
Problem: Patient Care Overview  Goal: Plan of Care Review  Outcome: Ongoing (interventions implemented as appropriate)  Pt remains on NC 2L in no distress;Q4 TX given ,BIPAP on standby. Therapy remains the same .Will continue to monitor.

## 2017-03-16 NOTE — PROGRESS NOTES
Patient was attempted X 2 trials first attempt patient was waiting on breakfast. Second attempt patient refused secondary to being discharged home. Full discharge summary to follow

## 2017-03-17 VITALS
HEIGHT: 62 IN | SYSTOLIC BLOOD PRESSURE: 173 MMHG | TEMPERATURE: 98 F | WEIGHT: 150.56 LBS | RESPIRATION RATE: 18 BRPM | HEART RATE: 62 BPM | DIASTOLIC BLOOD PRESSURE: 77 MMHG | OXYGEN SATURATION: 97 % | BODY MASS INDEX: 27.7 KG/M2

## 2017-03-17 LAB
BACTERIA BLD CULT: NORMAL
BACTERIA BLD CULT: NORMAL
BACTERIA SPEC AEROBE CULT: NORMAL
GRAM STN SPEC: NORMAL

## 2017-03-17 PROCEDURE — 25000003 PHARM REV CODE 250: Performed by: HOSPITALIST

## 2017-03-17 PROCEDURE — 94620 *HC PUL STRESS; SIMPLE (6MIN WALK): CPT

## 2017-03-17 PROCEDURE — 27000221 HC OXYGEN, UP TO 24 HOURS

## 2017-03-17 PROCEDURE — 97116 GAIT TRAINING THERAPY: CPT

## 2017-03-17 PROCEDURE — 93005 ELECTROCARDIOGRAM TRACING: CPT

## 2017-03-17 PROCEDURE — 99239 HOSP IP/OBS DSCHRG MGMT >30: CPT | Mod: ,,,

## 2017-03-17 PROCEDURE — 94660 CPAP INITIATION&MGMT: CPT

## 2017-03-17 PROCEDURE — 94640 AIRWAY INHALATION TREATMENT: CPT

## 2017-03-17 PROCEDURE — 99900035 HC TECH TIME PER 15 MIN (STAT)

## 2017-03-17 PROCEDURE — 97530 THERAPEUTIC ACTIVITIES: CPT

## 2017-03-17 PROCEDURE — 25000003 PHARM REV CODE 250

## 2017-03-17 PROCEDURE — 93010 ELECTROCARDIOGRAM REPORT: CPT | Mod: ,,, | Performed by: INTERNAL MEDICINE

## 2017-03-17 PROCEDURE — 25000242 PHARM REV CODE 250 ALT 637 W/ HCPCS: Performed by: HOSPITALIST

## 2017-03-17 PROCEDURE — 25000003 PHARM REV CODE 250: Performed by: PHYSICIAN ASSISTANT

## 2017-03-17 PROCEDURE — 25000003 PHARM REV CODE 250: Performed by: INTERNAL MEDICINE

## 2017-03-17 PROCEDURE — 97110 THERAPEUTIC EXERCISES: CPT

## 2017-03-17 PROCEDURE — 97535 SELF CARE MNGMENT TRAINING: CPT

## 2017-03-17 RX ORDER — PREDNISONE 20 MG/1
20 TABLET ORAL DAILY
Qty: 30 TABLET | Refills: 0 | OUTPATIENT
Start: 2017-03-17 | End: 2017-03-17

## 2017-03-17 RX ORDER — BISACODYL 5 MG
10 TABLET, DELAYED RELEASE (ENTERIC COATED) ORAL ONCE
Status: COMPLETED | OUTPATIENT
Start: 2017-03-17 | End: 2017-03-17

## 2017-03-17 RX ORDER — SYRING-NEEDL,DISP,INSUL,0.3 ML 29 G X1/2"
296 SYRINGE, EMPTY DISPOSABLE MISCELLANEOUS ONCE
Status: COMPLETED | OUTPATIENT
Start: 2017-03-17 | End: 2017-03-17

## 2017-03-17 RX ORDER — PREDNISONE 10 MG/1
10 TABLET ORAL DAILY
Qty: 30 TABLET | Refills: 0 | Status: SHIPPED | OUTPATIENT
Start: 2017-03-17 | End: 2017-03-27

## 2017-03-17 RX ORDER — DOCUSATE SODIUM 100 MG/1
100 CAPSULE, LIQUID FILLED ORAL DAILY
Status: DISCONTINUED | OUTPATIENT
Start: 2017-03-17 | End: 2017-03-17 | Stop reason: HOSPADM

## 2017-03-17 RX ORDER — PREDNISONE 20 MG/1
10 TABLET ORAL DAILY
Qty: 30 TABLET | Refills: 0 | Status: SHIPPED | OUTPATIENT
Start: 2017-03-17 | End: 2017-03-17

## 2017-03-17 RX ADMIN — HEPARIN SODIUM 5000 UNITS: 5000 INJECTION, SOLUTION INTRAVENOUS; SUBCUTANEOUS at 01:03

## 2017-03-17 RX ADMIN — METOPROLOL SUCCINATE 25 MG: 25 TABLET, EXTENDED RELEASE ORAL at 09:03

## 2017-03-17 RX ADMIN — BENZONATATE 100 MG: 100 CAPSULE ORAL at 09:03

## 2017-03-17 RX ADMIN — IPRATROPIUM BROMIDE AND ALBUTEROL SULFATE 3 ML: .5; 3 SOLUTION RESPIRATORY (INHALATION) at 11:03

## 2017-03-17 RX ADMIN — IPRATROPIUM BROMIDE AND ALBUTEROL SULFATE 3 ML: .5; 3 SOLUTION RESPIRATORY (INHALATION) at 03:03

## 2017-03-17 RX ADMIN — IPRATROPIUM BROMIDE AND ALBUTEROL SULFATE 3 ML: .5; 3 SOLUTION RESPIRATORY (INHALATION) at 07:03

## 2017-03-17 RX ADMIN — BISACODYL 10 MG: 5 TABLET, COATED ORAL at 01:03

## 2017-03-17 RX ADMIN — HEPARIN SODIUM 5000 UNITS: 5000 INJECTION, SOLUTION INTRAVENOUS; SUBCUTANEOUS at 05:03

## 2017-03-17 RX ADMIN — ACETAMINOPHEN 650 MG: 325 TABLET ORAL at 09:03

## 2017-03-17 RX ADMIN — AMLODIPINE BESYLATE 2.5 MG: 2.5 TABLET ORAL at 09:03

## 2017-03-17 RX ADMIN — ATORVASTATIN CALCIUM 10 MG: 10 TABLET, FILM COATED ORAL at 09:03

## 2017-03-17 RX ADMIN — PANTOPRAZOLE SODIUM 40 MG: 40 TABLET, DELAYED RELEASE ORAL at 09:03

## 2017-03-17 RX ADMIN — DOCUSATE SODIUM 100 MG: 100 CAPSULE, LIQUID FILLED ORAL at 01:03

## 2017-03-17 RX ADMIN — ACETAMINOPHEN 650 MG: 325 TABLET ORAL at 06:03

## 2017-03-17 RX ADMIN — MAGESIUM CITRATE 296 ML: 1.75 LIQUID ORAL at 12:03

## 2017-03-17 NOTE — PROGRESS NOTES
""I'm better, please send me home!"  Denies breathlessness/ cough.    Vitals:    03/17/17 1000 03/17/17 1130 03/17/17 1200 03/17/17 1233   BP:    (!) 160/73   BP Location:    Right arm   Patient Position:    Lying   BP Method:    Automatic   Pulse: (!) 51 95 63 (!) 59   Resp:  18  18   Temp:    98 °F (36.7 °C)   TempSrc:    Oral   SpO2:  97%  96%   Weight:       Height:         Chest clear  Cor: no gallop.    Labs reviewed.    Apparently discharge plans for am.  "

## 2017-03-17 NOTE — PLAN OF CARE
Problem: Physical Therapy Goal  Goal: Physical Therapy Goal  Goals to be met by: 3/30/17     Patient will increase functional independence with mobility by performin. Supine to sit with modified independence.   2. Sit to supine with modified independence.   3. Sit<>stand transfer with supervision using rollator or rolling walker.   4. Gait > 50 feet with supervision using rollator or rolling walker.   5. Ascend/descend curb steps with rollator or rolling walker with min A.    Outcome: Ongoing (interventions implemented as appropriate)     Needs CGA for out of bed activity

## 2017-03-17 NOTE — PLAN OF CARE
Problem: Patient Care Overview  Goal: Plan of Care Review  Outcome: Outcome(s) achieved Date Met:  03/16/17  Received pt on room air with no SOB noted; Q4 aerosol TX given with no adverse reaction .Therapy remains the same . Will continue to monitor.

## 2017-03-17 NOTE — PT/OT/SLP PROGRESS
"Physical Therapy  Treatment    Irina Polk   MRN: [id number]   Admitting Diagnosis: Acute hypoxemic respiratory failure    PT Received On: 17  PT Start Time: 1028     PT Stop Time: 1053    PT Total Time (min): 25 min       Billable Minutes:  Gait Training8, Therapeutic Activity 9 and Therapeutic Exercise 8    Treatment Type: Treatment  PT/PTA: PTA     PTA Visit Number: 1       General Precautions: Standard, fall, aspiration  Orthopedic Precautions: N/A   Braces: N/A    Do you have any cultural, spiritual, Temple conflicts, given your current situation?: none specified    Subjective:  Communicated with nurse prior to session. Pt. Stated " I don't want to get up" required encouragement     Pain Ratin/10   Pain Rating Post-Intervention: 0/10    Objective:   Patient found with: peripheral IV, oxygen, telemetry supine in bed with sitter present    Functional Mobility:  Bed Mobility:   Scooting/Bridging: Stand by Assistance  Supine to Sit: Stand by Assistance    Transfers: sit to stand from bed  Sit <> Stand Assistance: Contact Guard Assistance  Sit <> Stand Assistive Device: Rolling Walker    Gait: SPO2 2L oxygen saturation after ambulation was 97%   Gait Distance: 45 feet in room  Constant verbal cues to stay inside walker  Assistance 1: Contact Guard Assistance  Gait Assistive Device: Rolling walker  Gait Pattern: reciprocal  Gait Deviation(s): decreased lopez, decreased weight-shifting ability, decreased toe-to-floor clearance    Therapeutic Activities and Exercises:  Pt. Performed AP, LAQ, Hip Flexion, Hip Abd.Add X 15 reps B KESHA TALLEY    AM-PAC 6 CLICK MOBILITY  How much help from another person does this patient currently need?   1 = Unable, Total/Dependent Assistance  2 = A lot, Maximum/Moderate Assistance  3 = A little, Minimum/Contact Guard/Supervision  4 = None, Modified [place]/Independent    Turning over in bed (including adjusting bedclothes, sheets and blankets)?: 3  Sitting down on " and standing up from a chair with arms (e.g., wheelchair, bedside commode, etc.): 3  Moving from lying on back to sitting on the side of the bed?: 3  Moving to and from a bed to a chair (including a wheelchair)?: 3  Need to walk in hospital room?: 3  Climbing 3-5 steps with a railing?: 2  Total Score: 17    AM-PAC Raw Score CMS G-Code Modifier Level of Impairment Assistance   6 % Total / Unable   7 - 9 CM 80 - 100% Maximal Assist   10 - 14 CL 60 - 80% Moderate Assist   15 - 19 CK 40 - 60% Moderate Assist   20 - 22 CJ 20 - 40% Minimal Assist   23 CI 1-20% SBA / CGA   24 CH 0% Independent/ Mod I     Patient left up in chair with all lines intact, call button in reach and nurse notified.    Assessment:  Irina Polk is a 88 y.o. female with a medical diagnosis of Acute hypoxemic respiratory failure patient required encouragement to participate with PT on this day. Patient will cont. To benefit from skilled PT services to improve strength.     Rehab identified problem list/impairments: Rehab identified problem list/impairments: weakness, impaired endurance, impaired self care skills, gait instability, impaired functional mobilty, impaired balance    Rehab potential is fair.    Activity tolerance: Fair    Discharge recommendations: Discharge Facility/Level Of Care Needs: home health PT     Barriers to discharge: Barriers to Discharge: None (personal sitter )    Equipment recommendations: Equipment Needed After Discharge: walker, rolling     GOALS:   Physical Therapy Goals        Problem: Physical Therapy Goal    Goal Priority Disciplines Outcome Goal Variances Interventions   Physical Therapy Goal     PT/OT, PT Ongoing (interventions implemented as appropriate)     Description:  Goals to be met by: 3/30/17     Patient will increase functional independence with mobility by performin. Supine to sit with modified independence.   2. Sit to supine with modified independence.   3. Sit<>stand transfer  with supervision using rollator or rolling walker.   4. Gait > 50 feet with supervision using rollator or rolling walker.   5. Ascend/descend curb steps with rollator or rolling walker with min A.                 PLAN:    Patient to be seen 6 x/week  to address the above listed problems via therapeutic activities, gait training, therapeutic exercises  Plan of Care expires: 04/13/17  Plan of Care reviewed with: patient, caregiver         Regine Alcala, PTA  03/17/2017

## 2017-03-17 NOTE — PROGRESS NOTES
[facility] Hospital Medicine  Progress Note    Patient Name: Irina Polk  MRN: [id number]  Patient Class: IP- Inpatient   Admission Date: 3/11/2017  Length of Stay: 6 days  Attending Physician: Eligio Arenas MD  Primary Care Provider: Blanca Quinones MD        Subjective:     Principal Problem:Acute hypoxemic respiratory failure    HPI:  87y/o female with history of HTN and GERD, life long nonsmoker presents to ER with c/o cough, SOB and congestion for a few days. Per family, patient has been experiencing URI sxs since November with increased dry non-productive cough and associated wheezing. She was evaluated by PCP and treated with combination of inhaled bronchodilators, tessalon, abx, but continued to have recurrent episodes with only transient improvement. PCP evaluated her on Friday and provided additional steroid therapy but w/o improvement.           Hospital Course:  Admitted with hypoxic resp failure placed on NIPPV, bronchodilators, steroids, course notable for NSTEMI, per Dr. Wyatt suspect secondary to pulmonary disease with distress with anemia. Doubt primary ACS.    Slowly improved but will need cpap/bipap q hs at home.  This was arranged.  Has round the clock care at home    Palliative feeds ordered despite aspiration after family meeting, knowing risks involved.  They are not yet ready to employ hospice.  Time will tell if she gets frequent pneumonias.      Bipap for hs ordered at home    Dr Quinones aware and will follow up at home    No longer qualifies for oxygen sats greatly improved    Home in am         No new subjective & objective note has been filed under this hospital service since the last note was generated.    Assessment/Plan:      * Acute hypoxemic respiratory failure  - cont bronchodilators  - cont steroids, taper   CT chest: Patchy ground glass opacification of the bilateral lungs in a somewhat perihilar distribution     - Bipap q HS  - wean O2 as  tolerated  - case d/w Dr. Spaulding and Pulm CC        Elevated troponin  - NSTEMI;  Troponin 0.9, nonspecific ST T wave changes.  - per Cardiology secondary to pulmonary disease with distress with anemia  - Doubt primary ACS  - heparin drip dc'd  - check ECHO       NSTEMI (non-ST elevated myocardial infarction)  - NSTEMI;  Troponin 0.9, nonspecific ST T wave changes.  - per Cardiology secondary to pulmonary disease with distress with anemia  - Doubt primary ACS  - heparin drip dc'd  - follow ECHO  - cardio following    Aneurysm of thoracic aorta  stable      Aspiration of liquid  Failed MBS  Diet changed to mechanical soft   Discussed with patient, family, and PCP.  Decision to feed thin liquids despite risks as a comfort measure in this elderly patient  No hospice at this time      HTN (hypertension)  - on toprol 25 mg and amlodipine 2.5 mg daily at home  -restarted       VTE Risk Mitigation         Ordered     heparin (porcine) injection 5,000 Units  Every 8 hours     Route:  Subcutaneous        03/12/17 1349     Place sequential compression device  Until discontinued      03/11/17 9035          Josh Larsen PA-C  Department of Hospital Medicine   [facility]

## 2017-03-17 NOTE — PROGRESS NOTES
Six minute was done 3/17/2017 start at 11:00 am with NC on at 2LPM with Sat of 98% while resting. Remove NC and place on Room Air and repeated Pulse Ox on room air at rest at 11:10 am with Sat 93%.Started six minute walk at 11:10 am. Patient walked until 11:16 am with Pulse Ox Sat 95-97% throughout the walk with HR 88-95. Pt donald well. Stop at 11:16 am with resting Pulse Ox of 97% on 3/17/2017. Place back on NC at 2LPM

## 2017-03-17 NOTE — PLAN OF CARE
Problem: Patient Care Overview  Goal: Individualization & Mutuality  Outcome: Ongoing (interventions implemented as appropriate)  Patient on oxygen; aerosol treatments given as scheduled. V60 BiPAP at bedside. No change in therapy.

## 2017-03-17 NOTE — DISCHARGE INSTRUCTIONS
Your feedback is important to us.  If you should receive a survey in the next few days, please share your experience with us.

## 2017-03-17 NOTE — PT/OT/SLP PROGRESS
"Occupational Therapy  Treatment    Irina Polk   MRN: [id number]   Admitting Diagnosis: Acute hypoxemic respiratory failure    OT Date of Treatment: 17   OT Start Time: 1143  OT Stop Time: 1207  OT Total Time (min): 24 min    Billable Minutes:  Self Care/Home Management 14 and Therapeutic Activity 10    General Precautions: Standard, fall, aspiration  Orthopedic Precautions: N/A  Braces: N/A    Do you have any cultural, spiritual, Roman Catholic conflicts, given your current situation?: None Specified    Subjective:  Communicated with RN prior to session.  " I am just so tired. I cant do that right now. Can you just let me go home!"    Pain Ratin/10     Pain Rating Post-Intervention: 0/10    Objective:  Patient found with: peripheral IV, oxygen, telemetry     Functional Mobility:  Bed Mobility:  Sit to Supine: Stand by Assistance    Transfers:   Sit <> Stand Assistance: Minimum Assistance (from bedside chair x 3 trials)  Sit <> Stand Assistive Device: Rolling Walker  Bed <> Chair Technique: Stand Pivot  Bed <> Chair Transfer Assistance: Contact Guard Assistance (pt wtih LOB requiring MOd A to recover)  Bed <> Chair Assistive Device: Rolling Walker    Functional Ambulation: CGA/Min A using RW. LOB x 1 requiring MOd A to recover    Activities of Daily Living:    LE Dressing Level of Assistance: Minimum assistance (to don underwear)   Pt with posterior LOB x 2 while standing to don underwear  -pt refuse all other adls    Therapeutic Activities and Exercises:  Educated pt on importance of participation on therapy    AM-PAC 6 CLICK ADL   How much help from another person does this patient currently need?   1 = Unable, Total/Dependent Assistance  2 = A lot, Maximum/Moderate Assistance  3 = A little, Minimum/Contact Guard/Supervision  4 = None, Modified [place]/Independent    Putting on and taking off regular lower body clothing? : 3  Bathing (including washing, rinsing, drying)?: 2  Toileting, which includes " using toilet, bedpan, or urinal? : 2  Putting on and taking off regular upper body clothing?: 3  Taking care of personal grooming such as brushing teeth?: 3  Eating meals?: 4  Total Score: 17     AM-PAC Raw Score CMS G-Code Modifier Level of Impairment Assistance   6 % Total / Unable   7 - 9 CM 80 - 100% Maximal Assist   10 - 14 CL 60 - 80% Moderate Assist   15 - 19 CK 40 - 60% Moderate Assist   20 - 22 CJ 20 - 40% Minimal Assist   23 CI 1-20% SBA / CGA   24 CH 0% Independent/ Mod I     Patient left supine with all lines intact, call button in reach and bed alarm on    ASSESSMENT:  Irina Polk is a 88 y.o. female with a medical diagnosis of Acute hypoxemic respiratory failure and presents with the following  Rehab identified problem list/impairments: weakness, impaired endurance, impaired self care skills, impaired functional mobilty, impaired balance and decreased safety awareness. Pt continues to benefit from acute OT services to address deficits and maximize functional independence.     Rehab potential is good.    Activity tolerance: Fair    Discharge recommendations: Discharge Facility/Level Of Care Needs: home health OT, home health PT, home with home health     Barriers to discharge: Barriers to Discharge: None    Equipment recommendations: walker, rolling     GOALS:   Occupational Therapy Goals        Problem: Occupational Therapy Goal    Goal Priority Disciplines Outcome Interventions   Occupational Therapy Goal     OT, PT/OT Ongoing (interventions implemented as appropriate)    Description:  Goals to be met by 4/11/17:    Grooming activity standing at the sink with CGA. (MET 3/15/17)  LB dressing with Minimum Assistance (don briefs or pants).   Assess toileting. (MET 3/15/17)  Transfer to bedside chair with SBA.       New Goal:   G/H standing at sink with Modified [place].   Toileting with MIN A.               Plan:  Patient to be seen 6 x/week to address the above listed problems via  self-care/home management, therapeutic activities, therapeutic exercises  Plan of Care expires: 04/11/17  Plan of Care reviewed with: patient         LINO Arguello  03/17/2017

## 2017-03-17 NOTE — PROGRESS NOTES
Patient pulled both of her IVs out and refuses to have another IV reinserted.  Called moonlighter and informed him and stated that the patient can keep her IV out.

## 2017-03-17 NOTE — PLAN OF CARE
Problem: Occupational Therapy Goal  Goal: Occupational Therapy Goal  Goals to be met by 4/11/17:    Grooming activity standing at the sink with CGA. (MET 3/15/17)  LB dressing with Minimum Assistance (don briefs or pants). MET 3/17/17  Assess toileting. (MET 3/15/17)  Transfer to bedside chair with SBA.     New Goal:   G/H standing at sink with Modified [place].   Toileting with MIN A.   Outcome: Ongoing (interventions implemented as appropriate)  presents with the following  Rehab identified problem list/impairments: weakness, impaired endurance, impaired self care skills, impaired functional mobilty, impaired balance and decreased safety awareness. Pt continues to benefit from acute OT services to address deficits and maximize functional independence.

## 2017-03-18 NOTE — PT/OT/SLP DISCHARGE
Occupational Therapy Discharge Summary    Irina Polk  MRN: [id number]   Acute hypoxemic respiratory failure   Patient Discharged from acute Occupational Therapy on 3/17/17.  Please refer to prior OT note dated on 3/17/17 for functional status.     Assessment:   Patient appropriate for care in another setting.  GOALS:   Occupational Therapy Goals        Problem: Occupational Therapy Goal    Goal Priority Disciplines Outcome Interventions   Occupational Therapy Goal     OT, PT/OT Ongoing (interventions implemented as appropriate)    Description:  Goals to be met by 4/11/17:    Grooming activity standing at the sink with CGA. (MET 3/15/17)  LB dressing with Minimum Assistance (don briefs or pants). MET 3/17/17  Assess toileting. (MET 3/15/17)  Transfer to bedside chair with SBA.       New Goal:   G/H standing at sink with Modified [place].   Toileting with MIN A.              Reasons for Discontinuation of Therapy Services  Transfer to alternate level of care.      Plan:  Patient Discharged to: Home Health PT/OT recommended.   LINO Worley 3/18/2017  .

## 2017-03-18 NOTE — PROGRESS NOTES
Physical Therapy Discharge Summary    Irina Polk  MRN: [id number]   Acute hypoxemic respiratory failure   Patient Discharged from acute Physical Therapy on 3-17-17.  Please refer to prior PT noted date on 3-17-17 for functional status.     Assessment:   Patient appropriate for care in another setting.  GOALS:   Physical Therapy Goals        Problem: Physical Therapy Goal    Goal Priority Disciplines Outcome Goal Variances Interventions   Physical Therapy Goal     PT/OT, PT Ongoing (interventions implemented as appropriate)     Description:  Goals to be met by: 3/30/17     Patient will increase functional independence with mobility by performin. Supine to sit with modified independence.   2. Sit to supine with modified independence.   3. Sit<>stand transfer with supervision using rollator or rolling walker.   4. Gait > 50 feet with supervision using rollator or rolling walker.   5. Ascend/descend curb steps with rollator or rolling walker with min A.               Reasons for Discontinuation of Therapy Services  Transfer to alternate level of care.      Plan:  Patient Discharged to: Home with Home Health Service.

## 2017-03-20 ENCOUNTER — OUTPATIENT CASE MANAGEMENT (OUTPATIENT)
Dept: ADMINISTRATIVE | Facility: OTHER | Age: 82
End: 2017-03-20

## 2017-03-20 NOTE — PROGRESS NOTES
3/20/17 #1 Attempt to complete OPCM screen. No answer. Voice message left requesting call back. Patient referred for high risk and recent hospitalization 3/11-3/17/17.

## 2017-03-21 ENCOUNTER — OUTPATIENT CASE MANAGEMENT (OUTPATIENT)
Dept: ADMINISTRATIVE | Facility: OTHER | Age: 82
End: 2017-03-21

## 2017-03-21 NOTE — PATIENT INSTRUCTIONS
Dysphagia Diet  A dysphagia diet is a special eating plan. Your health care provider may advise it if you have trouble swallowing (dysphagia).  Why a dysphagia diet is needed  When you have dysphagia, you are at risk for aspiration. Aspiration is when food or liquid enters the lungs by accident. It can cause pneumonia and other problems. The foods you eat can affect your ability to swallow. For example, soft foods are easier to swallow than hard foods. A dysphagia diet can help prevent aspiration. You may be at risk for aspiration from dysphagia if you have any of these medical conditions:  · Stroke  · Severe dental problems  · Conditions that lead to less saliva, such as Sjogren syndrome  · Mouth sores  · Parkinson disease or other neurologic conditions  · Muscular dystrophies  · Blockage in the esophagus, such as a growth from cancer   You may need to follow a dysphagia diet for only a short time. Or you may be on it for a while. It depends on what is causing your dysphagia and how serious it is. A speech-language pathologist (SLP) assesses a person with dysphagia. The SLP will determine your risk for aspiration and talk about the best food and drink choices for you.  Levels of a dysphagia diet  The Academy of Nutrition and Dietetics has created a diet plan for people with dysphagia. The plan is called the National Dysphagia Diet. The dysphagia diet has 4 levels of foods. The levels are:  · Level 1. These are foods that are pureed or smooth, like pudding. They need no chewing. This includes foods such as yogurt, mashed potatoes with gravy to moisten it, smooth soups, and pureed vegetables and meats.  · Level 2. These are moist foods that need some chewing. They include soft, cooked, or mashed fruits or vegetables, soft or ground meats moist with gravy, cottage cheese, peanut butter, and soft scrambled eggs. You should avoid crackers, nuts, and other dry foods.  · Level 3. This includes soft-solid foods that need  more chewing. This includes meat, fruit, and vegetables that are easy to cut or mash. You should avoid crunchy, sticky, or very dry foods. This includes nuts, crackers, chips, and other snack foods.  · Level 4. This level includes all foods.  You will also need to be careful about the liquids you drink. Talk with your SLP about the liquids that are allowed on your dysphagia diet. Here is more information on managing liquids in a dysphagia diet.  Preparing food and liquids  Your SLP will give you instructions about how to prepare your food. You may need to avoid certain foods, or make changes to some foods. For example, you may need to puree your food. Make sure to taste and season your food before pureeing it. It will be easier to adjust to a new diet if your food smells and tastes appealing.  You may also need to make liquids thicker. You can manage your liquids by making thin liquids thicker. This is done by adding a flavorless gel, gum, powder, or other liquid to it. These are called thickeners. You can also buy pre-thickened liquids. Talk with your SLP if you have any questions about managing your liquids.  While you eat  While eating or drinking, it may help to sit upright, with your back straight. You may need support pillows to get into the best position. It may also help to have few distractions while eating or drinking. Changing between solid food and liquids may also help your swallowing. Stay upright for at least 30 minutes after eating. This can help reduce the risk for aspiration.  Keep watch for symptoms of aspiration such as:  · Coughing or wheezing during or right after eating  · Excess saliva  · Shortness of breath or fatigue while eating  · A wet-sounding voice during or after eating or drinking  · Fever 30 to 60 minutes after eating  After you eat  After meals, its important to do proper oral care. The SLP can give you instructions for your teeth or dentures. Make sure to not swallow any water  during your oral care routine.  Checking your health  Your health care team will keep track of how well you are swallowing. You may need follow-up tests such as a fiberoptic endoscopic evaluation of swallowing (FEES) test. If your swallowing gets better or worse, your SLP may change your dysphagia diet over time. In time you may be able to eat and drink foods and liquids of all kinds.  Getting enough liquids  While on a dysphagia diet, you may have trouble taking in enough fluid. This can cause dehydration, which can lead to serious health problems. Talk with your health care team about how you can help prevent this. In some cases drinking thicker liquids may make some of your medicines work less well. Because of this, you may need some of your medicines changed for a while.  While you are on a dysphagia diet  · Follow all instructions about what food and drink you can have.  · Do swallowing exercises as advised.  · Do not change your food or liquids, even if your swallowing gets better. Talk with your health care provider first.  · Crush medicines and mix them with food as needed.  · Tell all health care providers and caregivers that you are on a dysphagia diet. Explain which foods and liquids you can and cannot have.     When to call your health care provider  Call 911 if you have trouble breathing because of food blocking your airway.  Call your health care provider right away if you have any of these:  · Trouble swallowing that gets worse  · Unplanned weight loss  · Chewed food coming back up into the mouth  · Vomiting   Date Last Reviewed: 5/20/2015  © 4781-9835 [employer]. [address] [place] [address], [place] [address]. All rights reserved. This information is not intended as a substitute for professional medical care. Always follow your healthcare professional's instructions.        Female Urinary Tract Infection (Child)  Your child has a urinary tract infection.  Bacteria most often do not stay in  urine. When they do, the urine can become infected. This is called a urinary tract infection (UTI). An infection can happen any place in the urinary tract, from the kidney to the bladder and urethra. The urethra in a girl is the tube that drains the urine from the bladder through an opening in front of the vagina.  Bladder infection, UTI, and cystitis are often used to describe the same health problem, but they are not always the same. Cystitis is an inflammation of the bladder. The most common cause of cystitis is an infection.  The most common place for a UTI is in the bladder. When this happens, it is called a bladder infection. This is a common infection in children. Most bladder infections can be treated, and are not serious. But, a UTI can also harm the kidneys. The symptoms of a kidney infection are worse. The infection is more serious because it can harm the kidneys.   Key points to know  · Infections in the urine or any place in the urinary tract are called UTIs.  · Cystitis is most often caused by a UTI.  · Bladder infections are the most common type of cystitis.  · Not all UTIs and cases of cystitis are bladder infections.  · A UTI can cause a kidney infection. This is less common than a bladder infection.  · Most people with a bladder infection do not have a kidney infection.  · You can have a kidney infection without a bladder infection.  The symptoms that your child has often depend on her age. The younger the child, the more vague the symptoms are. Your child may have a hard time telling or showing you where it hurts.  The infection causes inflammation in the urethra and bladder. This causes many of the symptoms. The most common symptoms of a UTI are:  · Pain or burning when urinating. Your child may cry when urinating or not want to urinate because of the pain.  · Girls may curtsy trying to hold in the urine  · Having to go to the bathroom more often than usual  · Your child feels like she needs to  go right away  · Only a small amount of urine comes out  · Blood in urine  · Belly (abdominal) pain  · Cloudy, dark, strong, or bad smelling urine  · Your child cannot urinate (urinary retention)  · Bedwetting (urinary incontinence)  · Fever or chills  · Back pain  · Feeling irritable  · Loss of appetite  UTIs cannot be passed from person to person. You can't get one from some other person, from a toilet seat, or by sharing a bath.  The most common cause of bladder infections in children is bacteria from the bowels. The bacteria can get onto the skin around the urethra, and then into the urine. From there they can travel up into the bladder. This causes inflammation and an infection. This most often happens because of:  · Wiping from back to front after using the toilet. This moves bacteria to the urethra from the stool.  · Poor cleaning of the genitals  Other causes include:  · Not fully emptying the bladder. Bacteria do not pass out as often, so they have a chance to multiply.  · Constipation. This can cause the bowels to push on the bladder or urethra and keep the bladder from emptying.  · Dehydration. This lets the urine stay in the bladder longer.  · Irritation of the urethra from soaps, bubble baths, or tight clothes. This makes it easier for bacteria to cause an infection.  UTIs are diagnosed by the symptoms and a urine test. They are treated with antibiotics and most often go away quickly without problems. Treatment helps stop the UTI from becoming a more serious kidney infection.  Home care  Your childs healthcare provider prescribed antibiotics for the infection. Have your child take the antibiotics until they are all gone, unless the provider tells you to stop. She should take the medicine even if she feels better. This is to make sure the infection has cleared up.   You can give acetaminophen or ibuprofen for pain, fever, or fussiness, unless another medicine was prescribed. You may give ibuprofen  instead of acetaminophen to a baby older than 6 months. You can also alternate the 2 medicines or use them both together. They are different classes of medicines, so taking them together is not an overdose. If your child has chronic liver or kidney disease, talk with your childs provider before using these medicines. Also talk with the healthcare provider if your child has had a stomach ulcer or gastrointestinal bleeding, or is taking blood thinners.  Do not give aspirin to anyone younger than 18 years old who is ill with a fever. It may cause severe liver damage.  Preventing UTIs  · Teach your child to wipe from front to back after using the toilet.  · Give your child enough liquids to drink to prevent dehydration and flush out the bladder.  · Have your child wear loose-fitting clothes and cotton underwear. This helps keep the genital area clean and dry.  · Change soiled diapers or underwear as soon as you can. This will help prevent irritation, which can lead to infection.  · Encourage your child to urinate more often. Tell your child not to wait a long time before urinating.  · Give your child healthy foods to prevent constipation. This includes more fresh fruits and vegetables, and more fiber, and less junk and fatty foods.  Follow-up care  Follow up with your childs healthcare provider, or as advised. This is especially important if your child has infections that happen over and over again.  If a culture was done, you will be told if the treatment needs to be changed. You can call as directed for the results.  If X-rays were done, a radiologist will send your child's healthcare provider a report. You will be told of any changes that will affect treatment.   Call 911  Call 911 if any of these occur:  · Trouble breathing  · Difficulty arousing  · Fainting or loss of consciousness  · Rapid heart rate  · Seizure  When to seek medical advice  Call your childs healthcare provider right away if any of these  occur:  · Your child does not start to get better after 24 hours of treatment  · Any symptom that continues after 3 days of treatment  · Fever of 100.4°F (38°C) oral or 101.4°F (38.5°C) rectal or higher, even after taking fever medicine, or as advised  · Nausea, vomiting, or unable to keep down medicines  · Abdominal or back pain  · Vaginal discharge  · Pain, swelling, or redness in the outer vaginal area (labia)  Date Last Reviewed: 10/1/2016  © 0404-8800 [employer] [address] [place] [address]. All rights reserved. This information is not intended as a substitute for professional medical care. Always follow your healthcare professional's instructions.        Discharge Instructions: Using Oxygen at Home  Your healthcare provider has prescribed oxygen to help make breathing easier for you. You were shown in the hospital how to use your oxygen unit. Here are some guidelines on safely using oxygen at home. Do all steps each time you use your oxygen unit.  General tips  These include the following:  · Wash your hands before and after using your oxygen.  · If you have questions after discharge, work with your healthcare provider and medical supply company to help you determine what is best for you.  · Keep the equipment and tubing clean to help prevent breathing in germs. If you need information about cleaning or maintaining your oxygen equipment, the medical supply company can help.  Step 1: Check your supply  · Pressurize your oxygen tank.  · Check the oxygen gauge on the tank to be sure you have enough. Your medical supply company will tell you when to call. Or, they will deliver your oxygen on a regular schedule.  · If you have a humidifier bottle, check the water level. When the level is at or below 1/2 full, refill it with sterile or distilled water. Ask your medical supply company representative how often you should change your humidifier bottle, if you have one. It is important in  preventing germs.  Step 2: Attach the tubing  · Attach the cannula tubing to your oxygen source as you have been shown.  · Be sure the tubing is not bent or blocked.  Step 3: Set your prescribed flow rate  · Set the oxygen to flow at the rate your healthcare provider has prescribed.  · Never change this rate unless your provider tells you to.  Step 4: Insert the cannula  · Insert the nasal cannula (nose tube) into your nose and breathe through your nose normally.  · If you are not sure whether the oxygen is flowing, place the cannula in a glass of water. If the water bubbles, the oxygen is flowing.  Use oxygen safely  Tips for safe oxygen use include the following:  · Avoid open flames. This includes cigarettes, matches, candles, fireplaces, gas burners, pipes, or anything else that could start a fire.  · Don't smoke or be around others who are smoking.  · Keep oxygen tanks at least 5 feet from gas stoves, space heaters, electric or gas heaters, or any heat source.  · Keep the door to the room open so that air circulates and it is not stuffy.  · Protect your oxygen tank from being knocked over. Store the oxygen tank upright in a secure, approved storage device.  · Turn the tank off right away if it is knocked over and makes a hissing noise. If the regulator breaks or you cannot safely turn the tank off, remove the tubing and leave the room. Then call the supply company or the fire department for help right away.  · Be careful not to trip over the tubing of your oxygen tank.  · Don't use lotions or creams that contain petroleum jelly. This substance can be flammable when mixed with oxygen.  · Turn oxygen off when you are not using it.  · Always follow the instructions for safe use as recommended by your medical supply company.  · Make sure you know what to do in an emergency. Your emergency numbers should include 911 (or your area's emergency number), your healthcare provider, and your medical supply  company.        When to call the healthcare provider  Call your healthcare provider right away if you have any of the following:  · Pale skin or a blue tint to your lips or fingernails  · Increased shortness of breath, wheezing, or other changes from your usual breathing, even with oxygen in place  · Confusion, restlessness or more anxiety than usual  · Chest pain   Date Last Reviewed: 5/1/2016  © 4131-8729 [employer]. [address] [place] [address], [place] [address]. All rights reserved. This information is not intended as a substitute for professional medical care. Always follow your healthcare professional's instructions.        Using Oxygen Safely  Using prescribed oxygen can help you avoid shortness of breath, improve sleep, and mental alertness, and help you to be more active. To reduce the chances of fire and other hazards, you need to follow important safety guidelines when using your oxygen unit. Remember these Dos and Donts:    Oxygen DOs  Suggestions of what to do include the following:   · Do keep sources of flame at least 5 feet away from where your oxygen unit is being used or stored. This includes cigarettes, matches, candles, fireplaces, gas burners, pipes, or anything else that could start a fire. Never smoke or use an open flame when wearing oxygen.  · Do keep the oxygen unit at least 5 feet away from sources of heat such as space heaters, electric or gas heaters, steam pipes, furnaces, and radiators.  · Do ask the medical equipment company if you should keep the oxygen unit away from other appliances, such as TVs and radios.  · Do turn off the oxygen unit completely when its not in use.  · Do have a fire extinguisher nearby. Make sure you and others in your household know how to use it.  · Do be careful not to trip over the oxygen tubing.  Oxygen DONTs  Suggestions of what not to do include the following:   · Dont smoke, and dont allow others to smoke near you. Post a No Smoking  sign in your home.  · Dont use aerosol sprays such as air fresheners or hairspray near the oxygen unit. Aerosols are very flammable.  · Dont use vapor rubs, petroleum jelly, or oil-based hand lotion. These are flammable. Use water-based products instead.  · Dont use oxygen while cooking with gas. Ask the medical equipment company about other types of cooking.  · Dont oil the oxygen unit. And dont use it with oily or greasy hands.  · Dont place a liquid oxygen unit on its side. The oxygen inside can evaporate. Oxygen should always be stored upright in a secured device.  Date Last Reviewed: 5/1/2016  © 1649-1172 [employer]. [address] [place] [address], [place] [address]. All rights reserved. This information is not intended as a substitute for professional medical care. Always follow your healthcare professional's instructions.        Managing Liquids in a Dysphagia Diet  Dysphagia is when a person has trouble swallowing normally. A dysphagia diet is a way of eating and drinking that is safer for a person who has trouble swallowing. It helps to prevent aspiration. On a dysphagia diet, only certain kinds of liquids are safe to drink.  Understanding dysphagia and aspiration  Aspiration is when something enters the airway or lungs by accident. It may be food, liquid, or some other material. This can cause serious health problems, such as pneumonia. When a person has dysphagia, aspiration is always a risk. You may be at risk for aspiration from dysphagia if you have any of these health conditions:  · Stroke  · Severe dental problems  · Conditions that lead to less saliva, such as Sjogrens syndrome  · Mouth sores       · Parkinson disease or other neurologic conditions  · Muscular dystrophies  · Blockage in the esophagus, such as a growth from cancer  Types of liquids in a dysphagia diet  A person with dysphagia may aspirate thin liquids more easily. Because of this, some people with dysphagia need to  avoid certain liquids. Or, liquids need to be made thicker.  Liquids come in different types. Some are thin and flow quickly. Others are thicker and flow more slowly. Thicker liquids that flow slowly are easier to swallow. The liquids that may work best depend on how serious your dysphagia is. Drinking the right types of liquids will reduce your risk for aspiration.  Your dysphagia may be treated by a speech language pathologist (SLP). Talk with your SLP about which types of liquids you are allowed. From thin to thick, the types are:  · Thin. These are watery liquids such as juice, tea, milk, soda, beer, and broth.  · Nectar-like. These are slightly thicker liquids, such as vegetable juices and thin milkshakes.  · Honey-like. These liquids are like honey at room temperature.  · Spoon-thick. These are pudding-like liquids that are too thick to go through a straw.  Ways to manage your liquids  You can manage your liquids by making thin liquids thicker. This is done by adding a flavorless gel, gum, powder, or another liquid to it. These are called thickeners. By adding a thickener, you can bring any liquid to the right level of thickness that you need. You may be able to buy thickeners at a drugstore. Or you may buy them in medical supply stores. Thickeners have directions on the package that show how to use them. You can also buy pre-thickened liquids. These may be more expensive. But you do not need to prepare them first. Talk with your SLP if you have any questions about preparing your liquids.  Many people do not enjoy drinking liquids with added thickeners. These thickeners may lessen flavor, make you feel full quickly, or form lumps. Some people prefer the taste of one thickener over another. Other people may prefer pre-made products. Try different types to find the thickened liquids that you most enjoy.  When its time to drink  While drinking, it may help to sit upright. You may need support pillows to get into  the best position. It may also help to have few distractions while drinking. Changing between solid food and liquids may also help your swallowing. Stay upright for at least 30 minutes after eating. This can help reduce the risk for aspiration.  Keep watch for symptoms of aspiration such as:  · Coughing or wheezing during or right after eating  · Too much saliva  · Shortness of breath or tiredness while eating  · A wet-sounding voice during or after eating or drinking  · Fever 30 to 60 minutes after eating  While youre on a dysphagia diet  While on a dysphagia diet, you may have trouble getting enough fluid. This can cause dehydration, which can lead to serious health problems. Talk with your health care team about how you can help prevent this.  In some cases, drinking thicker liquids may make some of your medicines work less well. Because of this, you may need some of your medicines changed for a while.  Many people with dysphagia also need to be careful about the foods they eat. Talk with your SLP about the foods that are allowed on your dysphagia diet.  Also make sure to:  · Follow all instructions about what food and drink you can have.  · Do swallowing exercises as advised.  · Do not change your food or liquids, even if your swallowing gets better. Talk with your health care provider first.  · Tell all health care providers and caregivers that you are on a dysphagia diet. Explain which foods and liquids you can and cant have.  Follow-up testing  Your health care team will keep track of how well you are swallowing. You may need follow-up tests such as a fiberoptic endoscopic evaluation of swallowing (FEES) test. If your swallowing gets better, you may be able to drink thinner liquids over time. In time, you may be able to drink liquids of all kinds. If your swallowing gets worse, you may need to drink only thicker liquids for a time.     When to call your health care provider  Call your health care provider  right away if you have any of these:  · Your trouble swallowing gets worse  · Symptoms of dehydration  · Shortness of breath or tiredness while eating  · A wet-sounding voice after eating or drinking   Date Last Reviewed: 3/19/2015  © 4169-5132 [employer]. [address] [place] [address]. All rights reserved. This information is not intended as a substitute for professional medical care. Always follow your healthcare professional's instructions.

## 2017-03-21 NOTE — PROGRESS NOTES
Patient referred to [facility] for high risk. Patient was recently admitted 3/11-3/17/17 for hypoxic resp failure placed on NIPPV, bronchodilators, steroids, course notable for NSTEMI, per Dr. Wyatt suspect secondary to pulmonary disease with distress with anemia. Slowly improved but d/c with cpap/bipap q hs at home. Has round the clock care at home. Palliative feeds ordered upon discharge despite aspiration after family meeting done while in hospital, after discussing risks involved. They are not yet ready to employ hospice.     Initial screen and assessment were completed with patient. Did speak with son Marin who lives out of town and is currently visiting and staying with patient. Patient unable to verbalize her , but able to verbalize address. Patient is having memory deficits. Family aware and has hired private sitters 24 hours 7 days per week. Daughter Dolly is primary caregiver who assists with care and decisions. Patient not wearing oxygen while on phone. Patient denies SOB and does not appear to be experiencing SOB. Patient does have a cough at times which she reports doing nebulizer, etc. Discussed thicken liquid and preventing aspiration. Patient states thickener has been added into water/liquid which she does not care for. Reviewed follow-up appointments. Verbalizes understanding. Informed patient of [facility] 24 hr nurse line. Requests # be mailed.  Unable to complete medication reconciliation as daughter assists and she is not home now.

## 2017-03-21 NOTE — LETTER
March 21, 2017    Irina Polk  [address] [place] [address]             [facility] [address]  [place] [address] Dear Ms. Polk:    It was a pleasure speaking with you today. As discussed, I am enclosing information on UTI, dysphagia and oxygen. Furthermore, please remember that [facility] has an On Call department 24 hours 7 days per week to assist patients in triage, urgent appointments and more. Nurses can be reached at [phone].      If you have any questions or concerns, please don't hesitate to call. I can be reached Monday through Wednesday at [phone].    Sincerely,        Cesia Toro RN, [facility]  Outpatient Complex Care Manager

## 2017-03-22 NOTE — PHYSICIAN QUERY
PT Name: Irina Polk  MR #: [id number]     Physician Query Form - Documentation Clarification      CDS/: Jessica Nelson               Contact information: [email or web address]    This form is a permanent document in the medical record.     Query Date: March 22, 2017  By submitting this query, we are merely seeking further clarification of documentation. Please utilize your independent clinical judgment when addressing the question(s) below.    (The Medical record reflects the following:)      Supporting Clinical Findings Location in Medical Record   Hypoxemic respiratory failure- etiology unclear ( reactive airway disease, recurrent aspiration, early ild)      Continue to treat as asthma exacerbation  Progress notes 3-14          Consult 3-12  Dr Gutierrez Whalen   A lot of wheezing , coughing    Continue bronchodilators, continue steroids, taper     Progress notes 3-15    Discharge summary                                                                            Doctor, Please specify if reactive airway disease is a confirmed diagnosis for this hospital stay. Thank you    Provider Use Only                           [X  ] yes  [  ] no  [  ] other                                                                                                      [  ] Unable to determine

## 2017-03-23 NOTE — PHYSICIAN QUERY
PT Name: Irina Polk  MR #: [id number]     Physician Query Form - Diagnosis Clarification      CDS/: Jessica Nelson               Contact information: [email or web address]    This form is a permanent document in the medical record.     Query Date: March 23, 2017    By submitting this query, we are merely seeking further clarification of documentation.  Please utilize your independent clinical judgment when addressing the question(s) below.     The medical record contains the following:      Findings Supporting Clinical Information Location in Medical Record   Suspected Asp. Pneumonitis       Mild oral dysphagia and mod pharyngeal dysphagia      Failed swallow test Swallow evaluation to rule of regurgitation, consider lung ct/pulmonary consult pending swallow evaluation    Trace silent aspiration of thin and suspected trace aspiration nectar liquid      Aspiratory liquids     H and p         Barium swallow study        Discharge summary     Please clarify if the _Aspiration Pneumonitis________________diagnosis has been:    [ X ] Ruled In  [  ] Ruled In, Now Resolved  [  ] Resolved Prior to My Assessment  [  ] Ruled Out  [  ] Clinically insignificant  [  ] Clinically undetermined  [  ] Other/Clarification of findings (please specify)_______________________________    Please document in your progress notes daily for the duration of treatment, until resolved, and include in your discharge summary.

## 2017-03-27 ENCOUNTER — OUTPATIENT CASE MANAGEMENT (OUTPATIENT)
Dept: ADMINISTRATIVE | Facility: OTHER | Age: 82
End: 2017-03-27

## 2017-03-27 NOTE — PROGRESS NOTES
"3/27/17 Follow-up call with patient and daughter Dolly. States patient is doing well, denies SOB , denies UTI, denies cough. Patient still using thickener in liquids, though does not like flavor nor texture, but using it. Daughter inquiring regarding prescription for oxygen. Patient had previously stated she had oxygen at home, but was not using. Daughter explains patient has nebulizer which family bought and that is what she uses for treatments. Patient did not qualify for oxygen. Daughter requesting private pay price, etc. Patient remains with 24 hr sitters. Called [facility], spoke with Aj Wilcox who states patient did not qualify for vent, but would have for oxygen with sats in hospital. Unfortunately, patient has been home >2 days therefore, she needs new pulse oxygen to approve. Resting has to be 88% or <. If higher, would need ambulating saturations and at rest with oxygen to see recovery rate. Other option allowed is private pay which would cost $200 deposit, $125 for equipment (one tank/cables/etc), $150 per month and if addition tanks were needed $15 per tank. Credit card would need to remain on file. Called daughter Dolly. Informed of above and states "I think patient does not need oxygen at this time", but kept information for future needs. Reviewed appointments, verbalizes understanding.       3/21/17 Patient referred to O[facility] for high risk. Patient was recently admitted 3/11-3/17/17 for hypoxic resp failure placed on NIPPV, bronchodilators, steroids, course notable for NSTEMI, per Dr. Wyatt suspect secondary to pulmonary disease with distress with anemia. Slowly improved but d/c with cpap/bipap q hs at home. Has round the clock care at home. Palliative feeds ordered upon discharge despite aspiration after family meeting done while in hospital, after discussing risks involved. They are not yet ready to employ hospice.     Initial screen and assessment were completed with patient. Did speak with " son Marin who lives out of town and is currently visiting and staying with patient. Patient unable to verbalize her , but able to verbalize address. Patient is having memory deficits. Family aware and has hired private sitters 24 hours 7 days per week. Daughter Dolly is primary caregiver who assists with care and decisions. Patient not wearing oxygen while on phone. Patient denies SOB and does not appear to be experiencing SOB. Patient does have a cough at times which she reports doing nebulizer, etc. Discussed thicken liquid and preventing aspiration. Patient states thickener has been added into water/liquid which she does not care for. Reviewed follow-up appointments. Verbalizes understanding. Informed patient of [facility] 24 hr nurse line. Requests # be mailed.  Unable to complete medication reconciliation as daughter assists and she is not home now.

## 2017-03-28 ENCOUNTER — TELEPHONE (OUTPATIENT)
Dept: NEUROLOGY | Facility: CLINIC | Age: 82
End: 2017-03-28

## 2017-03-29 ENCOUNTER — OUTPATIENT CASE MANAGEMENT (OUTPATIENT)
Dept: ADMINISTRATIVE | Facility: OTHER | Age: 82
End: 2017-03-29

## 2017-03-29 ENCOUNTER — TELEPHONE (OUTPATIENT)
Dept: SLEEP MEDICINE | Facility: OTHER | Age: 82
End: 2017-03-29

## 2017-03-29 NOTE — LETTER
March 29, 2017    Irina Polk  [address] [place] [address]             Outpatient Case Management  [address]  [facility] [address] Dear Irina Polk:    We understand that receiving many services from different doctors and healthcare providers is overwhelming. There are appointments to make, transportation to arrange, dietary instructions to understand, and new medications to obtain.    This is where [facility] can help.     You are eligible to receive Outpatient Case Management services when you have healthcare needs that require the coordination of many providers, treatments, and services. You also qualify if you need assistance with a new treatment plan.     There is no charge for this support. You may have been referred to this program from your doctor(s), hospital staff member(s), or insurance company but you always have a choice to participate or not participate. To participate, you must give us your permission to be enrolled.     When you are enrolled in the [facility] Case [facility] program, the  who is assigned to you is    Cesia Toro RN  [phone]    Depending on your needs and wishes, your  may speak with you by phone, visit you at your place of living (for example your home, skilled nursing facility, or rehabilitation facility), or meet you at your doctors office.     Your  will tell you why you have been selected to participate in the program and will complete an assessment of your needs. Then a personalized plan of care will be developed with you and or your caregiver.             Here are examples of the services your [facility]  provides.     Coordinate communication among multiple providers.   Arrange for transportation, doctors visits, durable medical equipment, home care services, and special clinics.    Provide coaching on how to manage your health condition.     Answer questions about your health condition.   Help you understand your doctors treatment plan.    Provide additional instruction about your health condition, treatments, and medications.    Help you obtain information about your insurance coverage.    Advocate for your individual needs.    Request a Licensed Clinical  (LCSW) to visit you if you need their services. LCSWs help with long term planning (discussing placement options, advanced planning directives), financial planning, and assistance (for example rent, utilities, medication funding).     Your  will coordinate their activities with other outpatient services you are receiving. All services provided by [facility]  are coordinated with and communicated to your primary care physician.    Our goal is to help you manage your health condition(s) safely within your living environment, whether that is your home or a medical facility. We want to help you function at the healthiest and highest level possible.     Sincerely,      Isak Morrow MD  Medical Director    Enclosures:    Frequently Asked Questions  Patient Rights and Responsibilities   Reporting a Grievance or Complaint  Consent/Release of Information  Stamped Addressed Envelope                  Frequently Asked Questions about [facility]    What is [facility]?  Outpatient Case management is not Home healthcare services. [facility]  do not provide hands-on care. [facility]  will work with your doctor to arrange for home health services, if needed. Home health services have a limited duration and there are some restrictions as to who can get these services. There is no prescribed limit to the amount of time you receive [facility] services. [facility]  are not agents of your insurance company. However, [facility]  can help you obtain information from your insurance company.     Who are the [facility] ?  [facility]  are Registered Nurses and Social Workers. It is important to remember that you and your  are a team that works together with your primary care physician to create your individualized plan of care. The ultimate goal of your care plan is to help you implement your doctors treatment plan and to help you function at the highest level of health possible.     What are my rights as a patient?  It is important for you to know and understand your rights and responsibilities while receiving services from the [facility] program. We have enclosed a complete description of your rights and responsibilities. You can help to make your care more effective when you understand your right and responsibilities.     What is needed to be enrolled in the program?  You are only enrolled in the [facility] Program when you give us your consent to participate. You will find enclosed a consent form. You are receiving this letter because you or your caregivers have given us a verbal consent to enroll you in [facility]s Outpatient Case Management Program. We ask that you sign and return the enclosed written consent in the stamped self-addressed envelope.                           Patient Rights and Responsibilities    We consider you a partner in your care. When you are well informed, participate in treatment decisions and communicate openly with your doctor and other healthcare professionals, you help make your care more effective.     While you are in the Outpatient Case Management Program, your rights include the following:     You have a right to be provided services in a non-discriminatory manner in accordance with the provisions of Title VI of the Civil Rights Act of 1964, Section 504 of the Rehabilitation Act of  1973, the Age Discrimination Act of 1975, the Americans with Disabilities Act as well as any other applicable Federal and State laws and regulations.     You have the right to a reasonable, timely response to your request or need for care, as well as the right to considerate and respectful care including an environment that preserves dignity and contributes to a positive self-image. You are responsible for being considerate and respectful of our staff.     You have a right to information regarding patient rights, advocacy services and complaint mechanisms, and the right to prompt resolution of any complaint. You or a designee has the right to participate in the resolution of ethical issues surrounding your care. You have a right to file a complaint if you feel that your rights have been infringed, without fear or penalty from [facility] or the federal government. You may file a complaint with the Director of Outpatient Case Management by calling [phone]. At any time, you may lodge a grievance with the [facility] and [facility] by calling [phone], or the Joint Commission on Accreditation of Healthcare Organizations at [phone].     You, or someone acting on your behalf, have the right to understandable information on your health status, treatment and progress in order to make decisions. You have the right to know the nature, risks and alternatives to treatment. You have the right to be informed, when appropriate, regarding the outcome of the care that has been provided. You have the right to refuse treatment to the extent permitted by law, and the right to be informed of the alternatives and consequences of refusing treatment.     You, in collaboration with your physician, have the right to make decisions regarding care and the right to participate in the development and implementation of the plan of care and effective pain management. You have the right to know the name and  professional status of those responsible for the delivery of your care and treatment.       You have a right, within legal guidelines, to have a guardian, next-of-kin or legal designee exercises your patient rights when you are unable to do so. You have the right for your wishes regarding end-of-life decisions to be addressed by the healthcare team through advance directives. You have the right to personal privacy and confidentiality and to expect confidentiality of all records and communications pertaining to your care.      You have the right to receive communications about your health information confidentially. You have the right to request restrictions on the uses and disclosures of your health information. You have the right to inspect, copy, request amendments and receive an accounting of to whom we have disclosed your health information.     You have the right to be provided with interpretation services if you do not speak English; to alternative communication techniques if you are hearing or vision impaired; and to have any other resources needed on your behalf to ensure effective communication. These services are provided free of charge.     You have a right to personal safety (free from mental, physical, sexual and verbal abuse, neglect and exploitation). You have the right to access protective and advocacy services.     Advance Directives  A Patient Advocate is available to meet with patients to answer questions regarding advance directives.    Living Will  A document that outlines what medical treatment the patient does or does not want in the event the patient becomes unable to make those decisions at the appropriate time.    Durable Medical Power of   A document by which the patient designates an individual to be responsible for making medical decisions in the event the patient becomes unable to do so.    HIPAA Notice of Privacy Practices  Your medical information is governed by federal  privacy laws. HIPAA protects private medical information and how that information is disclosed. If you have a question regarding the HIPAA Notice of Privacy Practices, or if you believe your privacy rights have been violated, you may call our designated hotline at ([phone].            Quality Improvement  Because we consistently strive to improve the care and service provided to our patients, we welcome your feedback. Your comments are an important part of our quality improvement process, as we like to know what we are doing right and which areas are in need of improvement. Our policy is to listen, be responsive and provide you with an appropriate and timely follow-up to your questions or concerns. Our goal is active patient and family involvement in all aspects of the care process.                                                                                  Reporting a Grievance or Complaint    During your time with the [facility] team you may have a grievance or complaint with our services. Your Patients Bill of Rights gives patients, families, and caregivers the right to express concerns and grievances and the right to expect a reasonable and timely response.     Your presentation of your concerns is not viewed negatively. It is an opportunity for us to improve the quality of our care and services we provide to you.     You may report your concerns directly to your , or you can phone in a complaint to:     Director of Outpatient Case Management  [phone]    You may also send a complaint letter to:    Director of Outpatient Case Management Services  [address] [place] [id number]    Tell us the details of your complaint and provide us with a contact phone number so we can contact you to obtain additional information. We will return a call to you within two business days of our receipt of your complaint, and to request additional information as  needed. If you choose to mail a letter, your complaint may take a few days longer to reach us.     All grievances will be addressed as quickly as possible. A grievance or complaint that involves situations or practices which place patients in immediate danger will be addressed as an urgent matter. We will work to resolve all other complaints within seven days of receipt. By that time, you will receive a phone call with either the resolution of your complaint, or a plan for corrective action. A formal written response will be sent to you within 30 days of receipt of your grievance.     If a resolution cannot be completed within 30 days, a letter will be sent to you or your family member with an estimated time for the final response.    Additionally, all patients have the right to file complaints with external agencies, without exception. Complaints/grievances can be addressed to the following agencies:            Patient Safety or Quality of Care Concerns  Office of Quality Monitoring   The [facility] [place] [address]  [phone] Toll Free    HIPPA Privacy/Security Concerns  Office for Civil Rights Region IV  [place] Department of Health & Human Services  [address] [place] [address], [address]  [place] [address] [phone] Phone  [phone] TDD  [phone] Toll Free    Medicare/Medicaid Billing Concerns  [place] for Medicare & Medicaid Services  Region [address]  [address] [place] [address], [address]  [place] [address] [phone] Phone  [phone] Toll Free    General Concerns  [place] [facility] ([facility])  [phone] Toll Free Complaint Hotline                                                              Consent Form/Release of Information    By signing--     (1) I agree I have read the Outpatient Case Management information provided to me;     (2) I agree to voluntarily participate in the Outpatient Case Management program;     (3) I understand I  must consent to participation in the Outpatient Case Management program during my first interview with my ;    (4) I consent to the discussion and release of my personal health information to my healthcare team (including my personal physician, my medical home care team, any specialty physician(s), and my [facility] team);     (5) I agree my consent is valid for the length of time I am receiving Outpatient Case Management;    (6) I agree to referrals to community resources which my Case Management team recommends for me. I agree to the release of my personal information and personal health information as necessary to referral sources.    ___________________________________________________________________  Patients Printed Name     ___________________________________________________________________  Patients Signature       Date    If patient is in being cared for, please complete this section:     ___________________________________________________________________  Printed Name of Person Caring For Patient   Relationship To Patient    ___________________________________________________________________   Signature of Person Caring For Patient     Date    PLEASE SIGN AND RETURN IN THE ENCLOSED PRE-ADDRESSED ENVELOPE.

## 2017-03-29 NOTE — PROGRESS NOTES
An [facility] welcome Packet and consent form was created and mailed to the patient on 3/29/2017      Thank you,  Abbey Neri, SSC

## 2017-04-03 ENCOUNTER — TELEPHONE (OUTPATIENT)
Dept: SLEEP MEDICINE | Facility: OTHER | Age: 82
End: 2017-04-03

## 2017-04-10 ENCOUNTER — OUTPATIENT CASE MANAGEMENT (OUTPATIENT)
Dept: ADMINISTRATIVE | Facility: OTHER | Age: 82
End: 2017-04-10

## 2017-04-10 ENCOUNTER — TELEPHONE (OUTPATIENT)
Dept: SLEEP MEDICINE | Facility: OTHER | Age: 82
End: 2017-04-10

## 2017-04-10 NOTE — PROGRESS NOTES
"4/10/17 Follow-up with patient's daughter Dolly Polk. States patient is doing well, breathing well, without difficulty. States she remains on dysphagia diet though patient stark not like thickener but understands importance of it. They remain having patient sit up after meals. No cough. Daughter requesting assistance in scheduling sleep test. Instant message Phill Nagy to call daughter's mobile.     3/27/17 Follow-up call with patient and daughter Dolly. States patient is doing well, denies SOB , denies UTI, denies cough. Patient still using thickener in liquids, though does not like flavor nor texture, but using it. Daughter inquiring regarding prescription for oxygen. Patient had previously stated she had oxygen at home, but was not using. Daughter explains patient has nebulizer which family bought and that is what she uses for treatments. Patient did not qualify for oxygen. Daughter requesting private pay price, etc. Patient remains with 24 hr sitters. Called [facility], spoke with Aj forde [id number] who states patient did not qualify for vent, but would have for oxygen with sats in hospital. Unfortunately, patient has been home >2 days therefore, she needs new pulse oxygen to approve. Resting has to be 88% or <. If higher, would need ambulating saturations and at rest with oxygen to see recovery rate. Other option allowed is private pay which would cost $200 deposit, $125 for equipment (one tank/cables/etc), $150 per month and if addition tanks were needed $15 per tank. Credit card would need to remain on file. Called daughter Dolly. Informed of above and states "I think patient does not need oxygen at this time", but kept information for future needs. Reviewed appointments, verbalizes understanding.       3/21/17 Patient referred to [facility] for high risk. Patient was recently admitted 3/11-3/17/17 for hypoxic resp failure placed on NIPPV, bronchodilators, steroids, course notable for NSTEMI, per Dr. Wyatt suspect " secondary to pulmonary disease with distress with anemia. Slowly improved but d/c with cpap/bipap q hs at home. Has round the clock care at home. Palliative feeds ordered upon discharge despite aspiration after family meeting done while in hospital, after discussing risks involved. They are not yet ready to employ hospice.     Initial screen and assessment were completed with patient. Did speak with son Marin who lives out of town and is currently visiting and staying with patient. Patient unable to verbalize her , but able to verbalize address. Patient is having memory deficits. Family aware and has hired private sitters 24 hours 7 days per week. Daughter Dolly is primary caregiver who assists with care and decisions. Patient not wearing oxygen while on phone. Patient denies SOB and does not appear to be experiencing SOB. Patient does have a cough at times which she reports doing nebulizer, etc. Discussed thicken liquid and preventing aspiration. Patient states thickener has been added into water/liquid which she does not care for. Reviewed follow-up appointments. Verbalizes understanding. Informed patient of [facility] 24 hr nurse line. Requests # be mailed.  Unable to complete medication reconciliation as daughter assists and she is not home now.

## 2017-04-24 ENCOUNTER — OUTPATIENT CASE MANAGEMENT (OUTPATIENT)
Dept: ADMINISTRATIVE | Facility: OTHER | Age: 82
End: 2017-04-24

## 2017-04-24 NOTE — PROGRESS NOTES
"4/24/17 Follow-up attempted. Voice message left requesting call back.     4/10/17 Follow-up with patient's daughter Dolly Leeevin. States patient is doing well, breathing well, without difficulty. States she remains on dysphagia diet though patient stark not like thickener but understands importance of it. They remain having patient sit up after meals. No cough. Daughter requesting assistance in scheduling sleep test. Instant message Phill Nagy to call daughter's mobile.     3/27/17 Follow-up call with patient and daughter Dolly. States patient is doing well, denies SOB , denies UTI, denies cough. Patient still using thickener in liquids, though does not like flavor nor texture, but using it. Daughter inquiring regarding prescription for oxygen. Patient had previously stated she had oxygen at home, but was not using. Daughter explains patient has nebulizer which family bought and that is what she uses for treatments. Patient did not qualify for oxygen. Daughter requesting private pay price, etc. Patient remains with 24 hr sitters. Called [facility], spoke with Aj forde [id number] who states patient did not qualify for vent, but would have for oxygen with sats in hospital. Unfortunately, patient has been home >2 days therefore, she needs new pulse oxygen to approve. Resting has to be 88% or <. If higher, would need ambulating saturations and at rest with oxygen to see recovery rate. Other option allowed is private pay which would cost $200 deposit, $125 for equipment (one tank/cables/etc), $150 per month and if addition tanks were needed $15 per tank. Credit card would need to remain on file. Called daughter Dolly. Informed of above and states "I think patient does not need oxygen at this time", but kept information for future needs. Reviewed appointments, verbalizes understanding.       3/21/17 Patient referred to [facility] for high risk. Patient was recently admitted 3/11-3/17/17 for hypoxic resp failure placed on NIPPV, " bronchodilators, steroids, course notable for NSTEMI, per Dr. Wyatt suspect secondary to pulmonary disease with distress with anemia. Slowly improved but d/c with cpap/bipap q hs at home. Has round the clock care at home. Palliative feeds ordered upon discharge despite aspiration after family meeting done while in hospital, after discussing risks involved. They are not yet ready to employ hospice.     Initial screen and assessment were completed with patient. Did speak with son Marin who lives out of town and is currently visiting and staying with patient. Patient unable to verbalize her , but able to verbalize address. Patient is having memory deficits. Family aware and has hired private sitters 24 hours 7 days per week. Daughter Dolly is primary caregiver who assists with care and decisions. Patient not wearing oxygen while on phone. Patient denies SOB and does not appear to be experiencing SOB. Patient does have a cough at times which she reports doing nebulizer, etc. Discussed thicken liquid and preventing aspiration. Patient states thickener has been added into water/liquid which she does not care for. Reviewed follow-up appointments. Verbalizes understanding. Informed patient of [facility] 24 hr nurse line. Requests # be mailed.  Unable to complete medication reconciliation as daughter assists and she is not home now.

## 2017-05-02 ENCOUNTER — OUTPATIENT CASE MANAGEMENT (OUTPATIENT)
Dept: ADMINISTRATIVE | Facility: OTHER | Age: 82
End: 2017-05-02

## 2017-05-02 NOTE — PROGRESS NOTES
5/2/17 Follow-up call with patient's daughter Dolly. States mother began having symptoms of UTI on 4/28 and called PCP and RX of Cipro ordered and started. States mother wears depends and sometimes she does not want to get up to go to bathroom and will hold urine or go in diapers. Caregivers are pretty good in keeping her clean, she states. Reviewed UTI teaching, signs and symptoms, need to monitor after Rx completed to ensure no new UTI and /or bacteria was resistant. Discussed fuild intake, still using thickener. Patient tolerating.     4/24/17 Follow-up attempted. Voice message left requesting call back.     4/10/17 Follow-up with patient's daughter Dolly Delery. States patient is doing well, breathing well, without difficulty. States she remains on dysphagia diet though patient stark not like thickener but understands importance of it. They remain having patient sit up after meals. No cough. Daughter requesting assistance in scheduling sleep test. Instant message Phill Nagy to call daughter's mobile.     3/27/17 Follow-up call with patient and daughter Dolly. States patient is doing well, denies SOB , denies UTI, denies cough. Patient still using thickener in liquids, though does not like flavor nor texture, but using it. Daughter inquiring regarding prescription for oxygen. Patient had previously stated she had oxygen at home, but was not using. Daughter explains patient has nebulizer which family bought and that is what she uses for treatments. Patient did not qualify for oxygen. Daughter requesting private pay price, etc. Patient remains with 24 hr sitters. Called [facility], spoke with Aj forde [id number] who states patient did not qualify for vent, but would have for oxygen with sats in hospital. Unfortunately, patient has been home >2 days therefore, she needs new pulse oxygen to approve. Resting has to be 88% or <. If higher, would need ambulating saturations and at rest with oxygen to see recovery rate. Other  "option allowed is private pay which would cost $200 deposit, $125 for equipment (one tank/cables/etc), $150 per month and if addition tanks were needed $15 per tank. Credit card would need to remain on file. Called daughter Dolly. Informed of above and states "I think patient does not need oxygen at this time", but kept information for future needs. Reviewed appointments, verbalizes understanding.       3/21/17 Patient referred to [facility] for high risk. Patient was recently admitted 3/11-3/17/17 for hypoxic resp failure placed on NIPPV, bronchodilators, steroids, course notable for NSTEMI, per Dr. Wyatt suspect secondary to pulmonary disease with distress with anemia. Slowly improved but d/c with cpap/bipap q hs at home. Has round the clock care at home. Palliative feeds ordered upon discharge despite aspiration after family meeting done while in hospital, after discussing risks involved. They are not yet ready to employ hospice.     Initial screen and assessment were completed with patient. Did speak with son Marin who lives out of town and is currently visiting and staying with patient. Patient unable to verbalize her , but able to verbalize address. Patient is having memory deficits. Family aware and has hired private sitters 24 hours 7 days per week. Daughter Dolly is primary caregiver who assists with care and decisions. Patient not wearing oxygen while on phone. Patient denies SOB and does not appear to be experiencing SOB. Patient does have a cough at times which she reports doing nebulizer, etc. Discussed thicken liquid and preventing aspiration. Patient states thickener has been added into water/liquid which she does not care for. Reviewed follow-up appointments. Verbalizes understanding. Informed patient of [facility] 24 hr nurse line. Requests # be mailed.  Unable to complete medication reconciliation as daughter assists and she is not home now.   "

## 2017-05-15 ENCOUNTER — TELEPHONE (OUTPATIENT)
Dept: SLEEP MEDICINE | Facility: OTHER | Age: 82
End: 2017-05-15

## 2017-05-16 ENCOUNTER — OUTPATIENT CASE MANAGEMENT (OUTPATIENT)
Dept: ADMINISTRATIVE | Facility: OTHER | Age: 82
End: 2017-05-16

## 2017-05-16 NOTE — PROGRESS NOTES
5/16/17 Follow-Up call attempted. No answer, voice message left requesting call back.    5/2/17 Follow-up call with patient's daughter Dolly. States mother began having symptoms of UTI on 4/28 and called PCP and RX of Cipro ordered and started. States mother wears depends and sometimes she does not want to get up to go to bathroom and will hold urine or go in diapers. Caregivers are pretty good in keeping her clean, she states. Reviewed UTI teaching, signs and symptoms, need to monitor after Rx completed to ensure no new UTI and /or bacteria was resistant. Discussed fuild intake, still using thickener. Patient tolerating.     4/24/17 Follow-up attempted. Voice message left requesting call back.     4/10/17 Follow-up with patient's daughter Dolly Delery. States patient is doing well, breathing well, without difficulty. States she remains on dysphagia diet though patient stark not like thickener but understands importance of it. They remain having patient sit up after meals. No cough. Daughter requesting assistance in scheduling sleep test. Instant message Phill Nagy to call daughter's mobile.     3/27/17 Follow-up call with patient and daughter Dolly. States patient is doing well, denies SOB , denies UTI, denies cough. Patient still using thickener in liquids, though does not like flavor nor texture, but using it. Daughter inquiring regarding prescription for oxygen. Patient had previously stated she had oxygen at home, but was not using. Daughter explains patient has nebulizer which family bought and that is what she uses for treatments. Patient did not qualify for oxygen. Daughter requesting private pay price, etc. Patient remains with 24 hr sitters. Called [facility], spoke with Aj forde [id number] who states patient did not qualify for vent, but would have for oxygen with sats in hospital. Unfortunately, patient has been home >2 days therefore, she needs new pulse oxygen to approve. Resting has to be 88% or <. If  "higher, would need ambulating saturations and at rest with oxygen to see recovery rate. Other option allowed is private pay which would cost $200 deposit, $125 for equipment (one tank/cables/etc), $150 per month and if addition tanks were needed $15 per tank. Credit card would need to remain on file. Called daughter Dolly. Informed of above and states "I think patient does not need oxygen at this time", but kept information for future needs. Reviewed appointments, verbalizes understanding.       3/21/17 Patient referred to [facility] for high risk. Patient was recently admitted 3/11-3/17/17 for hypoxic resp failure placed on NIPPV, bronchodilators, steroids, course notable for NSTEMI, per Dr. Wyatt suspect secondary to pulmonary disease with distress with anemia. Slowly improved but d/c with cpap/bipap q hs at home. Has round the clock care at home. Palliative feeds ordered upon discharge despite aspiration after family meeting done while in hospital, after discussing risks involved. They are not yet ready to employ hospice.     Initial screen and assessment were completed with patient. Did speak with son Marin who lives out of town and is currently visiting and staying with patient. Patient unable to verbalize her , but able to verbalize address. Patient is having memory deficits. Family aware and has hired private sitters 24 hours 7 days per week. Keegan Alberto is primary caregiver who assists with care and decisions. Patient not wearing oxygen while on phone. Patient denies SOB and does not appear to be experiencing SOB. Patient does have a cough at times which she reports doing nebulizer, etc. Discussed thicken liquid and preventing aspiration. Patient states thickener has been added into water/liquid which she does not care for. Reviewed follow-up appointments. Verbalizes understanding. Informed patient of [facility] 24 hr nurse line. Requests # be mailed.  Unable to complete medication reconciliation as " daughter assists and she is not home now.

## 2017-05-18 ENCOUNTER — TELEPHONE (OUTPATIENT)
Dept: SLEEP MEDICINE | Facility: OTHER | Age: 82
End: 2017-05-18

## 2017-05-22 RX ORDER — ESCITALOPRAM OXALATE 10 MG/1
TABLET ORAL
Qty: 90 TABLET | Refills: 3 | Status: SHIPPED | OUTPATIENT
Start: 2017-05-22 | End: 2018-05-17 | Stop reason: SDUPTHER

## 2017-05-24 ENCOUNTER — OUTPATIENT CASE MANAGEMENT (OUTPATIENT)
Dept: ADMINISTRATIVE | Facility: OTHER | Age: 82
End: 2017-05-24

## 2017-05-24 NOTE — LETTER
May 24, 2017    Irina Polk  [address] [place] [address]             [facility] [address] [place] [address] Dear Ms. Polk:    I work with [facility] Outpatient Case Management Department. I have been unsuccessful at reaching you to follow-up to see how you have been doing. If you require any future assistance or if any new concerns or problems arise, please do not hesitate to call.     The Outpatient Case Management Department can be reached at [phone] from 8:00AM to 4:30 PM on Monday thru Friday. [facility] also has a program where a nurse is available 24/7 to answer questions or provide medical advice, their number is [phone].    Thanks,      Cesia Toro RN [facility]  Outpatient Complex Care Management

## 2017-05-24 NOTE — PROGRESS NOTES
5/24/17 Follow-up call attempted. Message left. Case closed. Letter mailed.     5/16/17 Follow-Up call attempted. No answer, voice message left requesting call back.    5/2/17 Follow-up call with patient's daughter Dolly. States mother began having symptoms of UTI on 4/28 and called PCP and RX of Cipro ordered and started. States mother wears depends and sometimes she does not want to get up to go to bathroom and will hold urine or go in diapers. Caregivers are pretty good in keeping her clean, she states. Reviewed UTI teaching, signs and symptoms, need to monitor after Rx completed to ensure no new UTI and /or bacteria was resistant. Discussed fuild intake, still using thickener. Patient tolerating.     4/24/17 Follow-up attempted. Voice message left requesting call back.     4/10/17 Follow-up with patient's daughter Dolly Delery. States patient is doing well, breathing well, without difficulty. States she remains on dysphagia diet though patient stark not like thickener but understands importance of it. They remain having patient sit up after meals. No cough. Daughter requesting assistance in scheduling sleep test. Instant message Phill Nagy to call daughter's mobile.     3/27/17 Follow-up call with patient and daughter Dolly. States patient is doing well, denies SOB , denies UTI, denies cough. Patient still using thickener in liquids, though does not like flavor nor texture, but using it. Daughter inquiring regarding prescription for oxygen. Patient had previously stated she had oxygen at home, but was not using. Daughter explains patient has nebulizer which family bought and that is what she uses for treatments. Patient did not qualify for oxygen. Daughter requesting private pay price, etc. Patient remains with 24 hr sitters. Called [facility], spoke with Aj forde [id number] who states patient did not qualify for vent, but would have for oxygen with sats in hospital. Unfortunately, patient has been home >2 days  "therefore, she needs new pulse oxygen to approve. Resting has to be 88% or <. If higher, would need ambulating saturations and at rest with oxygen to see recovery rate. Other option allowed is private pay which would cost $200 deposit, $125 for equipment (one tank/cables/etc), $150 per month and if addition tanks were needed $15 per tank. Credit card would need to remain on file. Called daughter Dolly. Informed of above and states "I think patient does not need oxygen at this time", but kept information for future needs. Reviewed appointments, verbalizes understanding.       3/21/17 Patient referred to [facility] for high risk. Patient was recently admitted 3/11-3/17/17 for hypoxic resp failure placed on NIPPV, bronchodilators, steroids, course notable for NSTEMI, per Dr. Wyatt suspect secondary to pulmonary disease with distress with anemia. Slowly improved but d/c with cpap/bipap q hs at home. Has round the clock care at home. Palliative feeds ordered upon discharge despite aspiration after family meeting done while in hospital, after discussing risks involved. They are not yet ready to employ hospice.     Initial screen and assessment were completed with patient. Did speak with son Marin who lives out of town and is currently visiting and staying with patient. Patient unable to verbalize her , but able to verbalize address. Patient is having memory deficits. Family aware and has hired private sitters 24 hours 7 days per week. Keegan Alberto is primary caregiver who assists with care and decisions. Patient not wearing oxygen while on phone. Patient denies SOB and does not appear to be experiencing SOB. Patient does have a cough at times which she reports doing nebulizer, etc. Discussed thicken liquid and preventing aspiration. Patient states thickener has been added into water/liquid which she does not care for. Reviewed follow-up appointments. Verbalizes understanding. Informed patient of [facility] 24 [facility] " nurse line. Requests # be mailed.  Unable to complete medication reconciliation as daughter assists and she is not home now.

## 2017-05-25 ENCOUNTER — OUTPATIENT CASE MANAGEMENT (OUTPATIENT)
Dept: ADMINISTRATIVE | Facility: OTHER | Age: 82
End: 2017-05-25

## 2017-05-25 NOTE — PROGRESS NOTES
Please note this patient was mailed a Patient Satisfaction Discharge Survey on 5-25-17        Thank you,      Madelaine Bernard, SSC

## 2017-06-06 ENCOUNTER — TELEPHONE (OUTPATIENT)
Dept: SLEEP MEDICINE | Facility: OTHER | Age: 82
End: 2017-06-06

## 2017-06-06 NOTE — TELEPHONE ENCOUNTER
Patient no showed for the home sleep study on May 16th.  Left message and sent out a letter to reschedule.  No response.

## 2017-06-16 ENCOUNTER — LAB VISIT (OUTPATIENT)
Dept: LAB | Facility: HOSPITAL | Age: 82
End: 2017-06-16
Attending: INTERNAL MEDICINE
Payer: MEDICARE

## 2017-06-16 DIAGNOSIS — N39.0 URINARY TRACT INFECTION WITH HEMATURIA, SITE UNSPECIFIED: ICD-10-CM

## 2017-06-16 DIAGNOSIS — R31.9 URINARY TRACT INFECTION WITH HEMATURIA, SITE UNSPECIFIED: ICD-10-CM

## 2017-06-16 LAB
BILIRUB UR QL STRIP: NEGATIVE
CLARITY UR REFRACT.AUTO: ABNORMAL
COLOR UR AUTO: YELLOW
GLUCOSE UR QL STRIP: NEGATIVE
HGB UR QL STRIP: NEGATIVE
KETONES UR QL STRIP: NEGATIVE
LEUKOCYTE ESTERASE UR QL STRIP: NEGATIVE
NITRITE UR QL STRIP: NEGATIVE
PH UR STRIP: 6 [PH] (ref 5–8)
PROT UR QL STRIP: NEGATIVE
SP GR UR STRIP: 1.02 (ref 1–1.03)
URN SPEC COLLECT METH UR: ABNORMAL
UROBILINOGEN UR STRIP-ACNC: NEGATIVE EU/DL

## 2017-06-16 PROCEDURE — 87086 URINE CULTURE/COLONY COUNT: CPT

## 2017-06-16 PROCEDURE — 81003 URINALYSIS AUTO W/O SCOPE: CPT

## 2017-06-16 PROCEDURE — 87077 CULTURE AEROBIC IDENTIFY: CPT

## 2017-06-16 PROCEDURE — 87186 SC STD MICRODIL/AGAR DIL: CPT

## 2017-06-16 PROCEDURE — 87088 URINE BACTERIA CULTURE: CPT

## 2017-06-19 LAB — BACTERIA UR CULT: NORMAL

## 2017-07-17 ENCOUNTER — LAB VISIT (OUTPATIENT)
Dept: LAB | Facility: HOSPITAL | Age: 82
End: 2017-07-17
Attending: INTERNAL MEDICINE
Payer: MEDICARE

## 2017-07-17 DIAGNOSIS — R31.9 URINARY TRACT INFECTION WITH HEMATURIA, SITE UNSPECIFIED: ICD-10-CM

## 2017-07-17 DIAGNOSIS — N39.0 URINARY TRACT INFECTION WITH HEMATURIA, SITE UNSPECIFIED: ICD-10-CM

## 2017-07-17 PROBLEM — Z87.898 HISTORY OF AIRWAY ASPIRATION: Status: ACTIVE | Noted: 2017-07-17

## 2017-07-17 LAB
BILIRUB UR QL STRIP: NEGATIVE
CLARITY UR REFRACT.AUTO: CLEAR
COLOR UR AUTO: YELLOW
GLUCOSE UR QL STRIP: NEGATIVE
HGB UR QL STRIP: NEGATIVE
KETONES UR QL STRIP: NEGATIVE
LEUKOCYTE ESTERASE UR QL STRIP: NEGATIVE
NITRITE UR QL STRIP: NEGATIVE
PH UR STRIP: 6 [PH] (ref 5–8)
PROT UR QL STRIP: NEGATIVE
SP GR UR STRIP: 1.02 (ref 1–1.03)
URN SPEC COLLECT METH UR: NORMAL
UROBILINOGEN UR STRIP-ACNC: NEGATIVE EU/DL

## 2017-07-17 PROCEDURE — 87086 URINE CULTURE/COLONY COUNT: CPT

## 2017-07-17 PROCEDURE — 81003 URINALYSIS AUTO W/O SCOPE: CPT

## 2017-07-19 LAB
BACTERIA UR CULT: NORMAL
BACTERIA UR CULT: NORMAL

## 2017-08-04 ENCOUNTER — LAB VISIT (OUTPATIENT)
Dept: LAB | Facility: HOSPITAL | Age: 82
End: 2017-08-04
Attending: INTERNAL MEDICINE
Payer: MEDICARE

## 2017-08-04 DIAGNOSIS — N39.0 URINARY TRACT INFECTION WITHOUT HEMATURIA, SITE UNSPECIFIED: ICD-10-CM

## 2017-08-04 LAB
AMORPH CRY UR QL COMP ASSIST: ABNORMAL
BACTERIA #/AREA URNS AUTO: ABNORMAL /HPF
BILIRUB UR QL STRIP: ABNORMAL
CLARITY UR REFRACT.AUTO: ABNORMAL
COLOR UR AUTO: YELLOW
GLUCOSE UR QL STRIP: NEGATIVE
HGB UR QL STRIP: NEGATIVE
HYALINE CASTS UR QL AUTO: 0 /LPF
KETONES UR QL STRIP: NEGATIVE
LEUKOCYTE ESTERASE UR QL STRIP: ABNORMAL
MICROSCOPIC COMMENT: ABNORMAL
NITRITE UR QL STRIP: POSITIVE
PH UR STRIP: 8 [PH] (ref 5–8)
PROT UR QL STRIP: ABNORMAL
RBC #/AREA URNS AUTO: 0 /HPF (ref 0–4)
SP GR UR STRIP: 1.02 (ref 1–1.03)
TRI-PHOS CRY UR QL COMP ASSIST: ABNORMAL
URN SPEC COLLECT METH UR: ABNORMAL
UROBILINOGEN UR STRIP-ACNC: NEGATIVE EU/DL
WBC #/AREA URNS AUTO: 15 /HPF (ref 0–5)

## 2017-08-04 PROCEDURE — 87186 SC STD MICRODIL/AGAR DIL: CPT

## 2017-08-04 PROCEDURE — 87088 URINE BACTERIA CULTURE: CPT

## 2017-08-04 PROCEDURE — 81001 URINALYSIS AUTO W/SCOPE: CPT

## 2017-08-04 PROCEDURE — 87077 CULTURE AEROBIC IDENTIFY: CPT

## 2017-08-04 PROCEDURE — 87086 URINE CULTURE/COLONY COUNT: CPT

## 2017-08-07 LAB — BACTERIA UR CULT: NORMAL

## 2017-08-11 ENCOUNTER — LAB VISIT (OUTPATIENT)
Dept: LAB | Facility: HOSPITAL | Age: 82
End: 2017-08-11
Attending: INTERNAL MEDICINE
Payer: MEDICARE

## 2017-08-11 DIAGNOSIS — N39.0 URINARY TRACT INFECTION WITHOUT HEMATURIA, SITE UNSPECIFIED: ICD-10-CM

## 2017-08-11 LAB
BILIRUB UR QL STRIP: NEGATIVE
CLARITY UR REFRACT.AUTO: CLEAR
COLOR UR AUTO: YELLOW
GLUCOSE UR QL STRIP: NEGATIVE
HGB UR QL STRIP: NEGATIVE
KETONES UR QL STRIP: NEGATIVE
LEUKOCYTE ESTERASE UR QL STRIP: NEGATIVE
NITRITE UR QL STRIP: NEGATIVE
PH UR STRIP: 5 [PH] (ref 5–8)
PROT UR QL STRIP: NEGATIVE
SP GR UR STRIP: 1.02 (ref 1–1.03)
URN SPEC COLLECT METH UR: NORMAL
UROBILINOGEN UR STRIP-ACNC: NEGATIVE EU/DL

## 2017-08-11 PROCEDURE — 81003 URINALYSIS AUTO W/O SCOPE: CPT

## 2017-08-11 PROCEDURE — 87086 URINE CULTURE/COLONY COUNT: CPT

## 2017-08-12 LAB
BACTERIA UR CULT: NORMAL
BACTERIA UR CULT: NORMAL

## 2017-10-03 ENCOUNTER — TELEPHONE (OUTPATIENT)
Dept: PULMONOLOGY | Facility: CLINIC | Age: 82
End: 2017-10-03

## 2017-10-03 DIAGNOSIS — R05.9 COUGH: Primary | ICD-10-CM

## 2017-10-03 DIAGNOSIS — J44.9 CHRONIC OBSTRUCTIVE PULMONARY DISEASE, UNSPECIFIED COPD TYPE: ICD-10-CM

## 2017-10-06 ENCOUNTER — HOSPITAL ENCOUNTER (OUTPATIENT)
Dept: PULMONOLOGY | Facility: CLINIC | Age: 82
Discharge: HOME | End: 2017-10-06
Payer: MEDICARE

## 2017-10-06 ENCOUNTER — OFFICE VISIT (OUTPATIENT)
Dept: PULMONOLOGY | Facility: CLINIC | Age: 82
End: 2017-10-06
Payer: MEDICARE

## 2017-10-06 ENCOUNTER — HOSPITAL ENCOUNTER (OUTPATIENT)
Dept: RADIOLOGY | Facility: HOSPITAL | Age: 82
Discharge: HOME | End: 2017-10-06
Attending: INTERNAL MEDICINE
Payer: MEDICARE

## 2017-10-06 VITALS
DIASTOLIC BLOOD PRESSURE: 68 MMHG | SYSTOLIC BLOOD PRESSURE: 108 MMHG | WEIGHT: 179 LBS | BODY MASS INDEX: 32.94 KG/M2 | HEIGHT: 62 IN | OXYGEN SATURATION: 98 % | HEART RATE: 58 BPM

## 2017-10-06 DIAGNOSIS — R05.9 COUGH: ICD-10-CM

## 2017-10-06 DIAGNOSIS — R41.0 CONFUSION: ICD-10-CM

## 2017-10-06 DIAGNOSIS — R05.9 COUGH: Primary | ICD-10-CM

## 2017-10-06 DIAGNOSIS — T17.998S ASPIRATION OF LIQUID, SEQUELA: ICD-10-CM

## 2017-10-06 DIAGNOSIS — J44.9 CHRONIC OBSTRUCTIVE PULMONARY DISEASE, UNSPECIFIED COPD TYPE: ICD-10-CM

## 2017-10-06 LAB
PRE FEV1 FVC: 71
PRE FEV1: 1.11
PRE FVC: 1.56
PREDICTED FEV1 FVC: 80
PREDICTED FEV1: 1.15
PREDICTED FVC: 1.57

## 2017-10-06 PROCEDURE — 94729 DIFFUSING CAPACITY: CPT | Mod: PBBFAC | Performed by: INTERNAL MEDICINE

## 2017-10-06 PROCEDURE — 99999 PR PBB SHADOW E&M-EST. PATIENT-LVL III: CPT | Mod: PBBFAC,,, | Performed by: INTERNAL MEDICINE

## 2017-10-06 PROCEDURE — 94010 BREATHING CAPACITY TEST: CPT | Mod: PBBFAC | Performed by: INTERNAL MEDICINE

## 2017-10-06 PROCEDURE — 94729 DIFFUSING CAPACITY: CPT | Mod: 26,S$PBB,, | Performed by: INTERNAL MEDICINE

## 2017-10-06 PROCEDURE — 71020 XR CHEST PA AND LATERAL: CPT | Mod: TC

## 2017-10-06 PROCEDURE — 94010 BREATHING CAPACITY TEST: CPT | Mod: 26,S$PBB,, | Performed by: INTERNAL MEDICINE

## 2017-10-06 PROCEDURE — 99213 OFFICE O/P EST LOW 20 MIN: CPT | Mod: 25,S$PBB,, | Performed by: INTERNAL MEDICINE

## 2017-10-06 PROCEDURE — 99213 OFFICE O/P EST LOW 20 MIN: CPT | Mod: PBBFAC,25 | Performed by: INTERNAL MEDICINE

## 2017-10-06 PROCEDURE — 71020 XR CHEST PA AND LATERAL: CPT | Mod: 26,,, | Performed by: RADIOLOGY

## 2017-10-06 NOTE — PROGRESS NOTES
Subjective:       Patient ID: Irina Polk is a 88 y.o. female.    Chief Complaint: No chief complaint on file.    HPI   Irina Polk 88 y.o. female    has a past medical history of GERD (gastroesophageal reflux disease) and Hypertension.    has a past surgical history that includes Total knee arthroplasty (Bilateral); Hysterectomy; and Appendectomy.   reports that she has never smoked. She does not have any smokeless tobacco history on file. She reports that she drinks about 0.6 oz of alcohol per week . She reports that she does not use drugs.  Referred by: Dr. Blanca Quinones  Who had no chief complaint listed for this encounter.  The patient's last visit with me was on Visit date not found.    History is per daughter and caregiver  Patient herself denies any problems - no cough, sob  Cough, aspiration noted in hospital  occ sob  Mental status is different  Patient with speech eval in hospital, started on thickener for water  Has recurrent uti  No prior issues with lungs, LTNS  Eating well, no complaints  No fever chills, ns, wt changes, nausea, vomiting, diarrhea, constipation, chest pain, tightness, pressure    Review of Systems       Objective:      Physical Exam   Constitutional: She is oriented to person, place, and time. She appears well-developed and well-nourished. She appears not cachectic. No distress. She is not obese.   HENT:   Head: Normocephalic.   Nose: Nose normal. No mucosal edema.   Mouth/Throat: Normal dentition. No oropharyngeal exudate.   Neck: Normal range of motion. Neck supple. No tracheal deviation present.   Cardiovascular: Normal rate, regular rhythm, normal heart sounds and intact distal pulses.  Exam reveals no gallop and no friction rub.    No murmur heard.  Pulmonary/Chest: Normal expansion, symmetric chest wall expansion, effort normal and breath sounds normal. No stridor.   Abdominal: Soft. Bowel sounds are normal.   Musculoskeletal: Normal range of motion. She  "exhibits no edema, tenderness or deformity.   Lymphadenopathy: No supraclavicular adenopathy is present.     She has no cervical adenopathy.   Neurological: She is alert and oriented to person, place, and time. No cranial nerve deficit. Gait normal.   Skin: Skin is warm and dry. No rash noted. She is not diaphoretic. No cyanosis or erythema. No pallor. Nails show no clubbing.   Psychiatric: She has a normal mood and affect. Her behavior is normal. Judgment and thought content normal.     Personal Diagnostic Review    No flowsheet data found.      Assessment:       1. Cough        Outpatient Encounter Prescriptions as of 10/6/2017   Medication Sig Dispense Refill    (Magic mouthwash) 1:1:1 Benadryl 12.5mg/5ml liq, aluminum & magnesium hydroxide-simehticone (Maalox), lidocaine viscous 2% Swish and spit 5 mLs every 4 (four) hours as needed. for mouth sores 90 mL 1    albuterol-ipratropium 2.5mg-0.5mg/3mL (DUO-NEB) 0.5 mg-3 mg(2.5 mg base)/3 mL nebulizer solution Take 3 mLs by nebulization every 4 (four) hours as needed for Wheezing. Rescue 90 vial 1    amlodipine (NORVASC) 2.5 MG tablet TAKE 1 TABLET DAILY 90 tablet 0    aspirin 81 MG Chew Take 81 mg by mouth once daily.      BD LUER-SELVIN SYRINGE 3 mL 23 x 1" Syrg   0    benzonatate (TESSALON) 100 MG capsule Take 1 capsule (100 mg total) by mouth 3 (three) times daily as needed for Cough. 30 capsule 0    COMBIGAN 0.2-0.5 % Drop       cyanocobalamin 1,000 mcg/mL injection Inject 1 mL (1,000 mcg total) into the muscle once a week. Dispense syringes as well. 10 mL 3    dicyclomine (BENTYL) 10 MG capsule Take 1 capsule (10 mg total) by mouth 2 (two) times daily. 30 capsule 0    doxycycline (MONODOX) 100 MG capsule Take 1 capsule (100 mg total) by mouth 2 (two) times daily. 14 capsule 0    escitalopram oxalate (LEXAPRO) 10 MG tablet TAKE 1 TABLET DAILY 90 tablet 3    fluticasone (FLOVENT HFA) 110 mcg/actuation inhaler Inhale 1 puff into the lungs 2 (two) times " daily. Controller 12 g 0    inhalation device (BREATHERITE RIGID SPACER-MASK) Use as directed for inhalation. 1 Device 0    metoprolol succinate (TOPROL-XL) 25 MG 24 hr tablet Take 1 tablet by mouth once daily.      OMEGA-3S/DHA/EPA/FISH OIL (OMEGA 3 ORAL) Take by mouth.      omeprazole (PRILOSEC) 20 MG capsule Take 1 capsule (20 mg total) by mouth once daily. 90 capsule 1    oxybutynin (DITROPAN) 5 MG Tab Take 1 tablet (5 mg total) by mouth 3 (three) times daily as needed (bladder spasms). 90 tablet 0    risedronate (ACTONEL) 150 MG Tab Take 1 tablet (150 mg total) by mouth every 30 days. 1 tablet 3     No facility-administered encounter medications on file as of 10/6/2017.      No orders of the defined types were placed in this encounter.    Plan:            I personally reviewed the      1. Echo report   2. PFT   3. CXR   4. CXR report   5. CT chest   6. CT chest report     Assessment:  Diagnoses and all orders for this visit:    Cough        Plan:  Source of cough is likely aspiration  Recc that patient use good hygiene for drinking- ie sitting upright and at table, no lying down after eating/drinking, no straws  Patient doesn't like thickener and won't drink water if she has to use  Pfts are wnl   +hiatal hernia that is likely exacerbating problems  Patient eager to leave visit  Continue supportive care.    Return if symptoms worsen or fail to improve.    There are no Patient Instructions on file for this visit.    Immunization History   Administered Date(s) Administered    Influenza 11/06/2010, 10/25/2013    Influenza - High Dose 11/02/2011, 10/21/2012    Influenza - Trivalent (ADULT) 09/03/2015    Zoster 11/18/2011

## 2017-10-06 NOTE — LETTER
October 6, 2017      Blanca Quinones MD  [address]  [address]  [facility] [address]           [facility]  [address]  [place] [address]  Phone: [phone]          Patient: Irina Polk   MR Number: [id number]   YOB: 1929   Date of Visit: 10/6/2017       Dear Dr. Blanca Quinones:    Thank you for referring Irina Polk to me for evaluation. Attached you will find relevant portions of my assessment and plan of care.    If you have questions, please do not hesitate to call me. I look forward to following Irina Polk along with you.    Sincerely,    Jonathan Vargas MD    Enclosure  CC:  No Recipients    If you would like to receive this communication electronically, please contact [email or web address] or [phone] to request more information on [employer] Link access.    For providers and/or their staff who would like to refer a patient to [facility], please contact us through our one-stop-shop provider referral line, [facility] [place], at [phone].    If you feel you have received this communication in error or would no longer like to receive these types of communications, please e-mail [email or web address]

## 2017-10-20 ENCOUNTER — APPOINTMENT (OUTPATIENT)
Dept: LAB | Facility: HOSPITAL | Age: 82
End: 2017-10-20
Attending: PATHOLOGY
Payer: MEDICARE

## 2017-10-20 PROCEDURE — 87077 CULTURE AEROBIC IDENTIFY: CPT

## 2017-10-20 PROCEDURE — 87088 URINE BACTERIA CULTURE: CPT

## 2017-10-20 PROCEDURE — 87186 SC STD MICRODIL/AGAR DIL: CPT

## 2017-10-20 PROCEDURE — 87086 URINE CULTURE/COLONY COUNT: CPT

## 2017-10-20 PROCEDURE — 81000 URINALYSIS NONAUTO W/SCOPE: CPT

## 2018-01-05 ENCOUNTER — HOSPITAL ENCOUNTER (OUTPATIENT)
Facility: OTHER | Age: 83
Discharge: HOME HEALTH | End: 2018-01-07
Attending: EMERGENCY MEDICINE | Admitting: EMERGENCY MEDICINE
Payer: MEDICARE

## 2018-01-05 DIAGNOSIS — I10 ESSENTIAL HYPERTENSION: ICD-10-CM

## 2018-01-05 DIAGNOSIS — M54.9 BACK PAIN: ICD-10-CM

## 2018-01-05 DIAGNOSIS — D64.9 ANEMIA, UNSPECIFIED TYPE: ICD-10-CM

## 2018-01-05 DIAGNOSIS — M48.56XA COMPRESSION FRACTURE OF LUMBAR SPINE, NON-TRAUMATIC, INITIAL ENCOUNTER: Primary | ICD-10-CM

## 2018-01-05 DIAGNOSIS — J06.9 VIRAL UPPER RESPIRATORY TRACT INFECTION: ICD-10-CM

## 2018-01-05 LAB
ALBUMIN SERPL BCP-MCNC: 3.5 G/DL
ALP SERPL-CCNC: 60 U/L
ALT SERPL W/O P-5'-P-CCNC: 12 U/L
ANION GAP SERPL CALC-SCNC: 9 MMOL/L
AST SERPL-CCNC: 17 U/L
BACTERIA #/AREA URNS HPF: ABNORMAL /HPF
BASOPHILS # BLD AUTO: 0.02 K/UL
BASOPHILS NFR BLD: 0.2 %
BILIRUB SERPL-MCNC: 0.5 MG/DL
BILIRUB UR QL STRIP: NEGATIVE
BUN SERPL-MCNC: 20 MG/DL
CALCIUM SERPL-MCNC: 8.9 MG/DL
CHLORIDE SERPL-SCNC: 104 MMOL/L
CLARITY UR: CLEAR
CO2 SERPL-SCNC: 27 MMOL/L
COLOR UR: YELLOW
CREAT SERPL-MCNC: 0.8 MG/DL
DIFFERENTIAL METHOD: ABNORMAL
EOSINOPHIL # BLD AUTO: 0.2 K/UL
EOSINOPHIL NFR BLD: 1.8 %
ERYTHROCYTE [DISTWIDTH] IN BLOOD BY AUTOMATED COUNT: 17.8 %
EST. GFR  (AFRICAN AMERICAN): >60 ML/MIN/1.73 M^2
EST. GFR  (NON AFRICAN AMERICAN): >60 ML/MIN/1.73 M^2
FLUAV AG SPEC QL IA: NEGATIVE
FLUBV AG SPEC QL IA: NEGATIVE
GLUCOSE SERPL-MCNC: 72 MG/DL
GLUCOSE UR QL STRIP: NEGATIVE
HCT VFR BLD AUTO: 38.2 %
HGB BLD-MCNC: 11.9 G/DL
HGB UR QL STRIP: NEGATIVE
INR PPP: 0.9
KETONES UR QL STRIP: NEGATIVE
LEUKOCYTE ESTERASE UR QL STRIP: ABNORMAL
LYMPHOCYTES # BLD AUTO: 2.9 K/UL
LYMPHOCYTES NFR BLD: 23 %
MCH RBC QN AUTO: 28.1 PG
MCHC RBC AUTO-ENTMCNC: 31.2 G/DL
MCV RBC AUTO: 90 FL
MICROSCOPIC COMMENT: ABNORMAL
MONOCYTES # BLD AUTO: 0.8 K/UL
MONOCYTES NFR BLD: 6.6 %
NEUTROPHILS # BLD AUTO: 8.5 K/UL
NEUTROPHILS NFR BLD: 67.8 %
NITRITE UR QL STRIP: NEGATIVE
NON-SQ EPI CELLS #/AREA URNS HPF: 1 /HPF
PH UR STRIP: 6 [PH] (ref 5–8)
PLATELET # BLD AUTO: 235 K/UL
PMV BLD AUTO: 9.3 FL
POTASSIUM SERPL-SCNC: 3.8 MMOL/L
PROT SERPL-MCNC: 6.7 G/DL
PROT UR QL STRIP: NEGATIVE
PROTHROMBIN TIME: 10 SEC
RBC # BLD AUTO: 4.24 M/UL
RBC #/AREA URNS HPF: 0 /HPF (ref 0–4)
SODIUM SERPL-SCNC: 140 MMOL/L
SP GR UR STRIP: 1.01 (ref 1–1.03)
SPECIMEN SOURCE: NORMAL
URN SPEC COLLECT METH UR: ABNORMAL
UROBILINOGEN UR STRIP-ACNC: NEGATIVE EU/DL
WBC # BLD AUTO: 12.5 K/UL
WBC #/AREA URNS HPF: 4 /HPF (ref 0–5)

## 2018-01-05 PROCEDURE — 99220 PR INITIAL OBSERVATION CARE,LEVL III: CPT | Mod: ,,, | Performed by: HOSPITALIST

## 2018-01-05 PROCEDURE — 93010 ELECTROCARDIOGRAM REPORT: CPT | Mod: ,,, | Performed by: INTERNAL MEDICINE

## 2018-01-05 PROCEDURE — 85025 COMPLETE CBC W/AUTO DIFF WBC: CPT

## 2018-01-05 PROCEDURE — 25000242 PHARM REV CODE 250 ALT 637 W/ HCPCS: Performed by: EMERGENCY MEDICINE

## 2018-01-05 PROCEDURE — P9612 CATHETERIZE FOR URINE SPEC: HCPCS

## 2018-01-05 PROCEDURE — 63600175 PHARM REV CODE 636 W HCPCS: Performed by: HOSPITALIST

## 2018-01-05 PROCEDURE — 85610 PROTHROMBIN TIME: CPT

## 2018-01-05 PROCEDURE — 96375 TX/PRO/DX INJ NEW DRUG ADDON: CPT

## 2018-01-05 PROCEDURE — G0378 HOSPITAL OBSERVATION PER HR: HCPCS

## 2018-01-05 PROCEDURE — 96361 HYDRATE IV INFUSION ADD-ON: CPT

## 2018-01-05 PROCEDURE — 96374 THER/PROPH/DIAG INJ IV PUSH: CPT

## 2018-01-05 PROCEDURE — 80053 COMPREHEN METABOLIC PANEL: CPT

## 2018-01-05 PROCEDURE — 81000 URINALYSIS NONAUTO W/SCOPE: CPT

## 2018-01-05 PROCEDURE — 25000242 PHARM REV CODE 250 ALT 637 W/ HCPCS: Performed by: HOSPITALIST

## 2018-01-05 PROCEDURE — 96376 TX/PRO/DX INJ SAME DRUG ADON: CPT

## 2018-01-05 PROCEDURE — 99285 EMERGENCY DEPT VISIT HI MDM: CPT | Mod: 25

## 2018-01-05 PROCEDURE — 27000221 HC OXYGEN, UP TO 24 HOURS

## 2018-01-05 PROCEDURE — 25000003 PHARM REV CODE 250: Performed by: EMERGENCY MEDICINE

## 2018-01-05 PROCEDURE — 63600175 PHARM REV CODE 636 W HCPCS: Performed by: EMERGENCY MEDICINE

## 2018-01-05 PROCEDURE — 94761 N-INVAS EAR/PLS OXIMETRY MLT: CPT

## 2018-01-05 PROCEDURE — 93005 ELECTROCARDIOGRAM TRACING: CPT

## 2018-01-05 PROCEDURE — 94640 AIRWAY INHALATION TREATMENT: CPT

## 2018-01-05 PROCEDURE — 25000003 PHARM REV CODE 250: Performed by: HOSPITALIST

## 2018-01-05 PROCEDURE — 87400 INFLUENZA A/B EACH AG IA: CPT

## 2018-01-05 RX ORDER — AMLODIPINE BESYLATE 2.5 MG/1
2.5 TABLET ORAL DAILY
Status: DISCONTINUED | OUTPATIENT
Start: 2018-01-06 | End: 2018-01-07 | Stop reason: HOSPADM

## 2018-01-05 RX ORDER — ALBUTEROL SULFATE 0.83 MG/ML
2.5 SOLUTION RESPIRATORY (INHALATION)
Status: COMPLETED | OUTPATIENT
Start: 2018-01-05 | End: 2018-01-05

## 2018-01-05 RX ORDER — ESCITALOPRAM OXALATE 10 MG/1
10 TABLET ORAL DAILY
Status: DISCONTINUED | OUTPATIENT
Start: 2018-01-06 | End: 2018-01-07 | Stop reason: HOSPADM

## 2018-01-05 RX ORDER — ONDANSETRON 2 MG/ML
4 INJECTION INTRAMUSCULAR; INTRAVENOUS EVERY 8 HOURS PRN
Status: DISCONTINUED | OUTPATIENT
Start: 2018-01-05 | End: 2018-01-07 | Stop reason: HOSPADM

## 2018-01-05 RX ORDER — ENOXAPARIN SODIUM 100 MG/ML
40 INJECTION SUBCUTANEOUS EVERY 24 HOURS
Status: DISCONTINUED | OUTPATIENT
Start: 2018-01-05 | End: 2018-01-07 | Stop reason: HOSPADM

## 2018-01-05 RX ORDER — NAPROXEN SODIUM 220 MG/1
81 TABLET, FILM COATED ORAL DAILY
Status: DISCONTINUED | OUTPATIENT
Start: 2018-01-06 | End: 2018-01-07 | Stop reason: HOSPADM

## 2018-01-05 RX ORDER — HYDROMORPHONE HYDROCHLORIDE 1 MG/ML
0.5 INJECTION, SOLUTION INTRAMUSCULAR; INTRAVENOUS; SUBCUTANEOUS
Status: COMPLETED | OUTPATIENT
Start: 2018-01-05 | End: 2018-01-05

## 2018-01-05 RX ORDER — METHYLPREDNISOLONE SOD SUCC 125 MG
125 VIAL (EA) INJECTION
Status: COMPLETED | OUTPATIENT
Start: 2018-01-05 | End: 2018-01-05

## 2018-01-05 RX ORDER — METOPROLOL SUCCINATE 25 MG/1
25 TABLET, EXTENDED RELEASE ORAL DAILY
Status: DISCONTINUED | OUTPATIENT
Start: 2018-01-06 | End: 2018-01-07 | Stop reason: HOSPADM

## 2018-01-05 RX ORDER — MORPHINE SULFATE 2 MG/ML
2 INJECTION, SOLUTION INTRAMUSCULAR; INTRAVENOUS
Status: COMPLETED | OUTPATIENT
Start: 2018-01-05 | End: 2018-01-05

## 2018-01-05 RX ORDER — DICYCLOMINE HYDROCHLORIDE 10 MG/1
10 CAPSULE ORAL 2 TIMES DAILY
Status: DISCONTINUED | OUTPATIENT
Start: 2018-01-05 | End: 2018-01-07 | Stop reason: HOSPADM

## 2018-01-05 RX ORDER — OXYBUTYNIN CHLORIDE 5 MG/1
5 TABLET ORAL 3 TIMES DAILY PRN
Status: DISCONTINUED | OUTPATIENT
Start: 2018-01-05 | End: 2018-01-07 | Stop reason: HOSPADM

## 2018-01-05 RX ORDER — IPRATROPIUM BROMIDE AND ALBUTEROL SULFATE 2.5; .5 MG/3ML; MG/3ML
3 SOLUTION RESPIRATORY (INHALATION) EVERY 4 HOURS PRN
Status: DISCONTINUED | OUTPATIENT
Start: 2018-01-05 | End: 2018-01-07 | Stop reason: HOSPADM

## 2018-01-05 RX ORDER — PREDNISONE 20 MG/1
40 TABLET ORAL DAILY
Status: DISCONTINUED | OUTPATIENT
Start: 2018-01-06 | End: 2018-01-05

## 2018-01-05 RX ORDER — PANTOPRAZOLE SODIUM 40 MG/1
40 TABLET, DELAYED RELEASE ORAL DAILY
Status: DISCONTINUED | OUTPATIENT
Start: 2018-01-06 | End: 2018-01-07 | Stop reason: HOSPADM

## 2018-01-05 RX ORDER — ONDANSETRON 2 MG/ML
4 INJECTION INTRAMUSCULAR; INTRAVENOUS
Status: COMPLETED | OUTPATIENT
Start: 2018-01-05 | End: 2018-01-05

## 2018-01-05 RX ORDER — AMLODIPINE BESYLATE 5 MG/1
5 TABLET ORAL
Status: COMPLETED | OUTPATIENT
Start: 2018-01-05 | End: 2018-01-05

## 2018-01-05 RX ORDER — FLUTICASONE FUROATE AND VILANTEROL 100; 25 UG/1; UG/1
1 POWDER RESPIRATORY (INHALATION) DAILY
Status: DISCONTINUED | OUTPATIENT
Start: 2018-01-06 | End: 2018-01-07 | Stop reason: HOSPADM

## 2018-01-05 RX ORDER — HYDROMORPHONE HYDROCHLORIDE 1 MG/ML
1 INJECTION, SOLUTION INTRAMUSCULAR; INTRAVENOUS; SUBCUTANEOUS ONCE
Status: COMPLETED | OUTPATIENT
Start: 2018-01-05 | End: 2018-01-05

## 2018-01-05 RX ORDER — SULFAMETHOXAZOLE AND TRIMETHOPRIM 800; 160 MG/1; MG/1
1 TABLET ORAL 2 TIMES DAILY
Status: DISCONTINUED | OUTPATIENT
Start: 2018-01-05 | End: 2018-01-05

## 2018-01-05 RX ORDER — HYDROMORPHONE HYDROCHLORIDE 1 MG/ML
1 INJECTION, SOLUTION INTRAMUSCULAR; INTRAVENOUS; SUBCUTANEOUS EVERY 6 HOURS PRN
Status: DISCONTINUED | OUTPATIENT
Start: 2018-01-05 | End: 2018-01-07 | Stop reason: HOSPADM

## 2018-01-05 RX ORDER — ACETAMINOPHEN 10 MG/ML
1000 INJECTION, SOLUTION INTRAVENOUS EVERY 8 HOURS
Status: COMPLETED | OUTPATIENT
Start: 2018-01-05 | End: 2018-01-06

## 2018-01-05 RX ORDER — HYDROMORPHONE HYDROCHLORIDE 1 MG/ML
0.5 INJECTION, SOLUTION INTRAMUSCULAR; INTRAVENOUS; SUBCUTANEOUS EVERY 6 HOURS PRN
Status: DISCONTINUED | OUTPATIENT
Start: 2018-01-05 | End: 2018-01-07 | Stop reason: HOSPADM

## 2018-01-05 RX ADMIN — ALBUTEROL SULFATE 2.5 MG: 2.5 SOLUTION RESPIRATORY (INHALATION) at 08:01

## 2018-01-05 RX ADMIN — ENOXAPARIN SODIUM 40 MG: 100 INJECTION SUBCUTANEOUS at 05:01

## 2018-01-05 RX ADMIN — HYDROMORPHONE HYDROCHLORIDE 1 MG: 1 INJECTION, SOLUTION INTRAMUSCULAR; INTRAVENOUS; SUBCUTANEOUS at 07:01

## 2018-01-05 RX ADMIN — HYDROMORPHONE HYDROCHLORIDE 0.5 MG: 1 INJECTION, SOLUTION INTRAMUSCULAR; INTRAVENOUS; SUBCUTANEOUS at 11:01

## 2018-01-05 RX ADMIN — SODIUM CHLORIDE 1000 ML: 0.9 INJECTION, SOLUTION INTRAVENOUS at 08:01

## 2018-01-05 RX ADMIN — MORPHINE SULFATE 2 MG: 2 INJECTION, SOLUTION INTRAMUSCULAR; INTRAVENOUS at 08:01

## 2018-01-05 RX ADMIN — DICYCLOMINE HYDROCHLORIDE 10 MG: 10 CAPSULE ORAL at 08:01

## 2018-01-05 RX ADMIN — AMLODIPINE BESYLATE 5 MG: 5 TABLET ORAL at 08:01

## 2018-01-05 RX ADMIN — ONDANSETRON 4 MG: 2 INJECTION, SOLUTION INTRAMUSCULAR; INTRAVENOUS at 08:01

## 2018-01-05 RX ADMIN — IPRATROPIUM BROMIDE AND ALBUTEROL SULFATE 3 ML: .5; 3 SOLUTION RESPIRATORY (INHALATION) at 08:01

## 2018-01-05 RX ADMIN — HYDROMORPHONE HYDROCHLORIDE 1 MG: 1 INJECTION, SOLUTION INTRAMUSCULAR; INTRAVENOUS; SUBCUTANEOUS at 01:01

## 2018-01-05 RX ADMIN — METHYLPREDNISOLONE SODIUM SUCCINATE 125 MG: 125 INJECTION, POWDER, FOR SOLUTION INTRAMUSCULAR; INTRAVENOUS at 11:01

## 2018-01-05 RX ADMIN — ACETAMINOPHEN 1000 MG: 10 INJECTION, SOLUTION INTRAVENOUS at 10:01

## 2018-01-05 RX ADMIN — IPRATROPIUM BROMIDE AND ALBUTEROL SULFATE 3 ML: .5; 3 SOLUTION RESPIRATORY (INHALATION) at 03:01

## 2018-01-05 RX ADMIN — MORPHINE SULFATE 2 MG: 2 INJECTION, SOLUTION INTRAMUSCULAR; INTRAVENOUS at 09:01

## 2018-01-05 NOTE — ED NOTES
Appearance: Pt awake, alert & oriented to person, place & time. Pt in no acute distress at present time. Pt is clean and well groomed with clothes appropriately fastened. Pt is of large body habitus.   Skin: Skin warm, dry & intact. Color consistent with ethnicity. Mucous membranes dry. No breakdown or brusing noted.   Musculoskeletal: Sensation intact to bilateral LE's. no obvious swelling or deformities noted. Denies bowel or bladder dysfunction. Pt reporting intense pain to lower back with any movement.   Respiratory: Respirations spontaneous, even, and non-labored. Visible chest rise noted. Airway is open and patent. No accessory muscle use noted.   Neurologic: Sensation is intact. Speech is clear and appropriate. Eyes open spontaneously, behavior appropriate to situation, follows commands, facial expression symmetrical, bilateral hand grasp equal and even, purposeful motor response noted.  Cardiac: All peripheral pulses present. No Bilateral lower extremity edema. Cap refill is <3 seconds. Pt denies active chest pains, SOB, dizziness, blurred vision, weakness or fatigue at this time.   Abdomen: Abdomen soft, non-tender to palpation. Pt denies active abd pains, cramping or discomfort, No N/V/D at this time.   : Pt reports no dysuria or hematuria.

## 2018-01-05 NOTE — PLAN OF CARE
01/05/18 1242   Discharge Assessment   Assessment Type Discharge Planning Assessment   Assessment information obtained from? Patient  (and adult daughter)   Prior to hospitilization cognitive status: Alert/Oriented   Prior to hospitalization functional status: Assistive Equipment;Needs Assistance   Current cognitive status: Alert/Oriented   Current Functional Status: Partially Dependent   Facility Arrived From: Home   Lives With other (see comments)  (24/7 in home caregiver)   Able to Return to Prior Arrangements yes   Equipment Currently Used at Home walker, rolling;shower chair;wheelchair   Do you have any problems affording any of your prescribed medications? No   Is the patient taking medications as prescribed? yes   Does the patient have transportation home? Yes   Discharge Plan A Home   Discharge Plan B Home  (with caregiver)

## 2018-01-05 NOTE — ED PROVIDER NOTES
Encounter Date: 1/5/2018    SCRIBE #1 NOTE: I, Jody Menchaca, am scribing for, and in the presence of, Dr. العراقي.       History     Chief Complaint   Patient presents with    Back Pain     low back pain x 2 days. no trauma/injury. tx for UTI for 3 days.     Time seen by provider: 8:22 AM    This is a 88 y.o. female, with history of HTN and GERD, who presents via EMS with complaint of lower back pain which started two days ago. Pain is described as constant and located in the central lower back. Per relative, the patient also reports cough and nasal congestion. She denies fever, chills, nausea, vomiting, numbness, weakness, tingling, bowel incontinence, urinary incontinence, urinary retention, dysuria, chest pain, shortness of breath, abdominal pain, flank pain, diarrhea, constipation, light-headedness, dizziness, or headache. Per relative, patient has been on bactrim for the past three days for treatment of UTI. She states that patient is also taking prednisone for treatment of cough/cold. Patient has not taken any of her medications today. She has no additional complaints.      The history is provided by the patient and a relative.     Review of patient's allergies indicates:   Allergen Reactions    Penicillins      Past Medical History:   Diagnosis Date    GERD (gastroesophageal reflux disease)     Hypertension      Past Surgical History:   Procedure Laterality Date    APPENDECTOMY      HYSTERECTOMY      TOTAL KNEE ARTHROPLASTY Bilateral      History reviewed. No pertinent family history.  Social History   Substance Use Topics    Smoking status: Never Smoker    Smokeless tobacco: Never Used    Alcohol use 0.6 oz/week     1 Glasses of wine per week      Comment: occasionally     Review of Systems   Constitutional: Negative for fever.   HENT: Positive for congestion.    Respiratory: Positive for cough. Negative for shortness of breath.    Cardiovascular: Negative for chest pain.   Gastrointestinal:  Negative for abdominal pain, constipation, diarrhea, nausea and vomiting.   Genitourinary: Negative for dysuria and flank pain.        Negative for bowel incontinence, urinary incontinence, or urinary retention.   Musculoskeletal: Positive for back pain.   Neurological: Negative for dizziness, weakness, numbness and headaches.   Hematological: Does not bruise/bleed easily.       Physical Exam     Initial Vitals [01/05/18 0808]   BP Pulse Resp Temp SpO2   (!) 217/103 76 18 98.3 °F (36.8 °C) (!) 92 %      MAP       141         Physical Exam    Nursing note and vitals reviewed.  Constitutional: She appears well-developed and well-nourished. She is not diaphoretic. No distress.   HENT:   Head: Normocephalic and atraumatic.   Mouth/Throat: Oropharynx is clear and moist.   Dry lips. Sticky mucous membranes.   Eyes: EOM are normal. Pupils are equal, round, and reactive to light. Right eye exhibits no discharge. Left eye exhibits no discharge.   Conjunctiva are pink, clear, and intact.   Neck: Normal range of motion. Neck supple.   Cardiovascular: Normal rate, regular rhythm, normal heart sounds and intact distal pulses. Exam reveals no gallop and no friction rub.    No murmur heard.  Normal S1, S2. No murmurs, no rubs, no gallops.    Pulmonary/Chest: No respiratory distress. She has no wheezes. She has no rhonchi. She has no rales.   Course breath sounds bilaterally.   Abdominal: Soft. Bowel sounds are normal. She exhibits no distension. There is no tenderness. There is no rebound and no guarding.   Musculoskeletal: Normal range of motion. She exhibits tenderness. She exhibits no edema.   Bilateral paraspinal tenderness to palpation to the right and left of L4 and L5. No midline C-T-L-spine tenderness to palpation, crepitus or step-offs. No flank tenderness to palpation.   Neurological: She is alert and oriented to person, place, and time.   Skin: Skin is warm and dry. Capillary refill takes less than 2 seconds. No rash  and no abscess noted. No erythema. No pallor.   No visible lesions or rash on the back.   Psychiatric: She has a normal mood and affect. Her behavior is normal. Judgment and thought content normal.         ED Course   Procedures  Labs Reviewed   CBC W/ AUTO DIFFERENTIAL - Abnormal; Notable for the following:        Result Value    Hemoglobin 11.9 (*)     MCHC 31.2 (*)     RDW 17.8 (*)     Gran # 8.5 (*)     All other components within normal limits   URINALYSIS - Abnormal; Notable for the following:     Leukocytes, UA 1+ (*)     All other components within normal limits   URINALYSIS MICROSCOPIC - Abnormal; Notable for the following:     Bacteria, UA Moderate (*)     Non-Squam Epith 1 (*)     All other components within normal limits   COMPREHENSIVE METABOLIC PANEL   INFLUENZA A AND B ANTIGEN   PROTIME-INR     EKG Readings: (Independently Interpreted)   Initial Reading: No STEMI.   Normal sinus rhythm at a rate of 73 bpm. No ischemic changes. No STEMI.     Imaging Results          CT Lumbar Spine Without Contrast (Final result)  Result time 01/05/18 10:36:36    Final result by Adele Mcdaniel MD (01/05/18 10:36:36)                 Impression:       Mild age-indeterminate anterior compression fracture of L2 with minimal retropulsion of the inferior endplate of L2 resulting in no more than mild spinal canal stenosis.    Remote mild compression fracture of T12.    Disc and facet degeneration L3-L5 with no more than mild spinal canal or foraminal stenosis.    Partially imaged large hiatal hernia.  Punctate nonobstructing right nephrolithiasis.  Suggestion of diverticulosis coli.      Electronically signed by: ADELE MCDANIEL  Date:     01/05/18  Time:    10:36              Narrative:    HISTORY: Pain, no trauma, history of compression fractures    TECHNIQUE:   Multislice noncontrast thin collimation helically-acquired axial images from the thoracolumbar junction to the upper sacrum, with multiplanar  reconstructions.    COMPARISON: Chest CT 3/12/17. No prior lumbar spine imaging.    FINDINGS:     Alignment: Normal.    Vertebrae: There is sacralization of L5.  Mild compression fracture of T12 is unchanged.  Additional age-indeterminate mild anterior compression fracture of L2.  There is minimal associated endplate retropulsion with the compression fractures and no associated soft tissue mass.  There is generalized osteopenia.    Discs: Preserved height.    Degenerative findings: Mild retropulsion of the superior T12 endplate and mild T11-T12 disc bulge does not result in spinal canal or foraminal stenosis.  Mild retropulsion of the inferior L2 endplate and circumferential L2-L3 disc bulge with mild buckling of the ligamentum flavum results in mild spinal canal stenosis and mild foraminal stenoses.  Circumferential disc bulges, mild facet arthropathy, and mild ligamentum flavum L3-L5 results in mild foraminal stenoses with no more than mild spinal canal stenosis.    Paraspinal muscles & soft tissues: Partially imaged large hiatal hernia.  Punctate nonobstructing right nephrolithiasis.  Suggestion of diverticulosis coli.                             CT Chest Without Contrast (Final result)  Result time 01/05/18 10:32:47    Final result by Adele Mcdaniel MD (01/05/18 10:32:47)                 Impression:       Mild lingular atelectasis.  Otherwise no acute abnormality.    Remote mild anterior compression fracture T12.    Unchanged thoracic aortic ectasia.  Large hiatal hernia.  Coronary atherosclerosis.  Punctate nonobstructing right renal stone.      Electronically signed by: ADELE MCDANIEL  Date:     01/05/18  Time:    10:32              Narrative:    HISTORY:  Cough and Shortness of Breath    TECHNIQUE: Axial CT images acquired from the thoracic inlet through the diaphragm without contrast.    Multiplanar reconstructions provided for review.    COMPARISON: CT chest 03/12/17.  Chest radiograph 10/06/17.      FINDINGS:    Chest Wall:  Normal.     Thyroid: Normal lower gland.    Axillae: No adenopathy.    Mediastinum/Jennifer: Unremarkable.    Heart: Calcific atherosclerosis of the coronary arteries.     Airways: Patent.      Pleura: No thickening or fluid.    Lungs: Mildly degraded by respiratory motion artifact.  Bandlike atelectasis in the lingula.  Additional minimal areas of scattered subsegmental atelectasis.    Upper Abdomen: Punctate nonobstructing right renal stone is present.    Vasculature: Unchanged ectasia of the thoracic aorta.  Mild calcific atherosclerosis throughout the aorta.      Bones: Mild anterior compression fracture of T12 is unchanged. Generalized osteopenia.                                 Medical Decision Making:   Independently Interpreted Test(s):   I have ordered and independently interpreted X-rays - see prior notes.  I have ordered and independently interpreted EKG Reading(s) - see prior notes  Clinical Tests:   Lab Tests: Ordered and Reviewed  Radiological Study: Reviewed and Ordered  Medical Tests: Ordered and Reviewed  ED Management:  8:39AM- Consulted and discussed case with patient's PCP Dr. Blanca Macdonald.     11:42AM- Discussed case with Dr. Arenas. Will admit patient for further treatment.            Scribe Attestation:   Scribe #1: I performed the above scribed service and the documentation accurately describes the services I performed. I attest to the accuracy of the note.    Attending Attestation:           Physician Attestation for Scribe:  Physician Attestation Statement for Scribe #1: I, Dr. العراقي, reviewed documentation, as scribed by Jody Menchaca in my presence, and it is both accurate and complete.         Attending ED Notes:   Emergent evaluation of 88-year-old female with nontraumatic back pain.  Patient has a history of compression fractures.  Patient is afebrile, nontoxic-appearing with stable vital signs except for oxygen saturation of 94% on room air.  Patient  in no acute respiratory distress.  Patient also complains of intermittent cough, cold, nasal congestion and runny nose.  Influenza screen is negative.  No acute findings on chest x-ray.  Patient is currently being treated for urinary tract infection.  Urinary analysis reveals moderate bacteria and 1+ leukocytes.  Patient has no flank tenderness to palpation.  No midline C-spine, T-spine or L-spine tenderness to palpation, crepitus or step-offs.  No elevation of white blood cell count.  H&H is 11.9 and 38.2.  No acute findings on CMP.  Patient has extreme pain in the back with movement and ambulation.  No clinical evidence of cauda equina syndrome.  Strength 5/5 throughout.  Sensation intact to light touch throughout.  Two point discrimination intact throughout.  No saddle paresthesias.  Reflexes 2+ throughout.CT scan of the chest reveals no acute abnormality.  CT scan of the lumbar region reveals age indeterminate anterior compression fracture of L2, remote mild compression fracture of T12, disc and facet degeneration of L3 and L5 and partially imaged hiatal hernia.  Patient also found to have nonobstructing right-sided nephrolithiasis.  The patient is extensive the counseled on her diagnosis and treatment including all diagnostic, laboratory and physical exam findings.  The patient is admitted in stable condition.          ED Course      Clinical Impression:     1. Compression fracture of lumbar spine, non-traumatic, initial encounter    2. Back pain    3. Anemia, unspecified type    4. Essential hypertension    5. Viral upper respiratory tract infection                                 Esdras Bourgeois MD  01/05/18 1944

## 2018-01-05 NOTE — ED NOTES
"Pt presents to ED via EMS with family. Family reporting pain to lower mid back since yesterday. Denies recent falls or trauma. Pt daughter does report "a   Comes the house to help her with body mechanics, and he came a couple days ago." Pt also reporting URI symptoms like cough, congestion. Pt daughter reporting she has a hx of aspiration. Pt AAOx4 and appropriate at this time. Respirations even and unlabored. No acute distress noted. Bed is locked and in lowest position with side rails up x2. Call bell within reach and pt oriented to use of call bell. Pt on continuous cardiac monitoring, continuous pulse ox, and continuous BP cuff. Will continue to monitor.     "

## 2018-01-05 NOTE — ED NOTES
Hourly rounding on pt has been done. Pt still with pain, rates pain 10/10, MD notified. Pt awake, oriented x 4. Respirations even and unlabored. No acute distress noted.  Awaiting further orders. Pt updated on POC. Bed is locked and in lowest position with side rails up x2. Call bell within reach and pt oriented to use of call bell. Pt on remains on continuous cardiac monitoring, continuous pulse ox, and continuous BP cuff. Will continue to monitor.

## 2018-01-06 LAB
ANION GAP SERPL CALC-SCNC: 8 MMOL/L
BASOPHILS # BLD AUTO: 0 K/UL
BASOPHILS NFR BLD: 0 %
BUN SERPL-MCNC: 23 MG/DL
CALCIUM SERPL-MCNC: 8.7 MG/DL
CHLORIDE SERPL-SCNC: 106 MMOL/L
CO2 SERPL-SCNC: 25 MMOL/L
CREAT SERPL-MCNC: 0.7 MG/DL
DIFFERENTIAL METHOD: ABNORMAL
EOSINOPHIL # BLD AUTO: 0 K/UL
EOSINOPHIL NFR BLD: 0 %
ERYTHROCYTE [DISTWIDTH] IN BLOOD BY AUTOMATED COUNT: 17.6 %
EST. GFR  (AFRICAN AMERICAN): >60 ML/MIN/1.73 M^2
EST. GFR  (NON AFRICAN AMERICAN): >60 ML/MIN/1.73 M^2
GLUCOSE SERPL-MCNC: 116 MG/DL
HCT VFR BLD AUTO: 37 %
HGB BLD-MCNC: 11.4 G/DL
LYMPHOCYTES # BLD AUTO: 1 K/UL
LYMPHOCYTES NFR BLD: 10.7 %
MAGNESIUM SERPL-MCNC: 2.2 MG/DL
MCH RBC QN AUTO: 27.9 PG
MCHC RBC AUTO-ENTMCNC: 30.8 G/DL
MCV RBC AUTO: 91 FL
MONOCYTES # BLD AUTO: 0.7 K/UL
MONOCYTES NFR BLD: 6.8 %
NEUTROPHILS # BLD AUTO: 7.8 K/UL
NEUTROPHILS NFR BLD: 81.9 %
PHOSPHATE SERPL-MCNC: 4 MG/DL
PLATELET # BLD AUTO: 265 K/UL
PMV BLD AUTO: 9.7 FL
POTASSIUM SERPL-SCNC: 4.5 MMOL/L
RBC # BLD AUTO: 4.08 M/UL
SODIUM SERPL-SCNC: 139 MMOL/L
WBC # BLD AUTO: 9.52 K/UL

## 2018-01-06 PROCEDURE — G8978 MOBILITY CURRENT STATUS: HCPCS | Mod: CK

## 2018-01-06 PROCEDURE — G8980 MOBILITY D/C STATUS: HCPCS | Mod: CK

## 2018-01-06 PROCEDURE — 25000242 PHARM REV CODE 250 ALT 637 W/ HCPCS: Performed by: HOSPITALIST

## 2018-01-06 PROCEDURE — 94640 AIRWAY INHALATION TREATMENT: CPT

## 2018-01-06 PROCEDURE — 63600175 PHARM REV CODE 636 W HCPCS: Performed by: HOSPITALIST

## 2018-01-06 PROCEDURE — 85025 COMPLETE CBC W/AUTO DIFF WBC: CPT

## 2018-01-06 PROCEDURE — 27000221 HC OXYGEN, UP TO 24 HOURS

## 2018-01-06 PROCEDURE — 97530 THERAPEUTIC ACTIVITIES: CPT

## 2018-01-06 PROCEDURE — 97165 OT EVAL LOW COMPLEX 30 MIN: CPT

## 2018-01-06 PROCEDURE — G8979 MOBILITY GOAL STATUS: HCPCS | Mod: CJ

## 2018-01-06 PROCEDURE — 84100 ASSAY OF PHOSPHORUS: CPT

## 2018-01-06 PROCEDURE — 25000003 PHARM REV CODE 250: Performed by: HOSPITALIST

## 2018-01-06 PROCEDURE — G8987 SELF CARE CURRENT STATUS: HCPCS | Mod: CL

## 2018-01-06 PROCEDURE — 97116 GAIT TRAINING THERAPY: CPT

## 2018-01-06 PROCEDURE — G8988 SELF CARE GOAL STATUS: HCPCS | Mod: CK

## 2018-01-06 PROCEDURE — 94761 N-INVAS EAR/PLS OXIMETRY MLT: CPT

## 2018-01-06 PROCEDURE — 83735 ASSAY OF MAGNESIUM: CPT

## 2018-01-06 PROCEDURE — 97161 PT EVAL LOW COMPLEX 20 MIN: CPT

## 2018-01-06 PROCEDURE — 80048 BASIC METABOLIC PNL TOTAL CA: CPT

## 2018-01-06 PROCEDURE — 99226 PR SUBSEQUENT OBSERVATION CARE,LEVEL III: CPT | Mod: ,,, | Performed by: HOSPITALIST

## 2018-01-06 PROCEDURE — 36415 COLL VENOUS BLD VENIPUNCTURE: CPT

## 2018-01-06 PROCEDURE — G0378 HOSPITAL OBSERVATION PER HR: HCPCS

## 2018-01-06 RX ORDER — PREDNISONE 20 MG/1
40 TABLET ORAL DAILY
Status: DISCONTINUED | OUTPATIENT
Start: 2018-01-06 | End: 2018-01-07 | Stop reason: HOSPADM

## 2018-01-06 RX ADMIN — PREDNISONE 40 MG: 20 TABLET ORAL at 03:01

## 2018-01-06 RX ADMIN — FLUTICASONE FUROATE AND VILANTEROL TRIFENATATE 1 PUFF: 100; 25 POWDER RESPIRATORY (INHALATION) at 10:01

## 2018-01-06 RX ADMIN — ENOXAPARIN SODIUM 40 MG: 100 INJECTION SUBCUTANEOUS at 06:01

## 2018-01-06 RX ADMIN — DICYCLOMINE HYDROCHLORIDE 10 MG: 10 CAPSULE ORAL at 09:01

## 2018-01-06 RX ADMIN — ACETAMINOPHEN 1000 MG: 10 INJECTION, SOLUTION INTRAVENOUS at 05:01

## 2018-01-06 RX ADMIN — HYDROMORPHONE HYDROCHLORIDE 1 MG: 1 INJECTION, SOLUTION INTRAMUSCULAR; INTRAVENOUS; SUBCUTANEOUS at 08:01

## 2018-01-06 RX ADMIN — ASPIRIN 81 MG 81 MG: 81 TABLET ORAL at 08:01

## 2018-01-06 RX ADMIN — AMLODIPINE BESYLATE 2.5 MG: 2.5 TABLET ORAL at 08:01

## 2018-01-06 RX ADMIN — HYDROMORPHONE HYDROCHLORIDE 1 MG: 1 INJECTION, SOLUTION INTRAMUSCULAR; INTRAVENOUS; SUBCUTANEOUS at 02:01

## 2018-01-06 RX ADMIN — IPRATROPIUM BROMIDE AND ALBUTEROL SULFATE 3 ML: .5; 3 SOLUTION RESPIRATORY (INHALATION) at 03:01

## 2018-01-06 RX ADMIN — PANTOPRAZOLE SODIUM 40 MG: 40 TABLET, DELAYED RELEASE ORAL at 08:01

## 2018-01-06 RX ADMIN — METOPROLOL SUCCINATE 25 MG: 25 TABLET, EXTENDED RELEASE ORAL at 08:01

## 2018-01-06 RX ADMIN — HYDROMORPHONE HYDROCHLORIDE 1 MG: 1 INJECTION, SOLUTION INTRAMUSCULAR; INTRAVENOUS; SUBCUTANEOUS at 01:01

## 2018-01-06 RX ADMIN — DICYCLOMINE HYDROCHLORIDE 10 MG: 10 CAPSULE ORAL at 08:01

## 2018-01-06 RX ADMIN — ESCITALOPRAM 10 MG: 10 TABLET, FILM COATED ORAL at 08:01

## 2018-01-06 RX ADMIN — HYDROMORPHONE HYDROCHLORIDE 1 MG: 1 INJECTION, SOLUTION INTRAMUSCULAR; INTRAVENOUS; SUBCUTANEOUS at 09:01

## 2018-01-06 RX ADMIN — ACETAMINOPHEN 1000 MG: 10 INJECTION, SOLUTION INTRAVENOUS at 02:01

## 2018-01-06 NOTE — PT/OT/SLP EVAL
"Occupational Therapy   Evaluation/Treatment    Name: Irina Polk  MRN: [id number]  Admitting Diagnosis:  Compression fracture of lumbar spine, non-traumatic, initial encounter      Recommendations:     Discharge Recommendations: home health OT, home health PT  Discharge Equipment Recommendations:  3-in-1 commode  Barriers to discharge:  None    History:     Occupational Profile:  Living Environment: lives alone in an apartment with elevator access  Previous level of function: ambulates with rollator, Mod A self-care (SBA G/H and self-feeding  Roles and Routines eating out and socializing with family and friends  Equipment Owned:  cane, straight, rollator, walker, rolling, shower chair, wheelchair  Assistance upon Discharge: she has 24 hour sitters    Past Medical History:   Diagnosis Date    GERD (gastroesophageal reflux disease)     Hypertension        Past Surgical History:   Procedure Laterality Date    APPENDECTOMY      HYSTERECTOMY      TOTAL KNEE ARTHROPLASTY Bilateral        Subjective     Chief Complaint: none  Patient/Family stated goals: walk without back pain  Communicated with: patient, patient's son prior to session.  The patient wanted to rest, she reluctantly agreed to the OT evaluation.  Her son left the room prior to the therapy session.  Pain/Comfort:  · Pain Rating 1: 0/10 (Right sidelying in bed)  · Location - Orientation 1: lower  · Location 1: back  · Pain Addressed 1: Pre-medicate for activity, Cessation of Activity, Reposition  · Pain Rating Post-Intervention 1:  (UT " it hurts))     Cardiopulmonary Function  She was found on RA (NC resting on pillow) Sp02 90% HR 60  Seated EOB at end of Session: Sp02 88% on RA (session done on RA)  HR 68, O2 reapplied and sitter instructed that it needed to be used at present.      Objective:     Patient found with: telemetry, peripheral IV (3L 02 NC on bed, not in use by pt)    General Precautions: Standard, fall   Orthopedic Precautions:N/A "   Braces: N/A     Occupational Performance:    Bed Mobility:    · Min A supine>sit EOB  · Max A sit>supine in bed    Functional Mobility/Transfers: (repeated instructions needed for RW safety)  · CGA sit><stand with RW  · Mod A toilet transfer with RW    She ambulated bed><bathroom with RW CGA (ambulated very slowly with an unstable gait.  She was no responsive to instructions to improve RW safety during use.    Activities of Daily Living:  · Max A UBD (don hospital gown as robwinter) sitting EOB  · SBA G/H (brush teeth, wash face and hands)-perseverated on brushing teeth and dipping tooth brush in water  · Total A LBD (doff/don socks seated EOB    Cognitive/Visual Perceptual:  Cognitive/Psychosocial Skills:     -       Oriented to: Person and Place, general situation  -       Follows Commands/attention:Follows one-step commands 50%  -       Communication: clear/fluent  -       Memory: Impaired STM and Poor immediate recall  -       Safety awareness/insight to disability: impaired   -       Mood/Affect/Coping skills/emotional control: Flat affect and Lethargic  Visual/Perceptual:      -Intact (reading glasses)    Physical Exam:  Postural examination/scapula alignment:    -       Rounded shoulders  -       Forward head  -       Kyphosis  -       Scoliosis  Skin integrity: Visible skin intact  Edema:  None noted  Sensation:    -       Intact for light touch both hands  Motor Planning:    -       poor  Dominant hand:    -       Right  UE ROM: WFL BUE  Strength: 3/5 BUE  Hand Function: Resting tremors (more evident in Right than Left hand), grasp was weak with fair dexterity both hands  Gross motor coordination:   Fair + sitting balance EOB (able to sit without UE support with static posture, impaired LE balance and gait, safety with RW poor,    Patient left right sidelying with all lines intact, call button in reach, bed alarm on, nurse notified and her sitter present    AM PAC 6 Click:  AM PAC Total Score: 14    Treatment  "& Education:  Bed mobility, transfer training  Education:    Assessment:     Irina Polk is a 88 y.o. female with a medical diagnosis of Compression fracture of lumbar spine, non-traumatic, initial encounter.  She presents with Performance deficits affecting function are weakness, impaired endurance, impaired self care skills, impaired functional mobilty, impaired balance, gait instability, decreased lower extremity function, decreased upper extremity function, decreased safety awareness, pain, impaired cardiopulmonary response to activity. Effecting her performance during the evaluation.  OT treatment is needed to maximize safety and self-care skill while in the hospital.    Rehab Prognosis:  fair; patient would benefit from acute skilled OT services to address these deficits and reach maximum level of function.         Clinical Decision Makin.  OT Low:  "Pt evaluation falls under low complexity for evaluation coding due to performance deficits noted in 1-3 areas as stated above and 0 co-morbities affecting current functional status. Data obtained from problem focused assessments. No modifications or assistance was required for completion of evaluation. Only brief occupational profile and history review completed."     Plan:     Patient to be seen 5 x/week to address the above listed problems via self-care/home management, therapeutic activities, therapeutic exercises  · Plan of Care Expires:    · Plan of Care Reviewed with: caregiver, patient    This Plan of care has been discussed with the patient who was involved in its development and understands and is in agreement with the identified goals and treatment plan    GOALS:    Occupational Therapy Goals        Problem: Occupational Therapy Goal    Goal Priority Disciplines Outcome Interventions   Occupational Therapy Goal     OT, PT/OT Ongoing (interventions implemented as appropriate)    Description:  Goals to be met by 18  1. Min A G/H standing " at sink  2. Assess toilting  3. Mod A UBD  4. Mod A LBD                    Time Tracking:     OT Date of Treatment: 01/06/18  OT Start Time: 1530  OT Stop Time: 1614  OT Total Time (min): 44 min    Billable Minutes:Evaluation 30  Therapeutic Activity 14    LINO Worley  1/6/2018

## 2018-01-06 NOTE — ASSESSMENT & PLAN NOTE
Mildly elevated likely due to pain.  Continue with home regimen and continue to monitor for now.

## 2018-01-06 NOTE — PLAN OF CARE
Problem: Patient Care Overview  Goal: Plan of Care Review  Outcome: Ongoing (interventions implemented as appropriate)  Plan of care reviewed with patient. VSS on O2. Pain mildly controlled with PRN medication. Incontinence care provided PRN. Sitter at bedside. Bed lowered and locked, call light and personal items within reach. No needs at this time. Will continue to monitor.

## 2018-01-06 NOTE — PLAN OF CARE
Problem: Physical Therapy Goal  Goal: Physical Therapy Goal  Goals to be met by 1-13-18.  1. Sup<>sit mod I  2. Sit<>stand with RW mod I  3. amb 150' with RW mod I      -    Comments: Physical therapy eval completed.  Recommend home with  PT.

## 2018-01-06 NOTE — HPI
Patient is a 88 year-old woman with hypertension, prior myocardial infraction, chronic aspiration, chronic lung disease (the etiology of which does not appear to have been elucidated), and gastroesophageal reflux disease who presents to the emergency department with severe lower back pain.  Patient reports that the pain started suddenly last night at 5:30 pm.  Patient denies falling or any other trauma.  She denies any fevers or chills. Patient reports that she has recently diagnosed with upper respiratory infection and possibly a urinary tract infection for which she was prescribed steroids and antibiotics.  She denies any urinary symptoms at this time.    CT show of the spine shows anterior compression fracture of L2 with minimal retropulsion of the inferior endplate of L2 resulting in no more than mild spinal canal stenosis along with evidence of remote compression fracture of T12 and diffuse degenerative disk disease.

## 2018-01-06 NOTE — ASSESSMENT & PLAN NOTE
Urinalysis not convincing for infection at this time and she denies any urinary symptoms.  Will discontinue antibiotics at this time.

## 2018-01-06 NOTE — PLAN OF CARE
Problem: Patient Care Overview  Goal: Plan of Care Review  Outcome: Ongoing (interventions implemented as appropriate)  Patient on 3 l/m nc.  No distress at this time. Breo administered and tolerated well. Will continue to monitor.

## 2018-01-06 NOTE — SUBJECTIVE & OBJECTIVE
Interval History: Patient reports pain improving.    Review of Systems   Constitutional: Negative for chills and fever.   Respiratory: Negative for shortness of breath and wheezing.    Cardiovascular: Negative for chest pain.   Gastrointestinal: Negative for abdominal distention, abdominal pain, constipation, diarrhea, nausea and vomiting.   Genitourinary: Negative for dysuria and frequency.   Musculoskeletal: Negative for arthralgias and myalgias.   Neurological: Negative for light-headedness.   Psychiatric/Behavioral: Negative for agitation and confusion.     Objective:     Vital Signs (Most Recent):  Temp: 97.9 °F (36.6 °C) (01/06/18 1419)  Pulse: 61 (01/06/18 1419)  Resp: 19 (01/06/18 1419)  BP: 133/62 (01/06/18 1419)  SpO2: 95 % (01/06/18 1419) Vital Signs (24h Range):  Temp:  [97 °F (36.1 °C)-98.4 °F (36.9 °C)] 97.9 °F (36.6 °C)  Pulse:  [61-90] 61  Resp:  [18-22] 19  SpO2:  [93 %-98 %] 95 %  BP: (133-167)/(62-79) 133/62     Weight: 81.5 kg (179 lb 10.8 oz)  Body mass index is 33.95 kg/m².  No intake or output data in the 24 hours ending 01/06/18 1456   Physical Exam   Constitutional: She is oriented to person, place, and time. She appears well-developed and well-nourished. No distress.   HENT:   Head: Atraumatic.   Eyes: Conjunctivae are normal.   Neck: Neck supple.   Cardiovascular: Normal rate, regular rhythm and normal heart sounds.    No murmur heard.  Pulmonary/Chest: Effort normal and breath sounds normal. She has no wheezes.   Abdominal: Soft. Bowel sounds are normal. She exhibits no distension. There is no tenderness.   Musculoskeletal: Normal range of motion. She exhibits no edema or deformity.   Neurological: She is alert and oriented to person, place, and time.       Significant Labs: All pertinent labs within the past 24 hours have been reviewed.    Significant Imaging: I have reviewed all pertinent imaging results/findings within the past 24 hours.

## 2018-01-06 NOTE — H&P
[facility] Hospital Medicine  History & Physical    Patient Name: Irina Polk  MRN: [id number]  Admission Date: 1/5/2018  Attending Physician: Eligio Arenas MD   Primary Care Provider: Blanca Quinones MD         Patient information was obtained from patient, past medical records and ER records.     Subjective:     Principal Problem:Compression fracture of lumbar spine, non-traumatic, initial encounter    Chief Complaint:   Chief Complaint   Patient presents with    Back Pain     low back pain x 2 days. no trauma/injury. tx for UTI for 3 days.        HPI: Patient is a 88 year-old woman with hypertension, prior myocardial infraction, chronic aspiration, chronic lung disease (the etiology of which does not appear to have been elucidated), and gastroesophageal reflux disease who presents to the emergency department with severe lower back pain.  Patient reports that the pain started suddenly last night at 5:30 pm.  Patient denies falling or any other trauma.  She denies any fevers or chills. Patient reports that she has recently diagnosed with upper respiratory infection and possibly a urinary tract infection for which she was prescribed steroids and antibiotics.  She denies any urinary symptoms at this time.    CT show of the spine shows anterior compression fracture of L2 with minimal retropulsion of the inferior endplate of L2 resulting in no more than mild spinal canal stenosis along with evidence of remote compression fracture of T12 and diffuse degenerative disk disease.    Past Medical History:   Diagnosis Date    GERD (gastroesophageal reflux disease)     Hypertension        Past Surgical History:   Procedure Laterality Date    APPENDECTOMY      HYSTERECTOMY      TOTAL KNEE ARTHROPLASTY Bilateral        Review of patient's allergies indicates:   Allergen Reactions    Codeine Other (See Comments)     Pt unsure    Penicillins        No current facility-administered  medications on file prior to encounter.      Current Outpatient Prescriptions on File Prior to Encounter   Medication Sig    albuterol-ipratropium 2.5mg-0.5mg/3mL (DUO-NEB) 0.5 mg-3 mg(2.5 mg base)/3 mL nebulizer solution inhale contents of 1 vial every 4 hours if needed for wheezing    amLODIPine (NORVASC) 2.5 MG tablet TAKE 1 TABLET DAILY    aspirin 81 MG Chew Take 81 mg by mouth once daily.    cyanocobalamin 1,000 mcg/mL injection Inject 1 mL (1,000 mcg total) into the muscle once a week. Dispense syringes as well.    escitalopram oxalate (LEXAPRO) 10 MG tablet TAKE 1 TABLET DAILY    fluticasone-salmeterol 100-50 mcg/dose (ADVAIR) 100-50 mcg/dose diskus inhaler Inhale 1 puff into the lungs 2 (two) times daily. Controller    furosemide (LASIX) 20 MG tablet Take 1 tablet (20 mg total) by mouth daily as needed.    OMEGA-3S/DHA/EPA/FISH OIL (OMEGA 3 ORAL) Take 1 capsule by mouth once daily.     omeprazole (PRILOSEC) 20 MG capsule Take 1 capsule (20 mg total) by mouth once daily.    predniSONE (DELTASONE) 20 MG tablet Take 1 tablet (20 mg total) by mouth once daily. 2 pills for 4 days then one pill for 4 days then half pilll for 4 days.    sulfamethoxazole-trimethoprim 800-160mg (BACTRIM DS) 800-160 mg Tab Take 1 tablet by mouth 2 (two) times daily.    (Magic mouthwash) 1:1:1 Benadryl 12.5mg/5ml liq, aluminum & magnesium hydroxide-simehticone (Maalox), lidocaine viscous 2% Swish and spit 5 mLs every 4 (four) hours as needed. for mouth sores    COMBIGAN 0.2-0.5 % Drop Place 1 drop into both eyes 2 (two) times daily.     dicyclomine (BENTYL) 10 MG capsule Take 1 capsule (10 mg total) by mouth 2 (two) times daily.    inhalation device (BREATHERITE RIGID SPACER-MASK) Use as directed for inhalation.    metoprolol succinate (TOPROL-XL) 25 MG 24 hr tablet Take 1 tablet by mouth once daily.    oxybutynin (DITROPAN) 5 MG Tab Take 1 tablet (5 mg total) by mouth 3 (three) times daily as needed (bladder spasms).  "   [DISCONTINUED] BD LUER-SELVIN SYRINGE 3 mL 23 x 1" Syrg     [DISCONTINUED] risedronate (ACTONEL) 150 MG Tab Take 1 tablet (150 mg total) by mouth every 30 days.     Family History     None        Social History Main Topics    Smoking status: Never Smoker    Smokeless tobacco: Never Used    Alcohol use 0.6 oz/week     1 Glasses of wine per week      Comment: occasionally    Drug use: No    Sexual activity: No     Review of Systems   Constitutional: Negative for chills and fever.   HENT: Positive for rhinorrhea. Negative for congestion, hearing loss, sinus pressure and trouble swallowing.    Eyes: Negative for photophobia, pain, redness and visual disturbance.   Respiratory: Positive for cough. Negative for chest tightness, shortness of breath and wheezing.    Cardiovascular: Negative for chest pain and palpitations.   Gastrointestinal: Negative for abdominal distention, abdominal pain, blood in stool, constipation, diarrhea, nausea and vomiting.   Endocrine: Negative for cold intolerance, heat intolerance, polydipsia, polyphagia and polyuria.   Genitourinary: Negative for dysuria and frequency.   Musculoskeletal: Positive for arthralgias and back pain. Negative for gait problem, joint swelling, myalgias and neck pain.   Allergic/Immunologic: Negative for environmental allergies, food allergies and immunocompromised state.   Neurological: Negative for dizziness, seizures, syncope, weakness, light-headedness and headaches.   Hematological: Negative for adenopathy.   Psychiatric/Behavioral: Negative for agitation, behavioral problems and confusion.     Objective:     Vital Signs (Most Recent):  Temp: 98.4 °F (36.9 °C) (01/05/18 1708)  Pulse: 82 (01/05/18 1800)  Resp: 18 (01/05/18 1708)  BP: (!) 167/75 (01/05/18 1708)  SpO2: (!) 93 % (01/05/18 1708) Vital Signs (24h Range):  Temp:  [98.3 °F (36.8 °C)-98.4 °F (36.9 °C)] 98.4 °F (36.9 °C)  Pulse:  [56-88] 82  Resp:  [18-20] 18  SpO2:  [92 %-97 %] 93 %  BP: " (126-217)/() 167/75     Weight: 77.1 kg (170 lb)  Body mass index is 32.12 kg/m².    Physical Exam   Constitutional: She is oriented to person, place, and time. She appears well-developed and well-nourished. No distress.   HENT:   Head: Normocephalic and atraumatic.   Nose: Nose normal.   Mouth/Throat: Oropharynx is clear and moist. No oropharyngeal exudate.   Eyes: Conjunctivae are normal. Right eye exhibits no discharge. Left eye exhibits no discharge. No scleral icterus.   Neck: Normal range of motion. Neck supple. No JVD present. No tracheal deviation present. No thyromegaly present.   Cardiovascular: Normal rate, regular rhythm, normal heart sounds and intact distal pulses.  Exam reveals no gallop and no friction rub.    No murmur heard.  Pulmonary/Chest: Effort normal and breath sounds normal. No stridor. No respiratory distress. She has no wheezes. She has no rales. She exhibits no tenderness.   Abdominal: Soft. Bowel sounds are normal. She exhibits no distension and no mass. There is no tenderness. There is no rebound and no guarding.   Musculoskeletal: Normal range of motion. She exhibits no edema or tenderness.   Lymphadenopathy:     She has no cervical adenopathy.   Neurological: She is alert and oriented to person, place, and time. She has normal reflexes. She displays normal reflexes. No cranial nerve deficit. She exhibits normal muscle tone. Coordination normal.   No focal weakness.  Patient with severe pain with any movement.   Skin: Skin is warm and dry. No rash noted. She is not diaphoretic. No erythema. No pallor.   Psychiatric: She has a normal mood and affect. Her behavior is normal. Judgment and thought content normal.           Significant Labs: All pertinent labs within the past 24 hours have been reviewed.    Significant Imaging: I have reviewed all pertinent imaging results/findings within the past 24 hours.    Assessment/Plan:     * Compression fracture of lumbar spine, non-traumatic,  initial encounter    Will treat with intravenous pain medications and steroids.  Consult physical and occupational therapy to evaluate and treat.  If she fails to improve will consider MRI of the spine.          Essential hypertension    Mildly elevated likely due to pain.  Continue with home regimen and continue to monitor for now.          Urinary tract infection without hematuria    Urinalysis not convincing for infection at this time and she denies any urinary symptoms.  Will discontinue antibiotics at this time.        Viral upper respiratory infection    Mild and likely resolving.  Continue with supportive care.          VTE Risk Mitigation         Ordered     enoxaparin injection 40 mg  Daily     Route:  Subcutaneous        01/05/18 1712     Place sequential compression device  Until discontinued      01/05/18 1712     Medium Risk of VTE  Once      01/05/18 1712             Eligio Arenas MD  Department of Hospital Medicine   [facility]

## 2018-01-06 NOTE — PROGRESS NOTES
[facility] Hospital Medicine  Progress Note    Patient Name: Irina Polk  MRN: [id number]  Patient Class: OP- Observation   Admission Date: 1/5/2018  Length of Stay: 0 days  Attending Physician: Eligio Arenas MD  Primary Care Provider: Blanca Quinones MD        Subjective:     Principal Problem:Compression fracture of lumbar spine, non-traumatic, initial encounter    HPI:  Patient is a 88 year-old woman with hypertension, prior myocardial infraction, chronic aspiration, chronic lung disease (the etiology of which does not appear to have been elucidated), and gastroesophageal reflux disease who presents to the emergency department with severe lower back pain.  Patient reports that the pain started suddenly last night at 5:30 pm.  Patient denies falling or any other trauma.  She denies any fevers or chills. Patient reports that she has recently diagnosed with upper respiratory infection and possibly a urinary tract infection for which she was prescribed steroids and antibiotics.  She denies any urinary symptoms at this time.    CT show of the spine shows anterior compression fracture of L2 with minimal retropulsion of the inferior endplate of L2 resulting in no more than mild spinal canal stenosis along with evidence of remote compression fracture of T12 and diffuse degenerative disk disease.    Hospital Course:  Patient admitted to the hospital to treat severe lower back pain requiring intravenous pain medications.    Interval History: Patient reports pain improving.    Review of Systems   Constitutional: Negative for chills and fever.   Respiratory: Negative for shortness of breath and wheezing.    Cardiovascular: Negative for chest pain.   Gastrointestinal: Negative for abdominal distention, abdominal pain, constipation, diarrhea, nausea and vomiting.   Genitourinary: Negative for dysuria and frequency.   Musculoskeletal: Negative for arthralgias and myalgias.   Neurological:  Negative for light-headedness.   Psychiatric/Behavioral: Negative for agitation and confusion.     Objective:     Vital Signs (Most Recent):  Temp: 97.9 °F (36.6 °C) (01/06/18 1419)  Pulse: 61 (01/06/18 1419)  Resp: 19 (01/06/18 1419)  BP: 133/62 (01/06/18 1419)  SpO2: 95 % (01/06/18 1419) Vital Signs (24h Range):  Temp:  [97 °F (36.1 °C)-98.4 °F (36.9 °C)] 97.9 °F (36.6 °C)  Pulse:  [61-90] 61  Resp:  [18-22] 19  SpO2:  [93 %-98 %] 95 %  BP: (133-167)/(62-79) 133/62     Weight: 81.5 kg (179 lb 10.8 oz)  Body mass index is 33.95 kg/m².  No intake or output data in the 24 hours ending 01/06/18 1456   Physical Exam   Constitutional: She is oriented to person, place, and time. She appears well-developed and well-nourished. No distress.   HENT:   Head: Atraumatic.   Eyes: Conjunctivae are normal.   Neck: Neck supple.   Cardiovascular: Normal rate, regular rhythm and normal heart sounds.    No murmur heard.  Pulmonary/Chest: Effort normal and breath sounds normal. She has no wheezes.   Abdominal: Soft. Bowel sounds are normal. She exhibits no distension. There is no tenderness.   Musculoskeletal: Normal range of motion. She exhibits no edema or deformity.   Neurological: She is alert and oriented to person, place, and time.       Significant Labs: All pertinent labs within the past 24 hours have been reviewed.    Significant Imaging: I have reviewed all pertinent imaging results/findings within the past 24 hours.    Assessment/Plan:      * Compression fracture of lumbar spine, non-traumatic, initial encounter    Improving.  Will treat with intravenous pain medications and steroids.  Continue with physical and occupational therapy.        Essential hypertension    Reasonably controlled with current regimen.  Will continue with current regimen and continue to monitor.          Viral upper respiratory infection    Mild and likely resolving.  Continue with supportive care.          VTE Risk Mitigation         Ordered      enoxaparin injection 40 mg  Daily     Route:  Subcutaneous        01/05/18 1712     Place sequential compression device  Until discontinued      01/05/18 1712     Medium Risk of VTE  Once      01/05/18 1712              Eligio Arenas MD  Department of Hospital Medicine   [facility]

## 2018-01-06 NOTE — HOSPITAL COURSE
Patient admitted to the hospital to treat severe lower back pain requiring intravenous pain medications.  Patient clinically improved and discharged home with home health for further therapy.  I have ordered her to continue with short course of oral steroids to help decrease inflammation related to her compression fracture and also to help with symptoms of her resolving viral upper respiratory tract infection.  Patient did not have evidence of a urinary tract infection on urinalysis nor symptoms of a urinary tract infection and therefore her sulfamethoxazole/trimethoprim was discontinued.  Patient advised to follow-up with her primary care provider Dr. Blacna Quinones in clinic.

## 2018-01-06 NOTE — ASSESSMENT & PLAN NOTE
Reasonably controlled with current regimen.  Will continue with current regimen and continue to monitor.

## 2018-01-06 NOTE — ASSESSMENT & PLAN NOTE
Improving.  Will treat with intravenous pain medications and steroids.  Continue with physical and occupational therapy.

## 2018-01-06 NOTE — ASSESSMENT & PLAN NOTE
Will treat with intravenous pain medications and steroids.  Consult physical and occupational therapy to evaluate and treat.  If she fails to improve will consider MRI of the spine.

## 2018-01-06 NOTE — PLAN OF CARE
Problem: Occupational Therapy Goal  Goal: Occupational Therapy Goal  Goals to be met by 2/6/18  1. Min A G/H standing at sink  2. Assess toilting  3. Mod A UBD  4. Mod A LBD  Outcome: Ongoing (interventions implemented as appropriate)  OT evaluation completed with treatment to initiated.  Recommend Home Health PT/OT at discharge.  DME: None.  LINO Worley 1/6/2018

## 2018-01-06 NOTE — PT/OT/SLP EVAL
Physical Therapy Evaluation and Treatment    Patient Name:  Irina Polk   MRN:  [id number]   B319/B319 A    Recommendations:     Discharge Recommendations:  home, home health PT, home with home health   Discharge Equipment Recommendations: none   Barriers to discharge: None    Assessment:     Irina Polk is a 88 y.o. female admitted with a medical diagnosis of Compression fracture of lumbar spine, non-traumatic, initial encounter.  She presents with the following impairments/functional limitations:  weakness, impaired endurance, gait instability, impaired balance, pain, impaired functional mobilty.  Pt given pain meds for low back pain.   During evaluation pt reported no pain when in bed in chair position.  Upon walking pt reports mild level of pain. Pt fearful while walking asking for PT to hold tighter but able to walk with CGA.  Pt has 24 hr caregiver coverage at home and will have all the assistance necessary at home.    Rehab Prognosis:  excellent; patient would benefit from acute skilled PT services to address these deficits and reach maximum level of function.      Recent Surgery: * No surgery found *      Plan:     During this hospitalization, patient to be seen 6 x/week to address the above listed problems via gait training, therapeutic activities, therapeutic exercises  · Plan of Care Expires:  02/06/18   Plan of Care Reviewed with: patient, caregiver    Subjective     Communicated with patient and caregiver prior to session.  Patient found in bed in chair position upon PT entry to room, agreeable to evaluation.      Chief Complaint: no complaints at rest fear of falling while walking  Patient comments/goals: to go home  Pain/Comfort:  · Pain Rating 1: 0/10  · Location - Orientation 1: lower  · Pain Addressed 1: Pre-medicate for activity, Distraction  · Pain Rating Post-Intervention 1: 3/10 (pain controlled by medication and absent while in bed but increased with activity)    Patients cultural,  spiritual, Buddhism conflicts given the current situation: none    Living Environment:  Lives in apt building with elevator access.  Has 24h caregiver coverage at home.   Prior to admission, patients level of function was amb mod I with RW.  Patient has the following equipment: walker, rolling, rollator, wheelchair, shower chair.  DME owned (not currently used): none.  Upon discharge, patient will have assistance from caregivers.    Objective:     Patient found with: peripheral IV, SCD     General Precautions: Standard,     Orthopedic Precautions:N/A   Braces: N/A     Exams:  · Cognitive Exam:  Patient is oriented to Person and Place and follows 80% of verbal commands   · Fine Motor Coordination:    · -       Intact  · Gross Motor Coordination:  WFL  · Postural Exam:  Patient presented with the following abnormalities:    · -       Rounded shoulders  · -       Forward head  · Sensation:    · -       Intact  · Skin Integrity/Edema:      · -       Skin integrity: Visible skin intact  · -       Edema: None noted B LE  · RLE ROM: WFL  · RLE Strength: WFL  · LLE ROM: WFL  · LLE Strength: WFL    Functional Mobility:  · Bed Mobility:     · Supine to Sit: moderate assistance  · Transfers:     · Sit to Stand:  minimum assistance with rolling walker  · Gait: amb 40' with RW and CGA  · Balance: fair standing dynamic balance    AM-PAC 6 CLICK MOBILITY  Total Score:18       Patient left up in chair with all lines intact, call button in reach, nurse notified and caregiver  present.    GOALS:    Physical Therapy Goals        Problem: Physical Therapy Goal    Goal Priority Disciplines Outcome Goal Variances Interventions   Physical Therapy Goal     PT/OT, PT      Description:  Goals to be met by 1-13-18.  1. Sup<>sit mod I  2. Sit<>stand with RW mod I  3. amb 150' with RW mod I                        History:     Past Medical History:   Diagnosis Date    GERD (gastroesophageal reflux disease)     Hypertension        Past  Surgical History:   Procedure Laterality Date    APPENDECTOMY      HYSTERECTOMY      TOTAL KNEE ARTHROPLASTY Bilateral        Clinical Decision Making:     History  Co-morbidities and personal factors that may impact the plan of care Examination  Body Structures and Functions, activity limitations and participation restrictions that may impact the plan of care Clinical Presentation   Decision Making/ Complexity Score   Co-morbidities:   [] Time since onset of injury / illness / exacerbation  [] Status of current condition  []Patient's cognitive status and safety concerns    [] Multiple Medical Problems (see med hx)  Personal Factors:   [] Patient's age  [] Prior Level of function   [] Patient's home situation (environment and family support)  [] Patient's level of motivation  [] Expected progression of patient      HISTORY:(criteria)    [] 57011 - no personal factors/history    [] 00217 - has 1-2 personal factor/comorbidity     [] 55928 - has >3 personal factor/comorbidity     Body Regions:  [] Objective examination findings  [] Head     []  Neck  [] Trunk   [] Upper Extremity  [] Lower Extremity    Body Systems:  [] For communication ability, affect, cognition, language, and learning style: the assessment of the ability to make needs known, consciousness, orientation (person, place, and time), expected emotional /behavioral responses, and learning preferences (eg, learning barriers, education  needs)  [] For the neuromuscular system: a general assessment of gross coordinated movement (eg, balance, gait, locomotion, transfers, and transitions) and motor function  (motor control and motor learning)  [] For the musculoskeletal system: the assessment of gross symmetry, gross range of motion, gross strength, height, and weight  [] For the integumentary system: the assessment of pliability(texture), presence of scar formation, skin color, and skin integrity  [] For cardiovascular/pulmonary system: the assessment of  heart rate, respiratory rate, blood pressure, and edema     Activity limitations:    [] Patient's cognitive status and saf ety concerns          [] Status of current condition      [] Weight bearing restriction  [] Cardiopulmunary Restriction    Participation Restrictions:   [] Goals and goal agreement with the patient     [] Rehab potential (prognosis) and probable outcome      Examination of Body System: (criteria)    [] 21462 - addressing 1-2 elements    [] 16443 - addressing a total of 3 or more elements     [] 24447 -  Addressing a total of 4 or more elements         Clinical Presentation: (criteria)  Choose one     On examination of body system using standardized tests and measures patient presents with (CHOOSE ONE) elements from any of the following: body structures and functions, activity limitations, and/or participation restrictions.  Leading to a clinical presentation that is considered (CHOOSE ONE)                              Clinical Decision Making  (Eval Complexity):  Choose One     Time Tracking:     PT Received On: 01/06/18  PT Start Time: 1004     PT Stop Time: 1044  PT Total Time (min): 40 min     Billable Minutes: Evaluation 25 and Gait Training 15      Marin Santos PT  01/06/2018

## 2018-01-06 NOTE — SUBJECTIVE & OBJECTIVE
Past Medical History:   Diagnosis Date    GERD (gastroesophageal reflux disease)     Hypertension        Past Surgical History:   Procedure Laterality Date    APPENDECTOMY      HYSTERECTOMY      TOTAL KNEE ARTHROPLASTY Bilateral        Review of patient's allergies indicates:   Allergen Reactions    Codeine Other (See Comments)     Pt unsure    Penicillins        No current facility-administered medications on file prior to encounter.      Current Outpatient Prescriptions on File Prior to Encounter   Medication Sig    albuterol-ipratropium 2.5mg-0.5mg/3mL (DUO-NEB) 0.5 mg-3 mg(2.5 mg base)/3 mL nebulizer solution inhale contents of 1 vial every 4 hours if needed for wheezing    amLODIPine (NORVASC) 2.5 MG tablet TAKE 1 TABLET DAILY    aspirin 81 MG Chew Take 81 mg by mouth once daily.    cyanocobalamin 1,000 mcg/mL injection Inject 1 mL (1,000 mcg total) into the muscle once a week. Dispense syringes as well.    escitalopram oxalate (LEXAPRO) 10 MG tablet TAKE 1 TABLET DAILY    fluticasone-salmeterol 100-50 mcg/dose (ADVAIR) 100-50 mcg/dose diskus inhaler Inhale 1 puff into the lungs 2 (two) times daily. Controller    furosemide (LASIX) 20 MG tablet Take 1 tablet (20 mg total) by mouth daily as needed.    OMEGA-3S/DHA/EPA/FISH OIL (OMEGA 3 ORAL) Take 1 capsule by mouth once daily.     omeprazole (PRILOSEC) 20 MG capsule Take 1 capsule (20 mg total) by mouth once daily.    predniSONE (DELTASONE) 20 MG tablet Take 1 tablet (20 mg total) by mouth once daily. 2 pills for 4 days then one pill for 4 days then half pilll for 4 days.    sulfamethoxazole-trimethoprim 800-160mg (BACTRIM DS) 800-160 mg Tab Take 1 tablet by mouth 2 (two) times daily.    (Magic mouthwash) 1:1:1 Benadryl 12.5mg/5ml liq, aluminum & magnesium hydroxide-simehticone (Maalox), lidocaine viscous 2% Swish and spit 5 mLs every 4 (four) hours as needed. for mouth sores    COMBIGAN 0.2-0.5 % Drop Place 1 drop into both eyes 2 (two)  "times daily.     dicyclomine (BENTYL) 10 MG capsule Take 1 capsule (10 mg total) by mouth 2 (two) times daily.    inhalation device (BREATHERITE RIGID SPACER-MASK) Use as directed for inhalation.    metoprolol succinate (TOPROL-XL) 25 MG 24 hr tablet Take 1 tablet by mouth once daily.    oxybutynin (DITROPAN) 5 MG Tab Take 1 tablet (5 mg total) by mouth 3 (three) times daily as needed (bladder spasms).    [DISCONTINUED] BD LUER-SELVIN SYRINGE 3 mL 23 x 1" Syrg     [DISCONTINUED] risedronate (ACTONEL) 150 MG Tab Take 1 tablet (150 mg total) by mouth every 30 days.     Family History     None        Social History Main Topics    Smoking status: Never Smoker    Smokeless tobacco: Never Used    Alcohol use 0.6 oz/week     1 Glasses of wine per week      Comment: occasionally    Drug use: No    Sexual activity: No     Review of Systems   Constitutional: Negative for chills and fever.   HENT: Positive for rhinorrhea. Negative for congestion, hearing loss, sinus pressure and trouble swallowing.    Eyes: Negative for photophobia, pain, redness and visual disturbance.   Respiratory: Positive for cough. Negative for chest tightness, shortness of breath and wheezing.    Cardiovascular: Negative for chest pain and palpitations.   Gastrointestinal: Negative for abdominal distention, abdominal pain, blood in stool, constipation, diarrhea, nausea and vomiting.   Endocrine: Negative for cold intolerance, heat intolerance, polydipsia, polyphagia and polyuria.   Genitourinary: Negative for dysuria and frequency.   Musculoskeletal: Positive for arthralgias and back pain. Negative for gait problem, joint swelling, myalgias and neck pain.   Allergic/Immunologic: Negative for environmental allergies, food allergies and immunocompromised state.   Neurological: Negative for dizziness, seizures, syncope, weakness, light-headedness and headaches.   Hematological: Negative for adenopathy.   Psychiatric/Behavioral: Negative for " agitation, behavioral problems and confusion.     Objective:     Vital Signs (Most Recent):  Temp: 98.4 °F (36.9 °C) (01/05/18 1708)  Pulse: 82 (01/05/18 1800)  Resp: 18 (01/05/18 1708)  BP: (!) 167/75 (01/05/18 1708)  SpO2: (!) 93 % (01/05/18 1708) Vital Signs (24h Range):  Temp:  [98.3 °F (36.8 °C)-98.4 °F (36.9 °C)] 98.4 °F (36.9 °C)  Pulse:  [56-88] 82  Resp:  [18-20] 18  SpO2:  [92 %-97 %] 93 %  BP: (126-217)/() 167/75     Weight: 77.1 kg (170 lb)  Body mass index is 32.12 kg/m².    Physical Exam   Constitutional: She is oriented to person, place, and time. She appears well-developed and well-nourished. No distress.   HENT:   Head: Normocephalic and atraumatic.   Nose: Nose normal.   Mouth/Throat: Oropharynx is clear and moist. No oropharyngeal exudate.   Eyes: Conjunctivae are normal. Right eye exhibits no discharge. Left eye exhibits no discharge. No scleral icterus.   Neck: Normal range of motion. Neck supple. No JVD present. No tracheal deviation present. No thyromegaly present.   Cardiovascular: Normal rate, regular rhythm, normal heart sounds and intact distal pulses.  Exam reveals no gallop and no friction rub.    No murmur heard.  Pulmonary/Chest: Effort normal and breath sounds normal. No stridor. No respiratory distress. She has no wheezes. She has no rales. She exhibits no tenderness.   Abdominal: Soft. Bowel sounds are normal. She exhibits no distension and no mass. There is no tenderness. There is no rebound and no guarding.   Musculoskeletal: Normal range of motion. She exhibits no edema or tenderness.   Lymphadenopathy:     She has no cervical adenopathy.   Neurological: She is alert and oriented to person, place, and time. She has normal reflexes. She displays normal reflexes. No cranial nerve deficit. She exhibits normal muscle tone. Coordination normal.   No focal weakness.  Patient with severe pain with any movement.   Skin: Skin is warm and dry. No rash noted. She is not diaphoretic.  No erythema. No pallor.   Psychiatric: She has a normal mood and affect. Her behavior is normal. Judgment and thought content normal.           Significant Labs: All pertinent labs within the past 24 hours have been reviewed.    Significant Imaging: I have reviewed all pertinent imaging results/findings within the past 24 hours.

## 2018-01-07 VITALS
SYSTOLIC BLOOD PRESSURE: 150 MMHG | OXYGEN SATURATION: 95 % | BODY MASS INDEX: 33.93 KG/M2 | RESPIRATION RATE: 18 BRPM | WEIGHT: 179.69 LBS | TEMPERATURE: 97 F | HEART RATE: 55 BPM | HEIGHT: 61 IN | DIASTOLIC BLOOD PRESSURE: 68 MMHG

## 2018-01-07 LAB
ANION GAP SERPL CALC-SCNC: 8 MMOL/L
BASOPHILS # BLD AUTO: 0.01 K/UL
BASOPHILS NFR BLD: 0.1 %
BUN SERPL-MCNC: 25 MG/DL
CALCIUM SERPL-MCNC: 8.9 MG/DL
CHLORIDE SERPL-SCNC: 106 MMOL/L
CO2 SERPL-SCNC: 25 MMOL/L
CREAT SERPL-MCNC: 0.7 MG/DL
DIFFERENTIAL METHOD: ABNORMAL
EOSINOPHIL # BLD AUTO: 0 K/UL
EOSINOPHIL NFR BLD: 0 %
ERYTHROCYTE [DISTWIDTH] IN BLOOD BY AUTOMATED COUNT: 17.5 %
EST. GFR  (AFRICAN AMERICAN): >60 ML/MIN/1.73 M^2
EST. GFR  (NON AFRICAN AMERICAN): >60 ML/MIN/1.73 M^2
GLUCOSE SERPL-MCNC: 107 MG/DL
HCT VFR BLD AUTO: 38.3 %
HGB BLD-MCNC: 11.9 G/DL
LYMPHOCYTES # BLD AUTO: 1.6 K/UL
LYMPHOCYTES NFR BLD: 11.9 %
MAGNESIUM SERPL-MCNC: 2.1 MG/DL
MCH RBC QN AUTO: 28.1 PG
MCHC RBC AUTO-ENTMCNC: 31.1 G/DL
MCV RBC AUTO: 91 FL
MONOCYTES # BLD AUTO: 0.7 K/UL
MONOCYTES NFR BLD: 5 %
NEUTROPHILS # BLD AUTO: 10.7 K/UL
NEUTROPHILS NFR BLD: 82.2 %
PHOSPHATE SERPL-MCNC: 3.4 MG/DL
PLATELET # BLD AUTO: 258 K/UL
PMV BLD AUTO: 9.5 FL
POTASSIUM SERPL-SCNC: 4.2 MMOL/L
RBC # BLD AUTO: 4.23 M/UL
SODIUM SERPL-SCNC: 139 MMOL/L
WBC # BLD AUTO: 13.07 K/UL

## 2018-01-07 PROCEDURE — 36415 COLL VENOUS BLD VENIPUNCTURE: CPT

## 2018-01-07 PROCEDURE — 99217 PR OBSERVATION CARE DISCHARGE: CPT | Mod: ,,, | Performed by: HOSPITALIST

## 2018-01-07 PROCEDURE — 83735 ASSAY OF MAGNESIUM: CPT

## 2018-01-07 PROCEDURE — 84100 ASSAY OF PHOSPHORUS: CPT

## 2018-01-07 PROCEDURE — 94640 AIRWAY INHALATION TREATMENT: CPT

## 2018-01-07 PROCEDURE — 27000221 HC OXYGEN, UP TO 24 HOURS

## 2018-01-07 PROCEDURE — 85025 COMPLETE CBC W/AUTO DIFF WBC: CPT

## 2018-01-07 PROCEDURE — 80048 BASIC METABOLIC PNL TOTAL CA: CPT

## 2018-01-07 PROCEDURE — 94761 N-INVAS EAR/PLS OXIMETRY MLT: CPT

## 2018-01-07 PROCEDURE — G0378 HOSPITAL OBSERVATION PER HR: HCPCS

## 2018-01-07 PROCEDURE — 63600175 PHARM REV CODE 636 W HCPCS: Performed by: HOSPITALIST

## 2018-01-07 PROCEDURE — 25000003 PHARM REV CODE 250: Performed by: NURSE PRACTITIONER

## 2018-01-07 PROCEDURE — 25000003 PHARM REV CODE 250: Performed by: HOSPITALIST

## 2018-01-07 PROCEDURE — 99900035 HC TECH TIME PER 15 MIN (STAT)

## 2018-01-07 RX ORDER — DIPHENHYDRAMINE HCL 25 MG
25 CAPSULE ORAL ONCE
Status: COMPLETED | OUTPATIENT
Start: 2018-01-07 | End: 2018-01-07

## 2018-01-07 RX ORDER — OXYCODONE HYDROCHLORIDE 5 MG/1
5 CAPSULE ORAL EVERY 6 HOURS PRN
Qty: 28 CAPSULE | Refills: 0 | Status: SHIPPED | OUTPATIENT
Start: 2018-01-07 | End: 2018-01-14

## 2018-01-07 RX ORDER — ACETAMINOPHEN 500 MG/1
1000 CAPSULE, LIQUID FILLED ORAL EVERY 6 HOURS PRN
Refills: 0 | COMMUNITY
Start: 2018-01-07

## 2018-01-07 RX ADMIN — PREDNISONE 40 MG: 20 TABLET ORAL at 08:01

## 2018-01-07 RX ADMIN — FLUTICASONE FUROATE AND VILANTEROL TRIFENATATE 1 PUFF: 100; 25 POWDER RESPIRATORY (INHALATION) at 10:01

## 2018-01-07 RX ADMIN — AMLODIPINE BESYLATE 2.5 MG: 2.5 TABLET ORAL at 08:01

## 2018-01-07 RX ADMIN — ASPIRIN 81 MG 81 MG: 81 TABLET ORAL at 08:01

## 2018-01-07 RX ADMIN — HYDROMORPHONE HYDROCHLORIDE 1 MG: 1 INJECTION, SOLUTION INTRAMUSCULAR; INTRAVENOUS; SUBCUTANEOUS at 07:01

## 2018-01-07 RX ADMIN — DIPHENHYDRAMINE HYDROCHLORIDE 25 MG: 25 CAPSULE ORAL at 02:01

## 2018-01-07 RX ADMIN — METOPROLOL SUCCINATE 25 MG: 25 TABLET, EXTENDED RELEASE ORAL at 08:01

## 2018-01-07 RX ADMIN — HYDROMORPHONE HYDROCHLORIDE 1 MG: 1 INJECTION, SOLUTION INTRAMUSCULAR; INTRAVENOUS; SUBCUTANEOUS at 01:01

## 2018-01-07 RX ADMIN — ESCITALOPRAM 10 MG: 10 TABLET, FILM COATED ORAL at 08:01

## 2018-01-07 RX ADMIN — PANTOPRAZOLE SODIUM 40 MG: 40 TABLET, DELAYED RELEASE ORAL at 08:01

## 2018-01-07 RX ADMIN — DICYCLOMINE HYDROCHLORIDE 10 MG: 10 CAPSULE ORAL at 08:01

## 2018-01-07 NOTE — PLAN OF CARE
Problem: Patient Care Overview  Goal: Plan of Care Review  Outcome: Ongoing (interventions implemented as appropriate)  Good saturation on nasal O2, no requests for PRN rx.

## 2018-01-07 NOTE — PLAN OF CARE
Problem: Patient Care Overview  Goal: Plan of Care Review  Pt updated on POC, needs reinforcing. VS stable on NC at 2L throughout this shift. No acute distress noted. Moderate pain controlled with PRN  pain meds. Hourly rounding done. Call light within reach. WCTM

## 2018-01-07 NOTE — PLAN OF CARE
[facility]    HOME HEALTH ORDERS  FACE TO FACE ENCOUNTER    Patient Name: Irina Polk  YOB: 1929    PCP: Blanca Quinones MD   PCP Address: [facility] [place] [address] / [facility] [address]  PCP Phone Number: [phone]  PCP Fax: [phone]    Encounter Date: 01/07/2018    Admit to Home Health    Diagnoses:  Active Hospital Problems    Diagnosis  POA    *Compression fracture of lumbar spine, non-traumatic, initial encounter [M48.56XA]  Yes     Priority: 1 - High    Essential hypertension [I10]  Yes     Priority: 5     Viral upper respiratory infection [J06.9, B97.89]  Yes     Priority: 18       Resolved Hospital Problems    Diagnosis Date Resolved POA   No resolved problems to display.       No future appointments.  Follow-up Information     Blanca Quinones MD. Schedule an appointment as soon as possible for a visit in 1 week.    Specialty:  Internal Medicine  Contact information:  [facility] [id number] [phone]                     I have seen and examined this patient face to face today. My clinical findings that support the need for the home health skilled services and home bound status are the following:  Weakness/numbness causing balance and gait disturbance due to Fracture making it taxing to leave home.    Allergies:  Review of patient's allergies indicates:   Allergen Reactions    Codeine Other (See Comments)     Pt unsure    Penicillins        Diet: regular diet    Activities: activity as tolerated    Nursing:   SN to complete comprehensive assessment including routine vital signs. Instruct on disease process and s/s of complications to report to MD. Review/verify medication list sent home with the patient at time of discharge  and instruct patient/caregiver as needed. Frequency may be adjusted depending on start of care date.    Notify MD if SBP > 160 or < 90; DBP > 90 or < 50; HR > 120 or < 50; Temp >  101;      CONSULTS:    Physical Therapy to evaluate and treat. Evaluate for home safety and equipment needs; Establish/upgrade home exercise program. Perform / instruct on therapeutic exercises, gait training, transfer training, and Range of Motion.  Occupational Therapy to evaluate and treat. Evaluate home environment for safety and equipment needs. Perform/Instruct on transfers, ADL training, ROM, and therapeutic exercises.   to evaluate for community resources/long-range planning.  Aide to provide assistance with personal care, ADLs, and vital signs.    Medications: Review discharge medications with patient and family and provide education.      Current Discharge Medication List      START taking these medications    Details   acetaminophen (TYLENOL) 500 mg Cap Take 2 capsules (1,000 mg total) by mouth every 6 (six) hours as needed (Pain).  Refills: 0      oxyCODONE (OXY-IR) 5 mg Cap Take 1 capsule (5 mg total) by mouth every 6 (six) hours as needed (Pain not relieved by acetaminophen).  Qty: 28 capsule, Refills: 0         CONTINUE these medications which have NOT CHANGED    Details   albuterol-ipratropium 2.5mg-0.5mg/3mL (DUO-NEB) 0.5 mg-3 mg(2.5 mg base)/3 mL nebulizer solution inhale contents of 1 vial every 4 hours if needed for wheezing  Qty: 1 Box, Refills: 1      amLODIPine (NORVASC) 2.5 MG tablet TAKE 1 TABLET DAILY  Qty: 90 tablet, Refills: 0      aspirin 81 MG Chew Take 81 mg by mouth once daily.      cyanocobalamin 1,000 mcg/mL injection Inject 1 mL (1,000 mcg total) into the muscle once a week. Dispense syringes as well.  Qty: 10 mL, Refills: 3      escitalopram oxalate (LEXAPRO) 10 MG tablet TAKE 1 TABLET DAILY  Qty: 90 tablet, Refills: 3      fluticasone-salmeterol 100-50 mcg/dose (ADVAIR) 100-50 mcg/dose diskus inhaler Inhale 1 puff into the lungs 2 (two) times daily. Controller  Qty: 1 each, Refills: 2      OMEGA-3S/DHA/EPA/FISH OIL (OMEGA 3 ORAL) Take 1 capsule by mouth once daily.        omeprazole (PRILOSEC) 20 MG capsule Take 1 capsule (20 mg total) by mouth once daily.  Qty: 90 capsule, Refills: 1      predniSONE (DELTASONE) 20 MG tablet Take 1 tablet (20 mg total) by mouth once daily. 2 pills for 4 days then one pill for 4 days then half pilll for 4 days.  Qty: 20 tablet, Refills: 0      (Magic mouthwash) 1:1:1 Benadryl 12.5mg/5ml liq, aluminum & magnesium hydroxide-simehticone (Maalox), lidocaine viscous 2% Swish and spit 5 mLs every 4 (four) hours as needed. for mouth sores  Qty: 90 mL, Refills: 1      COMBIGAN 0.2-0.5 % Drop Place 1 drop into both eyes 2 (two) times daily.       dicyclomine (BENTYL) 10 MG capsule Take 1 capsule (10 mg total) by mouth 2 (two) times daily.  Qty: 30 capsule, Refills: 0      inhalation device (BREATHERITE RIGID SPACER-MASK) Use as directed for inhalation.  Qty: 1 Device, Refills: 0      metoprolol succinate (TOPROL-XL) 25 MG 24 hr tablet Take 1 tablet by mouth once daily.      oxybutynin (DITROPAN) 5 MG Tab Take 1 tablet (5 mg total) by mouth 3 (three) times daily as needed (bladder spasms).  Qty: 90 tablet, Refills: 0         STOP taking these medications       furosemide (LASIX) 20 MG tablet Comments:   Reason for Stopping:         sulfamethoxazole-trimethoprim 800-160mg (BACTRIM DS) 800-160 mg Tab Comments:   Reason for Stopping:               I certify that this patient is confined to her home and needs intermittent skilled nursing care, physical therapy and occupational therapy.

## 2018-01-07 NOTE — PROGRESS NOTES
Discharge instructions reviewed with the pt and pt's daughters. All questions answered. Pt verbalized understanding. Peripheral IV removed per discharge order. Pt tolerated well. No irritation noted at the site.

## 2018-01-07 NOTE — PT/OT/SLP DISCHARGE
Physical Therapy Discharge Summary    Name: Irina Polk  MRN: [id number]   Principal Problem: Compression fracture of lumbar spine, non-traumatic, initial encounter     Patient Discharged from acute Physical Therapy on 1/7/18.  Please refer to prior PT noted date on 1/6/18 for functional status.     Assessment:     Patient has not met goals.    Objective:     GOALS:    Physical Therapy Goals        Problem: Physical Therapy Goal    Goal Priority Disciplines Outcome Goal Variances Interventions   Physical Therapy Goal     PT/OT, PT      Description:  Goals to be met by 1-13-18.  1. Sup<>sit mod I  2. Sit<>stand with RW mod I  3. amb 150' with RW mod I                        Reasons for Discontinuation of Therapy Services  Transfer to alternate level of care. and hospital discharge      Plan:     Patient Discharged to: Home with Home Health Service.    Saira Gonzalez, PT  1/7/2018     PT of record not available for this documentation

## 2018-01-08 NOTE — PT/OT/SLP DISCHARGE
Occupational Therapy Discharge Summary    Irina Polk  MRN: [id number]   Principal Problem: Compression fracture of lumbar spine, non-traumatic, initial encounter      Patient Discharged from acute Occupational Therapy on 1/7/18.  Please refer to prior OT note dated 1/7/18 for functional status.    Assessment:      Patient appropriate for care in another setting.    Objective:     GOALS:    Occupational Therapy Goals        Problem: Occupational Therapy Goal    Goal Priority Disciplines Outcome Interventions   Occupational Therapy Goal     OT, PT/OT Ongoing (interventions implemented as appropriate)    Description:  Goals to be met by 2/6/18  1. Min A G/H standing at sink  2. Assess toilting  3. Mod A UBD  4. Mod A LBD                    Reasons for Discontinuation of Therapy Services  Transfer to alternate level of care.      Plan:     Patient Discharged to: Home with Home Health Service    LINO Worley  1/7/2018

## 2018-01-08 NOTE — PLAN OF CARE
01/07/18 1814   Final Note   Assessment Type Final Discharge Note   Discharge Disposition Home-Health   Hospital Follow Up  Appt(s) scheduled? Yes   Discharge plans and expectations educations in teach back method with documentation complete? Yes   Right Care Referral Info   Post Acute Recommendation Home-care   Referral Type home health   Facility Name [facility] dalia snyder

## 2018-01-08 NOTE — DISCHARGE SUMMARY
[facility] Hospital Medicine  Discharge Summary      Patient Name: Irina Polk  MRN: [id number]  Admission Date: 1/5/2018  Hospital Length of Stay: 0 days  Discharge Date and Time:  01/08/2018 6:17 AM  Attending Physician: Natividad att. providers found   Discharging Provider: Eligio Arenas MD  Primary Care Provider: Blanca Quinones MD      HPI:   Patient is a 88 year-old woman with hypertension, prior myocardial infraction, chronic aspiration, chronic lung disease (the etiology of which does not appear to have been elucidated), and gastroesophageal reflux disease who presents to the emergency department with severe lower back pain.  Patient reports that the pain started suddenly last night at 5:30 pm.  Patient denies falling or any other trauma.  She denies any fevers or chills. Patient reports that she has recently diagnosed with upper respiratory infection and possibly a urinary tract infection for which she was prescribed steroids and antibiotics.  She denies any urinary symptoms at this time.    CT show of the spine shows anterior compression fracture of L2 with minimal retropulsion of the inferior endplate of L2 resulting in no more than mild spinal canal stenosis along with evidence of remote compression fracture of T12 and diffuse degenerative disk disease.    Hospital Course:   Patient admitted to the hospital to treat severe lower back pain requiring intravenous pain medications.  Patient clinically improved and discharged home with home health for further therapy.  I have ordered her to continue with short course of oral steroids to help decrease inflammation related to her compression fracture and also to help with symptoms of her resolving viral upper respiratory tract infection.  Patient did not have evidence of a urinary tract infection on urinalysis nor symptoms of a urinary tract infection and therefore her sulfamethoxazole/trimethoprim was discontinued.  Patient advised to  follow-up with her primary care provider Dr. Blanca Quinones in clinic.       Final Active Diagnoses:    Diagnosis Date Noted POA    PRINCIPAL PROBLEM:  Compression fracture of lumbar spine, non-traumatic, initial encounter [M48.56XA] 01/05/2018 Yes    Essential hypertension [I10] 08/11/2015 Yes    Viral upper respiratory infection [J06.9, B97.89] 01/05/2018 Yes      Problems Resolved During this Admission:    Diagnosis Date Noted Date Resolved POA       Discharged Condition: Stable    Disposition: Home-Health Care [facility]    Follow Up:  Follow-up Information     Blanca Quinones MD. Schedule an appointment as soon as possible for a visit in 1 week.    Specialty:  Internal Medicine  Contact information:  [address]  [address]  [facility] [address]  [phone]             [facility].    Specialty:  Home Health Services  Why:  Home Health  Contact information:  [address]  [address]  [facility] [address]  [phone]                 Patient Instructions:     Diet Adult Regular     Activity as tolerated     Notify your health care provider if you experience any of the following:  temperature >100.4     Notify your health care provider if you experience any of the following:  persistent nausea and vomiting or diarrhea     Notify your health care provider if you experience any of the following:  severe uncontrolled pain     Notify your health care provider if you experience any of the following:  worsening rash     Notify your health care provider if you experience any of the following:  persistent dizziness, light-headedness, or visual disturbances     Notify your health care provider if you experience any of the following:  increased confusion or weakness          Medications:  Reconciled Home Medications:   Discharge Medication List as of 1/7/2018  1:03 PM      START taking these medications    Details   acetaminophen (TYLENOL) 500 mg Cap Take 2 capsules (1,000 mg total) by mouth  every 6 (six) hours as needed (Pain)., Starting Sun 1/7/2018, OTC      oxyCODONE (OXY-IR) 5 mg Cap Take 1 capsule (5 mg total) by mouth every 6 (six) hours as needed (Pain not relieved by acetaminophen)., Starting Sun 1/7/2018, Until Sun 1/14/2018, Print         CONTINUE these medications which have NOT CHANGED    Details   (Magic mouthwash) 1:1:1 Benadryl 12.5mg/5ml liq, aluminum & magnesium hydroxide-simehticone (Maalox), lidocaine viscous 2% Swish and spit 5 mLs every 4 (four) hours as needed. for mouth sores, Starting 3/23/2017, Until Discontinued, Print      albuterol-ipratropium 2.5mg-0.5mg/3mL (DUO-NEB) 0.5 mg-3 mg(2.5 mg base)/3 mL nebulizer solution inhale contents of 1 vial every 4 hours if needed for wheezing, Normal      amLODIPine (NORVASC) 2.5 MG tablet TAKE 1 TABLET DAILY, Normal      aspirin 81 MG Chew Take 81 mg by mouth once daily., Until Discontinued, Historical Med      COMBIGAN 0.2-0.5 % Drop Place 1 drop into both eyes 2 (two) times daily. , Starting Thu 7/13/2017, Historical Med      cyanocobalamin 1,000 mcg/mL injection Inject 1 mL (1,000 mcg total) into the muscle once a week. Dispense syringes as well., Starting Tue 9/26/2017, Normal      dicyclomine (BENTYL) 10 MG capsule Take 1 capsule (10 mg total) by mouth 2 (two) times daily., Starting Mon 8/14/2017, Normal      escitalopram oxalate (LEXAPRO) 10 MG tablet TAKE 1 TABLET DAILY, Normal      fluticasone-salmeterol 100-50 mcg/dose (ADVAIR) 100-50 mcg/dose diskus inhaler Inhale 1 puff into the lungs 2 (two) times daily. Controller, Starting Fri 10/20/2017, Until Sat 10/20/2018, Normal      inhalation device (BREATHERITE RIGID SPACER-MASK) Use as directed for inhalation., Print      metoprolol succinate (TOPROL-XL) 25 MG 24 hr tablet Take 1 tablet by mouth once daily., Starting 5/18/2016, Until Discontinued, Historical Med      OMEGA-3S/DHA/EPA/FISH OIL (OMEGA 3 ORAL) Take 1 capsule by mouth once daily. , Historical Med      omeprazole  (PRILOSEC) 20 MG capsule Take 1 capsule (20 mg total) by mouth once daily., Starting Mon 8/14/2017, Normal      oxybutynin (DITROPAN) 5 MG Tab Take 1 tablet (5 mg total) by mouth 3 (three) times daily as needed (bladder spasms)., Starting 3/16/2017, Until Fri 3/16/18, Normal      predniSONE (DELTASONE) 20 MG tablet Take 1 tablet (20 mg total) by mouth once daily. 2 pills for 4 days then one pill for 4 days then half pilll for 4 days., Starting Tue 1/2/2018, Until Fri 1/12/2018, Normal         STOP taking these medications       furosemide (LASIX) 20 MG tablet Comments:   Reason for Stopping:         sulfamethoxazole-trimethoprim 800-160mg (BACTRIM DS) 800-160 mg Tab Comments:   Reason for Stopping:               Indwelling Lines/Drains at time of discharge:   Lines/Drains/Airways          No matching active lines, drains, or airways          Time spent on the discharge of patient: 35 minutes  Patient was seen and examined on the date of discharge and determined to be suitable for discharge.         Eligio Arenas MD  Department of Hospital Medicine  [facility]

## 2018-01-08 NOTE — PLAN OF CARE
01/07/18 1815   FRANK Message   Medicare Outpatient and Observation Notification regarding financial responsibility Given to patient/caregiver;Explained to patient/caregiver;Signed/date by patient/caregiver   Date FRANK was signed 01/07/18   Time FRANK was signed [id number]

## 2018-01-16 ENCOUNTER — HOSPITAL ENCOUNTER (OUTPATIENT)
Facility: HOSPITAL | Age: 83
Discharge: HOME | End: 2018-01-18
Admitting: INTERNAL MEDICINE
Payer: MEDICARE

## 2018-01-16 DIAGNOSIS — I10 ESSENTIAL HYPERTENSION: ICD-10-CM

## 2018-01-16 DIAGNOSIS — K59.03 DRUG-INDUCED CONSTIPATION: ICD-10-CM

## 2018-01-16 DIAGNOSIS — R10.9 ABDOMINAL PAIN, UNSPECIFIED ABDOMINAL LOCATION: Primary | ICD-10-CM

## 2018-01-16 DIAGNOSIS — R10.9 INTRACTABLE ABDOMINAL PAIN: ICD-10-CM

## 2018-01-16 LAB
ALBUMIN SERPL BCP-MCNC: 3.3 G/DL
ALP SERPL-CCNC: 74 U/L
ALT SERPL W/O P-5'-P-CCNC: 15 U/L
ANION GAP SERPL CALC-SCNC: 8 MMOL/L
AST SERPL-CCNC: 18 U/L
BACTERIA #/AREA URNS AUTO: ABNORMAL /HPF
BASOPHILS # BLD AUTO: 0.06 K/UL
BASOPHILS NFR BLD: 0.4 %
BILIRUB SERPL-MCNC: 0.5 MG/DL
BILIRUB UR QL STRIP: NEGATIVE
BUN SERPL-MCNC: 23 MG/DL
CALCIUM SERPL-MCNC: 9.1 MG/DL
CHLORIDE SERPL-SCNC: 106 MMOL/L
CLARITY UR REFRACT.AUTO: ABNORMAL
CO2 SERPL-SCNC: 26 MMOL/L
COLOR UR AUTO: YELLOW
CREAT SERPL-MCNC: 0.9 MG/DL
DIFFERENTIAL METHOD: ABNORMAL
EOSINOPHIL # BLD AUTO: 0.8 K/UL
EOSINOPHIL NFR BLD: 5.8 %
ERYTHROCYTE [DISTWIDTH] IN BLOOD BY AUTOMATED COUNT: 18.6 %
EST. GFR  (AFRICAN AMERICAN): >60 ML/MIN/1.73 M^2
EST. GFR  (NON AFRICAN AMERICAN): 57.3 ML/MIN/1.73 M^2
GLUCOSE SERPL-MCNC: 102 MG/DL
GLUCOSE UR QL STRIP: NEGATIVE
HCT VFR BLD AUTO: 39.7 %
HGB BLD-MCNC: 12.4 G/DL
HGB UR QL STRIP: ABNORMAL
HYALINE CASTS UR QL AUTO: 1 /LPF
IMM GRANULOCYTES # BLD AUTO: 0.29 K/UL
IMM GRANULOCYTES NFR BLD AUTO: 2.1 %
KETONES UR QL STRIP: ABNORMAL
LEUKOCYTE ESTERASE UR QL STRIP: ABNORMAL
LIPASE SERPL-CCNC: 14 U/L
LYMPHOCYTES # BLD AUTO: 1.8 K/UL
LYMPHOCYTES NFR BLD: 12.6 %
MCH RBC QN AUTO: 27.9 PG
MCHC RBC AUTO-ENTMCNC: 31.2 G/DL
MCV RBC AUTO: 89 FL
MICROSCOPIC COMMENT: ABNORMAL
MONOCYTES # BLD AUTO: 0.8 K/UL
MONOCYTES NFR BLD: 5.5 %
NEUTROPHILS # BLD AUTO: 10.3 K/UL
NEUTROPHILS NFR BLD: 73.6 %
NITRITE UR QL STRIP: NEGATIVE
NRBC BLD-RTO: 0 /100 WBC
PH UR STRIP: 5 [PH] (ref 5–8)
PLATELET # BLD AUTO: 289 K/UL
PMV BLD AUTO: 9.1 FL
POTASSIUM SERPL-SCNC: 4.2 MMOL/L
PROT SERPL-MCNC: 6.5 G/DL
PROT UR QL STRIP: NEGATIVE
RBC # BLD AUTO: 4.44 M/UL
RBC #/AREA URNS AUTO: 25 /HPF (ref 0–4)
SODIUM SERPL-SCNC: 140 MMOL/L
SP GR UR STRIP: 1.03 (ref 1–1.03)
SQUAMOUS #/AREA URNS AUTO: 1 /HPF
URN SPEC COLLECT METH UR: ABNORMAL
UROBILINOGEN UR STRIP-ACNC: 2 EU/DL
WBC # BLD AUTO: 14.02 K/UL
WBC #/AREA URNS AUTO: 19 /HPF (ref 0–5)

## 2018-01-16 PROCEDURE — 94640 AIRWAY INHALATION TREATMENT: CPT

## 2018-01-16 PROCEDURE — 94761 N-INVAS EAR/PLS OXIMETRY MLT: CPT

## 2018-01-16 PROCEDURE — 87186 SC STD MICRODIL/AGAR DIL: CPT

## 2018-01-16 PROCEDURE — 87086 URINE CULTURE/COLONY COUNT: CPT

## 2018-01-16 PROCEDURE — 96375 TX/PRO/DX INJ NEW DRUG ADDON: CPT

## 2018-01-16 PROCEDURE — 83690 ASSAY OF LIPASE: CPT

## 2018-01-16 PROCEDURE — 85025 COMPLETE CBC W/AUTO DIFF WBC: CPT

## 2018-01-16 PROCEDURE — 25000003 PHARM REV CODE 250: Performed by: INTERNAL MEDICINE

## 2018-01-16 PROCEDURE — 25000003 PHARM REV CODE 250: Performed by: PHYSICIAN ASSISTANT

## 2018-01-16 PROCEDURE — 94760 N-INVAS EAR/PLS OXIMETRY 1: CPT

## 2018-01-16 PROCEDURE — 87088 URINE BACTERIA CULTURE: CPT

## 2018-01-16 PROCEDURE — 96374 THER/PROPH/DIAG INJ IV PUSH: CPT

## 2018-01-16 PROCEDURE — 63600175 PHARM REV CODE 636 W HCPCS: Performed by: PHYSICIAN ASSISTANT

## 2018-01-16 PROCEDURE — G0378 HOSPITAL OBSERVATION PER HR: HCPCS

## 2018-01-16 PROCEDURE — 96372 THER/PROPH/DIAG INJ SC/IM: CPT | Mod: 59

## 2018-01-16 PROCEDURE — 99220 PR INITIAL OBSERVATION CARE,LEVL III: CPT | Mod: ,,, | Performed by: INTERNAL MEDICINE

## 2018-01-16 PROCEDURE — 87077 CULTURE AEROBIC IDENTIFY: CPT

## 2018-01-16 PROCEDURE — 25000242 PHARM REV CODE 250 ALT 637 W/ HCPCS: Performed by: INTERNAL MEDICINE

## 2018-01-16 PROCEDURE — 99285 EMERGENCY DEPT VISIT HI MDM: CPT | Mod: 25

## 2018-01-16 PROCEDURE — 80053 COMPREHEN METABOLIC PANEL: CPT

## 2018-01-16 PROCEDURE — 63600175 PHARM REV CODE 636 W HCPCS: Performed by: INTERNAL MEDICINE

## 2018-01-16 PROCEDURE — 81001 URINALYSIS AUTO W/SCOPE: CPT

## 2018-01-16 RX ORDER — SYRING-NEEDL,DISP,INSUL,0.3 ML 29 G X1/2"
296 SYRINGE, EMPTY DISPOSABLE MISCELLANEOUS
Status: DISCONTINUED | OUTPATIENT
Start: 2018-01-16 | End: 2018-01-16

## 2018-01-16 RX ORDER — PSEUDOEPHEDRINE/ACETAMINOPHEN 30MG-500MG
TABLET ORAL ONCE
Status: DISCONTINUED | OUTPATIENT
Start: 2018-01-16 | End: 2018-01-16

## 2018-01-16 RX ORDER — ACETAMINOPHEN 10 MG/ML
1000 INJECTION, SOLUTION INTRAVENOUS EVERY 8 HOURS
Status: COMPLETED | OUTPATIENT
Start: 2018-01-16 | End: 2018-01-18

## 2018-01-16 RX ORDER — METOPROLOL SUCCINATE 25 MG/1
25 TABLET, EXTENDED RELEASE ORAL DAILY
Status: DISCONTINUED | OUTPATIENT
Start: 2018-01-17 | End: 2018-01-18 | Stop reason: HOSPADM

## 2018-01-16 RX ORDER — POLYETHYLENE GLYCOL 3350 17 G/17G
17 POWDER, FOR SOLUTION ORAL
Status: DISCONTINUED | OUTPATIENT
Start: 2018-01-16 | End: 2018-01-18 | Stop reason: HOSPADM

## 2018-01-16 RX ORDER — KETOROLAC TROMETHAMINE 15 MG/ML
15 INJECTION, SOLUTION INTRAMUSCULAR; INTRAVENOUS EVERY 6 HOURS PRN
Status: DISCONTINUED | OUTPATIENT
Start: 2018-01-16 | End: 2018-01-17

## 2018-01-16 RX ORDER — KETOROLAC TROMETHAMINE 30 MG/ML
10 INJECTION, SOLUTION INTRAMUSCULAR; INTRAVENOUS
Status: COMPLETED | OUTPATIENT
Start: 2018-01-16 | End: 2018-01-16

## 2018-01-16 RX ORDER — METOCLOPRAMIDE HYDROCHLORIDE 5 MG/ML
10 INJECTION INTRAMUSCULAR; INTRAVENOUS EVERY 6 HOURS
Status: DISCONTINUED | OUTPATIENT
Start: 2018-01-16 | End: 2018-01-18 | Stop reason: HOSPADM

## 2018-01-16 RX ORDER — SODIUM CHLORIDE 0.9 % (FLUSH) 0.9 %
5 SYRINGE (ML) INJECTION
Status: DISCONTINUED | OUTPATIENT
Start: 2018-01-16 | End: 2018-01-18 | Stop reason: HOSPADM

## 2018-01-16 RX ORDER — BRIMONIDINE TARTRATE 1.5 MG/ML
1 SOLUTION/ DROPS OPHTHALMIC 2 TIMES DAILY
Status: DISCONTINUED | OUTPATIENT
Start: 2018-01-16 | End: 2018-01-18 | Stop reason: HOSPADM

## 2018-01-16 RX ORDER — ONDANSETRON 8 MG/1
8 TABLET, ORALLY DISINTEGRATING ORAL EVERY 8 HOURS PRN
Status: DISCONTINUED | OUTPATIENT
Start: 2018-01-16 | End: 2018-01-18 | Stop reason: HOSPADM

## 2018-01-16 RX ORDER — FLUTICASONE FUROATE AND VILANTEROL 100; 25 UG/1; UG/1
1 POWDER RESPIRATORY (INHALATION) DAILY
Status: DISCONTINUED | OUTPATIENT
Start: 2018-01-17 | End: 2018-01-18 | Stop reason: HOSPADM

## 2018-01-16 RX ORDER — ESCITALOPRAM OXALATE 10 MG/1
10 TABLET ORAL DAILY
Status: DISCONTINUED | OUTPATIENT
Start: 2018-01-17 | End: 2018-01-18 | Stop reason: HOSPADM

## 2018-01-16 RX ORDER — ONDANSETRON 2 MG/ML
8 INJECTION INTRAMUSCULAR; INTRAVENOUS
Status: COMPLETED | OUTPATIENT
Start: 2018-01-16 | End: 2018-01-16

## 2018-01-16 RX ORDER — AMLODIPINE BESYLATE 2.5 MG/1
2.5 TABLET ORAL DAILY
Status: DISCONTINUED | OUTPATIENT
Start: 2018-01-17 | End: 2018-01-18 | Stop reason: HOSPADM

## 2018-01-16 RX ORDER — IPRATROPIUM BROMIDE AND ALBUTEROL SULFATE 2.5; .5 MG/3ML; MG/3ML
3 SOLUTION RESPIRATORY (INHALATION) EVERY 4 HOURS PRN
Status: DISCONTINUED | OUTPATIENT
Start: 2018-01-16 | End: 2018-01-18 | Stop reason: HOSPADM

## 2018-01-16 RX ORDER — BRIMONIDINE TARTRATE AND TIMOLOL MALEATE 2; 5 MG/ML; MG/ML
1 SOLUTION OPHTHALMIC 2 TIMES DAILY
Status: DISCONTINUED | OUTPATIENT
Start: 2018-01-16 | End: 2018-01-16 | Stop reason: SDUPTHER

## 2018-01-16 RX ORDER — IPRATROPIUM BROMIDE AND ALBUTEROL SULFATE 2.5; .5 MG/3ML; MG/3ML
3 SOLUTION RESPIRATORY (INHALATION) EVERY 12 HOURS
Status: DISCONTINUED | OUTPATIENT
Start: 2018-01-16 | End: 2018-01-18 | Stop reason: HOSPADM

## 2018-01-16 RX ORDER — PANTOPRAZOLE SODIUM 40 MG/1
40 TABLET, DELAYED RELEASE ORAL DAILY
Status: DISCONTINUED | OUTPATIENT
Start: 2018-01-17 | End: 2018-01-18 | Stop reason: HOSPADM

## 2018-01-16 RX ORDER — TIMOLOL MALEATE 5 MG/ML
1 SOLUTION/ DROPS OPHTHALMIC 2 TIMES DAILY
Status: DISCONTINUED | OUTPATIENT
Start: 2018-01-16 | End: 2018-01-18 | Stop reason: HOSPADM

## 2018-01-16 RX ORDER — ACETAMINOPHEN 10 MG/ML
1000 INJECTION, SOLUTION INTRAVENOUS EVERY 8 HOURS
Status: DISCONTINUED | OUTPATIENT
Start: 2018-01-16 | End: 2018-01-16

## 2018-01-16 RX ORDER — TRAMADOL HYDROCHLORIDE 50 MG/1
50 TABLET ORAL
Status: COMPLETED | OUTPATIENT
Start: 2018-01-16 | End: 2018-01-16

## 2018-01-16 RX ORDER — SODIUM CHLORIDE 9 MG/ML
INJECTION, SOLUTION INTRAVENOUS CONTINUOUS
Status: DISCONTINUED | OUTPATIENT
Start: 2018-01-16 | End: 2018-01-17

## 2018-01-16 RX ORDER — NAPROXEN SODIUM 220 MG/1
81 TABLET, FILM COATED ORAL DAILY
Status: DISCONTINUED | OUTPATIENT
Start: 2018-01-17 | End: 2018-01-18 | Stop reason: HOSPADM

## 2018-01-16 RX ORDER — AMOXICILLIN 250 MG
1 CAPSULE ORAL 2 TIMES DAILY
Status: DISCONTINUED | OUTPATIENT
Start: 2018-01-16 | End: 2018-01-17

## 2018-01-16 RX ORDER — ENOXAPARIN SODIUM 100 MG/ML
40 INJECTION SUBCUTANEOUS EVERY 24 HOURS
Status: DISCONTINUED | OUTPATIENT
Start: 2018-01-16 | End: 2018-01-18 | Stop reason: HOSPADM

## 2018-01-16 RX ORDER — DICYCLOMINE HYDROCHLORIDE 10 MG/1
10 CAPSULE ORAL 2 TIMES DAILY
Status: DISCONTINUED | OUTPATIENT
Start: 2018-01-16 | End: 2018-01-18 | Stop reason: HOSPADM

## 2018-01-16 RX ADMIN — ACETAMINOPHEN 1000 MG: 10 INJECTION, SOLUTION INTRAVENOUS at 11:01

## 2018-01-16 RX ADMIN — KETOROLAC TROMETHAMINE 10 MG: 30 INJECTION, SOLUTION INTRAMUSCULAR at 01:01

## 2018-01-16 RX ADMIN — IPRATROPIUM BROMIDE AND ALBUTEROL SULFATE 3 ML: .5; 3 SOLUTION RESPIRATORY (INHALATION) at 09:01

## 2018-01-16 RX ADMIN — STANDARDIZED SENNA CONCENTRATE AND DOCUSATE SODIUM 1 TABLET: 8.6; 5 TABLET, FILM COATED ORAL at 08:01

## 2018-01-16 RX ADMIN — KETOROLAC TROMETHAMINE 15 MG: 15 INJECTION, SOLUTION INTRAMUSCULAR; INTRAVENOUS at 11:01

## 2018-01-16 RX ADMIN — ENOXAPARIN SODIUM 40 MG: 100 INJECTION SUBCUTANEOUS at 08:01

## 2018-01-16 RX ADMIN — DICYCLOMINE HYDROCHLORIDE 10 MG: 10 CAPSULE ORAL at 08:01

## 2018-01-16 RX ADMIN — SODIUM CHLORIDE: 0.9 INJECTION, SOLUTION INTRAVENOUS at 08:01

## 2018-01-16 RX ADMIN — ONDANSETRON 8 MG: 2 INJECTION INTRAMUSCULAR; INTRAVENOUS at 01:01

## 2018-01-16 RX ADMIN — MAGNESIUM CITRATE: 1.75 LIQUID ORAL at 02:01

## 2018-01-16 RX ADMIN — METHYLNALTREXONE BROMIDE 12 MG: 12 INJECTION, SOLUTION SUBCUTANEOUS at 07:01

## 2018-01-16 RX ADMIN — POLYETHYLENE GLYCOL 3350 17 G: 17 POWDER, FOR SOLUTION ORAL at 08:01

## 2018-01-16 RX ADMIN — METOCLOPRAMIDE 10 MG: 5 INJECTION, SOLUTION INTRAMUSCULAR; INTRAVENOUS at 08:01

## 2018-01-16 RX ADMIN — TRAMADOL HYDROCHLORIDE 50 MG: 50 TABLET, FILM COATED ORAL at 04:01

## 2018-01-16 NOTE — ED NOTES
Family at bedside states patient has complained of lower back pain for several weeks due to a compression fracture of L7, states generalized abdominal pain started yesterday, denies nausea vomiting and diarrhea, reports hx of frequent urinary tract infections

## 2018-01-16 NOTE — ED NOTES
Enema given by shavonne gil rn and enedina marrero, patient tolerated moderately well, education provided to patient and family,  Moderate amount of hard stool removed from rectum

## 2018-01-16 NOTE — ED PROVIDER NOTES
Encounter Date: 1/16/2018       History     Chief Complaint   Patient presents with    Abdominal Pain     generalized abdominal pain x 18 hours      Patient is a 88 year-old woman with hypertension, prior myocardial infraction, chronic aspiration, chronic lung disease, gastroesophageal reflux disease, and known compression fractures who presents to the emergency department due to a one-day history of abdominal pain.  Patient was recently seen in the hospital January 5 with back pain.  At this time patient was diagnosed with compression fractures and discharged home with PT/OT.  Patient was overall doing well, however yesterday was crunched down and unable to get out of bed.  This morning PT/OT attempted to get patient dressed and patient was unwilling to do so due to diffuse abdominal pain.  Patient denies any hematuria, dysuria, blood in stool, nausea, vomiting, fevers, chills, chest pain, shortness of breath, or any other complaints.          Review of patient's allergies indicates:   Allergen Reactions    Codeine Other (See Comments)     Pt unsure    Penicillins      Past Medical History:   Diagnosis Date    GERD (gastroesophageal reflux disease)     Hypertension      Past Surgical History:   Procedure Laterality Date    APPENDECTOMY      HYSTERECTOMY      TOTAL KNEE ARTHROPLASTY Bilateral      No family history on file.  Social History   Substance Use Topics    Smoking status: Never Smoker    Smokeless tobacco: Never Used    Alcohol use 0.6 oz/week     1 Glasses of wine per week      Comment: occasionally     Review of Systems   Constitutional: Negative for activity change, appetite change, diaphoresis, fatigue and fever.   HENT: Negative for congestion, dental problem, drooling, ear pain, facial swelling, sore throat and trouble swallowing.    Eyes: Negative for pain, discharge and visual disturbance.   Respiratory: Negative for apnea, cough, chest tightness and shortness of breath.     Cardiovascular: Negative for chest pain and palpitations.   Gastrointestinal: Positive for abdominal distention, abdominal pain and constipation. Negative for anal bleeding, blood in stool, diarrhea, nausea and vomiting.   Endocrine: Negative for cold intolerance and polydipsia.   Genitourinary: Negative for decreased urine volume, difficulty urinating, enuresis, frequency and hematuria.   Musculoskeletal: Negative for arthralgias, gait problem, myalgias and neck stiffness.   Skin: Negative for color change and pallor.   Allergic/Immunologic: Negative for environmental allergies.   Neurological: Negative for dizziness, syncope, numbness and headaches.   Psychiatric/Behavioral: Negative for agitation, confusion and dysphoric mood.       Physical Exam     Initial Vitals [01/16/18 1130]   BP Pulse Resp Temp SpO2   (!) 156/102 76 16 98.2 °F (36.8 °C) 98 %      MAP       120         Physical Exam    Nursing note and vitals reviewed.  Constitutional: She appears well-developed and well-nourished. She is not diaphoretic. No distress.   HENT:   Head: Normocephalic and atraumatic.   Neck: Normal range of motion. Neck supple.   Cardiovascular: Normal rate, regular rhythm and normal heart sounds. Exam reveals no gallop and no friction rub.    No murmur heard.  Pulmonary/Chest: Breath sounds normal. She has no wheezes. She has no rhonchi. She has no rales.   Abdominal: Soft. Bowel sounds are normal. There is tenderness. There is no rebound and no guarding.   Tenderness in right lower quadrant    Musculoskeletal: Normal range of motion.   Neurological: She is alert and oriented to person, place, and time.   Skin: Skin is warm and dry. No rash noted. No erythema.   Psychiatric: She has a normal mood and affect.         ED Course   Procedures  Labs Reviewed   CBC W/ AUTO DIFFERENTIAL - Abnormal; Notable for the following:        Result Value    WBC 14.02 (*)     MCHC 31.2 (*)     RDW 18.6 (*)     MPV 9.1 (*)     Immature  Granulocytes 2.1 (*)     Gran # 10.3 (*)     Immature Grans (Abs) 0.29 (*)     Eos # 0.8 (*)     Gran% 73.6 (*)     Lymph% 12.6 (*)     All other components within normal limits   COMPREHENSIVE METABOLIC PANEL - Abnormal; Notable for the following:     Albumin 3.3 (*)     eGFR if non  57.3 (*)     All other components within normal limits   URINALYSIS, REFLEX TO URINE CULTURE - Abnormal; Notable for the following:     Appearance, UA Hazy (*)     Ketones, UA Trace (*)     Occult Blood UA 2+ (*)     Leukocytes, UA 1+ (*)     All other components within normal limits   URINALYSIS MICROSCOPIC - Abnormal; Notable for the following:     RBC, UA 25 (*)     WBC, UA 19 (*)     All other components within normal limits   CULTURE, URINE   LIPASE                   APC / Resident Notes:   Patient is a 88 year-old woman with hypertension, prior myocardial infraction, chronic aspiration, chronic lung disease, gastroesophageal reflux disease, and known compression fractures who presents to the emergency department due to a one-day history of abdominal pain.  Physical exam reveals female in no acute distress.  Heart regular rate and rhythm.  Lungs clear to auscultation bilaterally.  Abdomen tender to palpation diffusely.  Suspect symptoms due to constipation.  Will obtain labs and CT scan.    CBC shows leukocytosis with a white blood cell count of 14.2.  CMP shows albumin 3.3.  Lipase 14.  UA shows 2+ blood and 1+ leukocyte.  CT abdomen and pelvis shows moderate stool throughout the colon, nonobstructive nephrolithiasis, gallbladder sludge, and hiatal hernia.  Patient will be given the brown bomb.  Patient did receive some relief with the brown bomb, however is still having diffuse abdominal pain.  Patient's family is requesting that patient be observed overnight and patient's abdomen is still extremely tender.  Patient will be admitted to observation for further pain control, as well as relief of constipation.  Plan  treatment discussed with attending physician she is agreeable.              ED Course      Clinical Impression:   The encounter diagnosis was Abdominal pain, unspecified abdominal location.    Disposition:   Disposition: Admitted  Condition: Stable                        Aislinn Celaya PA-C  01/16/18 5483

## 2018-01-16 NOTE — H&P
History & Physical  Hospital Medicine      SUBJECTIVE:     Chief Complaint/Reason for Admission: abdominal pain    History of Present Illness:  Patient is a 88 y.o. female presents with abdominal pain since 1800 yesterday. Pain is all over abdomen and is associated with nausea, but not vomiting. Pain is worsened with movement, and improved with [place] (patient otherwise drinks rarely). Patient had been on scheduled oxycodone and Tylenol for vertebral compression fracture since 1/5. No relief from percocet or anything. Family has noted no BM over the weekend and had BM yesterday that was little and hard. She denied BRBPR.  She has good appetite. No fevers, no chills. No home oxygen needs. Family states patient does not drink much fluids.    Allergies: Patient is allergic to codeine and penicillins.     PTA Medications   Medication Sig    acetaminophen (TYLENOL) 500 mg Cap Take 2 capsules (1,000 mg total) by mouth every 6 (six) hours as needed (Pain).    albuterol-ipratropium 2.5mg-0.5mg/3mL (DUO-NEB) 0.5 mg-3 mg(2.5 mg base)/3 mL nebulizer solution inhale contents of 1 vial every 4 hours if needed for wheezing    amLODIPine (NORVASC) 2.5 MG tablet TAKE 1 TABLET DAILY    aspirin 81 MG Chew Take 81 mg by mouth once daily.    COMBIGAN 0.2-0.5 % Drop Place 1 drop into both eyes 2 (two) times daily.     cyanocobalamin 1,000 mcg/mL injection Inject 1 mL (1,000 mcg total) into the muscle once a week. Dispense syringes as well.    dicyclomine (BENTYL) 10 MG capsule Take 1 capsule (10 mg total) by mouth 2 (two) times daily.    escitalopram oxalate (LEXAPRO) 10 MG tablet TAKE 1 TABLET DAILY    fluticasone-salmeterol 100-50 mcg/dose (ADVAIR) 100-50 mcg/dose diskus inhaler Inhale 1 puff into the lungs 2 (two) times daily. Controller    inhalation device (BREATHERITE RIGID SPACER-MASK) Use as directed for inhalation.    metoprolol succinate (TOPROL-XL) 25 MG 24 hr tablet Take 1 tablet by mouth once daily.     OMEGA-3S/DHA/EPA/FISH OIL (OMEGA 3 ORAL) Take 1 capsule by mouth once daily.     omeprazole (PRILOSEC) 20 MG capsule Take 1 capsule (20 mg total) by mouth once daily.    oxybutynin (DITROPAN) 5 MG Tab Take 1 tablet (5 mg total) by mouth 3 (three) times daily as needed (bladder spasms).     Past Medical History: Patient has a past medical history of GERD (gastroesophageal reflux disease) and Hypertension.  Past Surgical History: Patient has a past surgical history that includes Total knee arthroplasty (Bilateral); Hysterectomy; and Appendectomy.  Social History: Patient reports that she has never smoked. She has never used smokeless tobacco. She reports that she drinks about 0.6 oz of alcohol per week . She reports that she does not use drugs.  Family History: family history is not on file.  PCP: Blanca Quinones MD    Review of Systems:  General/Constitutional - no fevers, chills, no night sweats  Skin - no rash  ENT - no rhinorrhea, no sore throat  Cardiovascular - no chest pain, no palpitations  Respiratory - no SOB, no cough  Gastrointestinal - see HPI  Genitourinary - no dysuria, no urinary retention  Musculoskeletal - no arthralgias, no myalgias  Neurologic - no generalized weakness, no dizziness  Psychiatric - no depression, no anxiety  Endocrine - no polyuria, no polydypsia  All other systems reviewed and are negative    OBJECTIVE:     Vital Signs (Most Recent)  Temp: 98.2 °F (36.8 °C) (01/16/18 1130)  Pulse: 74 (01/16/18 1628)  Resp: 18 (01/16/18 1344)  BP: (!) 140/70 (01/16/18 1628)  SpO2: (!) 93 % (01/16/18 1628)    Physical Exam:  General Appearance: Well-developed well-nourished in no apparent distress, Well-groomed in hospital gown.  Eyes: anicteric, no scleral or conjunctival injection, no discharge  Ears, Nose, Mouth and Throat- Ears symmetric without pits, nose symmetric, MMM, pink, OP clear, no thrush  Neck: supple, non-tender, no goiter  Respiratory: no accessory muscle use, lungs clear to  auscultation  Cardiovascular: no thrills, no heave, regular rate and rhythm without murmur  GI: normal active bowel sounds, soft, non-tender, no organomegaly, non-distended, no mass  Lymphatic- no cervical or groin lymphadenopathy  Musculoskeletal- no clubbing, no cyanosis; no edema  Skin: Normal temperature, turgor and texture; no rash, no subcutaneous nodules  Neurology: speech normal, sensation grossly intact, A&O x3    Laboratory    Recent Labs  Lab 01/16/18  1238      K 4.2      CO2 26   BUN 23   CREATININE 0.9      CALCIUM 9.1       Recent Labs  Lab 01/16/18  1238   ALKPHOS 74   ALT 15   AST 18   ALBUMIN 3.3*   PROT 6.5   BILITOT 0.5         Recent Labs  Lab 01/16/18  1238   WBC 14.02*   HGB 12.4   HCT 39.7      GRAN 73.6*  10.3*   LYMPH 12.6*  1.8   MONO 5.5  0.8     ASSESSMENT/PLAN:     Intractable abdominal pain due to constipation  S/p brown bomb with large BM  Continue miralaz 17 g q2h while awake x 8 doses  Bisacodyl suppository 10 mg pr prn  Acetaminophen 1 g IV q8h, dicyclomine 10 mg BID, protonix 40 mg daily, methylnaltrexone 12 mcg subq once reglan 10 mg QID  Hold oxycodone for now  NS at 100 mL/h x 2 L  Ketorolac 15 mg IV q6h prn    HTN  - continue metoprolol XL 25 mg daily and amlodipine 2.5 mg daily    Glaucoma - continue drops

## 2018-01-17 PROBLEM — H40.9 GLAUCOMA: Status: ACTIVE | Noted: 2018-01-17

## 2018-01-17 PROBLEM — K59.03 DRUG-INDUCED CONSTIPATION: Status: ACTIVE | Noted: 2018-01-17

## 2018-01-17 LAB
ALBUMIN SERPL BCP-MCNC: 2.7 G/DL
ALBUMIN SERPL BCP-MCNC: 2.7 G/DL
ANION GAP SERPL CALC-SCNC: 7 MMOL/L
ANION GAP SERPL CALC-SCNC: 7 MMOL/L
BASOPHILS # BLD AUTO: 0.04 K/UL
BASOPHILS NFR BLD: 0.4 %
BUN SERPL-MCNC: 27 MG/DL
BUN SERPL-MCNC: 27 MG/DL
CALCIUM SERPL-MCNC: 8.2 MG/DL
CALCIUM SERPL-MCNC: 8.2 MG/DL
CHLORIDE SERPL-SCNC: 109 MMOL/L
CHLORIDE SERPL-SCNC: 109 MMOL/L
CO2 SERPL-SCNC: 26 MMOL/L
CO2 SERPL-SCNC: 27 MMOL/L
CREAT SERPL-MCNC: 0.9 MG/DL
CREAT SERPL-MCNC: 1 MG/DL
DIFFERENTIAL METHOD: ABNORMAL
EOSINOPHIL # BLD AUTO: 0.5 K/UL
EOSINOPHIL NFR BLD: 5 %
ERYTHROCYTE [DISTWIDTH] IN BLOOD BY AUTOMATED COUNT: 18.8 %
EST. GFR  (AFRICAN AMERICAN): 58.1 ML/MIN/1.73 M^2
EST. GFR  (AFRICAN AMERICAN): >60 ML/MIN/1.73 M^2
EST. GFR  (NON AFRICAN AMERICAN): 50.4 ML/MIN/1.73 M^2
EST. GFR  (NON AFRICAN AMERICAN): 57.3 ML/MIN/1.73 M^2
GLUCOSE SERPL-MCNC: 96 MG/DL
GLUCOSE SERPL-MCNC: 96 MG/DL
HCT VFR BLD AUTO: 35 %
HGB BLD-MCNC: 10.6 G/DL
IMM GRANULOCYTES # BLD AUTO: 0.22 K/UL
IMM GRANULOCYTES NFR BLD AUTO: 2.2 %
LYMPHOCYTES # BLD AUTO: 2 K/UL
LYMPHOCYTES NFR BLD: 19.4 %
MCH RBC QN AUTO: 28 PG
MCHC RBC AUTO-ENTMCNC: 30.3 G/DL
MCV RBC AUTO: 92 FL
MONOCYTES # BLD AUTO: 0.6 K/UL
MONOCYTES NFR BLD: 6.3 %
NEUTROPHILS # BLD AUTO: 6.7 K/UL
NEUTROPHILS NFR BLD: 66.7 %
NRBC BLD-RTO: 0 /100 WBC
PHOSPHATE SERPL-MCNC: 4.1 MG/DL
PHOSPHATE SERPL-MCNC: 4.1 MG/DL
PLATELET # BLD AUTO: 249 K/UL
PMV BLD AUTO: 9.7 FL
POTASSIUM SERPL-SCNC: 4.2 MMOL/L
POTASSIUM SERPL-SCNC: 4.2 MMOL/L
RBC # BLD AUTO: 3.79 M/UL
SODIUM SERPL-SCNC: 142 MMOL/L
SODIUM SERPL-SCNC: 143 MMOL/L
WBC # BLD AUTO: 10.1 K/UL

## 2018-01-17 PROCEDURE — 85025 COMPLETE CBC W/AUTO DIFF WBC: CPT

## 2018-01-17 PROCEDURE — 27000221 HC OXYGEN, UP TO 24 HOURS

## 2018-01-17 PROCEDURE — 25000003 PHARM REV CODE 250: Performed by: INTERNAL MEDICINE

## 2018-01-17 PROCEDURE — 97166 OT EVAL MOD COMPLEX 45 MIN: CPT

## 2018-01-17 PROCEDURE — 99225 PR SUBSEQUENT OBSERVATION CARE,LEVEL II: CPT | Mod: ,,, | Performed by: INTERNAL MEDICINE

## 2018-01-17 PROCEDURE — G0378 HOSPITAL OBSERVATION PER HR: HCPCS

## 2018-01-17 PROCEDURE — 63600175 PHARM REV CODE 636 W HCPCS: Performed by: INTERNAL MEDICINE

## 2018-01-17 PROCEDURE — 25000242 PHARM REV CODE 250 ALT 637 W/ HCPCS: Performed by: INTERNAL MEDICINE

## 2018-01-17 PROCEDURE — 36415 COLL VENOUS BLD VENIPUNCTURE: CPT

## 2018-01-17 PROCEDURE — 94640 AIRWAY INHALATION TREATMENT: CPT

## 2018-01-17 PROCEDURE — 94761 N-INVAS EAR/PLS OXIMETRY MLT: CPT

## 2018-01-17 PROCEDURE — 80069 RENAL FUNCTION PANEL: CPT

## 2018-01-17 RX ORDER — TRAMADOL HYDROCHLORIDE 50 MG/1
50 TABLET ORAL EVERY 6 HOURS PRN
Status: DISCONTINUED | OUTPATIENT
Start: 2018-01-17 | End: 2018-01-18 | Stop reason: HOSPADM

## 2018-01-17 RX ORDER — ACETAMINOPHEN 500 MG
500 TABLET ORAL
Status: DISCONTINUED | OUTPATIENT
Start: 2018-01-17 | End: 2018-01-18 | Stop reason: HOSPADM

## 2018-01-17 RX ORDER — BISACODYL 10 MG
10 SUPPOSITORY, RECTAL RECTAL DAILY
Status: COMPLETED | OUTPATIENT
Start: 2018-01-17 | End: 2018-01-17

## 2018-01-17 RX ORDER — NAPROXEN 250 MG/1
500 TABLET ORAL 2 TIMES DAILY WITH MEALS
Status: DISCONTINUED | OUTPATIENT
Start: 2018-01-17 | End: 2018-01-18 | Stop reason: HOSPADM

## 2018-01-17 RX ORDER — AMOXICILLIN 250 MG
2 CAPSULE ORAL 2 TIMES DAILY
Status: DISCONTINUED | OUTPATIENT
Start: 2018-01-17 | End: 2018-01-18 | Stop reason: HOSPADM

## 2018-01-17 RX ORDER — AMOXICILLIN 250 MG
2 CAPSULE ORAL 2 TIMES DAILY
COMMUNITY
Start: 2018-01-17

## 2018-01-17 RX ORDER — TRAMADOL HYDROCHLORIDE 50 MG/1
50 TABLET ORAL EVERY 6 HOURS PRN
Qty: 60 TABLET | Refills: 0 | Status: SHIPPED | OUTPATIENT
Start: 2018-01-17

## 2018-01-17 RX ADMIN — BRIMONIDINE TARTRATE 1 DROP: 1.5 SOLUTION OPHTHALMIC at 10:01

## 2018-01-17 RX ADMIN — TIMOLOL MALEATE 1 DROP: 5 SOLUTION OPHTHALMIC at 08:01

## 2018-01-17 RX ADMIN — ACETAMINOPHEN 1000 MG: 10 INJECTION, SOLUTION INTRAVENOUS at 01:01

## 2018-01-17 RX ADMIN — POLYETHYLENE GLYCOL 3350 17 G: 17 POWDER, FOR SOLUTION ORAL at 10:01

## 2018-01-17 RX ADMIN — FLUTICASONE FUROATE AND VILANTEROL TRIFENATATE 1 PUFF: 100; 25 POWDER RESPIRATORY (INHALATION) at 10:01

## 2018-01-17 RX ADMIN — BISACODYL 10 MG: 10 SUPPOSITORY RECTAL at 11:01

## 2018-01-17 RX ADMIN — ESCITALOPRAM OXALATE 10 MG: 10 TABLET ORAL at 10:01

## 2018-01-17 RX ADMIN — ASPIRIN 81 MG 81 MG: 81 TABLET ORAL at 10:01

## 2018-01-17 RX ADMIN — METOCLOPRAMIDE 10 MG: 5 INJECTION, SOLUTION INTRAMUSCULAR; INTRAVENOUS at 05:01

## 2018-01-17 RX ADMIN — METOCLOPRAMIDE 10 MG: 5 INJECTION, SOLUTION INTRAMUSCULAR; INTRAVENOUS at 12:01

## 2018-01-17 RX ADMIN — POLYETHYLENE GLYCOL 3350 17 G: 17 POWDER, FOR SOLUTION ORAL at 05:01

## 2018-01-17 RX ADMIN — IPRATROPIUM BROMIDE AND ALBUTEROL SULFATE 3 ML: .5; 3 SOLUTION RESPIRATORY (INHALATION) at 08:01

## 2018-01-17 RX ADMIN — PANTOPRAZOLE SODIUM 40 MG: 40 TABLET, DELAYED RELEASE ORAL at 10:01

## 2018-01-17 RX ADMIN — ACETAMINOPHEN 1000 MG: 10 INJECTION, SOLUTION INTRAVENOUS at 05:01

## 2018-01-17 RX ADMIN — STANDARDIZED SENNA CONCENTRATE AND DOCUSATE SODIUM 1 TABLET: 8.6; 5 TABLET, FILM COATED ORAL at 10:01

## 2018-01-17 RX ADMIN — TRAMADOL HYDROCHLORIDE 50 MG: 50 TABLET, COATED ORAL at 04:01

## 2018-01-17 RX ADMIN — Medication 1 CAPSULE: at 10:01

## 2018-01-17 RX ADMIN — IPRATROPIUM BROMIDE AND ALBUTEROL SULFATE 3 ML: .5; 3 SOLUTION RESPIRATORY (INHALATION) at 07:01

## 2018-01-17 RX ADMIN — BRIMONIDINE TARTRATE 1 DROP: 1.5 SOLUTION OPHTHALMIC at 08:01

## 2018-01-17 RX ADMIN — AMLODIPINE BESYLATE 2.5 MG: 2.5 TABLET ORAL at 10:01

## 2018-01-17 RX ADMIN — METOCLOPRAMIDE 10 MG: 5 INJECTION, SOLUTION INTRAMUSCULAR; INTRAVENOUS at 11:01

## 2018-01-17 RX ADMIN — METHYLNALTREXONE BROMIDE 12 MG: 12 INJECTION, SOLUTION SUBCUTANEOUS at 08:01

## 2018-01-17 RX ADMIN — NAPROXEN 500 MG: 250 TABLET ORAL at 04:01

## 2018-01-17 RX ADMIN — POLYETHYLENE GLYCOL 3350 17 G: 17 POWDER, FOR SOLUTION ORAL at 01:01

## 2018-01-17 RX ADMIN — DICYCLOMINE HYDROCHLORIDE 10 MG: 10 CAPSULE ORAL at 08:01

## 2018-01-17 RX ADMIN — DICYCLOMINE HYDROCHLORIDE 10 MG: 10 CAPSULE ORAL at 10:01

## 2018-01-17 RX ADMIN — ENOXAPARIN SODIUM 40 MG: 100 INJECTION SUBCUTANEOUS at 04:01

## 2018-01-17 RX ADMIN — KETOROLAC TROMETHAMINE 15 MG: 15 INJECTION, SOLUTION INTRAMUSCULAR; INTRAVENOUS at 10:01

## 2018-01-17 RX ADMIN — TIMOLOL MALEATE 1 DROP: 5 SOLUTION OPHTHALMIC at 10:01

## 2018-01-17 NOTE — ED NOTES
Patient transported to floor with ed tech, with paperwork, with personal belongings, via stretcher, with care taker, on oxygen

## 2018-01-17 NOTE — PT/OT/SLP EVAL
Occupational Therapy   Evaluation    Name: Irina Polk  MRN: [id number]  Admitting Diagnosis:  Intractable abdominal pain      Recommendations:     Discharge Recommendations: home with home health  Discharge Equipment Recommendations:  none  Barriers to discharge:  None    History:     Occupational Profile:  Living Environment: Pt lives alone in 1 story house with 1 small step to enter.   Previous level of function:Pt was I with basic self care and home access with limited community participation  Equipment Owned:  walker, rolling, bedside commode, shower chair  Assistance upon Discharge: Pt with 2 daughters available for assistance , home health OT and PT    Past Medical History:   Diagnosis Date    GERD (gastroesophageal reflux disease)     Hypertension        Past Surgical History:   Procedure Laterality Date    APPENDECTOMY      HYSTERECTOMY      TOTAL KNEE ARTHROPLASTY Bilateral        Subjective     Chief Complaint: pain in belly  Patient/Family stated goals:return to home   Communicated with: RN prior to session.  Pain/Comfort:  · Pain Rating 1: 9/10  · Location - Orientation 1: generalized  · Location 1: abdomen  · Pain Addressed 1: Distraction, Cessation of Activity    Objective:     Patient found with: peripheral IV, telemetry    General Precautions: Standard, fall   Orthopedic Precautions:N/A   Braces: N/A     Occupational Performance:    Bed Mobility:    · Patient completed Rolling/Turning to Left with  minimum assistance  · Patient completed Rolling/Turning to Right with minimum assistance  · Patient completed Scooting/Bridging with moderate assistance    Functional Mobility/Transfers:  · Pt refused sit to stand due to pain    Activities of Daily Living:  · UB Dressing: minimum assistance    · LB Dressing: moderate assistance    · Toileting: moderate assistance      Cognitive/Visual Perceptual:  Cognitive/Psychosocial Skills:     -       Oriented to: Person, Place, Time and Situation   -        "Follows Commands/attention:Follows two-step commands  -       Communication: clear/fluent  -       Memory: No Deficits noted  -       Safety awareness/insight to disability: intact   -       Mood/Affect/Coping skills/emotional control: Appropriate to situation and Flat affect    Physical Exam:  Postural examination/scapula alignment:    -       Rounded shoulders  -       Forward head  Skin integrity: Visible skin intact  Edema:  None noted  Sensation:    -       Intact  Upper Extremity Range of Motion:     -       Right Upper Extremity: WFL  -       Left Upper Extremity: WFL  Upper Extremity Strength:    -       Right Upper Extremity: WFL  -       Left Upper Extremity: WFL    Patient left supine with all lines intact and call button in reach    [facility] 6 Click:  [facility] Total Score: 17    Treatment & Education:  Evaluation complete.  Pt educated on safety, role of Ot, benefits of home care services, deep breathing, importance of increased participation in self care for max gains.   Education:    Assessment:     Irina Polk is a 88 y.o. female with a medical diagnosis of Intractable abdominal pain.  She presents with decreased participation and performance of self care from baseline. Pt lives alone but with 2 daughters available to assist as needed.  Pt would benefit from home health OT and PT for return to OF  Performance deficits affecting function are pain, impaired self care skills, impaired functional mobilty, impaired endurance.      Rehab Prognosis:  good patient would benefit from acute skilled OT services to address these deficits and reach maximum level of function.         Clinical Decision Makin.  OT Mod:  "Pt evaluation falls under moderate complexity for evaluation coding due to identification of 3-5 performance deficits noted as stated above. Eval required Min/Mod assistance to complete on this date and detailed assessment(s) were utilized. Moreover, an expanded review of history and " "occupational profile obtained with additional review of cognitive, physical and psychosocial hx."     Plan:     Patient to be seen 3 x/week to address the above listed problems via self-care/home management, therapeutic activities, therapeutic exercises  · Plan of Care Expires: 02/17/18  · Plan of Care Reviewed with: patient    This Plan of care has been discussed with the patient who was involved in its development and understands and is in agreement with the identified goals and treatment plan    GOALS:    Occupational Therapy Goals        Problem: Occupational Therapy Goal    Goal Priority Disciplines Outcome Interventions   Occupational Therapy Goal     OT, PT/OT Ongoing (interventions implemented as appropriate)    Description:  Goals to be met by: 1/30/18  Patient will increase functional independence with ADLs by performing:    UE Dressing with [place].  LE Dressing with Minimal Assistance.  Toileting from toilet with Minimal Assistance for hygiene and clothing management.                       Time Tracking:     OT Date of Treatment: 01/17/18  OT Start Time: 1045  OT Stop Time: 1100  OT Total Time (min): 15 min    Billable Minutes:Evaluation 15    Ghazala Kay OT  1/17/2018    "

## 2018-01-17 NOTE — PLAN OF CARE
Sw uploaded HH orders and called Purnima Ramesh with [facility] to inform, Pt accepted and ok to d/c.

## 2018-01-17 NOTE — PLAN OF CARE
01/17/18 1143   Discharge Assessment   Assessment Type Discharge Planning Assessment   Confirmed/corrected address and phone number on facesheet? Yes   Assessment information obtained from? Caregiver   Expected Length of Stay (days) 3   Communicated expected length of stay with patient/caregiver yes   Prior to hospitilization cognitive status: Alert/Oriented   Prior to hospitalization functional status: Needs Assistance;Assistive Equipment   Current Functional Status: Assistive Equipment;Needs Assistance   Lives With child(ronak), adult   Able to Return to Prior Arrangements yes   Is patient able to care for self after discharge? No   Who are your caregiver(s) and their phone number(s)? (daughter Dolly [phone])   Readmission Within The Last 30 Days no previous admission in last 30 days   Equipment Currently Used at Home walker, rolling;bedside commode   Do you have any problems affording any of your prescribed medications? No   Is the patient taking medications as prescribed? yes   Does the patient have transportation home? Yes   Transportation Available car   Does the patient receive services at the [facility]? No   Discharge Plan A Home with family;Home Health  (Current with [facility])   Discharge Plan B Home with family   Patient/Family In Agreement With Plan yes

## 2018-01-17 NOTE — PLAN OF CARE
Problem: Occupational Therapy Goal  Goal: Occupational Therapy Goal  Goals to be met by: 1/30/18  Patient will increase functional independence with ADLs by performing:    UE Dressing with [place].  LE Dressing with Minimal Assistance.  Toileting from toilet with Minimal Assistance for hygiene and clothing management.     Outcome: Ongoing (interventions implemented as appropriate)  Evaluation complete and goals set.  Cont with POC  Ghazala Kay, OT  1/17/2018

## 2018-01-17 NOTE — PROGRESS NOTES
Progress Note  Hospital Medicine      Admit Date: 1/16/2018    SUBJECTIVE:     Follow-up For:  Intractable abdominal pain    HPI/Interval history: Pain resolved. No BM since yesterday    Review of Systems: Gen: no fever, no chills, Heart: no chest pain, palpitations; Resp: no SOB, no cough    OBJECTIVE:     Vital Signs Range (Last 24H):  Temp:  [97.2 °F (36.2 °C)-98.3 °F (36.8 °C)]   Pulse:  [51-90]   Resp:  [16-26]   BP: (130-157)/(58-86)   SpO2:  [93 %-98 %]     Physical Exam:  General appearance: NAD, conversant  Neck: FROM, supple   Lungs: Clear to auscultation, no accessory muscle use  CV: RRR, no heave  Abdomen: Soft, non-tender; no masses or HSM  Extremities: No peripheral edema or digital cyanosis  Skin: no rash, lesions or ulcers  Psych: Alert and oriented to person, place and time      Recent Labs  Lab 01/16/18  1238 01/17/18  0708    142  143   K 4.2 4.2  4.2    109  109   CO2 26 26  27   BUN 23 27*  27*   CREATININE 0.9 0.9  1.0    96  96   CALCIUM 9.1 8.2*  8.2*   PHOS  --  4.1  4.1       Recent Labs  Lab 01/16/18  1238 01/17/18  0708   ALKPHOS 74  --    ALT 15  --    AST 18  --    ALBUMIN 3.3* 2.7*  2.7*   PROT 6.5  --    BILITOT 0.5  --          Recent Labs  Lab 01/16/18  1238 01/17/18  0802   WBC 14.02* 10.10   HGB 12.4 10.6*   HCT 39.7 35.0*    249   GRAN 73.6*  10.3* 66.7  6.7   LYMPH 12.6*  1.8 19.4  2.0   MONO 5.5  0.8 6.3  0.6     ASSESSMENT/PLAN:   Intractable abdominal pain due to constipation  S/p brown bomb with large BM  Continue miralaz 17 g q2h while awake x 17 doses  Bisacodyl suppository 10 mg pr - patient refused  Acetaminophen 1 g IV q8h x 3 doses, dicyclomine 10 mg BID, protonix 40 mg daily, methylnaltrexone 12 mcg subq once, reglan 10 mg QID - will repeat methylnaltrexone in AM if no BM by then  Hold oxycodone for now  NS at 100 mL/h x 2 L  Ketorolac 15 mg IV q6h prn     HTN  - continue metoprolol XL 25 mg daily and amlodipine 2.5 mg  daily     Glaucoma - continue drops    Debility - PT/OT

## 2018-01-17 NOTE — PLAN OF CARE
[facility]    HOME HEALTH ORDERS  FACE TO FACE ENCOUNTER    Patient Name: Irina Polk  YOB: 1929    PCP: Blanca Quinones MD   [address] Address[address] [place] [address] / [facility] [address]  PCP Phone Number: [phone]  PCP Fax: [phone]    Encounter Date: 01/17/2018    Admit to Home Health    Diagnoses:  Active Hospital Problems    Diagnosis  POA    *Intractable abdominal pain [R10.9]  Yes     Priority: 1 - High    Drug-induced constipation [K59.03]  Yes     Priority: 1 - High    Essential hypertension [I10]  Yes     Priority: 2     Glaucoma [H40.9]  Yes      Resolved Hospital Problems    Diagnosis Date Resolved POA   No resolved problems to display.       No future appointments.        I have seen and examined this patient face to face today. My clinical findings that support the need for the home health skilled services and home bound status are the following:  Weakness/numbness causing balance and gait disturbance due to Weakness/Debility making it taxing to leave home.  Requiring assistive device to leave home due to unsteady gait caused by  Weakness/Debility.    Allergies:  Review of patient's allergies indicates:   Allergen Reactions    Codeine Other (See Comments)     Pt unsure    Penicillins        Diet: regular diet    Activities: activity as tolerated    Nursing:   SN to complete comprehensive assessment including routine vital signs. Instruct on disease process and s/s of complications to report to MD. Review/verify medication list sent home with the patient at time of discharge  and instruct patient/caregiver as needed. Frequency may be adjusted depending on start of care date.    Notify MD if SBP > 160 or < 90; DBP > 90 or < 50; HR > 120 or < 50; Temp > 101      CONSULTS:    Physical Therapy to evaluate and treat. Evaluate for home safety and equipment needs; Establish/upgrade home exercise program. Perform / instruct on therapeutic exercises,  gait training, transfer training, and Range of Motion.  Occupational Therapy to evaluate and treat. Evaluate home environment for safety and equipment needs. Perform/Instruct on transfers, ADL training, ROM, and therapeutic exercises.    MISCELLANEOUS CARE:  N/A    WOUND CARE ORDERS  n/a      Medications: Review discharge medications with patient and family and provide education.      Current Discharge Medication List      START taking these medications    Details   senna-docusate 8.6-50 mg (PERICOLACE) 8.6-50 mg per tablet Take 2 tablets by mouth 2 (two) times daily.      traMADol (ULTRAM) 50 mg tablet Take 1 tablet (50 mg total) by mouth every 6 (six) hours as needed (pain not controlled with tylenol).  Qty: 60 tablet, Refills: 0         CONTINUE these medications which have NOT CHANGED    Details   acetaminophen (TYLENOL) 500 mg Cap Take 2 capsules (1,000 mg total) by mouth every 6 (six) hours as needed (Pain).  Refills: 0      albuterol-ipratropium 2.5mg-0.5mg/3mL (DUO-NEB) 0.5 mg-3 mg(2.5 mg base)/3 mL nebulizer solution inhale contents of 1 vial every 4 hours if needed for wheezing  Qty: 1 Box, Refills: 1      amLODIPine (NORVASC) 2.5 MG tablet TAKE 1 TABLET DAILY  Qty: 90 tablet, Refills: 0      aspirin 81 MG Chew Take 81 mg by mouth once daily.      COMBIGAN 0.2-0.5 % Drop Place 1 drop into both eyes 2 (two) times daily.       cyanocobalamin 1,000 mcg/mL injection Inject 1 mL (1,000 mcg total) into the muscle once a week. Dispense syringes as well.  Qty: 10 mL, Refills: 3      dicyclomine (BENTYL) 10 MG capsule Take 1 capsule (10 mg total) by mouth 2 (two) times daily.  Qty: 30 capsule, Refills: 0      escitalopram oxalate (LEXAPRO) 10 MG tablet TAKE 1 TABLET DAILY  Qty: 90 tablet, Refills: 3      fluticasone-salmeterol 100-50 mcg/dose (ADVAIR) 100-50 mcg/dose diskus inhaler Inhale 1 puff into the lungs 2 (two) times daily. Controller  Qty: 1 each, Refills: 2      metoprolol succinate (TOPROL-XL) 25 MG 24 hr  tablet Take 1 tablet by mouth once daily.      OMEGA-3S/DHA/EPA/FISH OIL (OMEGA 3 ORAL) Take 1 capsule by mouth once daily.       omeprazole (PRILOSEC) 20 MG capsule Take 1 capsule (20 mg total) by mouth once daily.  Qty: 90 capsule, Refills: 1      oxybutynin (DITROPAN) 5 MG Tab Take 1 tablet (5 mg total) by mouth 3 (three) times daily as needed (bladder spasms).  Qty: 90 tablet, Refills: 0         STOP taking these medications       (Magic mouthwash) 1:1:1 Benadryl 12.5mg/5ml liq, aluminum & magnesium hydroxide-simehticone (Maalox), lidocaine viscous 2% Comments:   Reason for Stopping:         inhalation device (BREATHERITE RIGID SPACER-MASK) Comments:   Reason for Stopping:               I certify that this patient is confined to her home and needs intermittent skilled nursing care, physical therapy and occupational therapy.

## 2018-01-18 VITALS
HEART RATE: 45 BPM | WEIGHT: 173 LBS | RESPIRATION RATE: 16 BRPM | HEIGHT: 61 IN | OXYGEN SATURATION: 96 % | TEMPERATURE: 98 F | SYSTOLIC BLOOD PRESSURE: 137 MMHG | BODY MASS INDEX: 32.66 KG/M2 | DIASTOLIC BLOOD PRESSURE: 63 MMHG

## 2018-01-18 LAB
ALBUMIN SERPL BCP-MCNC: 2.7 G/DL
ANION GAP SERPL CALC-SCNC: 8 MMOL/L
BUN SERPL-MCNC: 24 MG/DL
CALCIUM SERPL-MCNC: 7.2 MG/DL
CHLORIDE SERPL-SCNC: 110 MMOL/L
CO2 SERPL-SCNC: 24 MMOL/L
CREAT SERPL-MCNC: 0.7 MG/DL
EST. GFR  (AFRICAN AMERICAN): >60 ML/MIN/1.73 M^2
EST. GFR  (NON AFRICAN AMERICAN): >60 ML/MIN/1.73 M^2
GLUCOSE SERPL-MCNC: 93 MG/DL
PHOSPHATE SERPL-MCNC: 3.3 MG/DL
POTASSIUM SERPL-SCNC: 4.2 MMOL/L
SODIUM SERPL-SCNC: 142 MMOL/L

## 2018-01-18 PROCEDURE — 27000221 HC OXYGEN, UP TO 24 HOURS

## 2018-01-18 PROCEDURE — 36415 COLL VENOUS BLD VENIPUNCTURE: CPT

## 2018-01-18 PROCEDURE — 94640 AIRWAY INHALATION TREATMENT: CPT

## 2018-01-18 PROCEDURE — 25000242 PHARM REV CODE 250 ALT 637 W/ HCPCS: Performed by: INTERNAL MEDICINE

## 2018-01-18 PROCEDURE — 80069 RENAL FUNCTION PANEL: CPT

## 2018-01-18 PROCEDURE — 94761 N-INVAS EAR/PLS OXIMETRY MLT: CPT

## 2018-01-18 PROCEDURE — 99217 PR OBSERVATION CARE DISCHARGE: CPT | Mod: ,,, | Performed by: INTERNAL MEDICINE

## 2018-01-18 PROCEDURE — G0378 HOSPITAL OBSERVATION PER HR: HCPCS

## 2018-01-18 PROCEDURE — 25000003 PHARM REV CODE 250: Performed by: INTERNAL MEDICINE

## 2018-01-18 PROCEDURE — 94760 N-INVAS EAR/PLS OXIMETRY 1: CPT

## 2018-01-18 RX ADMIN — PANTOPRAZOLE SODIUM 40 MG: 40 TABLET, DELAYED RELEASE ORAL at 10:01

## 2018-01-18 RX ADMIN — TIMOLOL MALEATE 1 DROP: 5 SOLUTION OPHTHALMIC at 10:01

## 2018-01-18 RX ADMIN — ESCITALOPRAM OXALATE 10 MG: 10 TABLET ORAL at 10:01

## 2018-01-18 RX ADMIN — ACETAMINOPHEN 500 MG: 500 TABLET ORAL at 06:01

## 2018-01-18 RX ADMIN — ACETAMINOPHEN 500 MG: 500 TABLET ORAL at 10:01

## 2018-01-18 RX ADMIN — DICYCLOMINE HYDROCHLORIDE 10 MG: 10 CAPSULE ORAL at 10:01

## 2018-01-18 RX ADMIN — BRIMONIDINE TARTRATE 1 DROP: 1.5 SOLUTION OPHTHALMIC at 10:01

## 2018-01-18 RX ADMIN — Medication 1 CAPSULE: at 10:01

## 2018-01-18 RX ADMIN — IPRATROPIUM BROMIDE AND ALBUTEROL SULFATE 3 ML: .5; 3 SOLUTION RESPIRATORY (INHALATION) at 09:01

## 2018-01-18 RX ADMIN — TRAMADOL HYDROCHLORIDE 50 MG: 50 TABLET, COATED ORAL at 10:01

## 2018-01-18 RX ADMIN — ASPIRIN 81 MG 81 MG: 81 TABLET ORAL at 10:01

## 2018-01-18 RX ADMIN — FLUTICASONE FUROATE AND VILANTEROL TRIFENATATE 1 PUFF: 100; 25 POWDER RESPIRATORY (INHALATION) at 10:01

## 2018-01-18 RX ADMIN — NAPROXEN 500 MG: 250 TABLET ORAL at 10:01

## 2018-01-18 RX ADMIN — AMLODIPINE BESYLATE 2.5 MG: 2.5 TABLET ORAL at 10:01

## 2018-01-18 RX ADMIN — POLYETHYLENE GLYCOL 3350 17 G: 17 POWDER, FOR SOLUTION ORAL at 06:01

## 2018-01-18 NOTE — PT/OT/SLP PROGRESS
Physical Therapy      Patient Name:  Irina Polk   MRN:  [id number]    Patient not seen today secondary to pt preparing for discharge home; per RN, PT orders to be discontinued per MD- pt will discharge home with home health.     Yanely Shearer, PT, DPT   1/18/2018

## 2018-01-18 NOTE — PROGRESS NOTES
IV removed.  Bleeding controlled.  Awaiting PT to see pt.  Sitter at bedside.  Asked sitter to call pt's ride once PT has seen pt.  Will review discharge papers with family once they arrive.

## 2018-01-18 NOTE — PROGRESS NOTES
Family at bedside.  Discharge papers reviewed with family.  Family verbalizes understanding.  No complaints voiced.  Awaiting wheelchair transport.  Family bringing pt home.

## 2018-01-18 NOTE — NURSING
Pt removed IV; several attempts to place new one unsuccessfully. SHYAM Mendoza notified and states ok to leave out until primary team comes in d/t pt possible d/c to home today.

## 2018-01-19 LAB — BACTERIA UR CULT: NORMAL

## 2018-01-23 NOTE — DISCHARGE SUMMARY
[facility]  Discharge Summary  [facility] Medicine      Admit Date: 1/16/2018    Discharge Date and Time: 1/18/2018  3:05 PM    Discharge Provider: Sandrita Mccall    Reason for Admission:  Intractable abdominal pain due to opioid-induced constipation    Hospital Course (synopsis of major diagnoses, care, treatment, and services provided during the course of the hospital stay):    Intractable abdominal pain due to opioid-induced constipation  S/p brown bomb with large BM in ER. Gave patient miralaz 17 g q2h while awake x 17 doses. Acetaminophen 1 g IV q8h x 3 doses, dicyclomine 10 mg BID, protonix 40 mg daily, methylnaltrexone 12 mcg subq q24h x 2, reglan 10 mg QID. Stopped use of narcotics. NS at 100 mL/h x 2 L. Ketorolac 15 mg IV q6h prn. Abdominal pain improved by time of discharge, but still somewhat tender to palpation.     UA equivocal for UTI. Patient with elevated WBC upon admission but was normal the next day without abx.     HTN  - continue metoprolol XL 25 mg daily and amlodipine 2.5 mg daily     Glaucoma - continue drops    Back pain due to recent vertebral compression - family requested pain management other than acetaminophen as it alone is not effective for her pain. Sent patient home on tramadol as per family request.     Debility - PT/OT    Consults: None    Final Diagnoses:    Principal Problem: Intractable abdominal pain   Secondary Diagnoses:   Active Hospital Problems    Diagnosis  POA    *Intractable abdominal pain [R10.9]  Yes     Priority: 1 - High    Drug-induced constipation [K59.03]  Yes     Priority: 1 - High    Essential hypertension [I10]  Yes     Priority: 2     Glaucoma [H40.9]  Yes      Resolved Hospital Problems    Diagnosis Date Resolved POA   No resolved problems to display.       Discharged Condition: stable    Disposition: Home or Self Care    Follow Up/Patient Instructions:     Medications:  Reconciled Home Medications:   Discharge Medication List as of 1/18/2018  11:56 AM      START taking these medications    Details   senna-docusate 8.6-50 mg (PERICOLACE) 8.6-50 mg per tablet Take 2 tablets by mouth 2 (two) times daily., Starting Wed 1/17/2018, OTC      traMADol (ULTRAM) 50 mg tablet Take 1 tablet (50 mg total) by mouth every 6 (six) hours as needed (pain not controlled with tylenol)., Starting Wed 1/17/2018, Normal         CONTINUE these medications which have NOT CHANGED    Details   acetaminophen (TYLENOL) 500 mg Cap Take 2 capsules (1,000 mg total) by mouth every 6 (six) hours as needed (Pain)., Starting Sun 1/7/2018, OTC      albuterol-ipratropium 2.5mg-0.5mg/3mL (DUO-NEB) 0.5 mg-3 mg(2.5 mg base)/3 mL nebulizer solution inhale contents of 1 vial every 4 hours if needed for wheezing, Normal      amLODIPine (NORVASC) 2.5 MG tablet TAKE 1 TABLET DAILY, Normal      aspirin 81 MG Chew Take 81 mg by mouth once daily., Until Discontinued, Historical Med      COMBIGAN 0.2-0.5 % Drop Place 1 drop into both eyes 2 (two) times daily. , Starting Thu 7/13/2017, Historical Med      cyanocobalamin 1,000 mcg/mL injection Inject 1 mL (1,000 mcg total) into the muscle once a week. Dispense syringes as well., Starting Tue 9/26/2017, Normal      dicyclomine (BENTYL) 10 MG capsule Take 1 capsule (10 mg total) by mouth 2 (two) times daily., Starting Mon 8/14/2017, Normal      escitalopram oxalate (LEXAPRO) 10 MG tablet TAKE 1 TABLET DAILY, Normal      fluticasone-salmeterol 100-50 mcg/dose (ADVAIR) 100-50 mcg/dose diskus inhaler Inhale 1 puff into the lungs 2 (two) times daily. Controller, Starting Fri 10/20/2017, Until Sat 10/20/2018, Normal      metoprolol succinate (TOPROL-XL) 25 MG 24 hr tablet Take 1 tablet by mouth once daily., Starting 5/18/2016, Until Discontinued, Historical Med      OMEGA-3S/DHA/EPA/FISH OIL (OMEGA 3 ORAL) Take 1 capsule by mouth once daily. , Historical Med      omeprazole (PRILOSEC) 20 MG capsule Take 1 capsule (20 mg total) by mouth once daily., Starting Mon  8/14/2017, Normal      oxybutynin (DITROPAN) 5 MG Tab Take 1 tablet (5 mg total) by mouth 3 (three) times daily as needed (bladder spasms)., Starting 3/16/2017, Until Fri 3/16/18, Normal         STOP taking these medications       (Magic mouthwash) 1:1:1 Benadryl 12.5mg/5ml liq, aluminum & magnesium hydroxide-simehticone (Maalox), lidocaine viscous 2% Comments:   Reason for Stopping:         inhalation device (BREATHERITE RIGID SPACER-MASK) Comments:   Reason for Stopping:               Discharge Procedure Orders  Call MD for:  temperature >100.4     Call MD for:  persistent nausea and vomiting or diarrhea     Call MD for:  severe uncontrolled pain     Call MD for:  redness, tenderness, or signs of infection (pain, swelling, redness, odor or green/yellow discharge around incision site)     Call MD for:  difficulty breathing or increased cough     Call MD for:  severe persistent headache     Call MD for:  worsening rash     Call MD for:  persistent dizziness, light-headedness, or visual disturbances     Call MD for:  increased confusion or weakness       Follow-up Information     [facility].    Specialty:  Home Health Services  Contact information:  [address]  [address]  [facility] [address]  [phone]             Blanca Quinones MD On 1/25/2018.    Specialty:  Internal Medicine  Why:  11:30am , For Hospital Follow-up  Contact information:  [address]  [address]  [facility] [address]  [phone]                   I spent more than 30 minutes total on this discharge including seeing and examining patient, note writing, reconcilling medications, and discussion with other health providers on team.

## 2018-01-24 ENCOUNTER — HOSPITAL ENCOUNTER (EMERGENCY)
Facility: OTHER | Age: 83
Discharge: HOME | End: 2018-01-24
Attending: EMERGENCY MEDICINE
Payer: MEDICARE

## 2018-01-24 VITALS
TEMPERATURE: 98 F | SYSTOLIC BLOOD PRESSURE: 193 MMHG | RESPIRATION RATE: 20 BRPM | WEIGHT: 170 LBS | DIASTOLIC BLOOD PRESSURE: 87 MMHG | BODY MASS INDEX: 30.12 KG/M2 | HEART RATE: 64 BPM | OXYGEN SATURATION: 95 % | HEIGHT: 63 IN

## 2018-01-24 DIAGNOSIS — R10.9 ABDOMINAL PAIN: ICD-10-CM

## 2018-01-24 DIAGNOSIS — R10.30 LOWER ABDOMINAL PAIN: Primary | ICD-10-CM

## 2018-01-24 DIAGNOSIS — R05.9 COUGH: ICD-10-CM

## 2018-01-24 LAB
ALBUMIN SERPL BCP-MCNC: 3.4 G/DL
ALP SERPL-CCNC: 84 U/L
ALT SERPL W/O P-5'-P-CCNC: 13 U/L
ANION GAP SERPL CALC-SCNC: 11 MMOL/L
AST SERPL-CCNC: 13 U/L
BASOPHILS # BLD AUTO: 0.01 K/UL
BASOPHILS NFR BLD: 0.1 %
BILIRUB SERPL-MCNC: 0.3 MG/DL
BILIRUB UR QL STRIP: NEGATIVE
BUN SERPL-MCNC: 15 MG/DL
CALCIUM SERPL-MCNC: 8.9 MG/DL
CHLORIDE SERPL-SCNC: 106 MMOL/L
CLARITY UR: CLEAR
CO2 SERPL-SCNC: 25 MMOL/L
COLOR UR: YELLOW
CREAT SERPL-MCNC: 0.7 MG/DL
DIFFERENTIAL METHOD: ABNORMAL
EOSINOPHIL # BLD AUTO: 0.3 K/UL
EOSINOPHIL NFR BLD: 3.3 %
ERYTHROCYTE [DISTWIDTH] IN BLOOD BY AUTOMATED COUNT: 19.1 %
EST. GFR  (AFRICAN AMERICAN): >60 ML/MIN/1.73 M^2
EST. GFR  (NON AFRICAN AMERICAN): >60 ML/MIN/1.73 M^2
GLUCOSE SERPL-MCNC: 103 MG/DL
GLUCOSE UR QL STRIP: NEGATIVE
HCT VFR BLD AUTO: 38.4 %
HGB BLD-MCNC: 11.9 G/DL
HGB UR QL STRIP: NEGATIVE
KETONES UR QL STRIP: NEGATIVE
LEUKOCYTE ESTERASE UR QL STRIP: NEGATIVE
LYMPHOCYTES # BLD AUTO: 1.9 K/UL
LYMPHOCYTES NFR BLD: 20.4 %
MCH RBC QN AUTO: 28.3 PG
MCHC RBC AUTO-ENTMCNC: 31 G/DL
MCV RBC AUTO: 91 FL
MONOCYTES # BLD AUTO: 0.7 K/UL
MONOCYTES NFR BLD: 7.4 %
NEUTROPHILS # BLD AUTO: 6.5 K/UL
NEUTROPHILS NFR BLD: 68.2 %
NITRITE UR QL STRIP: NEGATIVE
PH UR STRIP: 7 [PH] (ref 5–8)
PLATELET # BLD AUTO: 236 K/UL
PMV BLD AUTO: 9.3 FL
POTASSIUM SERPL-SCNC: 3.6 MMOL/L
PROT SERPL-MCNC: 6.3 G/DL
PROT UR QL STRIP: NEGATIVE
RBC # BLD AUTO: 4.2 M/UL
SODIUM SERPL-SCNC: 142 MMOL/L
SP GR UR STRIP: 1.02 (ref 1–1.03)
URN SPEC COLLECT METH UR: NORMAL
UROBILINOGEN UR STRIP-ACNC: NEGATIVE EU/DL
WBC # BLD AUTO: 9.5 K/UL

## 2018-01-24 PROCEDURE — 80053 COMPREHEN METABOLIC PANEL: CPT

## 2018-01-24 PROCEDURE — 93005 ELECTROCARDIOGRAM TRACING: CPT

## 2018-01-24 PROCEDURE — 25000003 PHARM REV CODE 250: Performed by: EMERGENCY MEDICINE

## 2018-01-24 PROCEDURE — 25500020 PHARM REV CODE 255: Performed by: EMERGENCY MEDICINE

## 2018-01-24 PROCEDURE — 99284 EMERGENCY DEPT VISIT MOD MDM: CPT | Mod: 25

## 2018-01-24 PROCEDURE — 85025 COMPLETE CBC W/AUTO DIFF WBC: CPT

## 2018-01-24 PROCEDURE — 87086 URINE CULTURE/COLONY COUNT: CPT

## 2018-01-24 PROCEDURE — 81003 URINALYSIS AUTO W/O SCOPE: CPT

## 2018-01-24 PROCEDURE — 93010 ELECTROCARDIOGRAM REPORT: CPT | Mod: ,,, | Performed by: INTERNAL MEDICINE

## 2018-01-24 RX ORDER — TRAMADOL HYDROCHLORIDE 50 MG/1
50 TABLET ORAL
Status: COMPLETED | OUTPATIENT
Start: 2018-01-24 | End: 2018-01-24

## 2018-01-24 RX ADMIN — TRAMADOL HYDROCHLORIDE 50 MG: 50 TABLET, FILM COATED ORAL at 08:01

## 2018-01-24 RX ADMIN — TRAMADOL HYDROCHLORIDE 50 MG: 50 TABLET, FILM COATED ORAL at 11:01

## 2018-01-24 RX ADMIN — IOHEXOL 75 ML: 350 INJECTION, SOLUTION INTRAVENOUS at 09:01

## 2018-01-25 NOTE — ED TRIAGE NOTES
"Pt presents to ED with c/o abdominal pain starting today. Pain is rated 10/10, "all over". Pt's daughter reports being called by the home health nurse aprox 2 hrs PTA to inform her of c/o abdominal pain also stating that the pt appeared "out of it". Daughter states that she is not as alert as she normally is. Pt's daughter also reports hx of UTI, stating pt has been taking cipro for 3 days to treat with pain medication.   "

## 2018-01-25 NOTE — ED PROVIDER NOTES
Encounter Date: 1/24/2018    SCRIBE #1 NOTE: Halle MIR am scribing for, and in the presence of, Dr. Phillips.       History     Chief Complaint   Patient presents with    Abdominal Pain     x 3 weeks, back pain from a compression fx 3 wks ago,  was admitted to [facility] last week for constipation, appetite is good, episodic burning upon urination and has been taking meds for a UTI since Monday    Back Pain     Time seen by provider: 8:04 PM    This is a 88 y.o. female with HTN, GERD, and chronic aspiration who presents with complaint of suprapubic abdominal pain today. Pt's daughter states that home health nurse called approximately 2 hours ago because pt was complaining of abdominal pain and also seemed out of it.  Daughter endorses decreased responsiveness and not as alert as usual.   Pt was given Rx narcotics for pain management of compression fracture on 1/5/18. On 1/16/18, pt revisited the ED due to abdominal pain secondary to constipation and therefore required an enema and bowel regimen.  She is now having regular BMs since being home for the past week.  She is currently taking pain medication sparingly. Pt had a normal BM today.  She has been on cipro for 3 days to manage a UTI found via urinalysis; she denies any urinary symptoms. She also denies fever, chills, N/V/D, and weakness. Pt has a chronic cough. She has a PSHx of appendectomy and hysterectomy.      The history is provided by the patient and a relative.     Review of patient's allergies indicates:   Allergen Reactions    Codeine Other (See Comments)     Pt unsure    Penicillins      Past Medical History:   Diagnosis Date    GERD (gastroesophageal reflux disease)     Hypertension      Past Surgical History:   Procedure Laterality Date    APPENDECTOMY      HYSTERECTOMY      TOTAL KNEE ARTHROPLASTY Bilateral      History reviewed. No pertinent family history.  Social History   Substance Use Topics    Smoking status: Never Smoker     Smokeless tobacco: Never Used    Alcohol use 0.6 oz/week     1 Glasses of wine per week      Comment: occasionally     Review of Systems   Constitutional: Positive for activity change. Negative for chills and fever.   HENT: Negative for congestion, rhinorrhea and sore throat.    Respiratory: Negative for cough and shortness of breath.    Cardiovascular: Negative for chest pain.   Gastrointestinal: Positive for abdominal pain (suprapubic). Negative for constipation, diarrhea, nausea and vomiting.   Endocrine: Negative for polyuria.   Genitourinary: Negative for decreased urine volume, difficulty urinating, dysuria and hematuria.   Musculoskeletal: Negative for back pain.   Skin: Negative for rash.   Allergic/Immunologic: Negative for immunocompromised state.   Neurological: Negative for dizziness, weakness, light-headedness, numbness and headaches.   Hematological: Does not bruise/bleed easily.   Psychiatric/Behavioral: Negative for confusion.       Physical Exam     Initial Vitals [01/24/18 1931]   BP Pulse Resp Temp SpO2   (!) 173/83 79 20 97.3 °F (36.3 °C) (!) 94 %      MAP       113         Physical Exam    Nursing note and vitals reviewed.  Constitutional: She appears well-developed and well-nourished. She is not diaphoretic. No distress.   HENT:   Head: Normocephalic and atraumatic.   Right Ear: External ear normal.   Left Ear: External ear normal.   Eyes: Conjunctivae are normal.   Neck: Normal range of motion. Neck supple.   Cardiovascular: Normal rate, regular rhythm and normal heart sounds.   Pulmonary/Chest: Breath sounds normal. No respiratory distress. She has no wheezes. She has no rhonchi. She has no rales. She exhibits no tenderness.   Abdominal: Soft. Bowel sounds are normal. She exhibits no distension. There is tenderness. There is no rebound, no guarding and no CVA tenderness.   Tenderness noted along the lower abdominal area.   Musculoskeletal: Normal range of motion. She exhibits no edema or  tenderness.   Neurological: She is alert and oriented to person, place, and time. She has normal strength. No sensory deficit.   Skin: Skin is warm and dry. No rash noted. No erythema.   Psychiatric: She has a normal mood and affect. Her behavior is normal.         ED Course   Procedures  Labs Reviewed   COMPREHENSIVE METABOLIC PANEL - Abnormal; Notable for the following:        Result Value    Albumin 3.4 (*)     All other components within normal limits   CBC W/ AUTO DIFFERENTIAL - Abnormal; Notable for the following:     Hemoglobin 11.9 (*)     MCHC 31.0 (*)     RDW 19.1 (*)     All other components within normal limits   CULTURE, URINE   URINALYSIS     EKG Readings: (Independently Interpreted)   20:38 - NSR at 63 bpm. RAD. Lateral T wave inversion. No other ST or ischemic changes.       X-Rays:   Independently Interpreted Readings:   Chest X-Ray: Trachea midline. Cardiomegaly. No effusion, edema or pneumothorax.       Imaging Results          CT Abdomen Pelvis With Contrast (Final result)  Result time 01/24/18 22:42:45    Final result by Ant López MD (01/24/18 22:42:45)                 Impression:      1. No acute findings in the abdomen or pelvis.    2. Large hiatal hernia. Moderate volume of solid stool in the colon suggestive of constipation.    3. Gallbladder sludge and/or small stones.    4. Old compression deformities at T12 and L1.    5. Cardiomegaly.      Electronically signed by: Ant López  Date:     01/24/18  Time:    22:42              Narrative:    CT ABDOMEN AND PELVIS WITH CONTRAST    CLINICAL INDICATION:  Abdominal pain.     TECHNIQUE:  CT images were acquired of the abdomen and pelvis after the administration of 75 mL Omnipaque 350 intravenous contrast.  No oral contrast was used.  Multiplanar reconstructions were performed.  Images in the delayed phase were also acquired.    COMPARISON: CT abdomen and pelvis without contrast, 01/16/2018.    FINDINGS:  Lower chest: Heart is  enlarged. No pericardial effusion.   There is mild bibasilar edema and trace pleural fluid. Large hiatal hernia.    Abdomen: Liver is normal in size and contour.  No focal hepatic lesion.  Gallbladder demonstrates moderately hyperdense internal contents, which could represent small stones or sludge.  No intra-or extrahepatic biliary ductal dilatation.    Spleen, adrenals, and pancreas are unremarkable.      Kidneys are symmetric.  Normal excretion of contrast on the delayed phase. Small peripelvic renal cysts bilaterally. Nonobstructing stone in the mid right kidney.  No suspicious renal lesion.  No hydronephrosis.    No distended loops of small bowel.  Moderate volume of solid stool is again noted throughout the colon. There are numerous colonic diverticula without surrounding inflammation.    Abdominal aorta is normal in course and caliber with moderate calcific atherosclerosis.     Portal, splenic, and superior mesenteric veins are patent.    No abdominal lymphadenopathy.    No free fluid or pneumoperitoneum.    Pelvis: Urinary bladder and rectum are unremarkable.  No free fluid in the pelvis.  No pelvic lymphadenopathy.    Bones and soft tissues: Stable appearance of the lumbar spine, including a stable compression deformity at T12 and L1. No aggressive osseous lesions.                             X-Ray Chest 1 View (Final result)  Result time 01/24/18 21:32:08    Final result by Giuseppe Kaye MD (01/24/18 21:32:08)                 Impression:       Stable enlargement of the cardiomediastinal silhouette.              Electronically signed by: GIUSEPPE KAYE MD  Date:     01/24/18  Time:    21:32              Narrative:    Exam: [id number]  01/24/18  20:21:07 [id number] ([facility]) : XR CHEST 1 VIEW    Technique:    Single frontal chest x-ray    Comparison:    10/6/17    Findings:      Monitoring EKG leads are present.  The trachea is unremarkable.  There is stable enlargement of the cardiomediastinal silhouette.  There is stable  appearance of a hiatal hernia.  There are calcifications of the aortic knob.  The hemidiaphragms are within normal limits.  There is no evidence of free air beneath the hemidiaphragms.  There are no pleural effusions.  There is no evidence of a pneumothorax.  There is no evidence of pneumomediastinum.  There are chronic pulmonary interstitial changes.  There are degenerative changes in the osseous structures.  The subcutaneous tissues are within normal limits.                              Medical Decision Making:   History:   Old Medical Records: I decided to obtain old medical records.  Old Records Summarized: records from clinic visits, records from previous admission(s) and other records.  Initial Assessment:   8:04PM:  Pt is a 89 y/o F who presents to ED with lower abdominal pain along with decreased responsiveness. Pt appears well, nontoxic.  Her abdomen is soft, mildly tender on exam in the lower abdomen.  She does seem sleepy but easily arousable. Will plan for labs, imaging, will continue to follow and reassess.    Independently Interpreted Test(s):   I have ordered and independently interpreted X-rays - see prior notes.  I have ordered and independently interpreted EKG Reading(s) - see prior notes  Clinical Tests:   Lab Tests: Ordered and Reviewed  Radiological Study: Ordered and Reviewed  Medical Tests: Ordered and Reviewed    10:30PM: Pt doing well, remains stable. She is feeling much better and asking to go home. Her labs are unremarkable so far.  Still waiting CT read.  I updated pt and family regarding results, will continue to follow.    11:00PM:  Pts CT is negative for any acute findings, though still shows a significant amount of constipation which could be contributing.  She does also have GB sludge which I think is less likely contributing.  I updated pt and family regarding results.  They do admit that pt has not been as regimented about her bowel regimen since being home, and will start her  laxative 3x day as previously prescribed.  Pt is requesting to go home.  I counseled pt regarding supportive care measures.  I have discussed with the pt ED return warnings and need for close PCP f/u.  Pt agreeable to plan and all questions answered.  I feel that pt is stable for discharge and management as an outpatient and no further intervention is needed at this time.  Pt is comfortable returning to the ED if needed.  Will DC home in stable condition.                Scribe Attestation:   Scribe #1: I performed the above scribed service and the documentation accurately describes the services I performed. I attest to the accuracy of the note.    Attending Attestation:           Physician Attestation for Scribe:  Physician Attestation Statement for Scribe #1: I, Dr. Phillips, reviewed documentation, as scribed by Halle Astorga in my presence, and it is both accurate and complete.                 ED Course      Clinical Impression:     1. Lower abdominal pain    2. Cough    3. Abdominal pain                               Rupal Phillips MD  01/24/18 1708

## 2018-01-26 LAB — BACTERIA UR CULT: NORMAL

## 2018-05-17 RX ORDER — ESCITALOPRAM OXALATE 10 MG/1
TABLET ORAL
Qty: 90 TABLET | Refills: 3 | Status: SHIPPED | OUTPATIENT
Start: 2018-05-17 | End: 2019-11-11 | Stop reason: SDUPTHER

## 2018-06-22 ENCOUNTER — LAB VISIT (OUTPATIENT)
Dept: LAB | Facility: HOSPITAL | Age: 83
End: 2018-06-22
Attending: INTERNAL MEDICINE
Payer: MEDICARE

## 2018-06-22 DIAGNOSIS — N39.0 URINARY TRACT INFECTION WITHOUT HEMATURIA, SITE UNSPECIFIED: ICD-10-CM

## 2018-06-22 PROCEDURE — 87086 URINE CULTURE/COLONY COUNT: CPT

## 2018-06-22 PROCEDURE — 87186 SC STD MICRODIL/AGAR DIL: CPT

## 2018-06-22 PROCEDURE — 87088 URINE BACTERIA CULTURE: CPT

## 2018-06-22 PROCEDURE — 87077 CULTURE AEROBIC IDENTIFY: CPT

## 2018-06-25 LAB — BACTERIA UR CULT: NORMAL

## 2018-07-06 ENCOUNTER — LAB VISIT (OUTPATIENT)
Dept: LAB | Facility: HOSPITAL | Age: 83
End: 2018-07-06
Attending: INTERNAL MEDICINE
Payer: MEDICARE

## 2018-07-06 DIAGNOSIS — N39.0 URINARY TRACT INFECTION WITHOUT HEMATURIA, SITE UNSPECIFIED: ICD-10-CM

## 2018-07-06 PROCEDURE — 87186 SC STD MICRODIL/AGAR DIL: CPT

## 2018-07-06 PROCEDURE — 87088 URINE BACTERIA CULTURE: CPT

## 2018-07-06 PROCEDURE — 87086 URINE CULTURE/COLONY COUNT: CPT

## 2018-07-06 PROCEDURE — 87077 CULTURE AEROBIC IDENTIFY: CPT

## 2018-07-09 LAB — BACTERIA UR CULT: NORMAL

## 2018-07-22 ENCOUNTER — HOSPITAL ENCOUNTER (INPATIENT)
Facility: HOSPITAL | Age: 83
LOS: 6 days | Discharge: HOME HEALTH | DRG: 193 | End: 2018-07-28
Attending: EMERGENCY MEDICINE | Admitting: HOSPITALIST
Payer: MEDICARE

## 2018-07-22 DIAGNOSIS — J44.1 COPD WITH ACUTE EXACERBATION: ICD-10-CM

## 2018-07-22 DIAGNOSIS — I10 ESSENTIAL HYPERTENSION: ICD-10-CM

## 2018-07-22 DIAGNOSIS — J69.0 ASPIRATION PNEUMONIA OF RIGHT UPPER LOBE, UNSPECIFIED ASPIRATION PNEUMONIA TYPE: ICD-10-CM

## 2018-07-22 DIAGNOSIS — R05.9 COUGH: ICD-10-CM

## 2018-07-22 DIAGNOSIS — J18.9 PNEUMONIA OF RIGHT UPPER LOBE DUE TO INFECTIOUS ORGANISM: Primary | ICD-10-CM

## 2018-07-22 DIAGNOSIS — R09.02 HYPOXIA: ICD-10-CM

## 2018-07-22 DIAGNOSIS — J96.01 ACUTE RESPIRATORY FAILURE WITH HYPOXIA: ICD-10-CM

## 2018-07-22 DIAGNOSIS — R00.1 BRADYCARDIA: ICD-10-CM

## 2018-07-22 PROBLEM — J16.0 PNEUMONIA OF RIGHT UPPER LOBE DUE TO CHLAMYDIA SPECIES: Status: ACTIVE | Noted: 2018-07-22

## 2018-07-22 PROBLEM — R53.81 DEBILITY: Status: ACTIVE | Noted: 2018-07-22

## 2018-07-22 LAB
ALBUMIN SERPL BCP-MCNC: 3.8 G/DL
ALP SERPL-CCNC: 66 U/L
ALT SERPL W/O P-5'-P-CCNC: 11 U/L
AMORPH CRY UR QL COMP ASSIST: NORMAL
ANION GAP SERPL CALC-SCNC: 10 MMOL/L
AST SERPL-CCNC: 19 U/L
BACTERIA #/AREA URNS AUTO: NORMAL /HPF
BASOPHILS # BLD AUTO: 0.04 K/UL
BASOPHILS NFR BLD: 0.6 %
BILIRUB SERPL-MCNC: 0.6 MG/DL
BILIRUB UR QL STRIP: NEGATIVE
BNP SERPL-MCNC: 78 PG/ML
BUN SERPL-MCNC: 16 MG/DL
CALCIUM SERPL-MCNC: 9.3 MG/DL
CHLORIDE SERPL-SCNC: 105 MMOL/L
CLARITY UR REFRACT.AUTO: ABNORMAL
CO2 SERPL-SCNC: 24 MMOL/L
COLOR UR AUTO: YELLOW
CREAT SERPL-MCNC: 0.8 MG/DL
DIFFERENTIAL METHOD: ABNORMAL
EOSINOPHIL # BLD AUTO: 0.5 K/UL
EOSINOPHIL NFR BLD: 6.7 %
ERYTHROCYTE [DISTWIDTH] IN BLOOD BY AUTOMATED COUNT: 15.7 %
EST. GFR  (AFRICAN AMERICAN): >60 ML/MIN/1.73 M^2
EST. GFR  (NON AFRICAN AMERICAN): >60 ML/MIN/1.73 M^2
GLUCOSE SERPL-MCNC: 96 MG/DL
GLUCOSE UR QL STRIP: NEGATIVE
HCT VFR BLD AUTO: 39.9 %
HGB BLD-MCNC: 12.8 G/DL
HGB UR QL STRIP: NEGATIVE
IMM GRANULOCYTES # BLD AUTO: 0.03 K/UL
IMM GRANULOCYTES NFR BLD AUTO: 0.4 %
KETONES UR QL STRIP: NEGATIVE
LACTATE SERPL-SCNC: 1.7 MMOL/L
LEUKOCYTE ESTERASE UR QL STRIP: NEGATIVE
LYMPHOCYTES # BLD AUTO: 2.1 K/UL
LYMPHOCYTES NFR BLD: 29.8 %
MCH RBC QN AUTO: 31.1 PG
MCHC RBC AUTO-ENTMCNC: 32.1 G/DL
MCV RBC AUTO: 97 FL
MICROSCOPIC COMMENT: NORMAL
MONOCYTES # BLD AUTO: 0.6 K/UL
MONOCYTES NFR BLD: 7.9 %
NEUTROPHILS # BLD AUTO: 3.9 K/UL
NEUTROPHILS NFR BLD: 54.6 %
NITRITE UR QL STRIP: NEGATIVE
NRBC BLD-RTO: 0 /100 WBC
PH UR STRIP: 7 [PH] (ref 5–8)
PLATELET # BLD AUTO: 233 K/UL
PMV BLD AUTO: 10 FL
POTASSIUM SERPL-SCNC: 4 MMOL/L
PROT SERPL-MCNC: 6.9 G/DL
PROT UR QL STRIP: NEGATIVE
RBC # BLD AUTO: 4.12 M/UL
RBC #/AREA URNS AUTO: 1 /HPF (ref 0–4)
SODIUM SERPL-SCNC: 139 MMOL/L
SP GR UR STRIP: 1.02 (ref 1–1.03)
SQUAMOUS #/AREA URNS AUTO: 1 /HPF
TROPONIN I SERPL DL<=0.01 NG/ML-MCNC: 0.01 NG/ML
URN SPEC COLLECT METH UR: ABNORMAL
UROBILINOGEN UR STRIP-ACNC: NEGATIVE EU/DL
WBC # BLD AUTO: 7.05 K/UL
WBC #/AREA URNS AUTO: 2 /HPF (ref 0–5)

## 2018-07-22 PROCEDURE — 81001 URINALYSIS AUTO W/SCOPE: CPT

## 2018-07-22 PROCEDURE — 83605 ASSAY OF LACTIC ACID: CPT

## 2018-07-22 PROCEDURE — 87088 URINE BACTERIA CULTURE: CPT

## 2018-07-22 PROCEDURE — 11000001 HC ACUTE MED/SURG PRIVATE ROOM

## 2018-07-22 PROCEDURE — 87184 SC STD DISK METHOD PER PLATE: CPT

## 2018-07-22 PROCEDURE — 87077 CULTURE AEROBIC IDENTIFY: CPT

## 2018-07-22 PROCEDURE — 99223 1ST HOSP IP/OBS HIGH 75: CPT | Mod: AI,,, | Performed by: HOSPITALIST

## 2018-07-22 PROCEDURE — 87040 BLOOD CULTURE FOR BACTERIA: CPT | Mod: 59

## 2018-07-22 PROCEDURE — 96365 THER/PROPH/DIAG IV INF INIT: CPT

## 2018-07-22 PROCEDURE — 25000003 PHARM REV CODE 250: Performed by: HOSPITALIST

## 2018-07-22 PROCEDURE — 84484 ASSAY OF TROPONIN QUANT: CPT

## 2018-07-22 PROCEDURE — 25000242 PHARM REV CODE 250 ALT 637 W/ HCPCS

## 2018-07-22 PROCEDURE — 94640 AIRWAY INHALATION TREATMENT: CPT

## 2018-07-22 PROCEDURE — 96367 TX/PROPH/DG ADDL SEQ IV INF: CPT

## 2018-07-22 PROCEDURE — 99285 EMERGENCY DEPT VISIT HI MDM: CPT | Mod: 25

## 2018-07-22 PROCEDURE — 93005 ELECTROCARDIOGRAM TRACING: CPT

## 2018-07-22 PROCEDURE — 99285 EMERGENCY DEPT VISIT HI MDM: CPT | Mod: ,,, | Performed by: EMERGENCY MEDICINE

## 2018-07-22 PROCEDURE — 27000221 HC OXYGEN, UP TO 24 HOURS

## 2018-07-22 PROCEDURE — 63600175 PHARM REV CODE 636 W HCPCS: Performed by: EMERGENCY MEDICINE

## 2018-07-22 PROCEDURE — 87186 SC STD MICRODIL/AGAR DIL: CPT

## 2018-07-22 PROCEDURE — 96375 TX/PRO/DX INJ NEW DRUG ADDON: CPT

## 2018-07-22 PROCEDURE — 87086 URINE CULTURE/COLONY COUNT: CPT

## 2018-07-22 PROCEDURE — 25000242 PHARM REV CODE 250 ALT 637 W/ HCPCS: Performed by: HOSPITALIST

## 2018-07-22 PROCEDURE — 96372 THER/PROPH/DIAG INJ SC/IM: CPT

## 2018-07-22 PROCEDURE — 93010 ELECTROCARDIOGRAM REPORT: CPT | Mod: ,,, | Performed by: INTERNAL MEDICINE

## 2018-07-22 PROCEDURE — 83880 ASSAY OF NATRIURETIC PEPTIDE: CPT

## 2018-07-22 PROCEDURE — 25000003 PHARM REV CODE 250: Performed by: EMERGENCY MEDICINE

## 2018-07-22 PROCEDURE — 85025 COMPLETE CBC W/AUTO DIFF WBC: CPT

## 2018-07-22 PROCEDURE — 25000242 PHARM REV CODE 250 ALT 637 W/ HCPCS: Performed by: EMERGENCY MEDICINE

## 2018-07-22 PROCEDURE — 80053 COMPREHEN METABOLIC PANEL: CPT

## 2018-07-22 PROCEDURE — 94761 N-INVAS EAR/PLS OXIMETRY MLT: CPT

## 2018-07-22 PROCEDURE — 63600175 PHARM REV CODE 636 W HCPCS: Performed by: HOSPITALIST

## 2018-07-22 RX ORDER — DICLOFENAC SODIUM 10 MG/G
2 GEL TOPICAL 2 TIMES DAILY PRN
Status: DISCONTINUED | OUTPATIENT
Start: 2018-07-22 | End: 2018-07-28 | Stop reason: HOSPADM

## 2018-07-22 RX ORDER — SODIUM CHLORIDE 0.9 % (FLUSH) 0.9 %
3 SYRINGE (ML) INJECTION EVERY 6 HOURS PRN
Status: DISCONTINUED | OUTPATIENT
Start: 2018-07-22 | End: 2018-07-28 | Stop reason: HOSPADM

## 2018-07-22 RX ORDER — IPRATROPIUM BROMIDE AND ALBUTEROL SULFATE 2.5; .5 MG/3ML; MG/3ML
3 SOLUTION RESPIRATORY (INHALATION) EVERY 4 HOURS PRN
Status: DISCONTINUED | OUTPATIENT
Start: 2018-07-22 | End: 2018-07-28 | Stop reason: HOSPADM

## 2018-07-22 RX ORDER — METHYLPREDNISOLONE SOD SUCC 125 MG
125 VIAL (EA) INJECTION
Status: COMPLETED | OUTPATIENT
Start: 2018-07-22 | End: 2018-07-22

## 2018-07-22 RX ORDER — ENOXAPARIN SODIUM 100 MG/ML
40 INJECTION SUBCUTANEOUS EVERY 24 HOURS
Status: DISCONTINUED | OUTPATIENT
Start: 2018-07-22 | End: 2018-07-28 | Stop reason: HOSPADM

## 2018-07-22 RX ORDER — PANTOPRAZOLE SODIUM 40 MG/1
40 TABLET, DELAYED RELEASE ORAL DAILY
Status: DISCONTINUED | OUTPATIENT
Start: 2018-07-23 | End: 2018-07-28 | Stop reason: HOSPADM

## 2018-07-22 RX ORDER — IPRATROPIUM BROMIDE AND ALBUTEROL SULFATE 2.5; .5 MG/3ML; MG/3ML
3 SOLUTION RESPIRATORY (INHALATION)
Status: COMPLETED | OUTPATIENT
Start: 2018-07-22 | End: 2018-07-22

## 2018-07-22 RX ORDER — IPRATROPIUM BROMIDE AND ALBUTEROL SULFATE 2.5; .5 MG/3ML; MG/3ML
SOLUTION RESPIRATORY (INHALATION)
Status: COMPLETED
Start: 2018-07-22 | End: 2018-07-22

## 2018-07-22 RX ORDER — IPRATROPIUM BROMIDE AND ALBUTEROL SULFATE 2.5; .5 MG/3ML; MG/3ML
3 SOLUTION RESPIRATORY (INHALATION)
Status: DISCONTINUED | OUTPATIENT
Start: 2018-07-22 | End: 2018-07-28 | Stop reason: HOSPADM

## 2018-07-22 RX ORDER — PROMETHAZINE HYDROCHLORIDE AND CODEINE PHOSPHATE 6.25; 1 MG/5ML; MG/5ML
5 SOLUTION ORAL EVERY 4 HOURS PRN
Status: DISCONTINUED | OUTPATIENT
Start: 2018-07-22 | End: 2018-07-28 | Stop reason: HOSPADM

## 2018-07-22 RX ORDER — ESCITALOPRAM OXALATE 10 MG/1
10 TABLET ORAL DAILY
Status: DISCONTINUED | OUTPATIENT
Start: 2018-07-23 | End: 2018-07-28 | Stop reason: HOSPADM

## 2018-07-22 RX ORDER — METOPROLOL SUCCINATE 25 MG/1
25 TABLET, EXTENDED RELEASE ORAL DAILY
Status: DISCONTINUED | OUTPATIENT
Start: 2018-07-23 | End: 2018-07-28 | Stop reason: HOSPADM

## 2018-07-22 RX ORDER — BENZONATATE 100 MG/1
200 CAPSULE ORAL EVERY 6 HOURS PRN
Status: DISCONTINUED | OUTPATIENT
Start: 2018-07-22 | End: 2018-07-28 | Stop reason: HOSPADM

## 2018-07-22 RX ORDER — AMOXICILLIN 250 MG
2 CAPSULE ORAL 2 TIMES DAILY
Status: DISCONTINUED | OUTPATIENT
Start: 2018-07-22 | End: 2018-07-28 | Stop reason: HOSPADM

## 2018-07-22 RX ORDER — GUAIFENESIN 600 MG/1
600 TABLET, EXTENDED RELEASE ORAL 2 TIMES DAILY
Status: DISCONTINUED | OUTPATIENT
Start: 2018-07-22 | End: 2018-07-24

## 2018-07-22 RX ORDER — FLUTICASONE FUROATE AND VILANTEROL 100; 25 UG/1; UG/1
1 POWDER RESPIRATORY (INHALATION) DAILY
Status: DISCONTINUED | OUTPATIENT
Start: 2018-07-23 | End: 2018-07-28 | Stop reason: HOSPADM

## 2018-07-22 RX ORDER — TRAMADOL HYDROCHLORIDE 50 MG/1
50 TABLET ORAL EVERY 6 HOURS PRN
Status: DISCONTINUED | OUTPATIENT
Start: 2018-07-22 | End: 2018-07-28 | Stop reason: HOSPADM

## 2018-07-22 RX ORDER — DICYCLOMINE HYDROCHLORIDE 10 MG/1
10 CAPSULE ORAL 2 TIMES DAILY PRN
Status: DISCONTINUED | OUTPATIENT
Start: 2018-07-22 | End: 2018-07-28 | Stop reason: HOSPADM

## 2018-07-22 RX ORDER — AMLODIPINE BESYLATE 2.5 MG/1
2.5 TABLET ORAL DAILY
Status: DISCONTINUED | OUTPATIENT
Start: 2018-07-23 | End: 2018-07-28 | Stop reason: HOSPADM

## 2018-07-22 RX ORDER — NAPROXEN SODIUM 220 MG/1
81 TABLET, FILM COATED ORAL DAILY
Status: DISCONTINUED | OUTPATIENT
Start: 2018-07-23 | End: 2018-07-28 | Stop reason: HOSPADM

## 2018-07-22 RX ORDER — ACETAMINOPHEN 325 MG/1
650 TABLET ORAL EVERY 4 HOURS PRN
Status: DISCONTINUED | OUTPATIENT
Start: 2018-07-22 | End: 2018-07-28 | Stop reason: HOSPADM

## 2018-07-22 RX ORDER — CEFTRIAXONE 1 G/1
1 INJECTION, POWDER, FOR SOLUTION INTRAMUSCULAR; INTRAVENOUS
Status: DISCONTINUED | OUTPATIENT
Start: 2018-07-23 | End: 2018-07-28

## 2018-07-22 RX ADMIN — SENNOSIDES AND DOCUSATE SODIUM 2 TABLET: 8.6; 5 TABLET ORAL at 09:07

## 2018-07-22 RX ADMIN — ENOXAPARIN SODIUM 40 MG: 100 INJECTION SUBCUTANEOUS at 05:07

## 2018-07-22 RX ADMIN — CEFTRIAXONE SODIUM 2 G: 2 INJECTION, POWDER, FOR SOLUTION INTRAMUSCULAR; INTRAVENOUS at 02:07

## 2018-07-22 RX ADMIN — IPRATROPIUM BROMIDE AND ALBUTEROL SULFATE 3 ML: .5; 3 SOLUTION RESPIRATORY (INHALATION) at 01:07

## 2018-07-22 RX ADMIN — AZITHROMYCIN MONOHYDRATE 500 MG: 500 INJECTION, POWDER, LYOPHILIZED, FOR SOLUTION INTRAVENOUS at 03:07

## 2018-07-22 RX ADMIN — PROMETHAZINE HYDROCHLORIDE AND CODEINE PHOSPHATE 5 ML: 10; 6.25 SOLUTION ORAL at 05:07

## 2018-07-22 RX ADMIN — IPRATROPIUM BROMIDE AND ALBUTEROL SULFATE 3 ML: .5; 3 SOLUTION RESPIRATORY (INHALATION) at 07:07

## 2018-07-22 RX ADMIN — METHYLPREDNISOLONE SODIUM SUCCINATE 125 MG: 125 INJECTION, POWDER, FOR SOLUTION INTRAMUSCULAR; INTRAVENOUS at 01:07

## 2018-07-22 RX ADMIN — GUAIFENESIN 600 MG: 600 TABLET, EXTENDED RELEASE ORAL at 09:07

## 2018-07-22 NOTE — H&P
[facility] Hospital Medicine  History & Physical    Patient Name: Irina Polk  MRN: [id number]  Admission Date: 7/22/2018  Attending Physician: Neftali Randle MD   Primary Care Provider: Blanca Quinones MD    [facility] Medicine Team: [facility] HOSP MED D Neftali Randle MD     Patient information was obtained from patient, relative(s), caregiver / friend and ER records.     Subjective:     Principal Problem:Pneumonia of right upper lobe due to Chlamydia species    Chief Complaint:   Chief Complaint   Patient presents with    Cough     white productive cough; breathing treament prior to arrival; hx of aspiration         HPI: The patient is a 89 y.o. female with co-morbidities including: COPD who presents to the ED with a complaint of cough, wet for 2-3 days. It is constant, progressive and persistent. She tried albuterol at home without improvement in her symptoms. Patient caretaker states that today patient became hypoxic into the lower 80s. Family does not vocalize and exacerbating factors. The patient's caretaker and daughters have not noticed fever, but have noticed vomiting and diarrhea. The patient does have HX of chronic aspiration, last antibiotics was approximately 1 month ago.    Past Medical History:   Diagnosis Date    GERD (gastroesophageal reflux disease)     Hypertension     UTI (urinary tract infection)        Past Surgical History:   Procedure Laterality Date    APPENDECTOMY      HYSTERECTOMY      TOTAL KNEE ARTHROPLASTY Bilateral        Review of patient's allergies indicates:   Allergen Reactions    Codeine Nausea Only     Intolerance     Penicillins        No current facility-administered medications on file prior to encounter.      Current Outpatient Prescriptions on File Prior to Encounter   Medication Sig    acetaminophen (TYLENOL) 500 mg Cap Take 2 capsules (1,000 mg total) by mouth every 6 (six) hours as needed (Pain).    albuterol-ipratropium (DUO-NEB)  2.5 mg-0.5 mg/3 mL nebulizer solution inhale contents of 1 vial every 4 hours if needed for wheezing    amLODIPine (NORVASC) 2.5 MG tablet Take 1 tablet (2.5 mg total) by mouth once daily.    aspirin 81 MG Chew Take 81 mg by mouth once daily.    benzonatate (TESSALON) 200 MG capsule take 1 capsule by mouth three times a day if needed for cough    calcitonin, salmon, (FORTICAL) 200 unit/actuation nasal spray 1 spray by Nasal route once daily.    cetirizine (ZYRTEC) 5 MG tablet Take 5 mg by mouth once daily.    COMBIGAN 0.2-0.5 % Drop Place 1 drop into both eyes 2 (two) times daily.     cyanocobalamin 1,000 mcg/mL injection Inject 1 mL (1,000 mcg total) into the muscle once a week. Dispense syringes as well.    diclofenac sodium (VOLTAREN) 1 % Gel Apply 2 g topically once daily.    dicyclomine (BENTYL) 10 MG capsule Take 1 capsule (10 mg total) by mouth 2 (two) times daily.    doxycycline (VIBRA-TABS) 100 MG tablet Take 1 tablet (100 mg total) by mouth 2 (two) times daily.    escitalopram oxalate (LEXAPRO) 10 MG tablet TAKE 1 TABLET DAILY    fluticasone-salmeterol 100-50 mcg/dose (ADVAIR) 100-50 mcg/dose diskus inhaler Inhale 1 puff into the lungs 2 (two) times daily. Controller    metoprolol succinate (TOPROL-XL) 25 MG 24 hr tablet Take 1 tablet by mouth once daily.    OMEGA-3S/DHA/EPA/FISH OIL (OMEGA 3 ORAL) Take 1 capsule by mouth once daily.     omeprazole (PRILOSEC) 20 MG capsule take 1 capsule by mouth once daily    oxybutynin (DITROPAN) 5 MG Tab Take 1 tablet (5 mg total) by mouth 3 (three) times daily as needed (bladder spasms).    PROAIR HFA 90 mcg/actuation inhaler inhale 2 puffs by mouth every 6 hours if needed for shortness of breath or wheezing    promethazine-codeine 6.25-10 mg/5 ml (PHENERGAN WITH CODEINE) 6.25-10 mg/5 mL syrup Take 5 mLs by mouth every 4 (four) hours as needed for Cough.    senna-docusate 8.6-50 mg (PERICOLACE) 8.6-50 mg per tablet Take 2 tablets by mouth 2 (two)  times daily.    traMADol (ULTRAM) 50 mg tablet Take 1 tablet (50 mg total) by mouth every 6 (six) hours as needed (pain not controlled with tylenol).     Family History     None        Social History Main Topics    Smoking status: Never Smoker    Smokeless tobacco: Never Used    Alcohol use 0.6 oz/week     1 Glasses of wine per week      Comment: occasionally    Drug use: No    Sexual activity: No     Review of Systems   Constitutional: Positive for appetite change. Negative for chills and fever.   HENT: Negative for congestion.    Eyes: Negative for discharge.   Respiratory: Positive for cough, shortness of breath and wheezing.    Cardiovascular: Negative for chest pain.   Gastrointestinal: Negative for abdominal distention.   Endocrine: Negative for cold intolerance.   Genitourinary: Negative for difficulty urinating.   Musculoskeletal: Negative for arthralgias.   Skin: Negative for color change.   Allergic/Immunologic: Negative for environmental allergies.   Neurological: Negative for dizziness.   Hematological: Negative for adenopathy.   Psychiatric/Behavioral: Negative for behavioral problems.     Objective:     Vital Signs (Most Recent):  Temp: 98.9 °F (37.2 °C) (07/22/18 1245)  Pulse: 87 (07/22/18 1631)  Resp: (!) 22 (07/22/18 1631)  BP: 120/66 (07/22/18 1631)  SpO2: (!) 91 % (07/22/18 1631) Vital Signs (24h Range):  Temp:  [98.9 °F (37.2 °C)] 98.9 °F (37.2 °C)  Pulse:  [69-89] 87  Resp:  [16-27] 22  SpO2:  [87 %-98 %] 91 %  BP: (120-135)/(61-77) 120/66     Weight: 72.6 kg (160 lb)  Body mass index is 28.34 kg/m².    Physical Exam   Constitutional: She appears well-nourished. No distress.   HENT:   Mouth/Throat: Oropharynx is clear and moist. No oropharyngeal exudate.   Eyes: Left eye exhibits no discharge. No scleral icterus.   Neck: No JVD present.   Cardiovascular: Normal rate, regular rhythm and normal heart sounds.    Pulmonary/Chest: Effort normal. No respiratory distress. She has wheezes. She has  rales.   Abdominal: Soft. Bowel sounds are normal. She exhibits no distension. There is no tenderness.   Musculoskeletal: She exhibits no edema or tenderness.   Lymphadenopathy:     She has no cervical adenopathy.   Neurological: She is alert.   Skin: Skin is warm and dry.   Psychiatric: She has a normal mood and affect. Her behavior is normal.           Significant Labs: All pertinent labs within the past 24 hours have been reviewed.    Significant Imaging: I have reviewed and interpreted all pertinent imaging results/findings within the past 24 hours.    Assessment/Plan:     * Pneumonia of right upper lobe due to Chlamydia species    Acute respiratory failure with hypoxia.  H/o aspiration. Family let her eat a regular diet but use a sippy cup after SLP consultation in the past.  + CXR with sputum and sat in mid-80s at admission.  - c/w ceftriaxone and f/u cultures          Debility    -- pt/ot          Acute respiratory failure with hypoxia    Per PNA and COPD          COPD with acute exacerbation    Has wheezing on exam.  ER gave 125mg solumedrol  -- c/e po prednisone  -- wean off o2          Essential hypertension    -- acceptable; continue with current treatment & monitor              VTE Risk Mitigation         Ordered     enoxaparin injection 40 mg  Daily      07/22/18 1535     IP VTE HIGH RISK PATIENT  Once      07/22/18 1535             Neftali Randle MD  Department of Hospital Medicine   [facility]

## 2018-07-22 NOTE — ASSESSMENT & PLAN NOTE
Acute respiratory failure with hypoxia.  H/o aspiration. Family let her eat a regular diet but use a sippy cup after SLP consultation in the past.  At admission CXR shows RUL consolidation.  Had sputum and sat in mid-80s at admission.  - c/w ceftriaxone and f/u cultures  - aspiration precautions

## 2018-07-22 NOTE — ED PROVIDER NOTES
Encounter Date: 7/22/2018  SCRIBE #1 NOTE: I, Marin Smith, am scribing for, and in the presence of,  Jeferson Iyer MD. I have scribed the note.       History     Chief Complaint   Patient presents with    Cough     white productive cough; breathing treament prior to arrival; hx of aspiration      .Time patient was seen by the provider: 1:08 PM      The patient is a 89 y.o. female with co-morbidities including: COPD who presents to the ED with a complaint of cough, wet for 2-3 days. It is constant, progressive and persistent. She tried albuterol at home without improvement in her symptoms. Family does not vocalize and exacerbating factors. The patient's caretaker and daughters have not noticed fever, but have noticed vomiting and diarrhea. The patient does have HX of chronic aspiration, last antibiotics was approximately 1 month ago.                The history is provided by a relative and a caregiver.     Review of patient's allergies indicates:   Allergen Reactions    Codeine Nausea Only     Intolerance     Penicillins      Past Medical History:   Diagnosis Date    GERD (gastroesophageal reflux disease)     Hypertension     UTI (urinary tract infection)      Past Surgical History:   Procedure Laterality Date    APPENDECTOMY      HYSTERECTOMY      TOTAL KNEE ARTHROPLASTY Bilateral      History reviewed. No pertinent family history.  Social History   Substance Use Topics    Smoking status: Never Smoker    Smokeless tobacco: Never Used    Alcohol use 0.6 oz/week     1 Glasses of wine per week      Comment: occasionally     Review of Systems   Constitutional: Negative.  Negative for fever.   HENT: Negative.    Eyes: Negative.    Respiratory: Positive for cough.    Cardiovascular: Negative.    Gastrointestinal: Positive for diarrhea, nausea and vomiting.   Genitourinary: Negative.    Musculoskeletal: Negative.    Skin: Negative.    Neurological: Negative.    All other systems reviewed and are  negative.      Physical Exam     Initial Vitals [07/22/18 1245]   BP Pulse Resp Temp SpO2   134/65 89 18 98.9 °F (37.2 °C) (!) 91 %      MAP       --         Physical Exam    Vitals reviewed.  Constitutional: She appears well-developed and well-nourished.   Elderly womna eith daughters and caregiver geivng, wet cough   HENT:   Head: Normocephalic and atraumatic.   Eyes: EOM are normal. Pupils are equal, round, and reactive to light.   Neck: Normal range of motion. Neck supple.   Cardiovascular: Normal rate, regular rhythm and normal heart sounds.   Pulmonary/Chest:   Wheezing with consolidations and rhonchi heard in the right lung, O2 saturations in the 82% range per caregiver   Abdominal: Soft. Bowel sounds are normal.   Musculoskeletal: Normal range of motion.   Neurological: She is alert and oriented to person, place, and time.   Skin: Skin is warm and dry.   Psychiatric: She has a normal mood and affect. Thought content normal.         ED Course   Procedures  Labs Reviewed   CBC W/ AUTO DIFFERENTIAL - Abnormal; Notable for the following:        Result Value    MCH 31.1 (*)     RDW 15.7 (*)     All other components within normal limits   URINALYSIS - Abnormal; Notable for the following:     Appearance, UA Hazy (*)     All other components within normal limits   CULTURE, BLOOD   CULTURE, BLOOD   CULTURE, URINE   B-TYPE NATRIURETIC PEPTIDE   COMPREHENSIVE METABOLIC PANEL   TROPONIN I   LACTIC ACID, PLASMA   URINALYSIS MICROSCOPIC     EKG Readings: (Independently Interpreted)   Initial Reading: No STEMI. Rhythm: Normal Sinus Rhythm. Heart Rate: 77. Ectopy: No Ectopy. Conduction: Normal. ST Segments: Normal ST Segments. T Waves: Normal. Axis: Normal. Clinical Impression: Normal Sinus Rhythm       Imaging Results          X-Ray Chest AP Portable (Final result)     Abnormal  Result time 07/22/18 15:16:51    Final result by Jony Craft MD (07/22/18 15:16:51)                 Impression:      Mild  cardiomegaly    Hiatal hernia    Minimal patchy opacity in right upper lobe, could correlate with pneumonia or aspiration.  Clinical correlation and follow-up recommended.    This report was flagged in Epic as abnormal.      Electronically signed by: Jony Craft MD  Date:    07/22/2018  Time:    15:16             Narrative:    EXAMINATION:  XR CHEST AP PORTABLE    CLINICAL HISTORY:  Cough    TECHNIQUE:  Single frontal view of the chest was performed.    COMPARISON:  01/24/2018    FINDINGS:  The heart size is mildly enlarged.  Mediastinum shows aortic atherosclerosis and a hiatal hernia.  Lungs are expanded.  Right apex is partially obscured by overlying soft tissue of the face.  Right upper lobe has minimal, patchy opacity that looks new compared to prior exam.  Elsewhere lungs are clear.  No pleural fluid is seen.  Skeletal structures are intact without acute finding.                                 Medical Decision Making:   ED Management:  3:01 PM - She remains 90% O2 saturation on 3L NC.   3:22 PM - Discussed case with hospital medicine who will admit.    Persistent hypoxia over 1 hr since last neb. Lung exam still with rales and ronchi in rll. Suspect aspiration pneumonia. Will admit.               Attending Attestation:           Physician Attestation for Scribe:      Comments: I, Dr. Jeferson Iyer, personally performed the services described in this documentation. All medical record entries made by the scribe were at my direction and in my presence.  I have reviewed the chart and agree that the record reflects my personal performance and is accurate and complete. Jeferson Iyer MD.  3:30 PM 07/22/2018                 Clinical Impression:   The primary encounter diagnosis was Hypoxia. Diagnoses of Cough and Pneumonia of right lower lobe due to infectious organism were also pertinent to this visit.                             Jeferson Iyer MD  07/22/18 1525       Jeferson Iyer,  MD  07/22/18 1525       Jeferson Iyer MD  07/22/18 9052

## 2018-07-22 NOTE — ED NOTES
Pt. Placed on tele box [id number]. Plan to take pt. Upstairs with transport on tele box and 3L NC O2. No distress noted. Family and caregiver at bs.

## 2018-07-22 NOTE — HPI
The patient is a 89 y.o. female with co-morbidities including: COPD who presents to the ED with a complaint of cough, wet for 2-3 days. It is constant, progressive and persistent. She tried albuterol at home without improvement in her symptoms. Patient caretaker states that today patient became hypoxic into the lower 80s. Family does not vocalize and exacerbating factors. The patient's caretaker and daughters have not noticed fever, but have noticed vomiting and diarrhea. The patient does have HX of chronic aspiration, last antibiotics was approximately 1 month ago.

## 2018-07-22 NOTE — SUBJECTIVE & OBJECTIVE
Past Medical History:   Diagnosis Date    GERD (gastroesophageal reflux disease)     Hypertension     UTI (urinary tract infection)        Past Surgical History:   Procedure Laterality Date    APPENDECTOMY      HYSTERECTOMY      TOTAL KNEE ARTHROPLASTY Bilateral        Review of patient's allergies indicates:   Allergen Reactions    Codeine Nausea Only     Intolerance     Penicillins        No current facility-administered medications on file prior to encounter.      Current Outpatient Prescriptions on File Prior to Encounter   Medication Sig    acetaminophen (TYLENOL) 500 mg Cap Take 2 capsules (1,000 mg total) by mouth every 6 (six) hours as needed (Pain).    albuterol-ipratropium (DUO-NEB) 2.5 mg-0.5 mg/3 mL nebulizer solution inhale contents of 1 vial every 4 hours if needed for wheezing    amLODIPine (NORVASC) 2.5 MG tablet Take 1 tablet (2.5 mg total) by mouth once daily.    aspirin 81 MG Chew Take 81 mg by mouth once daily.    benzonatate (TESSALON) 200 MG capsule take 1 capsule by mouth three times a day if needed for cough    calcitonin, salmon, (FORTICAL) 200 unit/actuation nasal spray 1 spray by Nasal route once daily.    cetirizine (ZYRTEC) 5 MG tablet Take 5 mg by mouth once daily.    COMBIGAN 0.2-0.5 % Drop Place 1 drop into both eyes 2 (two) times daily.     cyanocobalamin 1,000 mcg/mL injection Inject 1 mL (1,000 mcg total) into the muscle once a week. Dispense syringes as well.    diclofenac sodium (VOLTAREN) 1 % Gel Apply 2 g topically once daily.    dicyclomine (BENTYL) 10 MG capsule Take 1 capsule (10 mg total) by mouth 2 (two) times daily.    doxycycline (VIBRA-TABS) 100 MG tablet Take 1 tablet (100 mg total) by mouth 2 (two) times daily.    escitalopram oxalate (LEXAPRO) 10 MG tablet TAKE 1 TABLET DAILY    fluticasone-salmeterol 100-50 mcg/dose (ADVAIR) 100-50 mcg/dose diskus inhaler Inhale 1 puff into the lungs 2 (two) times daily. Controller    metoprolol succinate  (TOPROL-XL) 25 MG 24 hr tablet Take 1 tablet by mouth once daily.    OMEGA-3S/DHA/EPA/FISH OIL (OMEGA 3 ORAL) Take 1 capsule by mouth once daily.     omeprazole (PRILOSEC) 20 MG capsule take 1 capsule by mouth once daily    oxybutynin (DITROPAN) 5 MG Tab Take 1 tablet (5 mg total) by mouth 3 (three) times daily as needed (bladder spasms).    PROAIR HFA 90 mcg/actuation inhaler inhale 2 puffs by mouth every 6 hours if needed for shortness of breath or wheezing    promethazine-codeine 6.25-10 mg/5 ml (PHENERGAN WITH CODEINE) 6.25-10 mg/5 mL syrup Take 5 mLs by mouth every 4 (four) hours as needed for Cough.    senna-docusate 8.6-50 mg (PERICOLACE) 8.6-50 mg per tablet Take 2 tablets by mouth 2 (two) times daily.    traMADol (ULTRAM) 50 mg tablet Take 1 tablet (50 mg total) by mouth every 6 (six) hours as needed (pain not controlled with tylenol).     Family History     None        Social History Main Topics    Smoking status: Never Smoker    Smokeless tobacco: Never Used    Alcohol use 0.6 oz/week     1 Glasses of wine per week      Comment: occasionally    Drug use: No    Sexual activity: No     Review of Systems   Constitutional: Positive for appetite change. Negative for chills and fever.   HENT: Negative for congestion.    Eyes: Negative for discharge.   Respiratory: Positive for cough, shortness of breath and wheezing.    Cardiovascular: Negative for chest pain.   Gastrointestinal: Negative for abdominal distention.   Endocrine: Negative for cold intolerance.   Genitourinary: Negative for difficulty urinating.   Musculoskeletal: Negative for arthralgias.   Skin: Negative for color change.   Allergic/Immunologic: Negative for environmental allergies.   Neurological: Negative for dizziness.   Hematological: Negative for adenopathy.   Psychiatric/Behavioral: Negative for behavioral problems.     Objective:     Vital Signs (Most Recent):  Temp: 98.9 °F (37.2 °C) (07/22/18 1245)  Pulse: 87 (07/22/18  1631)  Resp: (!) 22 (07/22/18 1631)  BP: 120/66 (07/22/18 1631)  SpO2: (!) 91 % (07/22/18 1631) Vital Signs (24h Range):  Temp:  [98.9 °F (37.2 °C)] 98.9 °F (37.2 °C)  Pulse:  [69-89] 87  Resp:  [16-27] 22  SpO2:  [87 %-98 %] 91 %  BP: (120-135)/(61-77) 120/66     Weight: 72.6 kg (160 lb)  Body mass index is 28.34 kg/m².    Physical Exam   Constitutional: She appears well-nourished. No distress.   HENT:   Mouth/Throat: Oropharynx is clear and moist. No oropharyngeal exudate.   Eyes: Left eye exhibits no discharge. No scleral icterus.   Neck: No JVD present.   Cardiovascular: Normal rate, regular rhythm and normal heart sounds.    Pulmonary/Chest: Effort normal. No respiratory distress. She has wheezes. She has rales.   Abdominal: Soft. Bowel sounds are normal. She exhibits no distension. There is no tenderness.   Musculoskeletal: She exhibits no edema or tenderness.   Lymphadenopathy:     She has no cervical adenopathy.   Neurological: She is alert.   Skin: Skin is warm and dry.   Psychiatric: She has a normal mood and affect. Her behavior is normal.           Significant Labs: All pertinent labs within the past 24 hours have been reviewed.    Significant Imaging: I have reviewed and interpreted all pertinent imaging results/findings within the past 24 hours.

## 2018-07-22 NOTE — ED TRIAGE NOTES
Patient family member reports that patient has had productive cough on and off for around a year. Family reports that cough has gotten worse of the past few days. Patient family reports that the cough is now constant with white sputum production. Patient caretaker states that today patient became hypoxic into the lower 80s. Patient received home albuterol treatment today and seemed to help somewhat, but caretaker felt that the patient needed to be evaluated. Patient family member also endorses frequent UTIs for which she is currently being treated. Patient presents oriented to self, NAD noted, and following commands.

## 2018-07-22 NOTE — ASSESSMENT & PLAN NOTE
Acute respiratory failure with hypoxia.  H/o aspiration. Family let her eat a regular diet but use a sippy cup after SLP consultation in the past.  + CXR with sputum and sat in mid-80s at admission.  - c/w ceftriaxone and f/u cultures

## 2018-07-22 NOTE — ED NOTES
Patient found to be hypoxic at 88% on room air. Patient placed on 3L NC per RN and oxygen saturation improved to 91% at this time.

## 2018-07-23 LAB
ANION GAP SERPL CALC-SCNC: 6 MMOL/L
BASOPHILS # BLD AUTO: 0 K/UL
BASOPHILS NFR BLD: 0 %
BUN SERPL-MCNC: 18 MG/DL
CALCIUM SERPL-MCNC: 8.9 MG/DL
CHLORIDE SERPL-SCNC: 104 MMOL/L
CO2 SERPL-SCNC: 26 MMOL/L
CREAT SERPL-MCNC: 1 MG/DL
DIFFERENTIAL METHOD: ABNORMAL
EOSINOPHIL # BLD AUTO: 0 K/UL
EOSINOPHIL NFR BLD: 0 %
ERYTHROCYTE [DISTWIDTH] IN BLOOD BY AUTOMATED COUNT: 15.7 %
EST. GFR  (AFRICAN AMERICAN): 57.7 ML/MIN/1.73 M^2
EST. GFR  (NON AFRICAN AMERICAN): 50.1 ML/MIN/1.73 M^2
GLUCOSE SERPL-MCNC: 115 MG/DL
HCT VFR BLD AUTO: 35.1 %
HGB BLD-MCNC: 11.2 G/DL
IMM GRANULOCYTES # BLD AUTO: 0.01 K/UL
IMM GRANULOCYTES NFR BLD AUTO: 0.2 %
LYMPHOCYTES # BLD AUTO: 1.1 K/UL
LYMPHOCYTES NFR BLD: 19.2 %
MCH RBC QN AUTO: 30.9 PG
MCHC RBC AUTO-ENTMCNC: 31.9 G/DL
MCV RBC AUTO: 97 FL
MONOCYTES # BLD AUTO: 0.3 K/UL
MONOCYTES NFR BLD: 5.4 %
NEUTROPHILS # BLD AUTO: 4.2 K/UL
NEUTROPHILS NFR BLD: 75.2 %
NRBC BLD-RTO: 0 /100 WBC
PLATELET # BLD AUTO: 230 K/UL
PMV BLD AUTO: 10.1 FL
POTASSIUM SERPL-SCNC: 4.6 MMOL/L
RBC # BLD AUTO: 3.62 M/UL
SODIUM SERPL-SCNC: 136 MMOL/L
WBC # BLD AUTO: 5.52 K/UL

## 2018-07-23 PROCEDURE — 99233 SBSQ HOSP IP/OBS HIGH 50: CPT | Mod: ,,, | Performed by: HOSPITALIST

## 2018-07-23 PROCEDURE — 97165 OT EVAL LOW COMPLEX 30 MIN: CPT

## 2018-07-23 PROCEDURE — 94640 AIRWAY INHALATION TREATMENT: CPT

## 2018-07-23 PROCEDURE — 80048 BASIC METABOLIC PNL TOTAL CA: CPT

## 2018-07-23 PROCEDURE — 87070 CULTURE OTHR SPECIMN AEROBIC: CPT

## 2018-07-23 PROCEDURE — 94761 N-INVAS EAR/PLS OXIMETRY MLT: CPT

## 2018-07-23 PROCEDURE — 25000242 PHARM REV CODE 250 ALT 637 W/ HCPCS: Performed by: HOSPITALIST

## 2018-07-23 PROCEDURE — 87205 SMEAR GRAM STAIN: CPT

## 2018-07-23 PROCEDURE — 25000003 PHARM REV CODE 250: Performed by: HOSPITALIST

## 2018-07-23 PROCEDURE — 11000001 HC ACUTE MED/SURG PRIVATE ROOM

## 2018-07-23 PROCEDURE — G8979 MOBILITY GOAL STATUS: HCPCS | Mod: CK

## 2018-07-23 PROCEDURE — 36415 COLL VENOUS BLD VENIPUNCTURE: CPT

## 2018-07-23 PROCEDURE — 27000221 HC OXYGEN, UP TO 24 HOURS

## 2018-07-23 PROCEDURE — 85025 COMPLETE CBC W/AUTO DIFF WBC: CPT

## 2018-07-23 PROCEDURE — 63600175 PHARM REV CODE 636 W HCPCS: Performed by: HOSPITALIST

## 2018-07-23 PROCEDURE — G8978 MOBILITY CURRENT STATUS: HCPCS | Mod: CL

## 2018-07-23 PROCEDURE — 97162 PT EVAL MOD COMPLEX 30 MIN: CPT

## 2018-07-23 RX ORDER — ONDANSETRON 4 MG/1
4 TABLET, ORALLY DISINTEGRATING ORAL EVERY 6 HOURS PRN
Status: DISCONTINUED | OUTPATIENT
Start: 2018-07-23 | End: 2018-07-28 | Stop reason: HOSPADM

## 2018-07-23 RX ADMIN — GUAIFENESIN 600 MG: 600 TABLET, EXTENDED RELEASE ORAL at 09:07

## 2018-07-23 RX ADMIN — SENNOSIDES AND DOCUSATE SODIUM 2 TABLET: 8.6; 5 TABLET ORAL at 09:07

## 2018-07-23 RX ADMIN — TRAZODONE HYDROCHLORIDE 25 MG: 50 TABLET ORAL at 09:07

## 2018-07-23 RX ADMIN — IPRATROPIUM BROMIDE AND ALBUTEROL SULFATE 3 ML: .5; 3 SOLUTION RESPIRATORY (INHALATION) at 12:07

## 2018-07-23 RX ADMIN — PREDNISONE 50 MG: 20 TABLET ORAL at 09:07

## 2018-07-23 RX ADMIN — ASPIRIN 81 MG CHEWABLE TABLET 81 MG: 81 TABLET CHEWABLE at 09:07

## 2018-07-23 RX ADMIN — PANTOPRAZOLE SODIUM 40 MG: 40 TABLET, DELAYED RELEASE ORAL at 09:07

## 2018-07-23 RX ADMIN — ENOXAPARIN SODIUM 40 MG: 100 INJECTION SUBCUTANEOUS at 04:07

## 2018-07-23 RX ADMIN — IPRATROPIUM BROMIDE AND ALBUTEROL SULFATE 3 ML: .5; 3 SOLUTION RESPIRATORY (INHALATION) at 08:07

## 2018-07-23 RX ADMIN — ESCITALOPRAM OXALATE 10 MG: 10 TABLET ORAL at 09:07

## 2018-07-23 RX ADMIN — FLUTICASONE FUROATE AND VILANTEROL TRIFENATATE 1 PUFF: 100; 25 POWDER RESPIRATORY (INHALATION) at 11:07

## 2018-07-23 RX ADMIN — AMLODIPINE BESYLATE 2.5 MG: 2.5 TABLET ORAL at 09:07

## 2018-07-23 RX ADMIN — ONDANSETRON 4 MG: 4 TABLET, ORALLY DISINTEGRATING ORAL at 09:07

## 2018-07-23 RX ADMIN — ACETAMINOPHEN 650 MG: 325 TABLET, FILM COATED ORAL at 11:07

## 2018-07-23 RX ADMIN — METOPROLOL SUCCINATE 25 MG: 25 TABLET, EXTENDED RELEASE ORAL at 09:07

## 2018-07-23 RX ADMIN — IPRATROPIUM BROMIDE AND ALBUTEROL SULFATE 3 ML: .5; 3 SOLUTION RESPIRATORY (INHALATION) at 07:07

## 2018-07-23 RX ADMIN — CEFTRIAXONE SODIUM 1 G: 1 INJECTION, POWDER, FOR SOLUTION INTRAMUSCULAR; INTRAVENOUS at 08:07

## 2018-07-23 NOTE — PLAN OF CARE
Problem: Fall Risk (Adult)  Goal: Absence of Falls  Patient will demonstrate the desired outcomes by discharge/transition of care.   Outcome: Ongoing (interventions implemented as appropriate)   07/23/18 0501   Fall Risk (Adult)   Absence of Falls making progress toward outcome       Problem: Patient Care Overview  Goal: Plan of Care Review  Outcome: Ongoing (interventions implemented as appropriate)   07/23/18 0501   Coping/Psychosocial   Plan Of Care Reviewed With patient     Pt continues on O2 at 2L via NC and telemetry monitoring, no distress/discomfort noted in pt, pericare provided as needed, all precautions maintained, will continue to monitor pt.

## 2018-07-23 NOTE — PROGRESS NOTES
[facility] Hospital Medicine  Progress Note    Patient Name: Irina Polk  MRN: [id number]  Patient Class: IP- Inpatient   Admission Date: 7/22/2018  Length of Stay: 1 days  Attending Physician: Neftali Randle MD  Primary Care Provider: Blanca Quinones MD    [facility] Medicine Team: [facility] HOSP MED D Neftali Randle MD    Subjective:     Principal Problem:Pneumonia of right upper lobe due to Chlamydia species    HPI:  The patient is a 89 y.o. female with co-morbidities including: COPD who presents to the ED with a complaint of cough, wet for 2-3 days. It is constant, progressive and persistent. She tried albuterol at home without improvement in her symptoms. Patient caretaker states that today patient became hypoxic into the lower 80s. Family does not vocalize and exacerbating factors. The patient's caretaker and daughters have not noticed fever, but have noticed vomiting and diarrhea. The patient does have HX of chronic aspiration, last antibiotics was approximately 1 month ago.    Hospital Course:  See problem list    Interval History:   Symptoms:   Same.  Diet:  Adequate intake.   Activity level:  Impaired due to weakness.    Pain:  No pain.       Review of Systems   Constitutional: Negative for fever.   Respiratory: Positive for cough, shortness of breath and wheezing.    Cardiovascular: Negative for chest pain and leg swelling.   Gastrointestinal: Positive for nausea. Negative for abdominal pain and constipation.     Objective:     Vital Signs (Most Recent):  Temp: 97.6 °F (36.4 °C) (07/23/18 0859)  Pulse: 70 (07/23/18 0859)  Resp: 19 (07/23/18 0859)  BP: (!) 145/71 (07/23/18 0859)  SpO2: (!) 92 % (07/23/18 0859) Vital Signs (24h Range):  Temp:  [97.6 °F (36.4 °C)-98.9 °F (37.2 °C)] 97.6 °F (36.4 °C)  Pulse:  [59-98] 70  Resp:  [16-27] 19  SpO2:  [87 %-98 %] 92 %  BP: (114-145)/(61-77) 145/71     Weight: 69.1 kg (152 lb 5.4 oz)  Body mass index is 26.15 kg/m².    Intake/Output  Summary (Last 24 hours) at 07/23/18 1001  Last data filed at 07/23/18 0900   Gross per 24 hour   Intake              350 ml   Output                0 ml   Net              350 ml      Physical Exam   Constitutional: No distress.   Cardiovascular: Normal rate, regular rhythm and normal heart sounds.    Pulmonary/Chest: Effort normal. No respiratory distress. She has wheezes. She has rales.   Abdominal: Soft. Bowel sounds are normal. She exhibits no distension. There is no tenderness.   Musculoskeletal: She exhibits no edema.   Neurological: She is alert.   Mildly confused but conversant.  Personal sitter at bedside.       Significant Labs: All pertinent labs within the past 24 hours have been reviewed.    Significant Imaging: I have reviewed and interpreted all pertinent imaging results/findings within the past 24 hours.    Assessment/Plan:      * Pneumonia of right upper lobe due to Chlamydia species    Acute respiratory failure with hypoxia.  H/o aspiration. Family let her eat a regular diet but use a sippy cup after SLP consultation in the past.  At admission CXR shows RUL consolidation.  Had sputum and sat in mid-80s at admission.  - c/w ceftriaxone and f/u cultures  - aspiration precautions          Debility    -- pt/ot          Acute respiratory failure with hypoxia    Per PNA and COPD          COPD with acute exacerbation    Has wheezing on exam.  ER gave 125mg solumedrol  -- c/e po prednisone  -- wean off o2          Essential hypertension    -- acceptable; continue with current treatment & monitor              VTE Risk Mitigation         Ordered     enoxaparin injection 40 mg  Daily      07/22/18 1535     IP VTE HIGH RISK PATIENT  Once      07/22/18 1535              Neftali Randle MD  Department of Hospital Medicine   [facility]

## 2018-07-23 NOTE — PLAN OF CARE
PT/OT ordered to evaluate and treat. PT/OT recs pending. Patient sitting up eating breakfast; sitter present at bedside. Patient currently lives with 24/7 sitters (private sitters). Patient has two daughters for support. CM completed discharge assessment and planning with patient and private sitter. Patient and sitter verbalized understanding. All questions and concerns addressed. SW and CM will continue to follow for any additional needs. Plan A to discharge home with 24/7 sitter service as soon as medically stable. Plan B to discharge home with sitters and home health.    Patient owns all necessary DME.     PCP: Blanca Quinones MD    Pharmacy:   [facility]  for [address]  [address] [place] [id number]  Phone: [phone] Fax: [phone]    [facility] [place] [address] [place] [address] [place] [address]  [address] [place] [address]  [place] [address]  Phone: [phone] Fax: [phone]    Payor: MEDICARE / Plan: MEDICARE PART A & B / Product Type: [facility] /      07/23/18 0830   Discharge Assessment   Assessment Type Discharge Planning Assessment   Confirmed/corrected address and phone number on facesheet? Yes   Assessment information obtained from? Patient;Caregiver;Medical Record   Expected Length of Stay (days) 4   Communicated expected length of stay with patient/caregiver yes   Prior to hospitilization cognitive status: Alert/Oriented   Prior to hospitalization functional status: Assistive Equipment   Current cognitive status: Alert/Oriented   Current Functional Status: Assistive Equipment   Lives With other (see comments)  (24/7 sitters)   Able to Return to Prior Arrangements yes   Is patient able to care for self after discharge? Yes   Who are your caregiver(s) and their phone number(s)? daughter- Dolly Jam [phone]; daughter- Ghazala Monroe [phone]   Patient's perception of discharge disposition home or selfcare;other  (comments)  (sitters)   Readmission Within The Last 30 Days no previous admission in last 30 days   Patient currently being followed by outpatient case management? No   Patient currently receives any other outside agency services? No   Equipment Currently Used at Home rollator;wheelchair;bedside commode;raised toilet;shower chair   Do you have any problems affording any of your prescribed medications? No   Is the patient taking medications as prescribed? yes   Does the patient have transportation home? Yes   Transportation Available family or friend will provide   Does the patient receive services at the [facility]? No   Discharge Plan A Home;Other  (24/7 sitters)   Discharge Plan B Home Health;Home;Other  (24/7 sitters)   Patient/Family In Agreement With Plan yes

## 2018-07-23 NOTE — PT/OT/SLP EVAL
"Physical Therapy Evaluation    Patient Name:  Irina Polk   MRN:  [id number]    Recommendations:     Discharge Recommendations:  home health PT   Discharge Equipment Recommendations: walker, rolling   Barriers to discharge: None    Assessment:     Irina Polk is a 89 y.o. female admitted with a medical diagnosis of Pneumonia of right upper lobe due to Chlamydia species.  She presents with the following impairments/functional limitations:  weakness, impaired endurance, impaired self care skills, impaired functional mobilty, gait instability, impaired balance, impaired cognition, decreased coordination, decreased upper extremity function, decreased lower extremity function, decreased safety awareness, decreased ROM, impaired cardiopulmonary response to activity. During evaluation, patient needed Mod A to stand and needed 2-person assist for steadying to ambulate <5' with rolling walker. Per patient and caregiver report, patient is typically able to ambulate household distances with rollator and supervision. Recommend d/c home as patient does have 24/7 supervision and with home health PT to increase balance and return gait to PLOF. Patient appropriate for skilled PT services during hospital course, recommend at 3x/week.     Rehab Prognosis:  fair; patient would benefit from acute skilled PT services to address these deficits and reach maximum level of function.      Recent Surgery: * No surgery found *      Plan:     During this hospitalization, patient to be seen 3 x/week to address the above listed problems via gait training, therapeutic activities, therapeutic exercises, neuromuscular re-education  · Plan of Care Expires:  08/23/18   Plan of Care Reviewed with: patient, caregiver, daughter    Subjective     Communicated with nsg prior to session.  Patient found supine upon PT entry to room, agreeable to evaluation.      Chief Complaint: Gait instability  Patient comments/goals: "I'm " "cold"  Pain/Comfort:  · Pain Rating 1: 0/10    Patients cultural, spiritual, Anabaptist conflicts given the current situation: Evangelical    Living Environment:  Patient History:  · Home environment: 2nd floor apartment with elevator access  · Assistance provided:  24/7 caretaker, daughter frequently checks in  · Bathroom set up:  Walk-in shower with shower chair    Previous Level of Mobilty  · Transfers: mod I  · Gait: Able to ambulate household distances with rollator and supervision    Additional Roles and Hx of Patient  · Working: retired  · Driving: no  · Hobbies: Lunch at the [employer]  · Hearing or Vision Deficit: hearing impaired  · Hx of Falls: denied    DME: rollator, wheelchair, bedside commode, raised toilet, shower chair  - Bold implies equipment being used      Objective:     Patient found with: oxygen, peripheral IV, telemetry     General Precautions: Standard, fall   Orthopedic Precautions:N/A   Braces: N/A       Exams:  Cognitive Exam  Patient is oriented to Person. Not oriented to place, month or year. Pleasantly confused during evaluation. Follows 50% of multistep commands. Poor safety awareness/awareness of deficits   Fine Motor Coordination -       Intact   Postural Exam Patient presented with the following abnormalities: -       Rounded shoulders  -       Forward head  -       Posterior pelvic tilt   Sensation -       Intact   Skin Integrity/Edema -       Skin integrity: Thin   R LE ROM WFL   R LE Strength  Grossly 4-/5   L LE ROM WFL   L LE Strength  Grossly 4-/5       Functional Mobility  Bed Mobility  Scooting: minimum assistance  Supine to Sit: minimum assistance   Sit to Supine: minimum assistance   Transfers Sit to Stand:  moderate assistance with rolling walker for 2 trials     Gait Gait Distance: <5'  Assistance Level: moderate assistance and of 2 persons   Assistive Device: Rolling Walker  Description: Patient took approximately 3 steps with rolling walker and Mod A of 2 people for " steadying and walker management. Ambulates with narrow base of support, extremely poor foot clearance, decreased stride length, decreased lopez, anterior trunk flexion and poor balance. Poor awareness of safety deficits.         Balance   Static Sitting stand by assistance   Dynamic Sitting contact guard assistance   Static Standing minimum assistance   Dynamic Standing moderate assistance and of 2 persons         AM-PAC 6 CLICK MOBILITY  Total Score:13       Therapeutic Activities and Exercises:    PT educated pt on the following  - role of PT  - PT POC (including frequency and duration while in hospital)  - discharge recommendation (home health PT) and equipment needs (rolling walker)  - level of assistance currently req (2 person assist to trasnfer)and safety precautions with [facility] staff   All questions and concerns answered and addressed. White board updated with pertinent information. Nsg notified.       Patient left supine with all lines intact and call button in reach.    GOALS:    Physical Therapy Goals        Problem: Physical Therapy Goal    Goal Priority Disciplines Outcome Goal Variances Interventions   Physical Therapy Goal     PT/OT, PT      Description:  Goals to be met by: 18    Patient will increase functional independence with mobility by performin. Supine to sit with Stand-by Assistance  2. Sit to supine with Stand-by Assistance  3. Sit to stand transfer with Minimal Assistance  4. Bed to chair transfer with Moderate Assistance using Rolling Walker  5. Gait  x 10 feet with Moderate Assistance using Rolling Walker.   6. Sitting at edge of bed x15 minutes with Stand-by Assistance  7. Lower extremity exercise program x15 reps per handout, with supervision                      History:     Past Medical History:   Diagnosis Date    GERD (gastroesophageal reflux disease)     Hypertension     UTI (urinary tract infection)        Past Surgical History:   Procedure Laterality Date     APPENDECTOMY      HYSTERECTOMY      TOTAL KNEE ARTHROPLASTY Bilateral        Clinical Decision Making:     History  Co-morbidities and personal factors that may impact the plan of care Examination  Body Structures and Functions, activity limitations and participation restrictions that may impact the plan of care Clinical Presentation   Decision Making/ Complexity Score   Co-morbidities:   [] Time since onset of injury / illness / exacerbation  [x] Status of current condition  [x]Patient's cognitive status and safety concerns    [] Multiple Medical Problems (see med hx)  Personal Factors:   [] Patient's age  [] Prior Level of function   [] Patient's home situation (environment and family support)  [] Patient's level of motivation  [] Expected progression of patient      HISTORY:(criteria)    [] 60521 - no personal factors/history    [x] 01456 - has 1-2 personal factor/comorbidity     [] 02184 - has >3 personal factor/comorbidity     Body Regions:  [x] Objective examination findings  [] Head     []  Neck  [] Trunk   [] Upper Extremity  [x] Lower Extremity    Body Systems:  [x] For communication ability, affect, cognition, language, and learning style: the assessment of the ability to make needs known, consciousness, orientation (person, place, and time), expected emotional /behavioral responses, and learning preferences (eg, learning barriers, education  needs)  [x] For the neuromuscular system: a general assessment of gross coordinated movement (eg, balance, gait, locomotion, transfers, and transitions) and motor function  (motor control and motor learning)  [x] For the musculoskeletal system: the assessment of gross symmetry, gross range of motion, gross strength, height, and weight  [] For the integumentary system: the assessment of pliability(texture), presence of scar formation, skin color, and skin integrity  [x] For cardiovascular/pulmonary system: the assessment of heart rate, respiratory rate, blood  pressure, and edema     Activity limitations:    [x] Patient's cognitive status and saf ety concerns          [x] Status of current condition      [] Weight bearing restriction  [] Cardiopulmunary Restriction    Participation Restrictions:   [] Goals and goal agreement with the patient     [] Rehab potential (prognosis) and probable outcome      Examination of Body System: (criteria)    [] 71406 - addressing 1-2 elements    [] 11870 - addressing a total of 3 or more elements     [x] 80138 -  Addressing a total of 4 or more elements         Clinical Presentation: (criteria)  Evolving - 54123     On examination of body system using standardized tests and measures patient presents with 4 or more elements from any of the following: body structures and functions, activity limitations, and/or participation restrictions.  Leading to a clinical presentation that is considered evolving with changing characteristics                              Clinical Decision Making  (Eval Complexity):  Moderate - 30728     Time Tracking:     PT Received On: 07/23/18  PT Start Time: 1043     PT Stop Time: 1104  PT Total Time (min): 21 min     Billable Minutes: Evaluation 21     Co-evaluation with OT      Thalia Tyler, [facility]  07/23/2018

## 2018-07-23 NOTE — PLAN OF CARE
Problem: Occupational Therapy Goal  Goal: Occupational Therapy Goal  Goals to be met by: 8/20    Patient will increase functional independence with ADLs by performing:    UE Dressing with Contact Guard Assistance.  LE Dressing with Minimal Assistance.  Grooming while seated at sink with Contact Guard Assistance.  Toileting from toilet with Minimal Assistance for hygiene and clothing management.     Outcome: Ongoing (interventions implemented as appropriate)  Evaluation complete and goals set.  Cont with POC  Ghazala Kay OT  7/23/2018

## 2018-07-23 NOTE — PLAN OF CARE
Problem: Fall Risk (Adult)  Goal: Absence of Falls  Patient will demonstrate the desired outcomes by discharge/transition of care.   Outcome: Ongoing (interventions implemented as appropriate)   07/23/18 1757   Fall Risk (Adult)   Absence of Falls making progress toward outcome   Pt is free of falls and injuries per shift , fall precautions maintained . Caregiver and family at bedside .     Problem: Patient Care Overview  Goal: Plan of Care Review  Outcome: Ongoing (interventions implemented as appropriate)   07/23/18 1757   Coping/Psychosocial   Plan Of Care Reviewed With patient;caregiver;daughter   Pt is A,A,O x 3 . Stable v/s. No C/o pain during the day . Pt is able to turn in bed and stand at the side of the bed and use the walker with assistant to the bathroom . Pt reported he cough is better today . Pt C/O nausea this morning and Zofran po given , nausea resolved . Sputum sample collected and sent to lab today .

## 2018-07-23 NOTE — PT/OT/SLP EVAL
Occupational Therapy   Evaluation    Name: Irina Polk  MRN: [id number]  Admitting Diagnosis:  Pneumonia of right upper lobe due to Chlamydia species      Recommendations:     Discharge Recommendations:  home with home health and 24 hour assist   Discharge Equipment Recommendations:     Barriers to discharge:  None    History:     Occupational Profile:  Living Environment: Pt lives in an apartment with 24 hour paid caregivers   Previous level of function: Pt with 24 hour paid caregivers who provide assistance for self care and home management due to dementia   Equipment Owned:  rollator, shower chair, bedside commode, raised toiletwalker  Assistance upon Discharge: paid caregivers 24hour    Past Medical History:   Diagnosis Date    GERD (gastroesophageal reflux disease)     Hypertension     UTI (urinary tract infection)          Past Surgical History:   Procedure Laterality Date    APPENDECTOMY      HYSTERECTOMY      TOTAL KNEE ARTHROPLASTY Bilateral        Subjective     Chief Complaint: poor mobility   Patient/Family stated goals: return to home   Communicated with: RN  prior to session.  Pain/Comfort:  Pain Rating 1: 0/10    Patients cultural, spiritual, Amish conflicts given the current situation: none stated     Objective:     Patient found with:  (lines intact )    General Precautions: Standard, fall   Orthopedic Precautions:N/A   Braces: N/A     Occupational Performance:    Bed Mobility:    · Patient completed Rolling/Turning to Right with contact guard assistance  · Patient completed Scooting/Bridging with contact guard assistance  · Patient completed Supine to Sit with minimum assistance  · Patient completed Sit to Supine with minimum assistance    Functional Mobility/Transfers:  · Patient completed Sit <> Stand Transfer with moderate assistance  with  hand-held assist   · Functional Mobility: Pt with poor balance and difficulty with following directions for safe mobility     Activities of  "Daily Living:  · Pt with overall Min A for safe completion of self care tasks     Cognitive/Visual Perceptual:  Cognitive/Psychosocial Skills:     -       Oriented to: Person   -       Follows Commands/attention:Follows one-step commands  -       Communication: clear/fluent  -       Memory: Impaired STM and Impaired LTM  -       Safety awareness/insight to disability: intact   -       Mood/Affect/Coping skills/emotional control: Appropriate to situation    Physical Exam:  Upper Extremity Range of Motion:     -       Right Upper Extremity: WFL  -       Left Upper Extremity: WFL  Upper Extremity Strength:    -       Right Upper Extremity: WFL  -       Left Upper Extremity: WFL    Patient left supine with all lines intact and paid caregiver and daughter  present    [facility] 6 Click:  [facility] Total Score: 19    Treatment & Education:  Evaluation complete.  Pt educated on safety, role of OT, importance of increased participation in self care for gains , expectations for participation, expectations for gains, POC, energy conservation, caregiver strain. White board updated.     Education:    Assessment:     Irina Polk is a 89 y.o. female with a medical diagnosis of Pneumonia of right upper lobe due to Chlamydia species.  She presents with paid caregiver and daughter.  Pt approaching baseline functional performance but with noted poor balance and functional mobility.  Pt with dementia and is easily distractable   Performance deficits affecting function are poor mobility, decreased ADL, decreased cognition impaired endurance, weakness, impaired balance, impaired self care skills, impaired functional mobilty, decreased coordination, impaired cognition.      Rehab Prognosis:  Good ; patient would benefit from acute skilled OT services to address these deficits and reach maximum level of function.         Clinical Decision Makin.  OT Low:  "Pt evaluation falls under low complexity for evaluation coding due to " "performance deficits noted in 1-3 areas as stated above and 0 co-morbities affecting current functional status. Data obtained from problem focused assessments. No modifications or assistance was required for completion of evaluation. Only brief occupational profile and history review completed."     Plan:     Patient to be seen 3 x/week to address the above listed problems via self-care/home management, therapeutic activities, therapeutic exercises  · Plan of Care Expires: 08/23/18  · Plan of Care Reviewed with: patient, caregiver, daughter    This Plan of care has been discussed with the patient who was involved in its development and understands and is in agreement with the identified goals and treatment plan    GOALS:    Occupational Therapy Goals        Problem: Occupational Therapy Goal    Goal Priority Disciplines Outcome Interventions   Occupational Therapy Goal     OT, PT/OT Ongoing (interventions implemented as appropriate)    Description:  Goals to be met by: 8/20    Patient will increase functional independence with ADLs by performing:    UE Dressing with Contact Guard Assistance.  LE Dressing with Minimal Assistance.  Grooming while seated at sink with Contact Guard Assistance.  Toileting from toilet with Minimal Assistance for hygiene and clothing management.                       Time Tracking:     OT Date of Treatment: 07/23/18  OT Start Time: 1046  OT Stop Time: 1105  OT Total Time (min): 19 min    Billable Minutes:Evaluation 19    Ghazala Kay, OT  7/23/2018    "

## 2018-07-23 NOTE — PLAN OF CARE
Problem: Physical Therapy Goal  Goal: Physical Therapy Goal  Goals to be met by: 18    Patient will increase functional independence with mobility by performin. Supine to sit with Stand-by Assistance  2. Sit to supine with Stand-by Assistance  3. Sit to stand transfer with Minimal Assistance  4. Bed to chair transfer with Moderate Assistance using Rolling Walker  5. Gait  x 10 feet with Moderate Assistance using Rolling Walker.   6. Sitting at edge of bed x15 minutes with Stand-by Assistance  7. Lower extremity exercise program x15 reps per handout, with supervision     Outcome: Ongoing (interventions implemented as appropriate)  Physical therapy evaluation completed. Plan of care initiated.    Thalia Tyler, SPT  2018

## 2018-07-23 NOTE — SUBJECTIVE & OBJECTIVE
Interval History:   Symptoms:   Same.  Diet:  Adequate intake.   Activity level:  Impaired due to weakness.    Pain:  No pain.       Review of Systems   Constitutional: Negative for fever.   Respiratory: Positive for cough, shortness of breath and wheezing.    Cardiovascular: Negative for chest pain and leg swelling.   Gastrointestinal: Positive for nausea. Negative for abdominal pain and constipation.     Objective:     Vital Signs (Most Recent):  Temp: 97.6 °F (36.4 °C) (07/23/18 0859)  Pulse: 70 (07/23/18 0859)  Resp: 19 (07/23/18 0859)  BP: (!) 145/71 (07/23/18 0859)  SpO2: (!) 92 % (07/23/18 0859) Vital Signs (24h Range):  Temp:  [97.6 °F (36.4 °C)-98.9 °F (37.2 °C)] 97.6 °F (36.4 °C)  Pulse:  [59-98] 70  Resp:  [16-27] 19  SpO2:  [87 %-98 %] 92 %  BP: (114-145)/(61-77) 145/71     Weight: 69.1 kg (152 lb 5.4 oz)  Body mass index is 26.15 kg/m².    Intake/Output Summary (Last 24 hours) at 07/23/18 1001  Last data filed at 07/23/18 0900   Gross per 24 hour   Intake              350 ml   Output                0 ml   Net              350 ml      Physical Exam   Constitutional: No distress.   Cardiovascular: Normal rate, regular rhythm and normal heart sounds.    Pulmonary/Chest: Effort normal. No respiratory distress. She has wheezes. She has rales.   Abdominal: Soft. Bowel sounds are normal. She exhibits no distension. There is no tenderness.   Musculoskeletal: She exhibits no edema.   Neurological: She is alert.   Mildly confused but conversant.  Personal sitter at bedside.       Significant Labs: All pertinent labs within the past 24 hours have been reviewed.    Significant Imaging: I have reviewed and interpreted all pertinent imaging results/findings within the past 24 hours.

## 2018-07-24 PROBLEM — J18.9 RIGHT UPPER LOBE PNEUMONIA: Status: ACTIVE | Noted: 2018-07-22

## 2018-07-24 LAB
ANION GAP SERPL CALC-SCNC: 11 MMOL/L
BASOPHILS # BLD AUTO: 0.02 K/UL
BASOPHILS NFR BLD: 0.2 %
BUN SERPL-MCNC: 23 MG/DL
CALCIUM SERPL-MCNC: 9 MG/DL
CHLORIDE SERPL-SCNC: 108 MMOL/L
CO2 SERPL-SCNC: 17 MMOL/L
CREAT SERPL-MCNC: 0.9 MG/DL
DIFFERENTIAL METHOD: ABNORMAL
EOSINOPHIL # BLD AUTO: 0 K/UL
EOSINOPHIL NFR BLD: 0 %
ERYTHROCYTE [DISTWIDTH] IN BLOOD BY AUTOMATED COUNT: 16.1 %
EST. GFR  (AFRICAN AMERICAN): >60 ML/MIN/1.73 M^2
EST. GFR  (NON AFRICAN AMERICAN): 56.9 ML/MIN/1.73 M^2
GLUCOSE SERPL-MCNC: 120 MG/DL
HCT VFR BLD AUTO: 35.6 %
HGB BLD-MCNC: 10.9 G/DL
IMM GRANULOCYTES # BLD AUTO: 0.04 K/UL
IMM GRANULOCYTES NFR BLD AUTO: 0.5 %
LYMPHOCYTES # BLD AUTO: 1.7 K/UL
LYMPHOCYTES NFR BLD: 19.8 %
MCH RBC QN AUTO: 30.6 PG
MCHC RBC AUTO-ENTMCNC: 30.6 G/DL
MCV RBC AUTO: 100 FL
MONOCYTES # BLD AUTO: 0.6 K/UL
MONOCYTES NFR BLD: 6.7 %
NEUTROPHILS # BLD AUTO: 6.1 K/UL
NEUTROPHILS NFR BLD: 72.8 %
NRBC BLD-RTO: 0 /100 WBC
PLATELET # BLD AUTO: 234 K/UL
PMV BLD AUTO: 10.2 FL
POTASSIUM SERPL-SCNC: 4.3 MMOL/L
RBC # BLD AUTO: 3.56 M/UL
SODIUM SERPL-SCNC: 136 MMOL/L
WBC # BLD AUTO: 8.34 K/UL

## 2018-07-24 PROCEDURE — 36415 COLL VENOUS BLD VENIPUNCTURE: CPT

## 2018-07-24 PROCEDURE — 94761 N-INVAS EAR/PLS OXIMETRY MLT: CPT

## 2018-07-24 PROCEDURE — 99232 SBSQ HOSP IP/OBS MODERATE 35: CPT | Mod: ,,, | Performed by: INTERNAL MEDICINE

## 2018-07-24 PROCEDURE — 27000221 HC OXYGEN, UP TO 24 HOURS

## 2018-07-24 PROCEDURE — 80048 BASIC METABOLIC PNL TOTAL CA: CPT

## 2018-07-24 PROCEDURE — 63600175 PHARM REV CODE 636 W HCPCS: Performed by: HOSPITALIST

## 2018-07-24 PROCEDURE — 94640 AIRWAY INHALATION TREATMENT: CPT

## 2018-07-24 PROCEDURE — 85025 COMPLETE CBC W/AUTO DIFF WBC: CPT

## 2018-07-24 PROCEDURE — 25000242 PHARM REV CODE 250 ALT 637 W/ HCPCS: Performed by: HOSPITALIST

## 2018-07-24 PROCEDURE — 25000003 PHARM REV CODE 250: Performed by: HOSPITALIST

## 2018-07-24 PROCEDURE — 11000001 HC ACUTE MED/SURG PRIVATE ROOM

## 2018-07-24 RX ORDER — GUAIFENESIN 600 MG/1
1200 TABLET, EXTENDED RELEASE ORAL 2 TIMES DAILY
Status: DISCONTINUED | OUTPATIENT
Start: 2018-07-25 | End: 2018-07-28 | Stop reason: HOSPADM

## 2018-07-24 RX ORDER — PREDNISONE 20 MG/1
40 TABLET ORAL DAILY
Status: DISCONTINUED | OUTPATIENT
Start: 2018-07-25 | End: 2018-07-25

## 2018-07-24 RX ADMIN — PROMETHAZINE HYDROCHLORIDE AND CODEINE PHOSPHATE 5 ML: 10; 6.25 SOLUTION ORAL at 05:07

## 2018-07-24 RX ADMIN — GUAIFENESIN 600 MG: 600 TABLET, EXTENDED RELEASE ORAL at 09:07

## 2018-07-24 RX ADMIN — SENNOSIDES AND DOCUSATE SODIUM 2 TABLET: 8.6; 5 TABLET ORAL at 09:07

## 2018-07-24 RX ADMIN — ASPIRIN 81 MG CHEWABLE TABLET 81 MG: 81 TABLET CHEWABLE at 09:07

## 2018-07-24 RX ADMIN — METOPROLOL SUCCINATE 25 MG: 25 TABLET, EXTENDED RELEASE ORAL at 10:07

## 2018-07-24 RX ADMIN — IPRATROPIUM BROMIDE AND ALBUTEROL SULFATE 3 ML: .5; 3 SOLUTION RESPIRATORY (INHALATION) at 02:07

## 2018-07-24 RX ADMIN — AMLODIPINE BESYLATE 2.5 MG: 2.5 TABLET ORAL at 09:07

## 2018-07-24 RX ADMIN — TRAZODONE HYDROCHLORIDE 25 MG: 50 TABLET ORAL at 09:07

## 2018-07-24 RX ADMIN — IPRATROPIUM BROMIDE AND ALBUTEROL SULFATE 3 ML: .5; 3 SOLUTION RESPIRATORY (INHALATION) at 09:07

## 2018-07-24 RX ADMIN — PANTOPRAZOLE SODIUM 40 MG: 40 TABLET, DELAYED RELEASE ORAL at 09:07

## 2018-07-24 RX ADMIN — IPRATROPIUM BROMIDE AND ALBUTEROL SULFATE 3 ML: .5; 3 SOLUTION RESPIRATORY (INHALATION) at 08:07

## 2018-07-24 RX ADMIN — ESCITALOPRAM OXALATE 10 MG: 10 TABLET ORAL at 09:07

## 2018-07-24 RX ADMIN — FLUTICASONE FUROATE AND VILANTEROL TRIFENATATE 1 PUFF: 100; 25 POWDER RESPIRATORY (INHALATION) at 09:07

## 2018-07-24 RX ADMIN — ENOXAPARIN SODIUM 40 MG: 100 INJECTION SUBCUTANEOUS at 05:07

## 2018-07-24 RX ADMIN — CEFTRIAXONE SODIUM 1 G: 1 INJECTION, POWDER, FOR SOLUTION INTRAMUSCULAR; INTRAVENOUS at 09:07

## 2018-07-24 RX ADMIN — PREDNISONE 50 MG: 20 TABLET ORAL at 09:07

## 2018-07-24 NOTE — PROGRESS NOTES
Pt 's HR  sustained in 48 - 50  For abut 20 min now , other V/S /63, Temp  97.7 , O2 sat 91% on 3 L N/C . Pt is sleeping , no distress . Spoke with Dr. Jay and notified her and she said it is ok since the pt is sleeping , no more orders at this time , will keep monitoring .

## 2018-07-24 NOTE — PLAN OF CARE
Problem: Patient Care Overview  Goal: Plan of Care Review  Outcome: Ongoing (interventions implemented as appropriate)  Pt alert and verbally responsive, able to make her needs known. However, confused and forgetful at times. Redirected as needed. Insomnia persists. PRN torazadone 25mg HS given without effect. Pt remains awake through the night. Coughs subsided. Scheduled mucinex given with effect. Caregiver remains at beside. Fall precaution maintained. No fall as of this time of the shift. No apparent distress noted. N/C at 3L in progress. No SOB noted. Continue to monitor and document any changes.

## 2018-07-24 NOTE — PROGRESS NOTES
Pt's personal caregiver reported that pt have not slept at all 2 nights in a row. Daughter expressed the concerns to RN earlier on this shift too. The team made aware PRN Torazadone 25mg was not effective.

## 2018-07-24 NOTE — PROGRESS NOTES
Hospital Medicine   Progress Note    Patient: Irina Polk  Date of Service: 7/24/2018  Hospital Day: 2  Admission Date: 7/22/2018  SELINA: 7/26/2018    CC: Cough (white productive cough; breathing treament prior to arrival; hx of aspiration )    Principal Problem:  Pneumonia of right upper lobe due to Chlamydia species    24 Hour Course / Overnight Events   Nursing reports bradycardia.     Subjective / Review of Systems   Poor sleep x 2 days.  Constitutional: negative for fevers  Respiratory: positive for cough and dyspnea on exertion  Cardiovascular: negative for chest pain    Medications   Scheduled Meds:    albuterol-ipratropium 3 mL Nebulization Q6H WAKE   amLODIPine 2.5 mg Oral Daily   aspirin 81 mg Oral Daily   cefTRIAXone (ROCEPHIN) IVPB 1 g Intravenous Q24H   enoxaparin 40 mg Subcutaneous Daily   escitalopram oxalate 10 mg Oral Daily   fluticasone-vilanterol 1 puff Inhalation Daily   [START ON 7/25/2018] guaiFENesin 1,200 mg Oral BID   metoprolol succinate 25 mg Oral Daily   pantoprazole 40 mg Oral Daily   [START ON 7/25/2018] predniSONE 40 mg Oral Daily   senna-docusate 8.6-50 mg 2 tablet Oral BID     PRN: acetaminophen, albuterol-ipratropium, benzonatate, diclofenac sodium, dicyclomine, ondansetron, promethazine (PHENERGAN) IVPB, promethazine-codeine 6.25-10 mg/5 ml, sodium chloride 0.9%, traMADol, traZODone  Infusions:     Physical Examination   Temp:  [97.7 °F (36.5 °C)-98.8 °F (37.1 °C)]   Pulse:  [49-75]   Resp:  [16-24]   BP: (110-147)/(57-66)   SpO2:  [88 %-96 %]     Intake/Output Summary (Last 24 hours) at 07/24/18 1415  Last data filed at 07/24/18 1000   Gross per 24 hour   Intake              550 ml   Output                0 ml   Net              550 ml     General: well developed, well nourished, no distress  Eyes: negative findings: lids and lashes normal and conjunctivae and sclerae normal.  Lungs:  normal respiratory effort and diminished breath sounds bilaterally  Cardiovascular: Heart:  regular rate and rhythm and S1, S2 normal. Chest Wall: not examined. Extremities: no cyanosis or edema, or clubbing. Pulses: not examined.  Abdomen/Rectal: Abdomen: normal findings: bowel sounds normal and soft, non-tender. Rectal: not examined  Neurologic: Mental status: alertness: alert, orientation: person, place      Data   Lab, Imaging, and Diagnostic results from 7/24/2018 were reviewed. I note that hemogram mildly abnormal but acceptable with H&H trending down slightly.    Problem-Based Assessment and Plan     Irina Polk is a 89 y.o. female with past medical history significant for COPD and hypertension was admitted for pneumonia.    Active Hospital Problems    Diagnosis  POA    *Right upper lobe pneumonia [J18.1]  Yes    Acute respiratory failure with hypoxia [J96.01]  Yes    Debility [R53.81]  Yes    COPD with acute exacerbation [J44.1]  Yes    Essential hypertension [I10]  Yes      Resolved Hospital Problems    Diagnosis Date Resolved POA   No resolved problems to display.       Problems addressed today:    * Pneumonia of right upper lobe      Acute respiratory failure with hypoxia.  H/o aspiration.  At admission CXR shows RUL consolidation.  Had sputum and sat in mid-80s at admission.  - continue ceftriaxone and follow cultures  - aspiration precautions          Debility     -- PT/OT          Acute respiratory failure with hypoxia     Treating for PNA and COPD          COPD with acute exacerbation     Has wheezing on exam.  ER gave 125 mg Solumedrol  -- continue po prednisone  -- wean O2          Essential hypertension     -- acceptable; continue with current treatment & monitor          HIGH RISK CONDITION(S):   Patient has a condition that poses threat to life and bodily function: Severe Respiratory Distress     Inpatient Checklist:  Diet:  Diet Adult Regular (IDDSI Level 7)  DVT Prophylaxis:    Anticoagulants   Medication Route Frequency    enoxaparin injection 40 mg Subcutaneous Daily      GI Prophylaxis:   GI Medications     Start     Stop Route Frequency Ordered    07/23/18 0955  ondansetron disintegrating tablet 4 mg      -- Oral Every 6 hours PRN 07/23/18 0855    07/23/18 0900  pantoprazole EC tablet 40 mg      -- Oral Daily 07/22/18 1540    07/22/18 2100  senna-docusate 8.6-50 mg per tablet 2 tablet      -- Oral 2 times daily 07/22/18 1540    07/22/18 1716  dicyclomine capsule 10 mg      -- Oral 2 times daily PRN 07/22/18 1616        Lines/ Drains/ Airways:   Peripheral IV  Central Lines:    Tubes/Drains:  Wounds:     Disposition:   Home-Health Care Svc    Code status: Full Code    Provider  Fauzia Jay MD  [facility] MED D  Department of Hospital Medicine  Contact Information: Spectralink # [id number]  Pager [phone]

## 2018-07-24 NOTE — PLAN OF CARE
Problem: Fall Risk (Adult)  Goal: Absence of Falls  Patient will demonstrate the desired outcomes by discharge/transition of care.   Outcome: Ongoing (interventions implemented as appropriate)   07/24/18 1740   Fall Risk (Adult)   Absence of Falls making progress toward outcome   Pt is free of falls of falls and injuries per shift , fall precautions maintained , pt assisted to the bathroom , family at bedside and involved in the care .     Problem: Patient Care Overview  Goal: Plan of Care Review  Outcome: Ongoing (interventions implemented as appropriate)   07/24/18 1740   Coping/Psychosocial   Plan Of Care Reviewed With patient;caregiver;daughter   Pt is A,A,O x 4 . Stable V/S , pt has low HR  48 sometimes during the day today pt asymptomatic and MD notified . Pt slept between care today . Medications given as scheduled . Pt on Iv antibiotics . Pt is able to ambulate to the bathroom with assist .

## 2018-07-24 NOTE — PROGRESS NOTES
Pt is confused today oriented to self  but confused about place and time . Pt's HR drops to 49-51 , pt asymptomatic resting in bed , pt denies chest pain or SOB . Per night shift nurse and caregiver pt have not slept for 2 nights in a row . Dr. Jay notified of the situation and she said she will check the pt's chart , and she order to hold the metoprolol dose for now . No other orders at this time . Will keep monitoring .

## 2018-07-24 NOTE — PROGRESS NOTES
Pt's HR is back to 60s-70s  , pt is resting in bed . Dr. Jay notified and she said it is ok to give the pt her metoprolol dose now . Will follow orders and keep monitoring .

## 2018-07-25 LAB
BACTERIA SPEC AEROBE CULT: NORMAL
BACTERIA UR CULT: NORMAL
GRAM STN SPEC: NORMAL

## 2018-07-25 PROCEDURE — 63600175 PHARM REV CODE 636 W HCPCS: Performed by: INTERNAL MEDICINE

## 2018-07-25 PROCEDURE — 27000221 HC OXYGEN, UP TO 24 HOURS

## 2018-07-25 PROCEDURE — 63600175 PHARM REV CODE 636 W HCPCS: Performed by: HOSPITALIST

## 2018-07-25 PROCEDURE — 94761 N-INVAS EAR/PLS OXIMETRY MLT: CPT

## 2018-07-25 PROCEDURE — 99232 SBSQ HOSP IP/OBS MODERATE 35: CPT | Mod: ,,, | Performed by: INTERNAL MEDICINE

## 2018-07-25 PROCEDURE — 97530 THERAPEUTIC ACTIVITIES: CPT

## 2018-07-25 PROCEDURE — 25000242 PHARM REV CODE 250 ALT 637 W/ HCPCS: Performed by: HOSPITALIST

## 2018-07-25 PROCEDURE — 25000003 PHARM REV CODE 250: Performed by: INTERNAL MEDICINE

## 2018-07-25 PROCEDURE — 11000001 HC ACUTE MED/SURG PRIVATE ROOM

## 2018-07-25 PROCEDURE — 97116 GAIT TRAINING THERAPY: CPT

## 2018-07-25 PROCEDURE — 94640 AIRWAY INHALATION TREATMENT: CPT

## 2018-07-25 PROCEDURE — 25000003 PHARM REV CODE 250: Performed by: HOSPITALIST

## 2018-07-25 RX ORDER — AZITHROMYCIN 250 MG/1
500 TABLET, FILM COATED ORAL DAILY
Status: COMPLETED | OUTPATIENT
Start: 2018-07-25 | End: 2018-07-27

## 2018-07-25 RX ORDER — PREDNISONE 20 MG/1
40 TABLET ORAL DAILY
Status: DISCONTINUED | OUTPATIENT
Start: 2018-07-26 | End: 2018-07-28 | Stop reason: HOSPADM

## 2018-07-25 RX ORDER — PROMETHAZINE HYDROCHLORIDE 12.5 MG/1
12.5 TABLET ORAL EVERY 6 HOURS PRN
Status: DISCONTINUED | OUTPATIENT
Start: 2018-07-25 | End: 2018-07-28 | Stop reason: HOSPADM

## 2018-07-25 RX ADMIN — PANTOPRAZOLE SODIUM 40 MG: 40 TABLET, DELAYED RELEASE ORAL at 08:07

## 2018-07-25 RX ADMIN — AMLODIPINE BESYLATE 2.5 MG: 2.5 TABLET ORAL at 08:07

## 2018-07-25 RX ADMIN — FLUTICASONE FUROATE AND VILANTEROL TRIFENATATE 1 PUFF: 100; 25 POWDER RESPIRATORY (INHALATION) at 09:07

## 2018-07-25 RX ADMIN — AZITHROMYCIN 500 MG: 250 TABLET, FILM COATED ORAL at 01:07

## 2018-07-25 RX ADMIN — CEFTRIAXONE SODIUM 1 G: 1 INJECTION, POWDER, FOR SOLUTION INTRAMUSCULAR; INTRAVENOUS at 08:07

## 2018-07-25 RX ADMIN — TRAZODONE HYDROCHLORIDE 25 MG: 50 TABLET ORAL at 09:07

## 2018-07-25 RX ADMIN — PREDNISONE 40 MG: 20 TABLET ORAL at 08:07

## 2018-07-25 RX ADMIN — GUAIFENESIN 1200 MG: 600 TABLET, EXTENDED RELEASE ORAL at 08:07

## 2018-07-25 RX ADMIN — SENNOSIDES AND DOCUSATE SODIUM 2 TABLET: 8.6; 5 TABLET ORAL at 08:07

## 2018-07-25 RX ADMIN — IPRATROPIUM BROMIDE AND ALBUTEROL SULFATE 3 ML: .5; 3 SOLUTION RESPIRATORY (INHALATION) at 07:07

## 2018-07-25 RX ADMIN — ENOXAPARIN SODIUM 40 MG: 100 INJECTION SUBCUTANEOUS at 06:07

## 2018-07-25 RX ADMIN — IPRATROPIUM BROMIDE AND ALBUTEROL SULFATE 3 ML: .5; 3 SOLUTION RESPIRATORY (INHALATION) at 11:07

## 2018-07-25 RX ADMIN — GUAIFENESIN 1200 MG: 600 TABLET, EXTENDED RELEASE ORAL at 09:07

## 2018-07-25 RX ADMIN — ASPIRIN 81 MG CHEWABLE TABLET 81 MG: 81 TABLET CHEWABLE at 08:07

## 2018-07-25 RX ADMIN — ESCITALOPRAM OXALATE 10 MG: 10 TABLET ORAL at 08:07

## 2018-07-25 RX ADMIN — PROMETHAZINE HYDROCHLORIDE AND CODEINE PHOSPHATE 5 ML: 10; 6.25 SOLUTION ORAL at 09:07

## 2018-07-25 NOTE — MEDICAL/APP STUDENT
Hospital Medicine  Progress Note    Patient Name: Irina Polk  MRN: [id number]  Team: [facility] D Fauzia Jay MD  Admit Date: 7/22/2018  SELINA 7/27/2018  Code status: Full Code    Principal Problem:  Right upper lobe pneumonia    Interval history: Sitting in bed reading a book. Continues to require oxygen.      Review of Systems   Constitutional: Negative for fever.   Respiratory: Positive for cough. Negative for shortness of breath.    Cardiovascular: Negative for chest pain.       Physical Exam:  Temp:  [96.7 °F (35.9 °C)-98.4 °F (36.9 °C)]   Pulse:  [42-68]   Resp:  [16-22]   BP: (110-140)/(54-66)   SpO2:  [92 %-97 %]      Temp: 96.7 °F (35.9 °C) (07/25/18 0836)  Pulse: 60 (07/25/18 1100)  Resp: 20 (07/25/18 0956)  BP: (!) 140/63 (07/25/18 0836)  SpO2: 96 % (07/25/18 0836)    Intake/Output Summary (Last 24 hours) at 07/25/18 1114  Last data filed at 07/24/18 2100   Gross per 24 hour   Intake              390 ml   Output                0 ml   Net              390 ml     Weight: 69.1 kg (152 lb 5.4 oz)  Body mass index is 26.15 kg/m².    Physical Exam   Constitutional: No distress.   Eyes: Conjunctivae and lids are normal.   Cardiovascular: S1 normal and S2 normal.    Pulmonary/Chest: Effort normal. She has decreased breath sounds. She has wheezes.   Abdominal: Soft. Bowel sounds are normal. There is no tenderness.   Musculoskeletal: She exhibits no edema.       Significant Labs:    Recent Labs  Lab 07/22/18  1316 07/23/18  0418 07/24/18  0346   WBC 7.05 5.52 8.34   HGB 12.8 11.2* 10.9*   HCT 39.9 35.1* 35.6*    230 234       Recent Labs  Lab 07/22/18  1316 07/23/18  0418 07/24/18  0345    136 136   K 4.0 4.6 4.3    104 108   CO2 24 26 17*   BUN 16 18 23   CREATININE 0.8 1.0 0.9   GLU 96 115* 120*   CALCIUM 9.3 8.9 9.0   ALKPHOS 66  --   --    ALT 11  --   --    AST 19  --   --    ALBUMIN 3.8  --   --    PROT 6.9  --   --    BILITOT 0.6  --   --        Recent Labs  Lab 07/22/18  4383    TROPONINI 0.013     Inpatient Medications prescribed for management of current Problems:   Scheduled Meds:    albuterol-ipratropium  3 mL Nebulization Q6H WAKE    amLODIPine  2.5 mg Oral Daily    aspirin  81 mg Oral Daily    cefTRIAXone (ROCEPHIN) IVPB  1 g Intravenous Q24H    enoxaparin  40 mg Subcutaneous Daily    escitalopram oxalate  10 mg Oral Daily    fluticasone-vilanterol  1 puff Inhalation Daily    guaiFENesin  1,200 mg Oral BID    metoprolol succinate  25 mg Oral Daily    pantoprazole  40 mg Oral Daily    predniSONE  40 mg Oral Daily    senna-docusate 8.6-50 mg  2 tablet Oral BID     Continuous Infusions:   As Needed: acetaminophen, albuterol-ipratropium, benzonatate, diclofenac sodium, dicyclomine, ondansetron, promethazine (PHENERGAN) IVPB, promethazine-codeine 6.25-10 mg/5 ml, sodium chloride 0.9%, traMADol, traZODone    Active Hospital Problems    Diagnosis  POA    *Right upper lobe pneumonia [J18.1]  Yes    Acute respiratory failure with hypoxia [J96.01]  Yes    Debility [R53.81]  Yes    COPD with acute exacerbation [J44.1]  Yes    Essential hypertension [I10]  Yes      Resolved Hospital Problems    Diagnosis Date Resolved POA   No resolved problems to display.       Overview:  Irina Polk is a 89 y.o. female with past medical history significant for COPD and hypertension was admitted for RUL pneumonia. The patient has reported history of aspiration. She was treated in the ED with Duonebs, ceftriaxone and solumedrol. The steroids were switched to PO prednisone and she was further managed with Breo nebs and guaifenesin. Blood cx show NGTD and respiratory cx are pending.      Assessment and Plan for Problems addressed today:    Pneumonia of right upper lobe   Acute respiratory failure with hypoxia  COPD with acute exacerbation  · On admission, CXR shows RUL consolidation; clinically wheezing with sputum and sat in mid-80s. H/o aspiration. Received solumedrol in ED.   · Continued  D[place]anny, Breo nebs, guaifenesin, ceftriaxone and PO prednisone and following cultures  · Aspiration precaution and wean O2 as tolerated   · Ordered repeat CXR (7/25)    Essential hypertension  · Continued with current home medications: amlodipine & Toprol     Debility  · PT/OT following     Depression   · Continued home escitalopram     Diet: Diet Adult Regular (IDDSI Level 7)  GI ppx: pantoprazole 40mg BID  DVT Prophylaxis:   Anticoagulants   Medication Route Frequency    enoxaparin injection 40 mg Subcutaneous Daily     Lines/ Drains/ Airways:   Peripheral IV- Right AC    Wounds: None    HIGH RISK CONDITION(S):   Patient has a condition that poses threat to life and bodily function: Severe Respiratory Distress     Discharge plan and follow up  Home-Health Care [facility] [place]      Provider  Fauzia Jay MD  [facility] MED D   Department of Hospital Medicine    Scribe Attestation: I personally scribed for Fauzia Jay MD on 07/25/2018 at 3:03 PM. Electronically signed by raji Haq on 07/25/2018 at 3:03 PM.

## 2018-07-25 NOTE — PLAN OF CARE
Problem: Patient Care Overview  Goal: Plan of Care Review  SLept most of this shift. HR has been mostly in the 40's  this shift. Sitter at bedside. Stated pt able to turn herself. I inform her that will assist her to help improve lung movement . HOB elevated with oral intake. No s/s of aspiration noted.

## 2018-07-25 NOTE — PT/OT/SLP PROGRESS
Physical Therapy Treatment    Patient Name:  Irina Polk   MRN:  [id number]    Recommendations:     Discharge Recommendations:  home health PT   Discharge Equipment Recommendations: walker, rolling   Barriers to discharge: None    Assessment:     Irina Polk is a 89 y.o. female admitted with a medical diagnosis of Right upper lobe pneumonia.  She presents with the following impairments/functional limitations:  weakness, impaired endurance, impaired self care skills, impaired functional mobilty, gait instability, impaired balance, decreased coordination, impaired cognition. Pt tolerated session well with focus on gait, transfers, bed mobility, and therex at EOB. Pt progressing well with large increase in ambulation distance and 4 goals met. Pt with poor RW mgmt requiring Mod A for safety and balance while ambulating. Pt will continue to benefit from therapy services to improve impairments listed above.     Rehab Prognosis:  Fair ; patient would benefit from acute skilled PT services to address these deficits and reach maximum level of function.      Recent Surgery: * No surgery found *      Plan:     During this hospitalization, patient to be seen 3 x/week to address the above listed problems via gait training, therapeutic activities, therapeutic exercises, neuromuscular re-education  · Plan of Care Expires:  08/23/18   Plan of Care Reviewed with: patient, caregiver, daughter    Subjective     Communicated with NSG prior to session.  Patient found supine with HOB elevated upon PTA entry to room, agreeable to treatment.      Chief Complaint: fatigue  Patient comments/goals: pt poorly motivated to participate but agreeable with coaxing and encouragement  Pain/Comfort:  · Pain Rating 1: 0/10  · Pain Rating Post-Intervention 1: 0/10    Patients cultural, spiritual, Roman [employer] conflicts given the current situation: none stated    Objective:     Patient found with:   oxygen     General Precautions: Standard, fall    Orthopedic Precautions:N/A   Braces: N/A     Functional Mobility:  · Bed Mobility:     · Rolling Right: stand by assistance  · Scooting: stand by assistance  · Supine to Sit: stand by assistance  · Sit to Supine: stand by assistance  · Transfers:     · Sit to Stand:  minimum assistance with rolling walker  · Gait: Pt ambulates 86 ft with RW and Mod A. Pts care giver provides close chair follow. Pt with extremely slow lopez, narrow MIRANDA, poor B foot clearance, and short step/stride length. Pt reaches for walll railing and requires cueing for proper safe use of RW to ambulate.   · Balance: Pt sits statically with SBA, dynamically with CGA/Min A. Pt stands statically/dynamically with Mod A for balance.       AM-PAC 6 CLICK MOBILITY  Turning over in bed (including adjusting bedclothes, sheets and blankets)?: 3  Sitting down on and standing up from a chair with arms (e.g., wheelchair, bedside commode, etc.): 2  Moving from lying on back to sitting on the side of the bed?: 3  Moving to and from a bed to a chair (including a wheelchair)?: 2  Need to walk in hospital room?: 2  Climbing 3-5 steps with a railing?: 1  Basic Mobility Total Score: 13       Therapeutic Activities and Exercises:   Pt sits at EOB for ~8 minutes to brush hair and later to perform seated therex. Pt requires SBA for static sitting balance, and CGA/Min A for dynamic sitting balance.   Pt performs BLE seated therex: AP, GS, LAQ, Seated Marching x 30 reps.   Pt educated on performance of therex at bedside with assistance off care giver and family for HEP.   Pt educated on importance of participation in therapy, use of RW, and increasing OOB activity.     Patient left HOB elevated with all lines intact, call button in reach and caregiver and family present..    GOALS:    Physical Therapy Goals        Problem: Physical Therapy Goal    Goal Priority Disciplines Outcome Goal Variances Interventions   Physical Therapy Goal     PT/OT, PT Ongoing  (interventions implemented as appropriate)     Description:  Goals to be met by: 18    Patient will increase functional independence with mobility by performin. Supine to sit with Stand-by Assistance - met   2. Sit to supine with Stand-by Assistance - met   3. Sit to stand transfer with Minimal Assistance - met   4. Bed to chair transfer with Moderate Assistance using Rolling Walker   5. Gait  x 10 feet with Moderate Assistance using Rolling Walker. - met  6. Sitting at edge of bed x15 minutes with Stand-by Assistance  7. Lower extremity exercise program x15 reps per handout, with supervision                       Time Tracking:     PT Received On: 18  PT Start Time: 1046     PT Stop Time: 1111  PT Total Time (min): 25 min     Billable Minutes: Gait Training 15 and Therapeutic Activity 10    Treatment Type: Treatment  PT/PTA: PTA     PTA Visit Number: 1     Javier Garcia, LYNETTE  2018

## 2018-07-25 NOTE — PLAN OF CARE
Problem: Physical Therapy Goal  Goal: Physical Therapy Goal  Goals to be met by: 18    Patient will increase functional independence with mobility by performin. Supine to sit with Stand-by Assistance - met   2. Sit to supine with Stand-by Assistance - met   3. Sit to stand transfer with Minimal Assistance - met   4. Bed to chair transfer with Moderate Assistance using Rolling Walker   5. Gait  x 10 feet with Moderate Assistance using Rolling Walker. - met  6. Sitting at edge of bed x15 minutes with Stand-by Assistance  7. Lower extremity exercise program x15 reps per handout, with supervision     Outcome: Ongoing (interventions implemented as appropriate)  4 goals met. Goals remain appropriate.

## 2018-07-26 LAB
ALBUMIN SERPL BCP-MCNC: 3.5 G/DL
ANION GAP SERPL CALC-SCNC: 9 MMOL/L
BASOPHILS # BLD AUTO: 0.03 K/UL
BASOPHILS NFR BLD: 0.3 %
BUN SERPL-MCNC: 22 MG/DL
CALCIUM SERPL-MCNC: 8.9 MG/DL
CHLORIDE SERPL-SCNC: 108 MMOL/L
CO2 SERPL-SCNC: 23 MMOL/L
CREAT SERPL-MCNC: 0.7 MG/DL
DIFFERENTIAL METHOD: ABNORMAL
EOSINOPHIL # BLD AUTO: 0.1 K/UL
EOSINOPHIL NFR BLD: 1.1 %
ERYTHROCYTE [DISTWIDTH] IN BLOOD BY AUTOMATED COUNT: 15.8 %
EST. GFR  (AFRICAN AMERICAN): >60 ML/MIN/1.73 M^2
EST. GFR  (NON AFRICAN AMERICAN): >60 ML/MIN/1.73 M^2
GLUCOSE SERPL-MCNC: 78 MG/DL
HCT VFR BLD AUTO: 41.1 %
HGB BLD-MCNC: 12.8 G/DL
IMM GRANULOCYTES # BLD AUTO: 0.11 K/UL
IMM GRANULOCYTES NFR BLD AUTO: 1.1 %
LYMPHOCYTES # BLD AUTO: 3.3 K/UL
LYMPHOCYTES NFR BLD: 32.7 %
MAGNESIUM SERPL-MCNC: 2.4 MG/DL
MCH RBC QN AUTO: 30.7 PG
MCHC RBC AUTO-ENTMCNC: 31.1 G/DL
MCV RBC AUTO: 99 FL
MONOCYTES # BLD AUTO: 0.8 K/UL
MONOCYTES NFR BLD: 7.6 %
NEUTROPHILS # BLD AUTO: 5.7 K/UL
NEUTROPHILS NFR BLD: 57.2 %
NRBC BLD-RTO: 0 /100 WBC
PHOSPHATE SERPL-MCNC: 3 MG/DL
PLATELET # BLD AUTO: 274 K/UL
PMV BLD AUTO: 10.3 FL
POTASSIUM SERPL-SCNC: 4.1 MMOL/L
RBC # BLD AUTO: 4.17 M/UL
SODIUM SERPL-SCNC: 140 MMOL/L
WBC # BLD AUTO: 9.98 K/UL

## 2018-07-26 PROCEDURE — 27000221 HC OXYGEN, UP TO 24 HOURS

## 2018-07-26 PROCEDURE — 85025 COMPLETE CBC W/AUTO DIFF WBC: CPT

## 2018-07-26 PROCEDURE — 94761 N-INVAS EAR/PLS OXIMETRY MLT: CPT

## 2018-07-26 PROCEDURE — 63600175 PHARM REV CODE 636 W HCPCS: Performed by: HOSPITALIST

## 2018-07-26 PROCEDURE — 25000003 PHARM REV CODE 250: Performed by: INTERNAL MEDICINE

## 2018-07-26 PROCEDURE — 94640 AIRWAY INHALATION TREATMENT: CPT

## 2018-07-26 PROCEDURE — 94799 UNLISTED PULMONARY SVC/PX: CPT

## 2018-07-26 PROCEDURE — 94664 DEMO&/EVAL PT USE INHALER: CPT

## 2018-07-26 PROCEDURE — 83735 ASSAY OF MAGNESIUM: CPT

## 2018-07-26 PROCEDURE — 36415 COLL VENOUS BLD VENIPUNCTURE: CPT

## 2018-07-26 PROCEDURE — 63600175 PHARM REV CODE 636 W HCPCS: Performed by: INTERNAL MEDICINE

## 2018-07-26 PROCEDURE — 27000646 HC AEROBIKA DEVICE

## 2018-07-26 PROCEDURE — 25000003 PHARM REV CODE 250: Performed by: HOSPITALIST

## 2018-07-26 PROCEDURE — 80069 RENAL FUNCTION PANEL: CPT

## 2018-07-26 PROCEDURE — 25000242 PHARM REV CODE 250 ALT 637 W/ HCPCS: Performed by: HOSPITALIST

## 2018-07-26 PROCEDURE — 99900035 HC TECH TIME PER 15 MIN (STAT)

## 2018-07-26 PROCEDURE — 11000001 HC ACUTE MED/SURG PRIVATE ROOM

## 2018-07-26 PROCEDURE — 97530 THERAPEUTIC ACTIVITIES: CPT

## 2018-07-26 PROCEDURE — 99232 SBSQ HOSP IP/OBS MODERATE 35: CPT | Mod: ,,, | Performed by: INTERNAL MEDICINE

## 2018-07-26 RX ADMIN — BENZONATATE 200 MG: 100 CAPSULE ORAL at 12:07

## 2018-07-26 RX ADMIN — ENOXAPARIN SODIUM 40 MG: 100 INJECTION SUBCUTANEOUS at 04:07

## 2018-07-26 RX ADMIN — AZITHROMYCIN 500 MG: 250 TABLET, FILM COATED ORAL at 08:07

## 2018-07-26 RX ADMIN — IPRATROPIUM BROMIDE AND ALBUTEROL SULFATE 3 ML: .5; 3 SOLUTION RESPIRATORY (INHALATION) at 02:07

## 2018-07-26 RX ADMIN — IPRATROPIUM BROMIDE AND ALBUTEROL SULFATE 3 ML: .5; 3 SOLUTION RESPIRATORY (INHALATION) at 07:07

## 2018-07-26 RX ADMIN — BENZONATATE 200 MG: 100 CAPSULE ORAL at 10:07

## 2018-07-26 RX ADMIN — IPRATROPIUM BROMIDE AND ALBUTEROL SULFATE 3 ML: .5; 3 SOLUTION RESPIRATORY (INHALATION) at 08:07

## 2018-07-26 RX ADMIN — GUAIFENESIN 1200 MG: 600 TABLET, EXTENDED RELEASE ORAL at 08:07

## 2018-07-26 RX ADMIN — FLUTICASONE FUROATE AND VILANTEROL TRIFENATATE 1 PUFF: 100; 25 POWDER RESPIRATORY (INHALATION) at 08:07

## 2018-07-26 RX ADMIN — AMLODIPINE BESYLATE 2.5 MG: 2.5 TABLET ORAL at 08:07

## 2018-07-26 RX ADMIN — CEFTRIAXONE SODIUM 1 G: 1 INJECTION, POWDER, FOR SOLUTION INTRAMUSCULAR; INTRAVENOUS at 08:07

## 2018-07-26 RX ADMIN — ESCITALOPRAM OXALATE 10 MG: 10 TABLET ORAL at 08:07

## 2018-07-26 RX ADMIN — PANTOPRAZOLE SODIUM 40 MG: 40 TABLET, DELAYED RELEASE ORAL at 08:07

## 2018-07-26 RX ADMIN — PREDNISONE 40 MG: 20 TABLET ORAL at 08:07

## 2018-07-26 RX ADMIN — ASPIRIN 81 MG CHEWABLE TABLET 81 MG: 81 TABLET CHEWABLE at 08:07

## 2018-07-26 NOTE — PLAN OF CARE
Problem: Occupational Therapy Goal  Goal: Occupational Therapy Goal  Goals to be met by: 8/20    Patient will increase functional independence with ADLs by performing:    UE Dressing with Contact Guard Assistance.  LE Dressing with Minimal Assistance.  Grooming while seated at sink with Contact Guard Assistance.  Toileting from toilet with Minimal Assistance for hygiene and clothing management.      Outcome: Ongoing (interventions implemented as appropriate)  Goals remain appropriate. Cont POC.    LINO Higgins  7/26/2018  Pager: [phone]

## 2018-07-26 NOTE — MEDICAL/APP STUDENT
Hospital Medicine  Progress Note    Patient Name: Irina Polk  MRN: [id number]  Team: [facility] [place] [facility] MED D Fauzia Jay MD  Admit Date: 7/22/2018  SELINA 7/30/2018  Code status: Full Code    Principal Problem:  Right upper lobe pneumonia    Interval history: Oxygenation improving. Still requiring 1L NC. Doing well with Acapella and IS.     Review of Systems   Constitutional: Negative for fever.   Respiratory: Positive for cough. Negative for shortness of breath.    Cardiovascular: Negative for chest pain.       Physical Exam:  Temp:  [97.3 °F (36.3 °C)-99.7 °F (37.6 °C)]   Pulse:  [45-81]   Resp:  [16-20]   BP: (116-151)/(59-72)   SpO2:  [92 %-97 %]      Temp: 97.3 °F (36.3 °C) (07/26/18 0725)  Pulse: (!) 52 (07/26/18 0845)  Resp: 16 (07/26/18 0845)  BP: (!) 151/72 (07/26/18 0725)  SpO2: 95 % (07/26/18 0825)    Intake/Output Summary (Last 24 hours) at 07/26/18 0920  Last data filed at 07/25/18 2130   Gross per 24 hour   Intake              980 ml   Output                0 ml   Net              980 ml     Weight: 69.1 kg (152 lb 5.4 oz)  Body mass index is 26.15 kg/m².    Physical Exam   Constitutional: No distress.   Eyes: Conjunctivae and lids are normal.   Cardiovascular: S1 normal and S2 normal.    Pulmonary/Chest: Effort normal. She has decreased breath sounds. She has no wheezes.   Abdominal: Soft. Bowel sounds are normal. There is no tenderness.   Musculoskeletal: She exhibits no edema.       Significant Labs:    Recent Labs  Lab 07/22/18  1316 07/23/18  0418 07/24/18  0346 07/26/18  0453   WBC 7.05 5.52 8.34 9.98   HGB 12.8 11.2* 10.9* 12.8   HCT 39.9 35.1* 35.6* 41.1    230 234 274       Recent Labs  Lab 07/22/18  1316 07/23/18  0418 07/24/18  0345 07/26/18  0453    136 136 140   K 4.0 4.6 4.3 4.1    104 108 108   CO2 24 26 17* 23   BUN 16 18 23 22   CREATININE 0.8 1.0 0.9 0.7   GLU 96 115* 120* 78   CALCIUM 9.3 8.9 9.0 8.9   MG  --   --   --  2.4   PHOS  --   --   --  3.0   ALKPHOS 66  --    --   --    ALT 11  --   --   --    AST 19  --   --   --    ALBUMIN 3.8  --   --  3.5   PROT 6.9  --   --   --    BILITOT 0.6  --   --   --        Recent Labs  Lab 07/22/18  1316   TROPONINI 0.013     Inpatient Medications prescribed for management of current Problems:   Scheduled Meds:    albuterol-ipratropium  3 mL Nebulization Q6H WAKE    amLODIPine  2.5 mg Oral Daily    aspirin  81 mg Oral Daily    azithromycin  500 mg Oral Daily    cefTRIAXone (ROCEPHIN) IVPB  1 g Intravenous Q24H    enoxaparin  40 mg Subcutaneous Daily    escitalopram oxalate  10 mg Oral Daily    fluticasone-vilanterol  1 puff Inhalation Daily    guaiFENesin  1,200 mg Oral BID    metoprolol succinate  25 mg Oral Daily    pantoprazole  40 mg Oral Daily    predniSONE  40 mg Oral Daily    senna-docusate 8.6-50 mg  2 tablet Oral BID     Continuous Infusions:   As Needed: acetaminophen, albuterol-ipratropium, benzonatate, diclofenac sodium, dicyclomine, ondansetron, promethazine, promethazine-codeine 6.25-10 mg/5 ml, sodium chloride 0.9%, traMADol, traZODone    Active Hospital Problems    Diagnosis  POA    *Right upper lobe pneumonia [J18.1]  Yes    Acute respiratory failure with hypoxia [J96.01]  Yes    Debility [R53.81]  Yes    COPD with acute exacerbation [J44.1]  Yes    Essential hypertension [I10]  Yes      Resolved Hospital Problems    Diagnosis Date Resolved POA   No resolved problems to display.       Overview:  Irina Polk is a 89 y.o. female with past medical history significant for COPD and hypertension was admitted for RUL pneumonia. The patient has reported history of aspiration. She was treated in the ED with Duonebs, ceftriaxone and solumedrol. The steroids were switched to PO prednisone and she was further managed with Breo nebs and guaifenesin. Blood cx show NGTD and respiratory cx are growing normal respiratory ruy.       Assessment and Plan for Problems addressed today:    Pneumonia of right upper lobe    Acute respiratory failure with hypoxia  COPD with acute exacerbation  · On admission, CXR shows RUL consolidation; clinically wheezing with sputum and sat in mid-80s. H/o aspiration. Received solumedrol in ED.   · Continued Duonebs, Breo nebs, guaifenesin, ceftriaxone and PO prednisone; following cultures  · Aspiration precaution and wean O2 as tolerated   · Repeat CXR (7/25): shows some LLB atelectasis, but resolution of RUL consolidation.     Essential hypertension  · Continued with current home medications: amlodipine & Toprol     Debility  · PT/OT following     Depression   · Continued home escitalopram     Diet: Diet Adult Regular (IDDSI Level 7)  GI ppx: pantoprazole 40mg BID  DVT Prophylaxis:   Anticoagulants   Medication Route Frequency    enoxaparin injection 40 mg Subcutaneous Daily     Lines/ Drains/ Airways:   Peripheral IV- Right AC    Wounds: None    HIGH RISK CONDITION(S):   Patient has a condition that poses threat to life and bodily function: Severe Respiratory Distress     Discharge plan and follow up  Home-Health Care [facility] [place]      Provider  Fauzia Jay MD  [facility] [place] [facility] MED D   Department of Hospital Medicine    Scribe Attestation: I personally scribed for Fauzia Jay MD on 07/26/2018 at 3:11 PM. Electronically signed by raji Haq on 07/26/2018 at 3:11 PM.

## 2018-07-26 NOTE — PLAN OF CARE
Problem: Fall Risk (Adult)  Goal: Absence of Falls  Patient will demonstrate the desired outcomes by discharge/transition of care.   Outcome: Ongoing (interventions implemented as appropriate)   07/26/18 1717   Fall Risk (Adult)   Absence of Falls making progress toward outcome       Problem: Patient Care Overview  Goal: Plan of Care Review  Outcome: Ongoing (interventions implemented as appropriate)  Questions answered and concerns addressed.   07/26/18 1717   Coping/Psychosocial   Plan Of Care Reviewed With patient;daughter;caregiver

## 2018-07-26 NOTE — PT/OT/SLP PROGRESS
Occupational Therapy   Treatment    Name: Irina Polk  MRN: [id number]  Admitting Diagnosis:  Right upper lobe pneumonia       Recommendations:     Discharge Recommendations: home with home health  Discharge Equipment Recommendations:  walker, rolling  Barriers to discharge:  None    Subjective     Communicated with: RN prior to session.  Pain/Comfort:  · Pain Rating 1: 0/10  · Pain Rating Post-Intervention 1: 0/10    Patients cultural, spiritual, Buddhism conflicts given the current situation: none stated    Objective:     Patient found with:  (oxygen)    General Precautions: Standard, fall   Orthopedic Precautions:N/A   Braces: N/A     Occupational Performance:    Bed Mobility:    · Patient completed Supine to Sit with stand by assistance     Functional Mobility/Transfers:  · Patient completed Sit <> Stand Transfer with contact guard assistance  with  4 wheeled walker   · Patient completed Bed <> Chair Transfer using Stand Pivot technique with contact guard assistance with 4 wheeled walker  · Functional Mobility: CGA with Rollator    Activities of Daily Living:  · Grooming: stand by assistance at sink, washing hands and face    Patient left up in chair with sitter and daughter present    [facility] 6 Click:  [facility] Total Score: 19    Treatment & Education:  Pt ed re OT role and POC. Pt ed re safety and use of Rollator, as well as posture.  Education:    Assessment:     Irina Polk is a 89 y.o. female with a medical diagnosis of Right upper lobe pneumonia.  She presents with the following performance deficits affecting function: weakness, impaired endurance, impaired self care skills, impaired functional mobilty, impaired cognition.  Pt participates well and is motivated to regain functional independence. Pt is progressing well with therapy.    Rehab Prognosis:  Good; patient would benefit from acute skilled OT services to address these deficits and reach maximum level of function.       Plan:     Patient to  be seen 3 x/week to address the above listed problems via self-care/home management, therapeutic activities, therapeutic exercises  · Plan of Care Expires: 08/23/18  · Plan of Care Reviewed with: patient    This Plan of care has been discussed with the patient who was involved in its development and understands and is in agreement with the identified goals and treatment plan    GOALS:    Occupational Therapy Goals        Problem: Occupational Therapy Goal    Goal Priority Disciplines Outcome Interventions   Occupational Therapy Goal     OT, PT/OT Ongoing (interventions implemented as appropriate)    Description:  Goals to be met by: 8/20    Patient will increase functional independence with ADLs by performing:    UE Dressing with Contact Guard Assistance.  LE Dressing with Minimal Assistance.  Grooming while seated at sink with Contact Guard Assistance.  Toileting from toilet with Minimal Assistance for hygiene and clothing management.                       Time Tracking:     OT Date of Treatment: 07/26/18  OT Start Time: 1112  OT Stop Time: 1128  OT Total Time (min): 16 min    Billable Minutes:Therapeutic Activity 16 minutes    LINO Higgins  7/26/2018  Pager: [phone]

## 2018-07-26 NOTE — PLAN OF CARE
Problem: Fall Risk (Adult)  Goal: Absence of Falls  Patient will demonstrate the desired outcomes by discharge/transition of care.   Outcome: Ongoing (interventions implemented as appropriate)   07/26/18 1128   Fall Risk (Adult)   Absence of Falls making progress toward outcome       Problem: Patient Care Overview  Goal: Plan of Care Review  Outcome: Ongoing (interventions implemented as appropriate)  Questions answered and concerns addressed.   07/26/18 1128   Coping/Psychosocial   Plan Of Care Reviewed With patient;caregiver       Problem: Pressure Ulcer Risk (Brandon Scale) (Adult,Obstetrics,Pediatric)  Goal: Skin Integrity  Patient will demonstrate the desired outcomes by discharge/transition of care.   Outcome: Ongoing (interventions implemented as appropriate)   07/26/18 1128   Pressure Ulcer Risk (Brandon Scale) (Adult,Obstetrics,Pediatric)   Skin Integrity making progress toward outcome

## 2018-07-26 NOTE — PROGRESS NOTES
Hospital Medicine  Progress Note     Patient Name: Irina Polk  MRN: [id number]  [facility]: [facility] Fauzia Jay MD  Admit Date: 7/22/2018  SELINA 7/27/2018  Code status: Full Code     Principal Problem:  Right upper lobe pneumonia     Interval history: Sitting in bed reading a book. Continues to require oxygen.       Review of Systems   Constitutional: Negative for fever.   Respiratory: Positive for cough. Negative for shortness of breath.    Cardiovascular: Negative for chest pain.         Physical Exam:  Temp:  [96.7 °F (35.9 °C)-98.4 °F (36.9 °C)]   Pulse:  [42-68]   Resp:  [16-22]   BP: (110-140)/(54-66)   SpO2:  [92 %-97 %]       Temp: 96.7 °F (35.9 °C) (07/25/18 0836)  Pulse: 60 (07/25/18 1100)  Resp: 20 (07/25/18 0956)  BP: (!) 140/63 (07/25/18 0836)  SpO2: 96 % (07/25/18 0836)     Intake/Output Summary (Last 24 hours) at 07/25/18 1114  Last data filed at 07/24/18 2100    Gross per 24 hour   Intake              390 ml   Output                0 ml   Net              390 ml      Weight: 69.1 kg (152 lb 5.4 oz)  Body mass index is 26.15 kg/m².     Physical Exam   Constitutional: No distress.   Eyes: Conjunctivae and lids are normal.   Cardiovascular: S1 normal and S2 normal.    Pulmonary/Chest: Effort normal. She has decreased breath sounds. She has wheezes.   Abdominal: Soft. Bowel sounds are normal. There is no tenderness.   Musculoskeletal: She exhibits no edema.         Significant Labs:     Recent Labs  Lab 07/22/18  1316 07/23/18  0418 07/24/18  0346   WBC 7.05 5.52 8.34   HGB 12.8 11.2* 10.9*   HCT 39.9 35.1* 35.6*    230 234         Recent Labs  Lab 07/22/18  1316 07/23/18  0418 07/24/18  0345    136 136   K 4.0 4.6 4.3    104 108   CO2 24 26 17*   BUN 16 18 23   CREATININE 0.8 1.0 0.9   GLU 96 115* 120*   CALCIUM 9.3 8.9 9.0   ALKPHOS 66  --   --    ALT 11  --   --    AST 19  --   --    ALBUMIN 3.8  --   --    PROT 6.9  --   --    BILITOT 0.6  --   --         Recent Labs  Lab  07/22/18  1316   TROPONINI 0.013     Inpatient Medications prescribed for management of current Problems:   Scheduled Meds:    albuterol-ipratropium  3 mL Nebulization Q6H WAKE    amLODIPine  2.5 mg Oral Daily    aspirin  81 mg Oral Daily    cefTRIAXone (ROCEPHIN) IVPB  1 g Intravenous Q24H    enoxaparin  40 mg Subcutaneous Daily    escitalopram oxalate  10 mg Oral Daily    fluticasone-vilanterol  1 puff Inhalation Daily    guaiFENesin  1,200 mg Oral BID    metoprolol succinate  25 mg Oral Daily    pantoprazole  40 mg Oral Daily    predniSONE  40 mg Oral Daily    senna-docusate 8.6-50 mg  2 tablet Oral BID      Continuous Infusions:   As Needed: acetaminophen, albuterol-ipratropium, benzonatate, diclofenac sodium, dicyclomine, ondansetron, promethazine (PHENERGAN) IVPB, promethazine-codeine 6.25-10 mg/5 ml, sodium chloride 0.9%, traMADol, traZODone           Active Hospital Problems     Diagnosis   POA    *Right upper lobe pneumonia [J18.1]   Yes    Acute respiratory failure with hypoxia [J96.01]   Yes    Debility [R53.81]   Yes    COPD with acute exacerbation [J44.1]   Yes    Essential hypertension [I10]   Yes       Resolved Hospital Problems     Diagnosis Date Resolved POA   No resolved problems to display.         Overview:  Irina Polk is a 89 y.o. female with past medical history significant for COPD and hypertension was admitted for RUL pneumonia. The patient has reported history of aspiration. She was treated in the ED with Duonebs, ceftriaxone and solumedrol. The steroids were switched to PO prednisone and she was further managed with Breo nebs and guaifenesin. Blood cx show NGTD and respiratory cx are pending.       Assessment and Plan for Problems addressed today:     Pneumonia of right upper lobe   Acute respiratory failure with hypoxia  COPD with acute exacerbation  · On admission, CXR shows RUL consolidation; clinically wheezing with sputum and sat in mid-80s. H/o aspiration.  Received solumedrol in ED.   · Continued Duonebs, Breo nebs, guaifenesin, ceftriaxone and PO prednisone and following cultures  · Aspiration precaution and wean O2 as tolerated   · Ordered repeat CXR (7/25)     Essential hypertension  · Continued with current home medications: amlodipine & Toprol      Debility  · PT/OT following      Depression   · Continued home escitalopram      Diet: Diet Adult Regular (IDDSI Level 7)  GI ppx: pantoprazole 40mg BID  DVT Prophylaxis:         Anticoagulants   Medication Route Frequency    enoxaparin injection 40 mg Subcutaneous Daily      Lines/ Drains/ Airways:   Peripheral IV- Right AC     Wounds: None     HIGH RISK CONDITION(S):   Patient has a condition that poses threat to life and bodily function: Severe Respiratory Distress      Discharge plan and follow up  Home-Health Care [facility]        Provider  Fauzia Jay MD  [facility]   Department of Hospital Medicine     Scribe Attestation: I personally scribed for Fauzia Jay MD on 07/25/2018 at 3:03 PM. Electronically signed by raji Haq on 07/25/2018 at 3:03 PM.

## 2018-07-27 LAB
ALBUMIN SERPL BCP-MCNC: 3.3 G/DL
ANION GAP SERPL CALC-SCNC: 7 MMOL/L
BACTERIA BLD CULT: NORMAL
BACTERIA BLD CULT: NORMAL
BASOPHILS # BLD AUTO: 0.03 K/UL
BASOPHILS NFR BLD: 0.3 %
BUN SERPL-MCNC: 18 MG/DL
CALCIUM SERPL-MCNC: 8.6 MG/DL
CHLORIDE SERPL-SCNC: 107 MMOL/L
CO2 SERPL-SCNC: 25 MMOL/L
CREAT SERPL-MCNC: 0.7 MG/DL
DIFFERENTIAL METHOD: ABNORMAL
EOSINOPHIL # BLD AUTO: 0.2 K/UL
EOSINOPHIL NFR BLD: 2 %
ERYTHROCYTE [DISTWIDTH] IN BLOOD BY AUTOMATED COUNT: 15.6 %
EST. GFR  (AFRICAN AMERICAN): >60 ML/MIN/1.73 M^2
EST. GFR  (NON AFRICAN AMERICAN): >60 ML/MIN/1.73 M^2
GLUCOSE SERPL-MCNC: 84 MG/DL
HCT VFR BLD AUTO: 38.1 %
HGB BLD-MCNC: 12 G/DL
IMM GRANULOCYTES # BLD AUTO: 0.1 K/UL
IMM GRANULOCYTES NFR BLD AUTO: 1 %
LYMPHOCYTES # BLD AUTO: 3 K/UL
LYMPHOCYTES NFR BLD: 31.2 %
MAGNESIUM SERPL-MCNC: 1.9 MG/DL
MCH RBC QN AUTO: 30.9 PG
MCHC RBC AUTO-ENTMCNC: 31.5 G/DL
MCV RBC AUTO: 98 FL
MONOCYTES # BLD AUTO: 0.8 K/UL
MONOCYTES NFR BLD: 8.1 %
NEUTROPHILS # BLD AUTO: 5.6 K/UL
NEUTROPHILS NFR BLD: 57.4 %
NRBC BLD-RTO: 0 /100 WBC
PHOSPHATE SERPL-MCNC: 3 MG/DL
PLATELET # BLD AUTO: 270 K/UL
PMV BLD AUTO: 10.3 FL
POTASSIUM SERPL-SCNC: 3.9 MMOL/L
RBC # BLD AUTO: 3.88 M/UL
SODIUM SERPL-SCNC: 139 MMOL/L
WBC # BLD AUTO: 9.67 K/UL

## 2018-07-27 PROCEDURE — 63600175 PHARM REV CODE 636 W HCPCS: Performed by: HOSPITALIST

## 2018-07-27 PROCEDURE — 11000001 HC ACUTE MED/SURG PRIVATE ROOM

## 2018-07-27 PROCEDURE — 63600175 PHARM REV CODE 636 W HCPCS: Performed by: INTERNAL MEDICINE

## 2018-07-27 PROCEDURE — 25000003 PHARM REV CODE 250: Performed by: HOSPITALIST

## 2018-07-27 PROCEDURE — 85025 COMPLETE CBC W/AUTO DIFF WBC: CPT

## 2018-07-27 PROCEDURE — 94640 AIRWAY INHALATION TREATMENT: CPT

## 2018-07-27 PROCEDURE — 83735 ASSAY OF MAGNESIUM: CPT

## 2018-07-27 PROCEDURE — 99232 SBSQ HOSP IP/OBS MODERATE 35: CPT | Mod: ,,, | Performed by: INTERNAL MEDICINE

## 2018-07-27 PROCEDURE — 99900035 HC TECH TIME PER 15 MIN (STAT)

## 2018-07-27 PROCEDURE — 36415 COLL VENOUS BLD VENIPUNCTURE: CPT

## 2018-07-27 PROCEDURE — 94761 N-INVAS EAR/PLS OXIMETRY MLT: CPT

## 2018-07-27 PROCEDURE — 97116 GAIT TRAINING THERAPY: CPT

## 2018-07-27 PROCEDURE — 80069 RENAL FUNCTION PANEL: CPT

## 2018-07-27 PROCEDURE — 94664 DEMO&/EVAL PT USE INHALER: CPT

## 2018-07-27 PROCEDURE — 97530 THERAPEUTIC ACTIVITIES: CPT

## 2018-07-27 PROCEDURE — 27000221 HC OXYGEN, UP TO 24 HOURS

## 2018-07-27 PROCEDURE — 27000646 HC AEROBIKA DEVICE

## 2018-07-27 PROCEDURE — 25000003 PHARM REV CODE 250: Performed by: INTERNAL MEDICINE

## 2018-07-27 PROCEDURE — 25000242 PHARM REV CODE 250 ALT 637 W/ HCPCS: Performed by: HOSPITALIST

## 2018-07-27 RX ADMIN — IPRATROPIUM BROMIDE AND ALBUTEROL SULFATE 3 ML: .5; 3 SOLUTION RESPIRATORY (INHALATION) at 08:07

## 2018-07-27 RX ADMIN — IPRATROPIUM BROMIDE AND ALBUTEROL SULFATE 3 ML: .5; 3 SOLUTION RESPIRATORY (INHALATION) at 03:07

## 2018-07-27 RX ADMIN — BENZONATATE 200 MG: 100 CAPSULE ORAL at 08:07

## 2018-07-27 RX ADMIN — ACETAMINOPHEN 650 MG: 325 TABLET, FILM COATED ORAL at 02:07

## 2018-07-27 RX ADMIN — AZITHROMYCIN 500 MG: 250 TABLET, FILM COATED ORAL at 10:07

## 2018-07-27 RX ADMIN — GUAIFENESIN 1200 MG: 600 TABLET, EXTENDED RELEASE ORAL at 10:07

## 2018-07-27 RX ADMIN — ACETAMINOPHEN 650 MG: 325 TABLET, FILM COATED ORAL at 08:07

## 2018-07-27 RX ADMIN — AMLODIPINE BESYLATE 2.5 MG: 2.5 TABLET ORAL at 10:07

## 2018-07-27 RX ADMIN — PANTOPRAZOLE SODIUM 40 MG: 40 TABLET, DELAYED RELEASE ORAL at 10:07

## 2018-07-27 RX ADMIN — ESCITALOPRAM OXALATE 10 MG: 10 TABLET ORAL at 10:07

## 2018-07-27 RX ADMIN — GUAIFENESIN 1200 MG: 600 TABLET, EXTENDED RELEASE ORAL at 09:07

## 2018-07-27 RX ADMIN — ENOXAPARIN SODIUM 40 MG: 100 INJECTION SUBCUTANEOUS at 04:07

## 2018-07-27 RX ADMIN — ASPIRIN 81 MG CHEWABLE TABLET 81 MG: 81 TABLET CHEWABLE at 10:07

## 2018-07-27 RX ADMIN — CEFTRIAXONE SODIUM 1 G: 1 INJECTION, POWDER, FOR SOLUTION INTRAMUSCULAR; INTRAVENOUS at 10:07

## 2018-07-27 RX ADMIN — PROMETHAZINE HYDROCHLORIDE AND CODEINE PHOSPHATE 5 ML: 10; 6.25 SOLUTION ORAL at 03:07

## 2018-07-27 RX ADMIN — PREDNISONE 40 MG: 20 TABLET ORAL at 10:07

## 2018-07-27 RX ADMIN — METOPROLOL SUCCINATE 25 MG: 25 TABLET, EXTENDED RELEASE ORAL at 10:07

## 2018-07-27 RX ADMIN — TRAMADOL HYDROCHLORIDE 50 MG: 50 TABLET, COATED ORAL at 08:07

## 2018-07-27 RX ADMIN — FLUTICASONE FUROATE AND VILANTEROL TRIFENATATE 1 PUFF: 100; 25 POWDER RESPIRATORY (INHALATION) at 10:07

## 2018-07-27 RX ADMIN — SENNOSIDES AND DOCUSATE SODIUM 1 TABLET: 8.6; 5 TABLET ORAL at 10:07

## 2018-07-27 RX ADMIN — BENZONATATE 200 MG: 100 CAPSULE ORAL at 11:07

## 2018-07-27 NOTE — PLAN OF CARE
Problem: Physical Therapy Goal  Goal: Physical Therapy Goal  Goals to be met by: 18    Patient will increase functional independence with mobility by performin. Supine to sit with Stand-by Assistance - met   2. Sit to supine with Stand-by Assistance - met   3. Sit to stand transfer with Minimal Assistance - met   4. Bed to chair transfer with Moderate Assistance using Rolling Walker   5. Gait  x 10 feet with Moderate Assistance using Rolling Walker. - met  6. Sitting at edge of bed x15 minutes with Stand-by Assistance  7. Lower extremity exercise program x15 reps per handout, with supervision      Outcome: Ongoing (interventions implemented as appropriate)  Goals remain appropriate.

## 2018-07-27 NOTE — MEDICAL/APP STUDENT
Hospital Medicine  Progress Note    Patient Name: Irina Polk  MRN: [id number]  Team: [facility] MED D Fauzia Jay MD  Admit Date: 7/22/2018  SELINA 7/30/2018  Code status: Full Code    Principal Problem:  Right upper lobe pneumonia    Interval history: Patient with no new complaints. Remains on 1L O2.     Review of Systems   Constitutional: Negative for fever.   Respiratory: Positive for cough. Negative for shortness of breath.    Cardiovascular: Negative for chest pain.       Physical Exam:  Temp:  [96.8 °F (36 °C)-99.1 °F (37.3 °C)]   Pulse:  [51-92]   Resp:  [16-22]   BP: (123-196)/(63-90)   SpO2:  [89 %-95 %]      Temp: 98.4 °F (36.9 °C) (07/27/18 0827)  Pulse: 67 (07/27/18 0827)  Resp: (!) 22 (07/27/18 0827)  BP: (!) 142/84 (07/27/18 0827)  SpO2: (!) 92 % (07/27/18 0827)    Intake/Output Summary (Last 24 hours) at 07/27/18 0942  Last data filed at 07/26/18 1715   Gross per 24 hour   Intake              650 ml   Output                0 ml   Net              650 ml     Weight: 69.1 kg (152 lb 5.4 oz)  Body mass index is 26.15 kg/m².    Physical Exam   Constitutional: No distress.   Eyes: Conjunctivae and lids are normal.   Cardiovascular: S1 normal and S2 normal.    Pulmonary/Chest: Effort normal. She has decreased breath sounds. She has wheezes.   Abdominal: Soft. Bowel sounds are normal. There is no tenderness.   Musculoskeletal: She exhibits no edema.       Significant Labs:    Recent Labs  Lab 07/23/18  0418 07/24/18  0346 07/26/18 0453 07/27/18 0427   WBC 5.52 8.34 9.98 9.67   HGB 11.2* 10.9* 12.8 12.0   HCT 35.1* 35.6* 41.1 38.1    234 274 270       Recent Labs  Lab 07/22/18  1316  07/24/18  0345 07/26/18  0453 07/27/18  0427     < > 136 140 139   K 4.0  < > 4.3 4.1 3.9     < > 108 108 107   CO2 24  < > 17* 23 25   BUN 16  < > 23 22 18   CREATININE 0.8  < > 0.9 0.7 0.7   GLU 96  < > 120* 78 84   CALCIUM 9.3  < > 9.0 8.9 8.6*   MG  --   --   --  2.4 1.9   PHOS  --   --   --  3.0 3.0    ALKPHOS 66  --   --   --   --    ALT 11  --   --   --   --    AST 19  --   --   --   --    ALBUMIN 3.8  --   --  3.5 3.3*   PROT 6.9  --   --   --   --    BILITOT 0.6  --   --   --   --    < > = values in this interval not displayed.    Recent Labs  Lab 07/22/18  1316   TROPONINI 0.013     Inpatient Medications prescribed for management of current Problems:   Scheduled Meds:    albuterol-ipratropium  3 mL Nebulization Q6H WAKE    amLODIPine  2.5 mg Oral Daily    aspirin  81 mg Oral Daily    azithromycin  500 mg Oral Daily    cefTRIAXone (ROCEPHIN) IVPB  1 g Intravenous Q24H    enoxaparin  40 mg Subcutaneous Daily    escitalopram oxalate  10 mg Oral Daily    fluticasone-vilanterol  1 puff Inhalation Daily    guaiFENesin  1,200 mg Oral BID    metoprolol succinate  25 mg Oral Daily    pantoprazole  40 mg Oral Daily    predniSONE  40 mg Oral Daily    senna-docusate 8.6-50 mg  2 tablet Oral BID     Continuous Infusions:   As Needed: acetaminophen, albuterol-ipratropium, benzonatate, diclofenac sodium, dicyclomine, ondansetron, promethazine, promethazine-codeine 6.25-10 mg/5 ml, sodium chloride 0.9%, traMADol, traZODone    Active Hospital Problems    Diagnosis  POA    *Right upper lobe pneumonia [J18.1]  Yes    Acute respiratory failure with hypoxia [J96.01]  Yes    Debility [R53.81]  Yes    COPD with acute exacerbation [J44.1]  Yes    Essential hypertension [I10]  Yes      Resolved Hospital Problems    Diagnosis Date Resolved POA   No resolved problems to display.       Overview:  Irina Polk is a 89 y.o. female with past medical history significant for COPD and hypertension was admitted for RUL pneumonia. The patient has reported history of aspiration. She was treated in the ED with Duonebs, ceftriaxone and solumedrol. The steroids were switched to PO prednisone and she was further managed with Breo nebs and guaifenesin. Blood cx show NGTD and respiratory cx are growing normal respiratory  ruy. Repeat CXR (7/25) showed some LLB atelectasis, but resolution of RUL consolidation. O2 weaned as tolerated.        Assessment and Plan for Problems addressed today:    Pneumonia of right upper lobe   Acute respiratory failure with hypoxia  COPD with acute exacerbation  · On admission, CXR shows RUL consolidation; clinically wheezing with sputum and sat in mid-80s. H/o aspiration. Received solumedrol in ED.   · Continued Duonebs, Breo nebs, guaifenesin, ceftriaxone and PO prednisone; following cultures  · Aspiration precaution and wean O2 as tolerated   · Repeat CXR (7/25): shows some LLB atelectasis, but resolution of RUL consolidation.   · Continuing current management.    Essential hypertension  · Continued with current home medications: amlodipine & Toprol     Debility  · PT/OT following     Depression   · Continued home escitalopram     Diet: Diet Adult Regular (IDDSI Level 7)  GI ppx: pantoprazole 40mg BID  DVT Prophylaxis:   Anticoagulants   Medication Route Frequency    enoxaparin injection 40 mg Subcutaneous Daily     Lines/ Drains/ Airways:   Peripheral IV- Right AC    Wounds: None    HIGH RISK CONDITION(S):   Patient has a condition that poses threat to life and bodily function: Severe Respiratory Distress     Discharge plan and follow up  Home-Health Care [facility] [place]      Provider  Fauzia Jay MD  [facility] MED D   Department of Hospital Medicine    Scribe Attestation: I personally scribed for Fauzia Jay MD on 07/27/2018 at 2:14 PM. Electronically signed by raji Haq on 07/27/2018 at 2:14 PM.

## 2018-07-27 NOTE — PLAN OF CARE
Problem: Patient Care Overview  Goal: Plan of Care Review  Outcome: Ongoing (interventions implemented as appropriate)  Pt disoriented to time and situation.  Per Dr. Jay, OK to titrate O2 to SpO2 89-92%.  Pt now 90% on room air.  All other VSS on room air.  Pt c/o upper chest pain r/t coughing.  Given PRN tessalon perles and scheduled mucinex.  IS encouraged, 500 mL highest goal.  Given IV ceftriaxone.  Pt transferred to chair and worked with PT today.

## 2018-07-27 NOTE — PLAN OF CARE
Problem: Patient Care Overview  Goal: Plan of Care Review  Outcome: Ongoing (interventions implemented as appropriate)  Greeted patient and gained consent for nursing care. Awake, alert, disoriented to time. Carer at bedside. POC reviewed, verbalized understanding. With episodes of coughing, given scheduled and PRN cough medication. Comfort and safety of patient maintained. Bed on lowest position, locked. Call bell within reach. Assisted in her needs. Regularly checked on her during the night.

## 2018-07-27 NOTE — PROGRESS NOTES
Hospital Medicine  Progress Note     Patient Name: Irina Polk  MRN: [id number]  Team: [facility] MED D Fauzia Jay MD  Admit Date: 7/22/2018  SELINA 7/30/2018  Code status: Full Code     Principal Problem:  Right upper lobe pneumonia     Interval history: Oxygenation improving. Still requiring 1L NC. Doing well with Acapella and IS.      Review of Systems   Constitutional: Negative for fever.   Respiratory: Positive for cough. Negative for shortness of breath.    Cardiovascular: Negative for chest pain.         Physical Exam:  Temp:  [97.3 °F (36.3 °C)-99.7 °F (37.6 °C)]   Pulse:  [45-81]   Resp:  [16-20]   BP: (116-151)/(59-72)   SpO2:  [92 %-97 %]       Temp: 97.3 °F (36.3 °C) (07/26/18 0725)  Pulse: (!) 52 (07/26/18 0845)  Resp: 16 (07/26/18 0845)  BP: (!) 151/72 (07/26/18 0725)  SpO2: 95 % (07/26/18 0825)     Intake/Output Summary (Last 24 hours) at 07/26/18 0920  Last data filed at 07/25/18 2130    Gross per 24 hour   Intake              980 ml   Output                0 ml   Net              980 ml      Weight: 69.1 kg (152 lb 5.4 oz)  Body mass index is 26.15 kg/m².     Physical Exam   Constitutional: No distress.   Eyes: Conjunctivae and lids are normal.   Cardiovascular: S1 normal and S2 normal.    Pulmonary/Chest: Effort normal. She has decreased breath sounds. She has no wheezes.   Abdominal: Soft. Bowel sounds are normal. There is no tenderness.   Musculoskeletal: She exhibits no edema.         Significant Labs:     Recent Labs  Lab 07/22/18  1316 07/23/18  0418 07/24/18  0346 07/26/18  0453   WBC 7.05 5.52 8.34 9.98   HGB 12.8 11.2* 10.9* 12.8   HCT 39.9 35.1* 35.6* 41.1    230 234 274         Recent Labs  Lab 07/22/18  1316 07/23/18  0418 07/24/18  0345 07/26/18  0453    136 136 140   K 4.0 4.6 4.3 4.1    104 108 108   CO2 24 26 17* 23   BUN 16 18 23 22   CREATININE 0.8 1.0 0.9 0.7   GLU 96 115* 120* 78   CALCIUM 9.3 8.9 9.0 8.9   MG  --   --   --  2.4   PHOS  --   --   --  3.0    ALKPHOS 66  --   --   --    ALT 11  --   --   --    AST 19  --   --   --    ALBUMIN 3.8  --   --  3.5   PROT 6.9  --   --   --    BILITOT 0.6  --   --   --         Recent Labs  Lab 07/22/18  1316   TROPONINI 0.013     Inpatient Medications prescribed for management of current Problems:   Scheduled Meds:    albuterol-ipratropium  3 mL Nebulization Q6H WAKE    amLODIPine  2.5 mg Oral Daily    aspirin  81 mg Oral Daily    azithromycin  500 mg Oral Daily    cefTRIAXone (ROCEPHIN) IVPB  1 g Intravenous Q24H    enoxaparin  40 mg Subcutaneous Daily    escitalopram oxalate  10 mg Oral Daily    fluticasone-vilanterol  1 puff Inhalation Daily    guaiFENesin  1,200 mg Oral BID    metoprolol succinate  25 mg Oral Daily    pantoprazole  40 mg Oral Daily    predniSONE  40 mg Oral Daily    senna-docusate 8.6-50 mg  2 tablet Oral BID      Continuous Infusions:   As Needed: acetaminophen, albuterol-ipratropium, benzonatate, diclofenac sodium, dicyclomine, ondansetron, promethazine, promethazine-codeine 6.25-10 mg/5 ml, sodium chloride 0.9%, traMADol, traZODone           Active Hospital Problems     Diagnosis   POA    *Right upper lobe pneumonia [J18.1]   Yes    Acute respiratory failure with hypoxia [J96.01]   Yes    Debility [R53.81]   Yes    COPD with acute exacerbation [J44.1]   Yes    Essential hypertension [I10]   Yes       Resolved Hospital Problems     Diagnosis Date Resolved POA   No resolved problems to display.         Overview:  Irina Polk is a 89 y.o. female with past medical history significant for COPD and hypertension was admitted for RUL pneumonia. The patient has reported history of aspiration. She was treated in the ED with Duonebs, ceftriaxone and solumedrol. The steroids were switched to PO prednisone and she was further managed with Breo nebs and guaifenesin. Blood cx show NGTD and respiratory cx are growing normal respiratory ruy.        Assessment and Plan for Problems addressed  today:     Pneumonia of right upper lobe   Acute respiratory failure with hypoxia  COPD with acute exacerbation  · On admission, CXR shows RUL consolidation; clinically wheezing with sputum and sat in mid-80s. H/o aspiration. Received solumedrol in ED.   · Continued Duonebs, Breo nebs, guaifenesin, ceftriaxone and PO prednisone; following cultures  · Aspiration precaution and wean O2 as tolerated   · Repeat CXR (7/25): shows some LLB atelectasis, but resolution of RUL consolidation.      Essential hypertension  · Continued with current home medications: amlodipine & Toprol      Debility  · PT/OT following      Depression   · Continued home escitalopram      Diet: Diet Adult Regular (IDDSI Level 7)  GI ppx: pantoprazole 40mg BID  DVT Prophylaxis:         Anticoagulants   Medication Route Frequency    enoxaparin injection 40 mg Subcutaneous Daily      Lines/ Drains/ Airways:   Peripheral IV- Right AC     Wounds: None     HIGH RISK CONDITION(S):   Patient has a condition that poses threat to life and bodily function: Severe Respiratory Distress      Discharge plan and follow up  Home-Health Care [facility] [place]        Provider  Fauzia Jay MD  [facility] [place] [facility] MED D   Department of Hospital Medicine     Scribe Attestation: I personally scribed for Fauzia Jay MD on 07/26/2018 at 3:11 PM. Electronically signed by raji Haq on 07/26/2018 at 3:11 PM.

## 2018-07-27 NOTE — PT/OT/SLP PROGRESS
"Physical Therapy Treatment    Patient Name:  Irina Polk   MRN:  [id number]    Recommendations:     Discharge Recommendations:  home with home health   Discharge Equipment Recommendations: walker, rolling   Barriers to discharge: None    Assessment:     Irina Polk is a 89 y.o. female admitted with a medical diagnosis of Right upper lobe pneumonia.  She presents with the following impairments/functional limitations:  weakness, impaired endurance, impaired self care skills, impaired functional mobilty, impaired cognition. Pt tolerated session well with focus on gait training and transfers. Pt progressing well and remains limited by impaired cognition, limited motivation to participate, and fatigue. Pt will continue to benefit from therapy services to improve impairments listed above.     Rehab Prognosis:  Fair ; patient would benefit from acute skilled PT services to address these deficits and reach maximum level of function.      Recent Surgery: * No surgery found *      Plan:     During this hospitalization, patient to be seen 3 x/week to address the above listed problems via gait training, therapeutic activities, therapeutic exercises, neuromuscular re-education  · Plan of Care Expires:  08/23/18   Plan of Care Reviewed with: patient    Subjective     Communicated with NSG prior to session.  Patient found sitting up in bedside chair in room upon PTA entry to room, agreeable to treatment.      Chief Complaint: fatigue and back pain  Patient comments/goals: "I can't do this, just let me hold the wall!"  Pain/Comfort:  · Pain Rating 1: other (see comments) (c/o pain with no rating given)  · Location - Orientation 1: generalized  · Location 1: back  · Pain Addressed 1: Reposition, Distraction, Nurse notified    Patients cultural, spiritual, Mormon conflicts given the current situation: none stated    Objective:     Patient found with: oxygen     General Precautions: Standard, fall   Orthopedic " Precautions:N/A   Braces: N/A     Functional Mobility:  · Bed Mobility:     · Scooting: minimum assistance  · Sit to Supine: maximal assistance  · Transfers:     · Sit to Stand:  minimum assistance with rolling walker  · Gait: Pt ambulates 70 ft with RW and Mod A. Pt requires Min A for balacne and Mod A overall d/t poor RW mgmt and use. Pt with extremely slow lopez and contionually lets go of RW with R hand throughtout gait trial.   · Balance: Pt sits with SBA/CGA for balance and requires CGA for static standing balance. Pt unable to accept additional challneges without LOB.       AM-PAC 6 CLICK MOBILITY  Turning over in bed (including adjusting bedclothes, sheets and blankets)?: 3  Sitting down on and standing up from a chair with arms (e.g., wheelchair, bedside commode, etc.): 2  Moving from lying on back to sitting on the side of the bed?: 3  Moving to and from a bed to a chair (including a wheelchair)?: 2  Need to walk in hospital room?: 2  Climbing 3-5 steps with a railing?: 1  Basic Mobility Total Score: 13       Therapeutic Activities and Exercises:   Pt assisted with all functional mobility as noted above.   Pt educated on safety with OOB mobility, importance of participation, need for safe use of RW for safety and stability, and need for increased time UIC/OOB.   Pts family educated on PT POC, frequency of visits, and d/c recommendation for post acute care with HHPT.     Patient left supine with all lines intact, call button in reach, NSG notified and family present.    GOALS:    Physical Therapy Goals        Problem: Physical Therapy Goal    Goal Priority Disciplines Outcome Goal Variances Interventions   Physical Therapy Goal     PT/OT, PT Ongoing (interventions implemented as appropriate)     Description:  Goals to be met by: 18    Patient will increase functional independence with mobility by performin. Supine to sit with Stand-by Assistance - met   2. Sit to supine with Stand-by Assistance  - met   3. Sit to stand transfer with Minimal Assistance - met   4. Bed to chair transfer with Moderate Assistance using Rolling Walker   5. Gait  x 10 feet with Moderate Assistance using Rolling Walker. - met  6. Sitting at edge of bed x15 minutes with Stand-by Assistance  7. Lower extremity exercise program x15 reps per handout, with supervision                       Time Tracking:     PT Received On: 07/27/18  PT Start Time: 1404     PT Stop Time: 1428  PT Total Time (min): 24 min     Billable Minutes: Gait Training 12 and Therapeutic Activity 12    Treatment Type: Treatment  PT/PTA: PTA     PTA Visit Number: 2     Javier Garcia, PTA  07/27/2018

## 2018-07-28 VITALS
TEMPERATURE: 97 F | DIASTOLIC BLOOD PRESSURE: 60 MMHG | WEIGHT: 152.31 LBS | SYSTOLIC BLOOD PRESSURE: 90 MMHG | OXYGEN SATURATION: 89 % | HEIGHT: 64 IN | BODY MASS INDEX: 26 KG/M2 | HEART RATE: 65 BPM | RESPIRATION RATE: 16 BRPM

## 2018-07-28 LAB
ALBUMIN SERPL BCP-MCNC: 3.1 G/DL
ANION GAP SERPL CALC-SCNC: 7 MMOL/L
BASOPHILS # BLD AUTO: 0.03 K/UL
BASOPHILS NFR BLD: 0.3 %
BUN SERPL-MCNC: 19 MG/DL
CALCIUM SERPL-MCNC: 8.4 MG/DL
CHLORIDE SERPL-SCNC: 106 MMOL/L
CO2 SERPL-SCNC: 27 MMOL/L
CREAT SERPL-MCNC: 0.7 MG/DL
DIFFERENTIAL METHOD: ABNORMAL
EOSINOPHIL # BLD AUTO: 0.1 K/UL
EOSINOPHIL NFR BLD: 0.8 %
ERYTHROCYTE [DISTWIDTH] IN BLOOD BY AUTOMATED COUNT: 15.6 %
EST. GFR  (AFRICAN AMERICAN): >60 ML/MIN/1.73 M^2
EST. GFR  (NON AFRICAN AMERICAN): >60 ML/MIN/1.73 M^2
GLUCOSE SERPL-MCNC: 92 MG/DL
HCT VFR BLD AUTO: 35.2 %
HGB BLD-MCNC: 11 G/DL
IMM GRANULOCYTES # BLD AUTO: 0.08 K/UL
IMM GRANULOCYTES NFR BLD AUTO: 0.9 %
LYMPHOCYTES # BLD AUTO: 2.2 K/UL
LYMPHOCYTES NFR BLD: 25.9 %
MAGNESIUM SERPL-MCNC: 2.1 MG/DL
MCH RBC QN AUTO: 30.6 PG
MCHC RBC AUTO-ENTMCNC: 31.3 G/DL
MCV RBC AUTO: 98 FL
MONOCYTES # BLD AUTO: 0.6 K/UL
MONOCYTES NFR BLD: 6.9 %
NEUTROPHILS # BLD AUTO: 5.6 K/UL
NEUTROPHILS NFR BLD: 65.2 %
NRBC BLD-RTO: 0 /100 WBC
PHOSPHATE SERPL-MCNC: 3.6 MG/DL
PLATELET # BLD AUTO: 256 K/UL
PMV BLD AUTO: 10 FL
POTASSIUM SERPL-SCNC: 4.3 MMOL/L
RBC # BLD AUTO: 3.6 M/UL
SODIUM SERPL-SCNC: 140 MMOL/L
WBC # BLD AUTO: 8.64 K/UL

## 2018-07-28 PROCEDURE — 94761 N-INVAS EAR/PLS OXIMETRY MLT: CPT

## 2018-07-28 PROCEDURE — 80069 RENAL FUNCTION PANEL: CPT

## 2018-07-28 PROCEDURE — 83735 ASSAY OF MAGNESIUM: CPT

## 2018-07-28 PROCEDURE — 99239 HOSP IP/OBS DSCHRG MGMT >30: CPT | Mod: ,,, | Performed by: INTERNAL MEDICINE

## 2018-07-28 PROCEDURE — 25000003 PHARM REV CODE 250: Performed by: HOSPITALIST

## 2018-07-28 PROCEDURE — 36415 COLL VENOUS BLD VENIPUNCTURE: CPT

## 2018-07-28 PROCEDURE — 27000646 HC AEROBIKA DEVICE

## 2018-07-28 PROCEDURE — 25000003 PHARM REV CODE 250: Performed by: INTERNAL MEDICINE

## 2018-07-28 PROCEDURE — 85025 COMPLETE CBC W/AUTO DIFF WBC: CPT

## 2018-07-28 PROCEDURE — 63600175 PHARM REV CODE 636 W HCPCS: Performed by: INTERNAL MEDICINE

## 2018-07-28 PROCEDURE — 94640 AIRWAY INHALATION TREATMENT: CPT

## 2018-07-28 PROCEDURE — 99900035 HC TECH TIME PER 15 MIN (STAT)

## 2018-07-28 PROCEDURE — 94664 DEMO&/EVAL PT USE INHALER: CPT

## 2018-07-28 PROCEDURE — 63600175 PHARM REV CODE 636 W HCPCS: Performed by: HOSPITALIST

## 2018-07-28 PROCEDURE — 25000242 PHARM REV CODE 250 ALT 637 W/ HCPCS: Performed by: HOSPITALIST

## 2018-07-28 RX ORDER — GUAIFENESIN 600 MG/1
1200 TABLET, EXTENDED RELEASE ORAL 2 TIMES DAILY
COMMUNITY
Start: 2018-07-28 | End: 2018-07-28

## 2018-07-28 RX ORDER — PREDNISONE 20 MG/1
20 TABLET ORAL DAILY
Qty: 5 TABLET | Refills: 0 | Status: SHIPPED | OUTPATIENT
Start: 2018-07-29 | End: 2018-07-28

## 2018-07-28 RX ORDER — PREDNISONE 20 MG/1
20 TABLET ORAL DAILY
Qty: 5 TABLET | Refills: 0 | Status: SHIPPED | OUTPATIENT
Start: 2018-07-29 | End: 2018-08-03

## 2018-07-28 RX ORDER — BENZONATATE 200 MG/1
CAPSULE ORAL
Qty: 30 CAPSULE | Refills: 2 | Status: SHIPPED | OUTPATIENT
Start: 2018-07-28 | End: 2018-07-28

## 2018-07-28 RX ORDER — GUAIFENESIN 600 MG/1
1200 TABLET, EXTENDED RELEASE ORAL 2 TIMES DAILY
COMMUNITY
Start: 2018-07-28

## 2018-07-28 RX ORDER — BENZONATATE 200 MG/1
CAPSULE ORAL
Qty: 30 CAPSULE | Refills: 2 | Status: SHIPPED | OUTPATIENT
Start: 2018-07-28 | End: 2018-11-15 | Stop reason: SDUPTHER

## 2018-07-28 RX ADMIN — GUAIFENESIN 1200 MG: 600 TABLET, EXTENDED RELEASE ORAL at 10:07

## 2018-07-28 RX ADMIN — BENZONATATE 200 MG: 100 CAPSULE ORAL at 06:07

## 2018-07-28 RX ADMIN — IPRATROPIUM BROMIDE AND ALBUTEROL SULFATE 3 ML: .5; 3 SOLUTION RESPIRATORY (INHALATION) at 12:07

## 2018-07-28 RX ADMIN — FLUTICASONE FUROATE AND VILANTEROL TRIFENATATE 1 PUFF: 100; 25 POWDER RESPIRATORY (INHALATION) at 10:07

## 2018-07-28 RX ADMIN — SODIUM CHLORIDE 250 ML: 0.9 INJECTION, SOLUTION INTRAVENOUS at 12:07

## 2018-07-28 RX ADMIN — CEFTRIAXONE SODIUM 1 G: 1 INJECTION, POWDER, FOR SOLUTION INTRAMUSCULAR; INTRAVENOUS at 10:07

## 2018-07-28 RX ADMIN — IPRATROPIUM BROMIDE AND ALBUTEROL SULFATE 3 ML: .5; 3 SOLUTION RESPIRATORY (INHALATION) at 07:07

## 2018-07-28 RX ADMIN — ASPIRIN 81 MG CHEWABLE TABLET 81 MG: 81 TABLET CHEWABLE at 10:07

## 2018-07-28 RX ADMIN — ESCITALOPRAM OXALATE 10 MG: 10 TABLET ORAL at 10:07

## 2018-07-28 RX ADMIN — PREDNISONE 40 MG: 20 TABLET ORAL at 10:07

## 2018-07-28 RX ADMIN — AMLODIPINE BESYLATE 2.5 MG: 2.5 TABLET ORAL at 10:07

## 2018-07-28 RX ADMIN — PANTOPRAZOLE SODIUM 40 MG: 40 TABLET, DELAYED RELEASE ORAL at 10:07

## 2018-07-28 NOTE — PROGRESS NOTES
Physician Attestation for Scribe:  I, Fauzia Jay MD, personally performed the services described in this documentation. All medical record entries made by the scribe were at my direction and in my presence.  I have reviewed this note and agree that the record reflects my personal performance and is accurate and complete.     Hospital Medicine  Progress Note     Patient Name: Irina Polk  MRN: [id number]  [place] [facility] Fauzia Jay MD  Admit Date: 7/22/2018  SELINA 7/30/2018  Code status: Full Code     Principal Problem:  Right upper lobe pneumonia     Interval history: Patient with no new complaints. Remains on 1L O2.      Review of Systems   Constitutional: Negative for fever.   Respiratory: Positive for cough. Negative for shortness of breath.    Cardiovascular: Negative for chest pain.         Physical Exam:  Temp:  [96.8 °F (36 °C)-99.1 °F (37.3 °C)]   Pulse:  [51-92]   Resp:  [16-22]   BP: (123-196)/(63-90)   SpO2:  [89 %-95 %]       Temp: 98.4 °F (36.9 °C) (07/27/18 0827)  Pulse: 67 (07/27/18 0827)  Resp: (!) 22 (07/27/18 0827)  BP: (!) 142/84 (07/27/18 0827)  SpO2: (!) 92 % (07/27/18 0827)     Intake/Output Summary (Last 24 hours) at 07/27/18 0942  Last data filed at 07/26/18 1715    Gross per 24 hour   Intake              650 ml   Output                0 ml   Net              650 ml      Weight: 69.1 kg (152 lb 5.4 oz)  Body mass index is 26.15 kg/m².     Physical Exam   Constitutional: No distress.   Eyes: Conjunctivae and lids are normal.   Cardiovascular: S1 normal and S2 normal.    Pulmonary/Chest: Effort normal. She has decreased breath sounds. She has wheezes.   Abdominal: Soft. Bowel sounds are normal. There is no tenderness.   Musculoskeletal: She exhibits no edema.         Significant Labs:     Recent Labs  Lab 07/23/18  0418 07/24/18  0346 07/26/18  0453 07/27/18  0427   WBC 5.52 8.34 9.98 9.67   HGB 11.2* 10.9* 12.8 12.0   HCT 35.1* 35.6* 41.1 38.1    234 274 270          Recent Labs  Lab 07/22/18  1316   07/24/18  0345 07/26/18  0453 07/27/18  0427     < > 136 140 139   K 4.0  < > 4.3 4.1 3.9     < > 108 108 107   CO2 24  < > 17* 23 25   BUN 16  < > 23 22 18   CREATININE 0.8  < > 0.9 0.7 0.7   GLU 96  < > 120* 78 84   CALCIUM 9.3  < > 9.0 8.9 8.6*   MG  --   --   --  2.4 1.9   PHOS  --   --   --  3.0 3.0   ALKPHOS 66  --   --   --   --    ALT 11  --   --   --   --    AST 19  --   --   --   --    ALBUMIN 3.8  --   --  3.5 3.3*   PROT 6.9  --   --   --   --    BILITOT 0.6  --   --   --   --    < > = values in this interval not displayed.    Recent Labs  Lab 07/22/18  1316   TROPONINI 0.013     Inpatient Medications prescribed for management of current Problems:   Scheduled Meds:    albuterol-ipratropium  3 mL Nebulization Q6H WAKE    amLODIPine  2.5 mg Oral Daily    aspirin  81 mg Oral Daily    azithromycin  500 mg Oral Daily    cefTRIAXone (ROCEPHIN) IVPB  1 g Intravenous Q24H    enoxaparin  40 mg Subcutaneous Daily    escitalopram oxalate  10 mg Oral Daily    fluticasone-vilanterol  1 puff Inhalation Daily    guaiFENesin  1,200 mg Oral BID    metoprolol succinate  25 mg Oral Daily    pantoprazole  40 mg Oral Daily    predniSONE  40 mg Oral Daily    senna-docusate 8.6-50 mg  2 tablet Oral BID      Continuous Infusions:   As Needed: acetaminophen, albuterol-ipratropium, benzonatate, diclofenac sodium, dicyclomine, ondansetron, promethazine, promethazine-codeine 6.25-10 mg/5 ml, sodium chloride 0.9%, traMADol, traZODone     Active Hospital Problems     Diagnosis   POA    *Right upper lobe pneumonia [J18.1]   Yes    Acute respiratory failure with hypoxia [J96.01]   Yes    Debility [R53.81]   Yes    COPD with acute exacerbation [J44.1]   Yes    Essential hypertension [I10]   Yes       Resolved Hospital Problems     Diagnosis Date Resolved POA   No resolved problems to display.         Overview:  Irina Polk is a 89 y.o. female with past medical  history significant for COPD and hypertension was admitted for RUL pneumonia. The patient has reported history of aspiration. She was treated in the ED with Duonebs, ceftriaxone and solumedrol. The steroids were switched to PO prednisone and she was further managed with Breo nebs and guaifenesin. Blood cx show NGTD and respiratory cx are growing normal respiratory ruy. Repeat CXR (7/25) showed some LLB atelectasis, but resolution of RUL consolidation. O2 weaned as tolerated.        Assessment and Plan for Problems addressed today:     Pneumonia of right upper lobe   Acute respiratory failure with hypoxia  COPD with acute exacerbation  · On admission, CXR shows RUL consolidation; clinically wheezing with sputum and sat in mid-80s. H/o aspiration. Received solumedrol in ED.   · Continued Duonebs, Breo nebs, guaifenesin, ceftriaxone and PO prednisone; following cultures  · Aspiration precaution and wean O2 as tolerated   · Repeat CXR (7/25): shows some LLB atelectasis, but resolution of RUL consolidation.   · Continuing current management.     Essential hypertension  · Continued with current home medications: amlodipine & Toprol      Debility  · PT/OT following      Depression   · Continued home escitalopram      Diet: Diet Adult Regular (IDDSI Level 7)  GI ppx: pantoprazole 40mg BID  DVT Prophylaxis:   Anticoagulants   Medication Route Frequency    enoxaparin injection 40 mg Subcutaneous Daily      Lines/ Drains/ Airways:   Peripheral IV- Right AC     Wounds: None     HIGH RISK CONDITION(S):   Patient has a condition that poses threat to life and bodily function: Severe Respiratory Distress      Discharge plan and follow up  [place] [facility]        Provider  Fauzia Jay MD  [facility]   Department of Hospital Medicine     Scribe Attestation: I personally scribed for Fauzia Jay MD on 07/27/2018 at 2:14 PM. Electronically signed by raji Haq on 07/27/2018 at 2:14 PM.

## 2018-07-28 NOTE — DISCHARGE SUMMARY
"Discharge Summary  Hospital Medicine    Patient Name: Irina Polk  MRN: [id number]  Attending Provider on Discharge: Fauzia Jay MD  Hospital Medicine Team: [facility] [place] [facility]  Date of Admission:  7/22/2018     Date of Discharge:  7/28/2018  5:19 PM  Code status: Full Code    Active Hospital Problems    Diagnosis  POA    *Right upper lobe pneumonia [J18.1]  Yes    Acute respiratory failure with hypoxia [J96.01]  Yes    Debility [R53.81]  Yes    COPD with acute exacerbation [J44.1]  Yes    Essential hypertension [I10]  Yes      Resolved Hospital Problems    Diagnosis Date Resolved POA   No resolved problems to display.        HPI: "89 y.o. female with co-morbidities including: COPD presented with a complaint of cough, wet for 2-3 days. It is constant, progressive and persistent. She tried albuterol at home without improvement in her symptoms. Patient caregiver states that today patient became hypoxic into the lower 80s. Family does not vocalize any exacerbating factors. The patient's caregiver and daughters have not noticed fever, but have noticed vomiting and diarrhea. The patient does have history of chronic aspiration, last antibiotic course was approximately 1 month ago."    Hospital Course: Patient with past medical history significant for COPD and hypertension was admitted for RUL pneumonia. The patient has reported history of aspiration. She was treated in the ED with Duonebs, ceftriaxone, azithromycin, and Solumedrol. The steroids were changed to PO prednisone and she was further managed with Breo, nebs, and guaifenesin. Blood cx show NGTD and respiratory cx growing normal respiratory ruy. Repeat CXR (7/25) showed some LLB atelectasis, but resolution of RUL consolidation. O2 weaned as tolerated.     Pneumonia of right upper lobe   Acute respiratory failure with hypoxia  COPD with acute exacerbation  · On admission, CXR shows RUL consolidation; clinically wheezing with sputum and sat in mid-80s. " H/o aspiration. Received solumedrol in ED.   · Continued Duonebs, Breo nebs, guaifenesin, ceftriaxone and PO prednisone; following cultures  · Aspiration precaution and wean O2 as tolerated   · Repeat CXR (7/25): shows some LLB atelectasis, but resolution of RUL consolidation.   · Completed a 7-day course of ceftriaxone and 3-day course of azithromycin.     Essential hypertension  · Continued with current home medications: amlodipine & Toprol; however, family later discovered that patient was not on BB at home so discontinued. BP controlled with amlodipine alone.     Debility  · PT/OT following      Depression   · Continued home escitalopram        Recent Labs  Lab 07/26/18  0453 07/27/18  0427 07/28/18  0353   WBC 9.98 9.67 8.64   HGB 12.8 12.0 11.0*   HCT 41.1 38.1 35.2*    270 256       Recent Labs  Lab 07/26/18  0453 07/27/18  0427 07/28/18  0353    139 140   K 4.1 3.9 4.3    107 106   CO2 23 25 27   BUN 22 18 19   CREATININE 0.7 0.7 0.7   GLU 78 84 92   CALCIUM 8.9 8.6* 8.4*   MG 2.4 1.9 2.1   PHOS 3.0 3.0 3.6       Recent Labs  Lab 07/22/18  1316 07/26/18  0453 07/27/18  0427 07/28/18  0353   ALKPHOS 66  --   --   --    ALT 11  --   --   --    AST 19  --   --   --    ALBUMIN 3.8 3.5 3.3* 3.1*   PROT 6.9  --   --   --    BILITOT 0.6  --   --   --       Procedures: none    Consultants: none    Medications:  Reconciled Home Medications:      Medication List      START taking these medications    guaiFENesin 600 mg 12 hr tablet  Commonly known as:  MUCINEX  Take 2 tablets (1,200 mg total) by mouth 2 (two) times daily.     predniSONE 20 MG tablet  Commonly known as:  DELTASONE  Take 1 tablet (20 mg total) by mouth once daily. for 5 days  Start taking on:  7/29/2018        CONTINUE taking these medications    acetaminophen 500 mg Cap  Commonly known as:  TYLENOL  Take 2 capsules (1,000 mg total) by mouth every 6 (six) hours as needed (Pain).     albuterol-ipratropium 2.5 mg-0.5 mg/3 mL nebulizer  solution  Commonly known as:  DUO-NEB  inhale contents of 1 vial every 4 hours if needed for wheezing     amLODIPine 2.5 MG tablet  Commonly known as:  NORVASC  Take 1 tablet (2.5 mg total) by mouth once daily.     aspirin 81 MG Chew  Take 81 mg by mouth once daily.     benzonatate 200 MG capsule  Commonly known as:  TESSALON  take 1 capsule by mouth three times a day if needed for cough     calcitonin (salmon) 200 unit/actuation nasal spray  Commonly known as:  FORTICAL  1 spray by Nasal route once daily.     cetirizine 5 MG tablet  Commonly known as:  ZYRTEC  Take 5 mg by mouth once daily.     COMBIGAN 0.2-0.5 % Drop  Generic drug:  brimonidine-timolol  Place 1 drop into both eyes 2 (two) times daily.     cyanocobalamin 1,000 mcg/mL injection  Inject 1 mL (1,000 mcg total) into the muscle once a week. Dispense syringes as well.     diclofenac sodium 1 % Gel  Commonly known as:  VOLTAREN  Apply 2 g topically once daily.     dicyclomine 10 MG capsule  Commonly known as:  BENTYL  Take 1 capsule (10 mg total) by mouth 2 (two) times daily.     escitalopram oxalate 10 MG tablet  Commonly known as:  LEXAPRO  TAKE 1 TABLET DAILY     fluticasone-salmeterol 100-50 mcg/dose 100-50 mcg/dose diskus inhaler  Commonly known as:  ADVAIR  Inhale 1 puff into the lungs 2 (two) times daily. Controller     OMEGA 3 ORAL  Take 1 capsule by mouth once daily.     omeprazole 20 MG capsule  Commonly known as:  PRILOSEC  take 1 capsule by mouth once daily     PROAIR HFA 90 mcg/actuation inhaler  Generic drug:  albuterol  inhale 2 puffs by mouth every 6 hours if needed for shortness of breath or wheezing     promethazine-codeine 6.25-10 mg/5 ml 6.25-10 mg/5 mL syrup  Commonly known as:  PHENERGAN with CODEINE  Take 5 mLs by mouth every 4 (four) hours as needed for Cough.     senna-docusate 8.6-50 mg 8.6-50 mg per tablet  Commonly known as:  PERICOLACE  Take 2 tablets by mouth 2 (two) times daily.     traMADol 50 mg tablet  Commonly known as:   ULTRAM  Take 1 tablet (50 mg total) by mouth every 6 (six) hours as needed (pain not controlled with tylenol).        STOP taking these medications    doxycycline 100 MG tablet  Commonly known as:  VIBRA-TABS     metoprolol succinate 25 MG 24 hr tablet  Commonly known as:  TOPROL-XL     oxybutynin 5 MG Tab  Commonly known as:  DITROPAN            Discharge Instructions:    Discharge Procedure Orders  Diet Adult Regular   Order Comments: Drink 1 L per day     Notify your health care provider if you experience any of the following:  temperature >100.4     Notify your health care provider if you experience any of the following:  persistent nausea and vomiting or diarrhea     Notify your health care provider if you experience any of the following:  difficulty breathing or increased cough     Notify your health care provider if you experience any of the following:  persistent dizziness, light-headedness, or visual disturbances     Notify your health care provider if you experience any of the following:  increased confusion or weakness     Activity as tolerated         Discharge Condition: stable    Disposition: Home or Self Care    Indwelling Lines/Drains at time of discharge: none    Tests pending at the time of discharge: none      Time spent on the discharge of the patient including review of hospital course with the patient, reviewing discharge medications and arranging follow-up care: 45 minutes.    Discharge examination Patient was seen and examined on 7/28/2018 and determined to be suitable for discharge.    Discharge plan and follow up:  Follow-up Information     Blanca Quinones MD. Schedule an appointment as soon as possible for a visit in 1 week.    Specialty:  Internal Medicine  Why:  For discharge from hospital follow up  Contact information:  [address] [address]  [phone]                   Provider  Fauzia Jay MD  Department of Hospital Medicine  [facility]  [facility]

## 2018-07-28 NOTE — PLAN OF CARE
Problem: Patient Care Overview  Goal: Plan of Care Review  Outcome: Ongoing (interventions implemented as appropriate)  Pt and daughter, Dolly, given d/c instructions.  Verbalized understanding.  Tele and PIV removed.  Belongings collected by pt's private aids.  Medicines called into pt's pharmacy in [place].  Pt transported with daughter in wheelchair to home.

## 2018-07-28 NOTE — NURSING
Contacted Dr. Jay.  Pt hypotensive, 82/54 manual.  AM metoprolol held d/t bradycardia.  Given daily amlodipine 2.5 mg this AM for /58.  Pt takes med at home.  Per provider, will order bolus.

## 2018-07-28 NOTE — PLAN OF CARE
Problem: Patient Care Overview  Goal: Plan of Care Review  Outcome: Ongoing (interventions implemented as appropriate)  Greeted patient and gained consent for nursing care. Awake, alert, needs re-orientation to situation. Cares and daughter at bedside. POC reviewed with patient and daughter, verbalized understanding. Still with coughing episodes, PRN cough medication given. Comfort and safety promoted. Bed on lowest position, locked. Call bell within reach. Assisted in her needs. Regularly checked on her during the night.

## 2018-09-17 ENCOUNTER — TELEPHONE (OUTPATIENT)
Dept: ADMINISTRATIVE | Facility: CLINIC | Age: 83
End: 2018-09-17

## 2018-09-17 NOTE — TELEPHONE ENCOUNTER
Home Health SOC with [facility]. Dr Blanca Quinones. SN, PT, OT, ST services.

## 2018-10-09 ENCOUNTER — LAB VISIT (OUTPATIENT)
Dept: LAB | Facility: HOSPITAL | Age: 83
End: 2018-10-09
Attending: INTERNAL MEDICINE
Payer: MEDICARE

## 2018-10-09 DIAGNOSIS — R41.0 CONFUSION: ICD-10-CM

## 2018-10-09 LAB
BACTERIA #/AREA URNS AUTO: ABNORMAL /HPF
BILIRUB UR QL STRIP: NEGATIVE
CLARITY UR REFRACT.AUTO: ABNORMAL
COLOR UR AUTO: YELLOW
GLUCOSE UR QL STRIP: NEGATIVE
HGB UR QL STRIP: NEGATIVE
KETONES UR QL STRIP: NEGATIVE
LEUKOCYTE ESTERASE UR QL STRIP: ABNORMAL
MICROSCOPIC COMMENT: ABNORMAL
NITRITE UR QL STRIP: POSITIVE
PH UR STRIP: 6 [PH] (ref 5–8)
PROT UR QL STRIP: NEGATIVE
RBC #/AREA URNS AUTO: 0 /HPF (ref 0–4)
SP GR UR STRIP: 1.02 (ref 1–1.03)
SQUAMOUS #/AREA URNS AUTO: 0 /HPF
URN SPEC COLLECT METH UR: ABNORMAL
UROBILINOGEN UR STRIP-ACNC: NEGATIVE EU/DL
WBC #/AREA URNS AUTO: 5 /HPF (ref 0–5)

## 2018-10-09 PROCEDURE — 87184 SC STD DISK METHOD PER PLATE: CPT

## 2018-10-09 PROCEDURE — 87077 CULTURE AEROBIC IDENTIFY: CPT

## 2018-10-09 PROCEDURE — 87186 SC STD MICRODIL/AGAR DIL: CPT | Mod: 59

## 2018-10-09 PROCEDURE — 81001 URINALYSIS AUTO W/SCOPE: CPT

## 2018-10-09 PROCEDURE — 87086 URINE CULTURE/COLONY COUNT: CPT

## 2018-10-09 PROCEDURE — 87088 URINE BACTERIA CULTURE: CPT

## 2018-10-12 LAB — BACTERIA UR CULT: NORMAL

## 2018-10-15 ENCOUNTER — HOSPITAL ENCOUNTER (OUTPATIENT)
Dept: PULMONOLOGY | Facility: CLINIC | Age: 83
Discharge: HOME | End: 2018-10-15
Payer: MEDICARE

## 2018-10-15 VITALS — HEIGHT: 61 IN | BODY MASS INDEX: 28.7 KG/M2 | WEIGHT: 152 LBS

## 2018-10-15 DIAGNOSIS — R06.02 SOB (SHORTNESS OF BREATH) ON EXERTION: ICD-10-CM

## 2018-10-15 PROCEDURE — 94618 PULMONARY STRESS TESTING: CPT | Mod: PBBFAC | Performed by: INTERNAL MEDICINE

## 2018-10-15 PROCEDURE — 94618 PULMONARY STRESS TESTING: CPT | Mod: 26,S$PBB,, | Performed by: INTERNAL MEDICINE

## 2018-10-16 NOTE — PROCEDURES
Irina Polk is a 89 y.o.  female patient, who presents for a 6 minute walk test ordered by MD Joni.  The diagnosis is COPD, Qualify for Oxygen.  The patient's BMI is 28.8 kg/m2.  Predicted distance (lower limit of normal) is 179.42 meters.      Test Results:    The test was completed without stopping.   The total time walked was 360 seconds.  During walking, the patient reported:  Dyspnea, Other (Comment)(tired). The patient used a wheelchair  during testing.     10/15/2018---------Distance: 84.43 meters (277 feet)     O2 Sat % Supplemental Oxygen Heart Rate Blood Pressure Cindy Scale   Pre-exercise  (Resting) 93 % Room Air 73 bpm 132/69 mmHg 3   During Exercise 94 % Room Air 99 bpm 134/69 mmHg 4   Post-exercise  (Recovery) 94 % Room Air  79 bpm   mmHg       Recovery Time: 136 seconds    Performing nurse/tech: Santiago HARDIN      PREVIOUS STUDY:   The patient has not had a previous study.      CLINICAL INTERPRETATION:  Six minute walk distance is 84.43 meters (277 feet) with somewhat heavy dyspnea.  During exercise, there was no significant desaturation while breathing room air.  Blood pressure remained stable and Heart rate increased significantly with walking.  This may represent a tachycardic response to exercise.  The patient reported non-pulmonary symptoms during exercise.  Severe exercise impairment is likely due to subjective symptoms.  No previous study performed.  Based upon age and body mass index, exercise capacity is less than predicted.

## 2018-11-20 ENCOUNTER — HOSPITAL ENCOUNTER (OUTPATIENT)
Dept: RADIOLOGY | Facility: HOSPITAL | Age: 83
Discharge: HOME | End: 2018-11-20
Attending: INTERNAL MEDICINE
Payer: MEDICARE

## 2018-11-20 DIAGNOSIS — R05.9 COUGH: ICD-10-CM

## 2018-11-20 PROCEDURE — 71046 X-RAY EXAM CHEST 2 VIEWS: CPT | Mod: TC,PO

## 2018-11-20 PROCEDURE — 71046 X-RAY EXAM CHEST 2 VIEWS: CPT | Mod: 26,,, | Performed by: RADIOLOGY

## 2018-12-12 ENCOUNTER — LAB VISIT (OUTPATIENT)
Dept: LAB | Facility: HOSPITAL | Age: 83
End: 2018-12-12
Attending: INTERNAL MEDICINE
Payer: MEDICARE

## 2018-12-12 DIAGNOSIS — F03.90 DEMENTIA WITHOUT BEHAVIORAL DISTURBANCE, UNSPECIFIED DEMENTIA TYPE: ICD-10-CM

## 2018-12-12 DIAGNOSIS — R53.1 WEAKNESS: ICD-10-CM

## 2018-12-12 DIAGNOSIS — R53.83 FATIGUE, UNSPECIFIED TYPE: ICD-10-CM

## 2018-12-12 DIAGNOSIS — Z87.898 HISTORY OF DYSURIA: ICD-10-CM

## 2018-12-12 LAB
ALBUMIN SERPL BCP-MCNC: 3.5 G/DL
ALP SERPL-CCNC: 73 U/L
ALT SERPL W/O P-5'-P-CCNC: 10 U/L
ANION GAP SERPL CALC-SCNC: 7 MMOL/L
AST SERPL-CCNC: 18 U/L
BASOPHILS # BLD AUTO: 0.04 K/UL
BASOPHILS NFR BLD: 0.5 %
BILIRUB SERPL-MCNC: 0.5 MG/DL
BUN SERPL-MCNC: 13 MG/DL
CALCIUM SERPL-MCNC: 9.4 MG/DL
CHLORIDE SERPL-SCNC: 103 MMOL/L
CO2 SERPL-SCNC: 29 MMOL/L
CREAT SERPL-MCNC: 0.7 MG/DL
DIFFERENTIAL METHOD: ABNORMAL
EOSINOPHIL # BLD AUTO: 0.4 K/UL
EOSINOPHIL NFR BLD: 4.8 %
ERYTHROCYTE [DISTWIDTH] IN BLOOD BY AUTOMATED COUNT: 15.8 %
EST. GFR  (AFRICAN AMERICAN): >60 ML/MIN/1.73 M^2
EST. GFR  (NON AFRICAN AMERICAN): >60 ML/MIN/1.73 M^2
GLUCOSE SERPL-MCNC: 92 MG/DL
HCT VFR BLD AUTO: 42.9 %
HGB BLD-MCNC: 13.9 G/DL
IMM GRANULOCYTES # BLD AUTO: 0.04 K/UL
IMM GRANULOCYTES NFR BLD AUTO: 0.5 %
LYMPHOCYTES # BLD AUTO: 1.9 K/UL
LYMPHOCYTES NFR BLD: 22.7 %
MCH RBC QN AUTO: 32.4 PG
MCHC RBC AUTO-ENTMCNC: 32.4 G/DL
MCV RBC AUTO: 100 FL
MONOCYTES # BLD AUTO: 0.7 K/UL
MONOCYTES NFR BLD: 8.2 %
NEUTROPHILS # BLD AUTO: 5.2 K/UL
NEUTROPHILS NFR BLD: 63.3 %
NRBC BLD-RTO: 0 /100 WBC
PLATELET # BLD AUTO: 241 K/UL
PMV BLD AUTO: 10.4 FL
POTASSIUM SERPL-SCNC: 4.5 MMOL/L
PROT SERPL-MCNC: 6.9 G/DL
RBC # BLD AUTO: 4.29 M/UL
SODIUM SERPL-SCNC: 139 MMOL/L
T4 FREE SERPL-MCNC: 0.98 NG/DL
TSH SERPL DL<=0.005 MIU/L-ACNC: 2.7 UIU/ML
VIT B12 SERPL-MCNC: >2000 PG/ML
WBC # BLD AUTO: 8.15 K/UL

## 2018-12-12 PROCEDURE — 84439 ASSAY OF FREE THYROXINE: CPT

## 2018-12-12 PROCEDURE — 85025 COMPLETE CBC W/AUTO DIFF WBC: CPT

## 2018-12-12 PROCEDURE — 84443 ASSAY THYROID STIM HORMONE: CPT

## 2018-12-12 PROCEDURE — 82607 VITAMIN B-12: CPT

## 2018-12-12 PROCEDURE — 80053 COMPREHEN METABOLIC PANEL: CPT

## 2018-12-12 PROCEDURE — 36415 COLL VENOUS BLD VENIPUNCTURE: CPT | Mod: PO

## 2018-12-26 ENCOUNTER — HOSPITAL ENCOUNTER (OUTPATIENT)
Dept: RADIOLOGY | Facility: HOSPITAL | Age: 83
Discharge: HOME | End: 2018-12-26
Attending: INTERNAL MEDICINE
Payer: MEDICARE

## 2018-12-26 DIAGNOSIS — R05.9 COUGH: ICD-10-CM

## 2018-12-26 PROCEDURE — 71046 X-RAY EXAM CHEST 2 VIEWS: CPT | Mod: 26,,, | Performed by: RADIOLOGY

## 2018-12-26 PROCEDURE — 71046 X-RAY EXAM CHEST 2 VIEWS: CPT | Mod: TC,PO

## 2018-12-27 ENCOUNTER — HOSPITAL ENCOUNTER (EMERGENCY)
Facility: OTHER | Age: 83
Discharge: HOME | End: 2018-12-27
Attending: EMERGENCY MEDICINE
Payer: MEDICARE

## 2018-12-27 VITALS
TEMPERATURE: 98 F | SYSTOLIC BLOOD PRESSURE: 163 MMHG | DIASTOLIC BLOOD PRESSURE: 77 MMHG | OXYGEN SATURATION: 91 % | RESPIRATION RATE: 18 BRPM | WEIGHT: 160 LBS | BODY MASS INDEX: 30.23 KG/M2 | HEART RATE: 58 BPM

## 2018-12-27 DIAGNOSIS — J20.9 COPD WITH ACUTE BRONCHITIS: Primary | ICD-10-CM

## 2018-12-27 DIAGNOSIS — R82.81 PYURIA: ICD-10-CM

## 2018-12-27 DIAGNOSIS — R05.8 COUGH WITH EXPECTORATION: ICD-10-CM

## 2018-12-27 DIAGNOSIS — J44.0 COPD WITH ACUTE BRONCHITIS: Primary | ICD-10-CM

## 2018-12-27 LAB
ALBUMIN SERPL BCP-MCNC: 3.6 G/DL
ALP SERPL-CCNC: 60 U/L
ALT SERPL W/O P-5'-P-CCNC: 12 U/L
ANION GAP SERPL CALC-SCNC: 10 MMOL/L
AST SERPL-CCNC: 17 U/L
BACTERIA #/AREA URNS HPF: ABNORMAL /HPF
BASOPHILS # BLD AUTO: 0.02 K/UL
BASOPHILS NFR BLD: 0.3 %
BILIRUB SERPL-MCNC: 0.7 MG/DL
BILIRUB UR QL STRIP: NEGATIVE
BUN SERPL-MCNC: 13 MG/DL
CALCIUM SERPL-MCNC: 9.1 MG/DL
CHLORIDE SERPL-SCNC: 104 MMOL/L
CLARITY UR: ABNORMAL
CO2 SERPL-SCNC: 24 MMOL/L
COLOR UR: YELLOW
CREAT SERPL-MCNC: 0.7 MG/DL
DIFFERENTIAL METHOD: ABNORMAL
EOSINOPHIL # BLD AUTO: 0.5 K/UL
EOSINOPHIL NFR BLD: 6.2 %
ERYTHROCYTE [DISTWIDTH] IN BLOOD BY AUTOMATED COUNT: 15.9 %
EST. GFR  (AFRICAN AMERICAN): >60 ML/MIN/1.73 M^2
EST. GFR  (NON AFRICAN AMERICAN): >60 ML/MIN/1.73 M^2
GLUCOSE SERPL-MCNC: 92 MG/DL
GLUCOSE UR QL STRIP: NEGATIVE
HCT VFR BLD AUTO: 43.9 %
HGB BLD-MCNC: 14.3 G/DL
HGB UR QL STRIP: NEGATIVE
KETONES UR QL STRIP: NEGATIVE
LEUKOCYTE ESTERASE UR QL STRIP: ABNORMAL
LYMPHOCYTES # BLD AUTO: 2.1 K/UL
LYMPHOCYTES NFR BLD: 28.3 %
MAGNESIUM SERPL-MCNC: 2.1 MG/DL
MCH RBC QN AUTO: 31.3 PG
MCHC RBC AUTO-ENTMCNC: 32.6 G/DL
MCV RBC AUTO: 96 FL
MICROSCOPIC COMMENT: ABNORMAL
MONOCYTES # BLD AUTO: 0.7 K/UL
MONOCYTES NFR BLD: 8.6 %
NEUTROPHILS # BLD AUTO: 4.2 K/UL
NEUTROPHILS NFR BLD: 56.1 %
NITRITE UR QL STRIP: POSITIVE
NON-SQ EPI CELLS #/AREA URNS HPF: 1 /HPF
PH UR STRIP: 8 [PH] (ref 5–8)
PHOSPHATE SERPL-MCNC: 3.5 MG/DL
PLATELET # BLD AUTO: 266 K/UL
PMV BLD AUTO: 10.1 FL
POTASSIUM SERPL-SCNC: 3.9 MMOL/L
PROT SERPL-MCNC: 6.8 G/DL
PROT UR QL STRIP: NEGATIVE
RBC # BLD AUTO: 4.57 M/UL
RBC #/AREA URNS HPF: 2 /HPF (ref 0–4)
SODIUM SERPL-SCNC: 138 MMOL/L
SP GR UR STRIP: 1.01 (ref 1–1.03)
SQUAMOUS #/AREA URNS HPF: 1 /HPF
URN SPEC COLLECT METH UR: ABNORMAL
UROBILINOGEN UR STRIP-ACNC: 1 EU/DL
WBC # BLD AUTO: 7.55 K/UL
WBC #/AREA URNS HPF: 40 /HPF (ref 0–5)

## 2018-12-27 PROCEDURE — 25000003 PHARM REV CODE 250: Performed by: EMERGENCY MEDICINE

## 2018-12-27 PROCEDURE — 81000 URINALYSIS NONAUTO W/SCOPE: CPT

## 2018-12-27 PROCEDURE — 93005 ELECTROCARDIOGRAM TRACING: CPT

## 2018-12-27 PROCEDURE — 87186 SC STD MICRODIL/AGAR DIL: CPT

## 2018-12-27 PROCEDURE — 87077 CULTURE AEROBIC IDENTIFY: CPT

## 2018-12-27 PROCEDURE — 85025 COMPLETE CBC W/AUTO DIFF WBC: CPT

## 2018-12-27 PROCEDURE — 93010 ELECTROCARDIOGRAM REPORT: CPT | Mod: ,,, | Performed by: INTERNAL MEDICINE

## 2018-12-27 PROCEDURE — 80053 COMPREHEN METABOLIC PANEL: CPT

## 2018-12-27 PROCEDURE — 87086 URINE CULTURE/COLONY COUNT: CPT

## 2018-12-27 PROCEDURE — 83735 ASSAY OF MAGNESIUM: CPT

## 2018-12-27 PROCEDURE — 87088 URINE BACTERIA CULTURE: CPT

## 2018-12-27 PROCEDURE — 96360 HYDRATION IV INFUSION INIT: CPT

## 2018-12-27 PROCEDURE — 36415 COLL VENOUS BLD VENIPUNCTURE: CPT

## 2018-12-27 PROCEDURE — 99285 EMERGENCY DEPT VISIT HI MDM: CPT | Mod: 25

## 2018-12-27 PROCEDURE — 84100 ASSAY OF PHOSPHORUS: CPT

## 2018-12-27 PROCEDURE — 87184 SC STD DISK METHOD PER PLATE: CPT

## 2018-12-27 RX ADMIN — SODIUM CHLORIDE 250 ML: 0.9 INJECTION, SOLUTION INTRAVENOUS at 12:12

## 2018-12-27 NOTE — ED TRIAGE NOTES
Pt presents to ED with c/o acute on chronic productive cough of white phlegm, fatigue, decreased fluid intake and low SpO2 of 87% this morning when baseline is 91%. Hx of COPD. Pt denies SOB at rest, reports SOB with exertion chronically. Pt denies N/V/D, constipation, fever, chills. Pt AAOx3, disoriented to time.

## 2018-12-27 NOTE — ED PROVIDER NOTES
"Encounter Date: 12/27/2018    SCRIBE #1 NOTE: I, Navdeep Villegas, am scribing for, and in the presence of, Dr. Wheeler .       History     Chief Complaint   Patient presents with    Cough     acute on chronic productive cough with white phlegm with fatigue and decreased fluid intake. PMH of COPD. Daughter states baseline Spo2 is 91% on RA. This morning Spo2 was 87%     Time seen by provider: 10:37 AM    This is an 89 y.o. female who presents with complaint of worsening chronic productive cough for a week. Dr. Blake called the ED about an hour ago before patient arrived. Patient was seen by Dr. Blake yesterday and was started on Avalox. Dr. Blake is concerned the patient may be dehydrated. Per daughter, her mother took the 1st dose of Avalox last night. Daughter notes her mother completed a chest X-ray yesterday and there was "no pneumonia, just chronic changes". Daughter reports her mother aspirates and coughs frequently while eating and drinking. Daughter notes her mother receives four breathing treatments a day. Per daughter, her mother's oxygen saturation fluctuates and it was 86% yesterday, where as normally her room air oxygen saturation is at 91%. Daughter notes her mother has been "clammy, weak and tired". She states her mother does not drink enough fluids throughout the day. She reports her mother normally ambulates to the restroom, but she would not yesterday. Patient currently denies fevers, chills, headaches, dizziness, congestion, rhinorrhea, sore throat, SOB, chest pain, abdominal pain, N/V/D, constipation, bloody stools, dysuria, urinary frequency, or urinary urgency. She denies recent surgeries. She denies use of alcohol, tobacco, or illicit drugs.       The history is provided by the patient and a relative (daughter at bedside).     Review of patient's allergies indicates:   Allergen Reactions    Codeine Nausea Only     Intolerance     Penicillins      Past Medical History:   Diagnosis " "Date    GERD (gastroesophageal reflux disease)     Hypertension     UTI (urinary tract infection)      Past Surgical History:   Procedure Laterality Date    APPENDECTOMY      HYSTERECTOMY      TOTAL KNEE ARTHROPLASTY Bilateral      No family history on file.  Social History     Tobacco Use    Smoking status: Never Smoker    Smokeless tobacco: Never Used   Substance Use Topics    Alcohol use: Yes     Alcohol/week: 0.6 oz     Types: 1 Glasses of wine per week     Comment: occasionally    Drug use: No     Review of Systems   Constitutional: Positive for fatigue. Negative for chills and fever.   HENT: Negative for congestion, rhinorrhea and sore throat.    Respiratory: Positive for cough. Negative for shortness of breath.    Cardiovascular: Negative for chest pain.   Gastrointestinal: Negative for abdominal pain, blood in stool, constipation, diarrhea, nausea and vomiting.   Genitourinary: Negative for decreased urine volume and dysuria.   Musculoskeletal: Negative for back pain.   Skin: Negative for rash.        Positive for "clammy" skin.    Neurological: Positive for weakness. Negative for dizziness and headaches.   Hematological: Does not bruise/bleed easily.   Psychiatric/Behavioral: Negative for confusion.       Physical Exam     Initial Vitals [12/27/18 1013]   BP Pulse Resp Temp SpO2   123/76 67 16 97.8 °F (36.6 °C) (!) 90 %      MAP       --         Vitals:    12/27/18 1057 12/27/18 1157 12/27/18 1227 12/27/18 1257   BP: (!) 152/68 (!) 151/70 (!) 153/77 (!) 155/72   Pulse: (!) 56 60 (!) 58 60   Resp: (!) 28 (!) 29 (!) 23 (!) 24   Temp:       TempSrc:       SpO2: (!) 92% (!) 90% (!) 92% (!) 89%   Weight:        12/27/18 1327   BP: (!) 163/77   Pulse: (!) 58   Resp: 18   Temp:    TempSrc:    SpO2: (!) 91%   Weight:      Physical Exam    Nursing note and vitals reviewed.  Constitutional: She appears well-developed and well-nourished. No distress.   HENT:   Head: Normocephalic and atraumatic.   Sticky " mucous membranes.    Eyes: Conjunctivae and EOM are normal. Pupils are equal, round, and reactive to light.   Neck: Normal range of motion. Neck supple.   Cardiovascular: Normal rate, regular rhythm, normal heart sounds and intact distal pulses.   No murmur heard.  Pulmonary/Chest: No respiratory distress. She has wheezes. She has no rhonchi. She has no rales.   Faint expiratory wheezes throughout.    Abdominal: Soft. She exhibits no distension. There is no tenderness.   Musculoskeletal: Normal range of motion. She exhibits no edema.   No lower extremity edema.    Neurological: She is alert and oriented to person, place, and time. She has normal strength. No cranial nerve deficit.   Skin: Skin is warm and dry. No rash noted.   Psychiatric: She has a normal mood and affect. Her behavior is normal. Judgment and thought content normal.         ED Course   Procedures  Labs Reviewed   CBC W/ AUTO DIFFERENTIAL - Abnormal; Notable for the following components:       Result Value    MCH 31.3 (*)     RDW 15.9 (*)     All other components within normal limits   URINALYSIS, REFLEX TO URINE CULTURE - Abnormal; Notable for the following components:    Appearance, UA Hazy (*)     Nitrite, UA Positive (*)     Leukocytes, UA 1+ (*)     All other components within normal limits    Narrative:     Preferred Collection Type->Urine, Clean Catch   URINALYSIS MICROSCOPIC - Abnormal; Notable for the following components:    WBC, UA 40 (*)     Bacteria, UA Many (*)     Non-Squam Epith 1 (*)     All other components within normal limits    Narrative:     Preferred Collection Type->Urine, Clean Catch   CULTURE, URINE   COMPREHENSIVE METABOLIC PANEL   MAGNESIUM   PHOSPHORUS     EKG Readings: (Independently Interpreted)   Initial Reading: No STEMI.   Normal sinus rhythm at a rate of 61. Occasional PACs present.        Imaging Results          X-Ray Chest PA And Lateral (Final result)  Result time 12/27/18 12:01:10    Final result by Danielle GALICIA  MD Zina (12/27/18 12:01:10)                 Impression:      Limited exam for secondary to patient positioning however there are no acute findings on this exam.      Electronically signed by: Danielle Izaguirre MD  Date:    12/27/2018  Time:    12:01             Narrative:    EXAMINATION:  XR CHEST PA AND LATERAL    CLINICAL HISTORY:  cough;    TECHNIQUE:  PA and lateral views of the chest were performed.    COMPARISON:  12/26/2018    FINDINGS:  This examination is limited secondary to patient patient's position which causes an appearance of dextrocardia.  Several prior exam shows at the patient is normal situs.  The lungs are symmetrically hypoinflated with crowding of the bronchovascular bundles.  There is a mild, diffuse persistent increased interstitial pattern throughout the lungs.  No mass, nodule, pneumothorax, airspace consolidation or pleural effusion.  The cardiomediastinal silhouette cannot be adequately assessed.  The osseous and soft tissue structures are significant only for compression fractures at L1 and L2 which are stable.                              X-Rays:   Independently Interpreted Readings:   Chest X-Ray: Will defer to radiology.      Medical Decision Making:   Clinical Tests:   Lab Tests: Ordered and Reviewed  Radiological Study: Ordered and Reviewed  Medical Tests: Ordered and Reviewed  ED Management:  12:40 PM Case discussed with Dr. Blake. Recommends giving patient small fluid bolus (250 cc). She will follow up with patient in clinic.     Emergent evaluation of a 89-year-old female who presents with complaint of worsening of chronic cough, history of COPD.  Her daughter is concerned because her room air pulse oximetry readings have been slightly lower than her baseline.  The patient was seen by her PCP yesterday and was started on Avelox with a negative chest x-ray.  Her vital signs here reveal room air pulse oximetry is diminished, but at her baseline.  Pulse oximetry readings  "tended to be between 88 and 93%, but mostly at 91% which is her reported baseline.  Chest x-ray had some rotational limitation, but no large pneumonia and she is currently on antibiotics for pneumonia.  She had faint wheezing, family members report this is "the best she has ever sounded."  Labs reveal 40 WBCs per high-powered field, however she adamantly denies urinary symptoms. I discussed this at length with her and her PCP.  A culture was sent.  Her PCP will repeat urinalysis or treat based on culture results and will follow.  Other labs are benign with no major electrolyte disturbance, renal insufficiency, profound dehydration, leukocytosis.  Patient is advised to continue taking her current Avelox.  She was discharged in good condition and encourage close follow-up with her PCP or to return for new or worsening symptoms.            Scribe Attestation:   Scribe #1: I performed the above scribed service and the documentation accurately describes the services I performed. I attest to the accuracy of the note.    Attending Attestation:           Physician Attestation for Scribe:  Physician Attestation Statement for Scribe #1: I, Dr. Wheeler, reviewed documentation, as scribed by Navdeep Villegas  in my presence, and it is both accurate and complete.                 ED Course as of Dec 27 1504   Thu Dec 27, 2018   1244 Discussed plan of care with Dr. Blake.  She will repeat UA and culture in 1 week.  [AK]      ED Course User Index  [AK] Shante Wheeler MD     Clinical Impression:     1. COPD with acute bronchitis    2. Cough with expectoration    3. Pyuria                                   Shante Wheeler MD  12/27/18 1915    "

## 2018-12-27 NOTE — ED NOTES
"Pt and family updated on plan of care. Family requesting home o2 use "just in case" provider notified. Pt currently denies any SOB and has an awake RA sat around 90-91% and 88% on occasional while asleep. Monitoring continues.   "

## 2018-12-27 NOTE — ED NOTES
Rounding on pt performed. Denies any concerns/complaints. Bed in low, locked position and side rails up x 2. monitoring continues.

## 2018-12-30 LAB — BACTERIA UR CULT: NORMAL

## 2019-01-09 ENCOUNTER — OUTPATIENT CASE MANAGEMENT (OUTPATIENT)
Dept: ADMINISTRATIVE | Facility: OTHER | Age: 84
End: 2019-01-09

## 2019-01-09 NOTE — PROGRESS NOTES
The following patient has been assigned to Yaneth Craig RN with Outpatient Complex Care Management for high risk screening.    Reason: High Risk : Recently discharged Report    Please contact [facility] at [phone] with any questions.    Thank you,    Jennifer Jay, [facility]  Outpatient Care Mgmt.  [phone]

## 2019-01-11 ENCOUNTER — OUTPATIENT CASE MANAGEMENT (OUTPATIENT)
Dept: ADMINISTRATIVE | Facility: OTHER | Age: 84
End: 2019-01-11

## 2019-01-11 NOTE — PROGRESS NOTES
1/11/19  Summary:  1st Attempt to complete initial assessment for Outpatient Care Management. Called and spoke with pt's daughter, Dolly Delery regarding referral to OPCM for high risk screening. Dolly reports all pt needs are addressed. Pt with 24/7 sitters, reports knowledge of aspiration pxs, keep pt sitting upright while drinking/eating, pt doesn't use the thickner--d/t disagreeable taste, pt/daughter aware of pt needing PO fluids for hydration, pt drinking high calorie milk shakes for fluids, pt using neb treatment QID, chronic cough with knowledge the COPD will not improve. Dolly is taking pt out for lunch with friends today. Dolly is not familiar with Palliative Care and would like more information on it to discuss with her 4 other siblings. Dolly declines OPCM services.     Interventions:  Explained the purpose of referral and OPCM services (RNs & LCSWs) as part of her healthcare team at [facility] , PCP.              Mailed contact for CM/OPCM Brochure and Palliative Care Brochure.   In basket message to Dr. Quinones --OPCM declined, pt needs met, FYI-- Pall Care information provided to daughter to consider & how to make such a PC referral through [employer].     Plan:  Referral closed.

## 2019-04-23 ENCOUNTER — HOSPITAL ENCOUNTER (EMERGENCY)
Facility: OTHER | Age: 84
Discharge: HOME | End: 2019-04-23
Attending: EMERGENCY MEDICINE
Payer: MEDICARE

## 2019-04-23 VITALS
OXYGEN SATURATION: 95 % | HEIGHT: 66 IN | WEIGHT: 143 LBS | DIASTOLIC BLOOD PRESSURE: 73 MMHG | BODY MASS INDEX: 22.98 KG/M2 | TEMPERATURE: 98 F | SYSTOLIC BLOOD PRESSURE: 155 MMHG | HEART RATE: 60 BPM | RESPIRATION RATE: 24 BRPM

## 2019-04-23 DIAGNOSIS — J40 BRONCHITIS: Primary | ICD-10-CM

## 2019-04-23 LAB
ALBUMIN SERPL BCP-MCNC: 3.5 G/DL (ref 3.5–5.2)
ALP SERPL-CCNC: 69 U/L (ref 55–135)
ALT SERPL W/O P-5'-P-CCNC: 13 U/L (ref 10–44)
ANION GAP SERPL CALC-SCNC: 12 MMOL/L (ref 8–16)
ANISOCYTOSIS BLD QL SMEAR: SLIGHT
AST SERPL-CCNC: 18 U/L (ref 10–40)
BASOPHILS # BLD AUTO: ABNORMAL K/UL (ref 0–0.2)
BASOPHILS NFR BLD: 1 % (ref 0–1.9)
BILIRUB SERPL-MCNC: 0.3 MG/DL (ref 0.1–1)
BNP SERPL-MCNC: 137 PG/ML (ref 0–99)
BUN SERPL-MCNC: 21 MG/DL (ref 8–23)
CALCIUM SERPL-MCNC: 9.8 MG/DL (ref 8.7–10.5)
CHLORIDE SERPL-SCNC: 104 MMOL/L (ref 95–110)
CO2 SERPL-SCNC: 24 MMOL/L (ref 23–29)
CREAT SERPL-MCNC: 0.7 MG/DL (ref 0.5–1.4)
DIFFERENTIAL METHOD: ABNORMAL
EOSINOPHIL # BLD AUTO: ABNORMAL K/UL (ref 0–0.5)
EOSINOPHIL NFR BLD: 0 % (ref 0–8)
ERYTHROCYTE [DISTWIDTH] IN BLOOD BY AUTOMATED COUNT: 15.8 % (ref 11.5–14.5)
EST. GFR  (AFRICAN AMERICAN): >60 ML/MIN/1.73 M^2
EST. GFR  (NON AFRICAN AMERICAN): >60 ML/MIN/1.73 M^2
GLUCOSE SERPL-MCNC: 114 MG/DL (ref 70–110)
HCT VFR BLD AUTO: 41.1 % (ref 37–48.5)
HGB BLD-MCNC: 13.4 G/DL (ref 12–16)
LYMPHOCYTES # BLD AUTO: ABNORMAL K/UL (ref 1–4.8)
LYMPHOCYTES NFR BLD: 11 % (ref 18–48)
MCH RBC QN AUTO: 31.2 PG (ref 27–31)
MCHC RBC AUTO-ENTMCNC: 32.6 G/DL (ref 32–36)
MCV RBC AUTO: 96 FL (ref 82–98)
METAMYELOCYTES NFR BLD MANUAL: 2 %
MONOCYTES # BLD AUTO: ABNORMAL K/UL (ref 0.3–1)
MONOCYTES NFR BLD: 4 % (ref 4–15)
NEUTROPHILS NFR BLD: 73 % (ref 38–73)
NEUTS BAND NFR BLD MANUAL: 9 %
OVALOCYTES BLD QL SMEAR: ABNORMAL
PLATELET # BLD AUTO: 266 K/UL (ref 150–350)
PMV BLD AUTO: 9.9 FL (ref 9.2–12.9)
POTASSIUM SERPL-SCNC: 4.3 MMOL/L (ref 3.5–5.1)
PROT SERPL-MCNC: 6.9 G/DL (ref 6–8.4)
RBC # BLD AUTO: 4.3 M/UL (ref 4–5.4)
SODIUM SERPL-SCNC: 140 MMOL/L (ref 136–145)
WBC # BLD AUTO: 11.51 K/UL (ref 3.9–12.7)

## 2019-04-23 PROCEDURE — 36415 COLL VENOUS BLD VENIPUNCTURE: CPT

## 2019-04-23 PROCEDURE — 83880 ASSAY OF NATRIURETIC PEPTIDE: CPT

## 2019-04-23 PROCEDURE — 85027 COMPLETE CBC AUTOMATED: CPT

## 2019-04-23 PROCEDURE — 87040 BLOOD CULTURE FOR BACTERIA: CPT

## 2019-04-23 PROCEDURE — 99284 EMERGENCY DEPT VISIT MOD MDM: CPT | Mod: 25

## 2019-04-23 PROCEDURE — 85007 BL SMEAR W/DIFF WBC COUNT: CPT

## 2019-04-23 PROCEDURE — 80053 COMPREHEN METABOLIC PANEL: CPT

## 2019-04-23 NOTE — ED PROVIDER NOTES
Encounter Date: 4/23/2019    SCRIBE #1 NOTE: I, Nicolette Lieberman, am scribing for, and in the presence of, Dr. Scales.       History     Chief Complaint   Patient presents with    Pneumonia     Sent to ED after home health visit by Dr. Prescott, xray revealed pneumonia. Per Dr. Quinones pt to be admitted.      Time seen by provider: 6:31 PM    This is a 89 y.o. Female, with son and caretaker at bedside, who presents with complaint of generally feeling bad after being diagnosed with pneumonia about three days go. The patient's son reports the patient was feeling better two days ago but today began again feeling generally worse. The patient's caretaker also reports diaphoresis, generalized body aches, and a productive cough. The patient denies appetite change, fever, abdominal pain, or difficulty urinating. The patient reports being put on antibiotics but the caregiver is unsure of which kind. The patient denies history of CHF, lung difficulties, asthma, or emphysema.    The history is provided by the patient, a caregiver and a relative.     Review of patient's allergies indicates:   Allergen Reactions    Codeine Nausea Only     Intolerance     Penicillins      Past Medical History:   Diagnosis Date    GERD (gastroesophageal reflux disease)     Hypertension     UTI (urinary tract infection)      Past Surgical History:   Procedure Laterality Date    APPENDECTOMY      HYSTERECTOMY      TOTAL KNEE ARTHROPLASTY Bilateral      History reviewed. No pertinent family history.  Social History     Tobacco Use    Smoking status: Never Smoker    Smokeless tobacco: Never Used   Substance Use Topics    Alcohol use: Yes     Alcohol/week: 0.6 oz     Types: 1 Glasses of wine per week     Comment: occasionally    Drug use: No     Review of Systems   Constitutional: Positive for diaphoresis. Negative for appetite change and fever.   HENT: Negative for sore throat.    Respiratory: Positive for cough (Productive.). Negative for  shortness of breath.    Cardiovascular: Negative for chest pain.   Gastrointestinal: Negative for abdominal pain.   Genitourinary: Negative for difficulty urinating, dysuria, frequency, hematuria and urgency.   Musculoskeletal: Positive for myalgias. Negative for back pain.   Skin: Negative for rash.   Neurological: Negative for weakness.   Hematological: Does not bruise/bleed easily.       Physical Exam     Initial Vitals [04/23/19 1816]   BP Pulse Resp Temp SpO2   135/67 (!) 57 (!) 24 98 °F (36.7 °C) (!) 93 %      MAP       --         Physical Exam    Nursing note and vitals reviewed.  Constitutional: She appears well-developed and well-nourished. She is not diaphoretic. No distress.   Elderly female. Good health for age. Short term memory due to dementia.   HENT:   Head: Normocephalic and atraumatic.   Mouth/Throat: No oropharyngeal exudate.   Eyes: Conjunctivae and EOM are normal. Pupils are equal, round, and reactive to light.   Neck: Normal range of motion. Neck supple.   Cardiovascular: Regular rhythm and normal heart sounds.   Pulmonary/Chest: Breath sounds normal. No respiratory distress. She has no wheezes. She has no rales.   Musculoskeletal: Normal range of motion. She exhibits no edema.   Neurological: She is alert and oriented to person, place, and time.   Skin: Skin is warm and dry.         ED Course   Procedures  Labs Reviewed   CBC W/ AUTO DIFFERENTIAL - Abnormal; Notable for the following components:       Result Value    MCH 31.2 (*)     RDW 15.8 (*)     Lymph% 11.0 (*)     All other components within normal limits   COMPREHENSIVE METABOLIC PANEL - Abnormal; Notable for the following components:    Glucose 114 (*)     All other components within normal limits   B-TYPE NATRIURETIC PEPTIDE - Abnormal; Notable for the following components:     (*)     All other components within normal limits   CULTURE, BLOOD   CULTURE, BLOOD          Imaging Results          X-Ray Chest AP Portable (SOB)  (Final result)  Result time 04/23/19 19:55:56    Final result by Felix De La Torre MD (04/23/19 19:55:56)                 Impression:      No detrimental change or radiographic acute intrathoracic process seen on this single view.      Electronically signed by: Felix De La Torre MD  Date:    04/23/2019  Time:    19:55             Narrative:    EXAMINATION:  XR CHEST AP PORTABLE    CLINICAL HISTORY:  SOB;    TECHNIQUE:  Single frontal view of the chest was performed.    COMPARISON:  Chest radiograph most recent 12/27/2018 and 07/22/2018; CT thorax 01/05/2018    FINDINGS:  Monitoring leads overlie the chest.  Patient is rotated.  Large body habitus.    Cardiomediastinal silhouette is midline and prominent grossly similar to prior.  Grossly stable tortuosity and calcific atherosclerosis of the thoracic aorta.  Grossly stable chronic bilateral diffuse nonspecific interstitial coarsening.  Scattered linear opacities consistent with subsegmental scarring versus atelectasis, unchanged.  Mild biapical pleuroparenchymal scarring.  The lungs are otherwise well expanded without large consolidation, pleural effusion or pneumothorax.  No acute osseous process seen.  PA and lateral views can be obtained.                              X-Rays:   Independently Interpreted Readings:   Chest X-Ray: Enlarged mediastinum possibly due to technique. Coarse interstitial appearance greater on the left. No effusion or focal infiltrate.     Medical Decision Making:   Independently Interpreted Test(s):   I have ordered and independently interpreted X-rays - see prior notes.  Clinical Tests:   Lab Tests: Ordered and Reviewed  Radiological Study: Ordered and Reviewed  ED Management:  8:59 PM - Case discussed with PCP Dr. Quinones. He reports CXRAY and antibiotics were initiated by palliative care team. Agrees with plan for outpatient treatment as patient is clinically well appearing.            Scribe Attestation:   Scribe #1: I performed the above  scribed service and the documentation accurately describes the services I performed. I attest to the accuracy of the note.    Attending Attestation:           Physician Attestation for Scribe:  Physician Attestation Statement for Scribe #1: I, Dr. Scales, reviewed documentation, as scribed by Nicolette Lieberman in my presence, and it is both accurate and complete.           Elderly female, presents to the emergency department accompanied by her son and aide, who helps provide history.  She apparently was recently diagnosed with a pneumonia based on chest x-ray performed at home, finished with the described as a course of antibiotics for a week followed by 3 days erythromycin.  The patient continues to have a cough, is receiving Mucinex.  There were advised to seek evaluation the ER due to concern for not improving after the treatment for this pneumonia and the aide states that they were concerned because her oxygen saturations dropped to 80% or so earlier today.  She normally wears oxygen only at night.  On my exam she is afebrile, not acutely ill or toxic appearing.  She does not appear dyspneic.  Other than the cough a family member/8 reports she seems to be at her baseline her laboratory studies show normal white blood cell count.  Normal electrolytes.  Chest x-ray today does not show focal infiltrate.  We do not have the prior chest x-ray available for comparison however it seems unlikely in infiltrate would have cleared in this short of the time.  Her BNP is not elevated.  They had been giving her Lasix for the past several days and as there is no evidence of volume overload/pulmonary edema recommend they stop this.  I discussed the case with her primary care physician.  She likewise he has no indication for inpatient admission.  The patient and her family would prefer discharge to home.  They will continue to follow up with the palliative care team as well as primary care.  They are comfortable with the patient  using oxygen during the day.  I discussed return precautions specifically going including fever altered mental status difficulty breathing persistent vomiting etc             Clinical Impression:     1. Bronchitis                                   Rodrick Scales II, MD  04/23/19 4100

## 2019-04-24 NOTE — ED NOTES
"Pt presented to ED via [facility] with complaints of "worsening pneumonia" x1 day pta. Pt was recently dx with such and prescribed antibiotics but "not getting any better". On arrival pt appears calm and cooperative RR easy non labored, NAD. Pt has hx of dementia but is alert and oriented x2 with disorientation to time. Son at bedside reports at home physician was concerned for sepsis and advised pt to be seen at ER. Continuous blood pressure cuff/pulse ox and cardiac monitoring in place, side rails up x2, call light within reach, bed in lowest locked position, will continue to monitor and assess for changes.    "

## 2019-04-24 NOTE — ED NOTES
Family feel comfortable bringing pt home tonight and doing home care for treatment. Pt has at home oxygen and round care, pt will be sent home

## 2019-04-24 NOTE — ED NOTES
Pt resting in bed calmly RR easy non labored, NAD. VSS, negative further complaints at this time, family at bedside, updated on current plan of care, side rails up x2, call light within reach, bed in lowest locked position, will continue to monitor and assess for changes.

## 2019-04-29 LAB
BACTERIA BLD CULT: NORMAL
BACTERIA BLD CULT: NORMAL

## 2019-08-14 ENCOUNTER — HOSPITAL ENCOUNTER (EMERGENCY)
Facility: OTHER | Age: 84
Discharge: HOME | End: 2019-08-14
Attending: EMERGENCY MEDICINE
Payer: MEDICARE

## 2019-08-14 VITALS
HEART RATE: 50 BPM | RESPIRATION RATE: 16 BRPM | OXYGEN SATURATION: 100 % | TEMPERATURE: 99 F | HEIGHT: 60 IN | DIASTOLIC BLOOD PRESSURE: 71 MMHG | SYSTOLIC BLOOD PRESSURE: 150 MMHG | WEIGHT: 150 LBS | BODY MASS INDEX: 29.45 KG/M2

## 2019-08-14 DIAGNOSIS — R41.89 DECREASED RESPONSIVENESS: ICD-10-CM

## 2019-08-14 DIAGNOSIS — N39.0 URINARY TRACT INFECTION WITH HEMATURIA, SITE UNSPECIFIED: Primary | ICD-10-CM

## 2019-08-14 DIAGNOSIS — R31.9 URINARY TRACT INFECTION WITH HEMATURIA, SITE UNSPECIFIED: Primary | ICD-10-CM

## 2019-08-14 LAB
ALBUMIN SERPL BCP-MCNC: 3.8 G/DL (ref 3.5–5.2)
ALP SERPL-CCNC: 59 U/L (ref 55–135)
ALT SERPL W/O P-5'-P-CCNC: 10 U/L (ref 10–44)
ANION GAP SERPL CALC-SCNC: 10 MMOL/L (ref 8–16)
AST SERPL-CCNC: 18 U/L (ref 10–40)
BACTERIA #/AREA URNS HPF: ABNORMAL /HPF
BASOPHILS # BLD AUTO: 0.03 K/UL (ref 0–0.2)
BASOPHILS NFR BLD: 0.4 % (ref 0–1.9)
BILIRUB SERPL-MCNC: 0.6 MG/DL (ref 0.1–1)
BILIRUB UR QL STRIP: NEGATIVE
BUN SERPL-MCNC: 16 MG/DL (ref 8–23)
CALCIUM SERPL-MCNC: 9.4 MG/DL (ref 8.7–10.5)
CHLORIDE SERPL-SCNC: 104 MMOL/L (ref 95–110)
CLARITY UR: CLEAR
CO2 SERPL-SCNC: 28 MMOL/L (ref 23–29)
COLOR UR: YELLOW
CREAT SERPL-MCNC: 0.7 MG/DL (ref 0.5–1.4)
DIFFERENTIAL METHOD: ABNORMAL
EOSINOPHIL # BLD AUTO: 0.2 K/UL (ref 0–0.5)
EOSINOPHIL NFR BLD: 3.5 % (ref 0–8)
ERYTHROCYTE [DISTWIDTH] IN BLOOD BY AUTOMATED COUNT: 15.1 % (ref 11.5–14.5)
EST. GFR  (AFRICAN AMERICAN): >60 ML/MIN/1.73 M^2
EST. GFR  (NON AFRICAN AMERICAN): >60 ML/MIN/1.73 M^2
GLUCOSE SERPL-MCNC: 89 MG/DL (ref 70–110)
GLUCOSE UR QL STRIP: NEGATIVE
HCT VFR BLD AUTO: 38.9 % (ref 37–48.5)
HGB BLD-MCNC: 12.7 G/DL (ref 12–16)
HGB UR QL STRIP: NEGATIVE
IMM GRANULOCYTES # BLD AUTO: 0.04 K/UL (ref 0–0.04)
IMM GRANULOCYTES NFR BLD AUTO: 0.6 % (ref 0–0.5)
KETONES UR QL STRIP: NEGATIVE
LEUKOCYTE ESTERASE UR QL STRIP: ABNORMAL
LYMPHOCYTES # BLD AUTO: 2 K/UL (ref 1–4.8)
LYMPHOCYTES NFR BLD: 28.9 % (ref 18–48)
MCH RBC QN AUTO: 31.3 PG (ref 27–31)
MCHC RBC AUTO-ENTMCNC: 32.6 G/DL (ref 32–36)
MCV RBC AUTO: 96 FL (ref 82–98)
MICROSCOPIC COMMENT: ABNORMAL
MONOCYTES # BLD AUTO: 0.6 K/UL (ref 0.3–1)
MONOCYTES NFR BLD: 8.2 % (ref 4–15)
NEUTROPHILS # BLD AUTO: 4 K/UL (ref 1.8–7.7)
NEUTROPHILS NFR BLD: 58.4 % (ref 38–73)
NITRITE UR QL STRIP: POSITIVE
NRBC BLD-RTO: 0 /100 WBC
PH UR STRIP: 8 [PH] (ref 5–8)
PLATELET # BLD AUTO: 192 K/UL (ref 150–350)
PMV BLD AUTO: 10.3 FL (ref 9.2–12.9)
POCT GLUCOSE: 102 MG/DL (ref 70–110)
POTASSIUM SERPL-SCNC: 4 MMOL/L (ref 3.5–5.1)
PROT SERPL-MCNC: 6.6 G/DL (ref 6–8.4)
PROT UR QL STRIP: NEGATIVE
RBC # BLD AUTO: 4.06 M/UL (ref 4–5.4)
SODIUM SERPL-SCNC: 142 MMOL/L (ref 136–145)
SP GR UR STRIP: 1.01 (ref 1–1.03)
SQUAMOUS #/AREA URNS HPF: 4 /HPF
TROPONIN I SERPL DL<=0.01 NG/ML-MCNC: <0.006 NG/ML (ref 0–0.03)
TSH SERPL DL<=0.005 MIU/L-ACNC: 2.79 UIU/ML (ref 0.4–4)
URN SPEC COLLECT METH UR: ABNORMAL
UROBILINOGEN UR STRIP-ACNC: NEGATIVE EU/DL
WBC # BLD AUTO: 6.84 K/UL (ref 3.9–12.7)
WBC #/AREA URNS HPF: 13 /HPF (ref 0–5)
WBC CLUMPS URNS QL MICRO: ABNORMAL

## 2019-08-14 PROCEDURE — 81000 URINALYSIS NONAUTO W/SCOPE: CPT

## 2019-08-14 PROCEDURE — 87086 URINE CULTURE/COLONY COUNT: CPT

## 2019-08-14 PROCEDURE — 84484 ASSAY OF TROPONIN QUANT: CPT

## 2019-08-14 PROCEDURE — 80053 COMPREHEN METABOLIC PANEL: CPT

## 2019-08-14 PROCEDURE — 93010 EKG 12-LEAD: ICD-10-PCS | Mod: ,,, | Performed by: INTERNAL MEDICINE

## 2019-08-14 PROCEDURE — 87088 URINE BACTERIA CULTURE: CPT

## 2019-08-14 PROCEDURE — 82962 GLUCOSE BLOOD TEST: CPT

## 2019-08-14 PROCEDURE — 36415 COLL VENOUS BLD VENIPUNCTURE: CPT

## 2019-08-14 PROCEDURE — 85025 COMPLETE CBC W/AUTO DIFF WBC: CPT

## 2019-08-14 PROCEDURE — 93005 ELECTROCARDIOGRAM TRACING: CPT

## 2019-08-14 PROCEDURE — 99285 EMERGENCY DEPT VISIT HI MDM: CPT | Mod: 25

## 2019-08-14 PROCEDURE — 84443 ASSAY THYROID STIM HORMONE: CPT

## 2019-08-14 PROCEDURE — 93010 ELECTROCARDIOGRAM REPORT: CPT | Mod: ,,, | Performed by: INTERNAL MEDICINE

## 2019-08-14 PROCEDURE — 87077 CULTURE AEROBIC IDENTIFY: CPT

## 2019-08-14 PROCEDURE — 87186 SC STD MICRODIL/AGAR DIL: CPT

## 2019-08-14 RX ORDER — NITROFURANTOIN 25; 75 MG/1; MG/1
100 CAPSULE ORAL 2 TIMES DAILY
Qty: 14 CAPSULE | Refills: 0 | Status: SHIPPED | OUTPATIENT
Start: 2019-08-14 | End: 2019-08-21

## 2019-08-14 NOTE — ED PROVIDER NOTES
"Encounter Date: 8/14/2019       History     Chief Complaint   Patient presents with    Altered Mental Status     sent to ED from PCP for TIA due to decreased LOC and "difficulty" finding words. Pt curently answering questions appropriately. No slurred speech, facial droop and ext weakness.      Patient is a 89-year-old female with COPD, hypertension, frequent UTIs, and recurrent TIAs who presents to the emergency department with her daughter for decreased responsiveness.  Patient's palliative caretaker states she had an episode of decreased responsiveness for approximately 3-5 minutes.  She states she was conscious but would not respond to any questioning and had a blank stare .  Family and caretaker report patient frequently has episodes similar to this but they usually last 30 seconds to 1 minute. Family states she has been at baseline for approximately 30 min prior to arrival.  They report no weakness or slurred speech. Patient has no complaints. Family denies fever or decreased appetite.  Patient's daughter states patient appeared mildly agitated last night, banging her head on the table and pointing to her daughter to read her the paper.    The history is provided by the patient.     Review of patient's allergies indicates:   Allergen Reactions    Codeine Nausea Only     Intolerance     Penicillins      Past Medical History:   Diagnosis Date    GERD (gastroesophageal reflux disease)     Hypertension     UTI (urinary tract infection)      Past Surgical History:   Procedure Laterality Date    APPENDECTOMY      HYSTERECTOMY      TOTAL KNEE ARTHROPLASTY Bilateral      No family history on file.  Social History     Tobacco Use    Smoking status: Never Smoker    Smokeless tobacco: Never Used   Substance Use Topics    Alcohol use: Yes     Alcohol/week: 0.6 oz     Types: 1 Glasses of wine per week     Comment: occasionally    Drug use: No     Review of Systems   Constitutional: Positive for activity " change (Resolved). Negative for chills and fever.   Eyes: Negative for visual disturbance.   Respiratory: Positive for shortness of breath (Chronic).    Cardiovascular: Negative for chest pain.   Gastrointestinal: Negative for vomiting.   Neurological: Positive for speech difficulty (Lack of speech response that has resolved). Negative for facial asymmetry.       Physical Exam     Initial Vitals   BP Pulse Resp Temp SpO2   -- -- -- -- --      MAP       --         Physical Exam    Constitutional: Vital signs are normal. She is cooperative.   On home oxygen   HENT:   Head: Normocephalic and atraumatic.   Eyes: EOM are normal. Pupils are equal, round, and reactive to light.   Neck: Normal range of motion. Neck supple.   Cardiovascular: Normal rate, regular rhythm and intact distal pulses.   Pulmonary/Chest: Breath sounds normal. No respiratory distress.   Abdominal: Soft. Bowel sounds are normal. There is no tenderness.   Musculoskeletal: Normal range of motion. She exhibits no edema.   Neurological: She is alert and oriented to person, place, and time. GCS score is 15. GCS eye subscore is 4. GCS verbal subscore is 5. GCS motor subscore is 6.   Patient is alert to person and place but not time.  Oriented to family members.  No facial droop.  Negative pronator drift.  CN II-XII were intact. Slowed but accurate finger-to-nose.  Strength 5/5 to all extremities.   Skin: Skin is warm and dry. No rash noted.         ED Course   Procedures  Labs Reviewed   CBC W/ AUTO DIFFERENTIAL - Abnormal; Notable for the following components:       Result Value    Mean Corpuscular Hemoglobin 31.3 (*)     RDW 15.1 (*)     Immature Granulocytes 0.6 (*)     All other components within normal limits   URINALYSIS, REFLEX TO URINE CULTURE - Abnormal; Notable for the following components:    Nitrite, UA Positive (*)     Leukocytes, UA Trace (*)     All other components within normal limits    Narrative:     Preferred Collection Type->Urine,  Clean Catch   URINALYSIS MICROSCOPIC - Abnormal; Notable for the following components:    WBC, UA 13 (*)     WBC Clumps, UA Occasional (*)     Bacteria Many (*)     All other components within normal limits    Narrative:     Preferred Collection Type->Urine, Clean Catch   CULTURE, URINE   COMPREHENSIVE METABOLIC PANEL   TROPONIN I   TSH   POCT GLUCOSE   POCT GLUCOSE MONITORING CONTINUOUS     EKG Readings: (Independently Interpreted)   Initial Reading: No STEMI. Ectopy: No Ectopy. Clinical Impression: AV Block - 1st Degree   Sinus bradycardia at a rate of 49 beats per minute.       Imaging Results          CT Head Without Contrast (Final result)  Result time 08/14/19 11:28:15    Final result by Villa Beasley DO (08/14/19 11:28:15)                 Impression:      Allowing for motion limitation there is no evidence for acute intracranial hemorrhage or definite new abnormal parenchymal attenuation.    Continued generalized cerebral volume loss with slight prominence of the lateral and 3rd ventricles.  While this may be compensatory to volume loss component of normal pressure hydrocephalus not excluded.  Clinical correlation and further evaluation as warranted.      Electronically signed by: Villa Beasley DO  Date:    08/14/2019  Time:    11:28             Narrative:    EXAMINATION:  CT HEAD WITHOUT CONTRAST    CLINICAL HISTORY:  Confusion/delirium, altered LOC, unexplained;    TECHNIQUE:  Multiple sequential 5 mm axial images of the head without contrast.  Coronal and sagittal reformatted imaging from the axial acquisition.    COMPARISON:  11/07/2015    FINDINGS:  Study is overall limited by patient motion.  Within limits of the study there is no evidence for acute intracranial hemorrhage or sulcal effacement to suggest large territory recent infarction.    There is continued mild generalized cerebral volume loss.    There is slight prominence of the lateral and 3rd ventricles which may be compensatory to volume  loss component of normal pressure hydrocephalus not excluded.    Continued right inferior basal gangliar prominent perivascular space.    No midline shift or significant mass effect.  Trace mucosal thickening left maxillary antra.  No significant opacification remaining visualized paranasal sinuses or mastoid air cells.    Extensive vascular calcifications.                               X-Ray Chest 1 View (Final result)  Result time 08/14/19 11:12:58    Final result by Dewayne Neely MD (08/14/19 11:12:58)                 Impression:      No significant change from prior.  There is cardiomegaly without acute superimposed findings.      Electronically signed by: Dewayne Neely MD  Date:    08/14/2019  Time:    11:12             Narrative:    EXAMINATION:  XR CHEST 1 VIEW    CLINICAL HISTORY:  Altered mental status, unspecified    TECHNIQUE:  Single frontal view of the chest was performed.    COMPARISON:  None    FINDINGS:  Cardiac silhouette is enlarged but unchanged.  Tortuous aorta also unchanged.  No focal consolidation.  No pneumothorax or large effusion.  No acute osseous findings.  Partial visualization of numerous coils in the right upper quadrant from prior procedure.                                 Medical Decision Making:   Initial Assessment:   Urgent evaluation of a 89 y.o. female presenting to the emergency department with daughters and caretaker for decreased responsiveness lasting 3-5 minutes that has resolved. Patient is afebrile, nontoxic appearing and hemodynamically stable.  Patient has no focal neurologic deficits on exam.  Patient at baseline per family member and caretaker.  VAN  negative. No signs to suggest acute CVA.  Will obtain head CT and basic labs urinalysis and chest x-ray to rule out metabolic disturbance, UTI or pneumonia.  EKG does reveal sinus bradycardia with new 1st degree AV block without ischemic changes.  ED Management:  CBC reveals no leukocytosis or anemia.  No  metabolic disturbance.  Troponin is negative. TSH is normal.  CT head reveals no evidence of intracranial hemorrhage or infarction.  Chronic cerebral volume loss noted.   Chest x-ray reveals chronic cardiomegaly, no signs of consolidation.  Family remained at bedside during ED visit.  She had no new focal neurologic deficits.   Urinalysis consistent with UTI.  Previous urine cultures have been susceptible to Macrobid.  I did discuss all findings with Dr. Quinones, recommends 1 week of Macrobid.  Caretaker and family were informed are results as well.  For given strict return precautions.  Patient is stable for discharge.    Other:   I have discussed this case with another health care provider.                      Clinical Impression:     1. Urinary tract infection with hematuria, site unspecified    2. Decreased responsiveness                                Delbert Kellogg PA-C  08/14/19 1352

## 2019-08-14 NOTE — ED NOTES
"Pt to ED via family with c/o of AMS since last night. LWN last PM. Per pt's family, pt was seen by PCP and palliative care, advised to come to ED for emergent CT scan for s/s including increased "forgetfulness", decreased LOC, and difficulty finding words. Per pt's family, PCP worried for acute TIA. Pt's family reports pt is currently at mental baseline. Pt is AAOx3, disoriented to time. No facial droop or slurred speech noted. Pt able to respond/answer RN's questions appropriately. Pt able to complete simple tasks. No drift noted to any extremity. Valdez COBIAN, and Uzma PATHAK, currently at bedside for evaluation, NO CODE STROKE TO BE CALLED OVERHEAD AT THIS TIME. Pt connected to continuous cardiac monitoring, pulse ox, BP cycled. Will continue to monitor.     Two patient identifiers have been checked and are correct.      Appearance: Pt awake, alert & oriented to person, place, disoriented to time. Pt in no acute distress at present time. Pt is clean and well groomed with clothes appropriately fastened.   Skin: Skin warm, dry & intact. Color consistent with ethnicity. Mucous membranes moist. No breakdown or brusing noted.   Musculoskeletal: Patient moving all extremities well, no obvious swelling or deformities noted. Generalized weakness reported.   Respiratory: Respirations spontaneous, even, and non-labored. Visible chest rise noted. Airway is open and patent. No accessory muscle use noted.   Neurologic: Sensation is intact. Speech is clear and appropriate. Eyes open spontaneously, behavior appropriate to situation, follows commands, facial expression symmetrical, bilateral hand grasp equal and even, purposeful motor response noted.  Cardiac: All peripheral pulses present. No Bilateral lower extremity edema. Cap refill is <3 seconds.  Abdomen: Abdomen soft, non-tender to palpation.   : Pt reports no dysuria or hematuria. Urinary incontinence reported.           "

## 2019-08-14 NOTE — ED NOTES
Pt rounding complete.  Pain 0/10, pt unable to provide urine sample at this time using SHYAM squires states okay to straight cath.  Restroom and comfort needs addressed.  Pt updated on plan of care.  Call light within reach.  Will continue to monitor.  Visitor and caretaker at bedside.

## 2019-08-16 LAB — BACTERIA UR CULT: ABNORMAL

## 2019-09-16 ENCOUNTER — CARE AT HOME (OUTPATIENT)
Dept: HOME HEALTH SERVICES | Facility: CLINIC | Age: 84
End: 2019-09-16
Payer: MEDICARE

## 2019-09-16 VITALS
HEART RATE: 54 BPM | SYSTOLIC BLOOD PRESSURE: 118 MMHG | DIASTOLIC BLOOD PRESSURE: 70 MMHG | OXYGEN SATURATION: 99 % | RESPIRATION RATE: 18 BRPM

## 2019-09-16 DIAGNOSIS — Z51.5 ENCOUNTER FOR PALLIATIVE CARE: Primary | ICD-10-CM

## 2019-09-16 DIAGNOSIS — J44.9 CHRONIC OBSTRUCTIVE PULMONARY DISEASE, UNSPECIFIED COPD TYPE: ICD-10-CM

## 2019-09-16 DIAGNOSIS — J96.11 CHRONIC RESPIRATORY FAILURE WITH HYPOXIA, ON HOME OXYGEN THERAPY: ICD-10-CM

## 2019-09-16 DIAGNOSIS — Z99.81 CHRONIC RESPIRATORY FAILURE WITH HYPOXIA, ON HOME OXYGEN THERAPY: ICD-10-CM

## 2019-09-16 DIAGNOSIS — R53.83 FATIGUE, UNSPECIFIED TYPE: ICD-10-CM

## 2019-09-16 PROBLEM — R06.03 RESPIRATORY DISTRESS: Status: RESOLVED | Noted: 2017-03-11 | Resolved: 2019-09-16

## 2019-09-16 PROBLEM — J06.9 VIRAL UPPER RESPIRATORY INFECTION: Status: RESOLVED | Noted: 2018-01-05 | Resolved: 2019-09-16

## 2019-09-16 PROBLEM — I21.4 NSTEMI (NON-ST ELEVATED MYOCARDIAL INFARCTION): Status: RESOLVED | Noted: 2017-03-13 | Resolved: 2019-09-16

## 2019-09-16 PROCEDURE — 99349 PR HOME VISIT,ESTAB PATIENT,LEVEL III: ICD-10-PCS | Mod: S$GLB,,, | Performed by: NURSE PRACTITIONER

## 2019-09-16 PROCEDURE — 99349 HOME/RES VST EST MOD MDM 40: CPT | Mod: S$GLB,,, | Performed by: NURSE PRACTITIONER

## 2019-09-16 RX ORDER — POLYETHYLENE GLYCOL 3350 17 G/17G
17 POWDER, FOR SOLUTION ORAL DAILY
COMMUNITY

## 2019-09-16 RX ORDER — LATANOPROST 50 UG/ML
SOLUTION/ DROPS OPHTHALMIC
Refills: 11 | COMMUNITY
Start: 2019-06-21

## 2019-09-16 RX ORDER — FERROUS SULFATE 325(65) MG
325 TABLET ORAL DAILY
COMMUNITY

## 2019-09-16 RX ORDER — ACETAMINOPHEN 500 MG
1 TABLET ORAL DAILY
COMMUNITY

## 2019-09-16 NOTE — PROGRESS NOTES
[facility]  Palliative Care Home Visit    Visit Date: 9/16/2019  Encounter Provider: TATYANA Kinsey-C  PCP:  Blanca Quinones MD    Subjective:      Patient ID: Irina Polk is a 89 y.o. female.    Consult Requested By:  Blanca Quinones MD  Reason for Consult:  Palliative Care Follow Up    Chief Complaint: Follow-up and Shortness of Breath    Irina is being seen at home for palliative care follow up for COPD and chronic respiratory failure. Daughter Ghazala and ab Costello present during visit today. Daughter lives upstairs and patient has caregivers in the home 24 hours per day. She is AAOx2 and in no acute distress, on 3L O2 via NC. She has mild SOB but no coughing or wheezing. No recent hospital admissions or falls. She has a history of aspiration and has a suction machine at home but does not use it regularly.    Review of Systems   Constitutional: Positive for fatigue. Negative for activity change, appetite change and unexpected weight change.   Respiratory: Positive for shortness of breath (mild). Negative for cough, choking and wheezing.    Cardiovascular: Negative for chest pain, palpitations and leg swelling.   Gastrointestinal: Negative for abdominal distention, abdominal pain, constipation and diarrhea.   Genitourinary: Negative for difficulty urinating and dysuria.   Musculoskeletal: Positive for gait problem. Negative for joint swelling and myalgias.   Skin: Negative for rash and wound.   Neurological: Positive for weakness. Negative for dizziness and light-headedness.   Psychiatric/Behavioral: Negative for behavioral problems, confusion, dysphoric mood and suicidal ideas. The patient is not nervous/anxious.        Assessments:  · Environmental: lives on 2nd story of home, daughter lives on 3rd floor, home is clean and uncluttered  · Functional Status: ambulatory with rollator, requires assistance with ADLs  · Safety: sitters present 24 hours per day, no safety concerns    · Nutritional: good appetite, eats 3 meals per day  · Home Health/DME/Supplies: no home health; has home oxygen, suction machine, rollator    Symptom Assessment (ESAS 0-10 scale)     ESAS 0 1 2 3 4 5 6 7 8 9 10   Pain X             Dyspnea  X            Anxiety X             Nausea X             Depression  X             Anorexia X             Fatigue  X            Insomnia X             Restlessness  X             Agitation X               Constipation    no  Bowel Management Plan (BMP): yes  Diarrhea        no  Comments: Miralax daily    Performance Status: PPS Score 50  Karnofsky Score:  50       History:  Past Medical History:   Diagnosis Date    Chronic respiratory failure with hypoxia, on home O2 therapy     COPD (chronic obstructive pulmonary disease)     GERD (gastroesophageal reflux disease)     Hypertension     NSTEMI (non-ST elevated myocardial infarction) 3/13/2017    UTI (urinary tract infection)     Vertebral compression fracture 8/11/2015     No family history on file.  Past Surgical History:   Procedure Laterality Date    APPENDECTOMY      HYSTERECTOMY      TOTAL KNEE ARTHROPLASTY Bilateral      Review of patient's allergies indicates:   Allergen Reactions    Codeine Nausea Only     Intolerance     Penicillins        Medications:    Current Outpatient Medications:     acetaminophen (TYLENOL) 500 mg Cap, Take 2 capsules (1,000 mg total) by mouth every 6 (six) hours as needed (Pain)., Disp: , Rfl: 0    albuterol-ipratropium (DUO-NEB) 2.5 mg-0.5 mg/3 mL nebulizer solution, USE 1 VIAL VIA NEBULIZER EVERY 4 HOURS AS NEEDED FOR WHEEZING. RESCUE, Disp: 1620 mL, Rfl: 1    albuterol-ipratropium (DUO-NEB) 2.5 mg-0.5 mg/3 mL nebulizer solution, USE 1 VIAL VIA NEBULIZER EVERY 4 HOURS AS NEEDED FOR WHEEZING. RESCUE, Disp: 90 mL, Rfl: 0    aspirin 81 MG Chew, Take 81 mg by mouth once daily., Disp: , Rfl:     calcitonin, salmon, (FORTICAL) 200 unit/actuation nasal spray, INSTILL 1 SPRAY INTO  ALTERNATING NOSTRILS ONCE DAILY, Disp: 3.7 mL, Rfl: 0    cetirizine (ZYRTEC) 5 MG tablet, Take 5 mg by mouth once daily., Disp: , Rfl:     cholecalciferol, vitamin D3, (VITAMIN D3) 2,000 unit Cap, Take 1 capsule by mouth once daily., Disp: , Rfl:     COMBIGAN 0.2-0.5 % Drop, Place 1 drop into both eyes 2 (two) times daily. , Disp: , Rfl:     diclofenac sodium (VOLTAREN) 1 % Gel, Apply 2 g topically once daily., Disp: 1 Tube, Rfl: 3    escitalopram oxalate (LEXAPRO) 10 MG tablet, TAKE 1 TABLET DAILY, Disp: 90 tablet, Rfl: 3    ferrous sulfate (FEOSOL) 325 mg (65 mg iron) Tab tablet, Take 325 mg by mouth once daily., Disp: , Rfl:     latanoprost 0.005 % ophthalmic solution, INSTILL 1 DROP OU HS, Disp: , Rfl: 11    OMEGA-3S/DHA/EPA/FISH OIL (OMEGA 3 ORAL), Take 1 capsule by mouth once daily. , Disp: , Rfl:     omeprazole (PRILOSEC) 20 MG capsule, Take 1 capsule (20 mg total) by mouth once daily., Disp: 90 capsule, Rfl: 1    polyethylene glycol (GLYCOLAX) 17 gram PwPk, Take 17 g by mouth once daily., Disp: , Rfl:     PROAIR HFA 90 mcg/actuation inhaler, INHALE 2 PUFFS BY MOUTH EVERY 6 HOURS IF NEEDED FOR SHORTNESS OF BREATH OR WHEEZING, Disp: 8.5 g, Rfl: 0    amLODIPine (NORVASC) 2.5 MG tablet, Take 1 tablet (2.5 mg total) by mouth once daily., Disp: 90 tablet, Rfl: 3    benzonatate (TESSALON) 200 MG capsule, TAKE 1 CAPSULE BY MOUTH THREE TIMES DAILY AS NEEDED FOR COUGH, Disp: 30 capsule, Rfl: 0    cyanocobalamin 1,000 mcg/mL injection, Inject 1 mL (1,000 mcg total) into the muscle once a week. Dispense syringes as well., Disp: 10 mL, Rfl: 3    dicyclomine (BENTYL) 10 MG capsule, Take 1 capsule (10 mg total) by mouth 2 (two) times daily., Disp: 30 capsule, Rfl: 0    fluticasone-salmeterol 100-50 mcg/dose (ADVAIR) 100-50 mcg/dose diskus inhaler, Inhale 1 puff into the lungs 2 (two) times daily. Controller, Disp: 1 each, Rfl: 2    furosemide (LASIX) 20 MG tablet, Take 1 tablet (20 mg total) by mouth daily  as needed. LIMIT TWICE A WEEK AS NEEDED FOR SWELLING, Disp: 30 tablet, Rfl: 1    guaiFENesin (MUCINEX) 600 mg 12 hr tablet, Take 2 tablets (1,200 mg total) by mouth 2 (two) times daily., Disp: , Rfl:     hydrocodone-chlorpheniramine (TUSSIONEX) 10-8 mg/5 mL suspension, Take 5 mLs by mouth every 12 (twelve) hours as needed for Cough., Disp: 473 mL, Rfl: 0    lidocaine (LIDODERM) 5 %, Place 1 patch onto the skin once daily. Remove & Discard patch within 12 hours or as directed by MD, Disp: 15 patch, Rfl: 0    moxifloxacin (AVELOX) 400 mg tablet, Take 1 tablet (400 mg total) by mouth once daily., Disp: 7 tablet, Rfl: 0    senna-docusate 8.6-50 mg (PERICOLACE) 8.6-50 mg per tablet, Take 2 tablets by mouth 2 (two) times daily., Disp: , Rfl:     traMADol (ULTRAM) 50 mg tablet, Take 1 tablet (50 mg total) by mouth every 6 (six) hours as needed (pain not controlled with tylenol). (Patient not taking: Reported on 9/16/2019), Disp: 60 tablet, Rfl: 0    24h Oral Morphine Equivalents (OME):  N/A    Objective:     Physical Exam:  Vitals:    09/16/19 1157   BP: 118/70   Pulse: (!) 54   Resp: 18   SpO2: 99%   PainSc: 0-No pain     There is no height or weight on file to calculate BMI.    Physical Exam   Constitutional: She appears well-developed and well-nourished.   HENT:   Head: Normocephalic and atraumatic.   Eyes: Conjunctivae and EOM are normal.   Neck: Normal range of motion. Neck supple.   Cardiovascular: Regular rhythm. Bradycardia present.   Murmur heard.  Pulmonary/Chest: Effort normal and breath sounds normal.   On 3L O2 via NC   Abdominal: Soft. Bowel sounds are normal.   Musculoskeletal: Normal range of motion.   Neurological: She is alert.   AAOx2   Skin: Skin is warm and dry.   Psychiatric: She has a normal mood and affect. Her behavior is normal.       Labs:  CBC:   WBC   Date Value Ref Range Status   08/14/2019 6.84 3.90 - 12.70 K/uL Final   - 98 f  Hemoglobin   Date Value Ref Range Status   08/14/2019 12.7  12.0 - 16.0 g/dL Final   :   Hematocrit   Date Value Ref Range Status   08/14/2019 38.9 37.0 - 48.5 % Final       Mean Corpuscular Volume   Date Value Ref Range Status   08/14/2019 96 82 - 98 fL Final   BILITOT 0.6 08/14/2019       Albumin:   Albumin   Date Value Ref Range Status   08/14/2019 3.8 3.5 - 5.2 g/dL Final     Protein:   Total Protein   Date Value Ref Range Status   08/14/2019 6.6 6.0 - 8.4 g/dL Final       Radiology:  I have reviewed all pertinent imaging results/findings within the past 24 hours.    Psychosocial/Cultural/Spiritual:  · F- Donna and Belief:  Druze   · I - Importance: very important  · C - Community: member of [employer] but no longer attends, watches Sunday [employer] with [employer] on TV  · A - Address in Care: daughter is Eucharist  on the [facility], can bring communion      Assessment:     1. Encounter for palliative care    2. Chronic obstructive pulmonary disease, unspecified COPD type    3. Chronic respiratory failure with hypoxia, on home oxygen therapy    4. Fatigue, unspecified type        Plan:     Ethical / Legal: Advance Care Planning   · Surrogate decision maker:  Name Dolly Polk, Relationship: daughter  · Code Status:  DNR  · [place]:  Yes  · Other advance directive:  Yes, Capacity to make medical decisions:  Yes, Conflict none       Donnellson was seen today for follow-up and shortness of breath.    Diagnoses and all orders for this visit:    Encounter for palliative care    Chronic obstructive pulmonary disease, unspecified COPD type    Chronic respiratory failure with hypoxia, on home oxygen therapy    Fatigue, unspecified type    Continue home oxygen and medications as per current med list  Aspiration precautions  Alternate periods of rest and activity  Family is not ready to consider hospice at this time but is open to hospice in the future as health declines    Were controlled substances prescribed?  No    > 50% of 40 min visit spent in  chart review, face to face discussion of goals of care,  symptom assessment, coordination of care and emotional support.    Follow Up Appointments:   No future appointments.     Follow up in 4 weeks.    Patient consent obtained prior to treatment on this visit.    Signature:  RAJENDRA Kinsey

## 2019-09-17 NOTE — PATIENT INSTRUCTIONS
- Continue current medications and keep oxygen on at all times  - Get a flu shot at [facility]  - Notify palliative care of any new or worsening symptoms          Chronic Lung Disease: Preventing Lung Infections  Chronic lung diseases include chronic obstructive pulmonary disease (COPD), which includes chronic bronchitis and emphysema. Other chronic lung diseases include pulmonary fibrosis, sarcoidosis, and other conditions. When you have chronic lung diseases, it's very important to protect yourself from respiratory infections, like colds, the flu, and lung infections. Infections may cause your lung condition to worsen. Although you can't completely avoid them, there are things you can do to lessen the chance of infections.    Take precautions  Taking the following precautions can help you avoid illness:  · Remember to keep your hands away from your nose and mouth. Germs on your hands get into your respiratory system this way.  · Wash your hands often. When you wash them:  ¨ Use soap and warm water.  ¨ Rub your hands together well for at least 20 seconds.  ¨ Make sure to rinse them well.  ¨ Dry your hands on clean towels or air-dry them.  · Use hand  containing alcohol, if you are unable to wash your hands. Use the  after touching doorknobs, handles, and supermarket carts, for example, since lots of people touch them. Then wash your hands as soon as you can.  · To help prevent the flu, get a flu vaccination every year. This may be given at your healthcare provider's office, a drugstore, or pharmacy, or at work. Get your flu shot as soon as the vaccines are available in your area. This is usually around September each year.  · To help prevent pneumococcal pneumonia, get pneumonia vaccinations. Talk with your healthcare provider about which pneumococcal vaccinations you need.  · Try to stay away from people with respiratory infections, such as colds or the flu. Stay away from crowded places, like  shopping centers or movie theatres during cold and flu season.  · If you smoke, think about quitting. In addition to causing or worsening many lung conditions, the lung damage from smoking increases your risk of infections. Stay away from others who smoke, too. This is also harmful and increases your chance of infections.  Date Last Reviewed: 4/14/2016  © 6432-1979 [employer]. [address] [place] [address], [place] [address]. All rights reserved. This information is not intended as a substitute for professional medical care. Always follow your healthcare professional's instructions.

## 2019-10-17 ENCOUNTER — CARE AT HOME (OUTPATIENT)
Dept: HOME HEALTH SERVICES | Facility: CLINIC | Age: 84
End: 2019-10-17
Payer: MEDICARE

## 2019-10-17 DIAGNOSIS — I50.9 CHRONIC CONGESTIVE HEART FAILURE, UNSPECIFIED HEART FAILURE TYPE: ICD-10-CM

## 2019-10-17 DIAGNOSIS — Z51.5 PALLIATIVE CARE ENCOUNTER: Primary | ICD-10-CM

## 2019-10-17 DIAGNOSIS — J44.9 CHRONIC OBSTRUCTIVE PULMONARY DISEASE, UNSPECIFIED COPD TYPE: ICD-10-CM

## 2019-10-17 DIAGNOSIS — M48.56XS COMPRESSION FRACTURE OF LUMBAR SPINE, NON-TRAUMATIC, SEQUELA: ICD-10-CM

## 2019-10-17 PROCEDURE — 99349 HOME/RES VST EST MOD MDM 40: CPT | Mod: S$GLB,,, | Performed by: NURSE PRACTITIONER

## 2019-10-17 PROCEDURE — 99349 PR HOME VISIT,ESTAB PATIENT,LEVEL III: ICD-10-PCS | Mod: S$GLB,,, | Performed by: NURSE PRACTITIONER

## 2019-10-18 VITALS
TEMPERATURE: 98 F | SYSTOLIC BLOOD PRESSURE: 122 MMHG | OXYGEN SATURATION: 98 % | HEART RATE: 57 BPM | RESPIRATION RATE: 20 BRPM | DIASTOLIC BLOOD PRESSURE: 78 MMHG

## 2019-10-18 NOTE — PROGRESS NOTES
[facility]  Palliative Care Home Visit    Visit Date: 10/17/2019  Encounter Provider: Sandra Schwartz NP  PCP:  Blanca Quinones MD    Subjective:      Patient ID: Irina Polk is a 89 y.o. female.    Consult Requested By:  Blanca Quinones M.D.  Reason for Consult:  Palliative Care     Outpatient Palliative Care Encounter      Ms. Polk is being seen at home for palliative care follow up for COPD and chronic respiratory failure. Daughter Ghazala and ab Costello present during visit today. Daughter lives upstairs and patient has caregivers in the home 24 hours per day. She is AAOx2 and in no acute distress, on 3L O2 via NC. She has mild SOB but no wheezing, mild cough. No recent hospital admissions or falls. She has a history of aspiration and has a suction machine at home but does not use it regularly.     Review of Systems   Constitutional: Positive for fatigue. Negative for activity change, appetite change and unexpected weight change.   Respiratory: Positive for shortness of breath (mild). Negative for choking and wheezing. Intermittent cough; runny nose  Cardiovascular: Negative for chest pain, palpitations and leg swelling.   Gastrointestinal: Negative for abdominal distention, abdominal pain, constipation and diarrhea.   Genitourinary: Negative for difficulty urinating and dysuria.   Musculoskeletal: Positive for gait problem. Negative for joint swelling and myalgias.   Skin: Negative for rash and wound.   Neurological: Positive for weakness. Negative for dizziness and light-headedness.   Psychiatric/Behavioral: Negative for behavioral problems, confusion, dysphoric mood and suicidal ideas. The patient is not nervous/anxious.          Assessments:  · Environmental: lives on 2nd story of home, daughter lives on 3rd floor, home is clean and uncluttered  · Functional Status: ambulatory with rollator, requires assistance with ADLs  · Safety: sitters present 24 hours per day, no safety concerns    · Nutritional: good appetite, eats 3 meals per day  · Home Health/DME/Supplies: no home health; has home oxygen, suction machine, rollator     Symptom Assessment (ESAS 0-10 scale)      ESAS 0 1 2 3 4 5 6 7 8 9 10   Pain X                       Dyspnea   X                     Anxiety X                       Nausea X                       Depression  X                       Anorexia X                       Fatigue   X                     Insomnia X                       Restlessness  X                       Agitation X                          Constipation    no  Bowel Management Plan (BMP): yes  Diarrhea        no  Comments: Miralax daily     Performance Status: PPS Score 50  Karnofsky Score:  50                 History:  Past Medical History:   Diagnosis Date    Chronic respiratory failure with hypoxia, on home O2 therapy     COPD (chronic obstructive pulmonary disease)     GERD (gastroesophageal reflux disease)     Hypertension     NSTEMI (non-ST elevated myocardial infarction) 3/13/2017    UTI (urinary tract infection)     Vertebral compression fracture 8/11/2015     No family history on file.  Past Surgical History:   Procedure Laterality Date    APPENDECTOMY      HYSTERECTOMY      TOTAL KNEE ARTHROPLASTY Bilateral      Review of patient's allergies indicates:   Allergen Reactions    Codeine Nausea Only     Intolerance     Penicillins        Medications:    Current Outpatient Medications:     acetaminophen (TYLENOL) 500 mg Cap, Take 2 capsules (1,000 mg total) by mouth every 6 (six) hours as needed (Pain)., Disp: , Rfl: 0    albuterol-ipratropium (DUO-NEB) 2.5 mg-0.5 mg/3 mL nebulizer solution, USE 1 VIAL VIA NEBULIZER EVERY 4 HOURS AS NEEDED FOR WHEEZING. RESCUE, Disp: 1620 mL, Rfl: 1    albuterol-ipratropium (DUO-NEB) 2.5 mg-0.5 mg/3 mL nebulizer solution, USE 1 VIAL VIA NEBULIZER EVERY 4 HOURS AS NEEDED FOR WHEEZING. RESCUE, Disp: 90 mL, Rfl: 0    amLODIPine (NORVASC) 2.5 MG tablet, Take 1  tablet (2.5 mg total) by mouth once daily., Disp: 90 tablet, Rfl: 3    aspirin 81 MG Chew, Take 81 mg by mouth once daily., Disp: , Rfl:     benzonatate (TESSALON) 200 MG capsule, TAKE 1 CAPSULE BY MOUTH THREE TIMES DAILY AS NEEDED FOR COUGH, Disp: 30 capsule, Rfl: 0    calcitonin, salmon, (FORTICAL) 200 unit/actuation nasal spray, INSTILL 1 SPRAY INTO ALTERNATING NOSTRILS ONCE DAILY, Disp: 3.7 mL, Rfl: 0    cetirizine (ZYRTEC) 5 MG tablet, Take 5 mg by mouth once daily., Disp: , Rfl:     cholecalciferol, vitamin D3, (VITAMIN D3) 2,000 unit Cap, Take 1 capsule by mouth once daily., Disp: , Rfl:     COMBIGAN 0.2-0.5 % Drop, Place 1 drop into both eyes 2 (two) times daily. , Disp: , Rfl:     cyanocobalamin 1,000 mcg/mL injection, Inject 1 mL (1,000 mcg total) into the muscle once a week. Dispense syringes as well., Disp: 10 mL, Rfl: 3    diclofenac sodium (VOLTAREN) 1 % Gel, Apply 2 g topically once daily., Disp: 1 Tube, Rfl: 3    dicyclomine (BENTYL) 10 MG capsule, Take 1 capsule (10 mg total) by mouth 2 (two) times daily., Disp: 30 capsule, Rfl: 0    escitalopram oxalate (LEXAPRO) 10 MG tablet, TAKE 1 TABLET DAILY, Disp: 90 tablet, Rfl: 3    ferrous sulfate (FEOSOL) 325 mg (65 mg iron) Tab tablet, Take 325 mg by mouth once daily., Disp: , Rfl:     fluticasone-salmeterol 100-50 mcg/dose (ADVAIR) 100-50 mcg/dose diskus inhaler, Inhale 1 puff into the lungs 2 (two) times daily. Controller, Disp: 1 each, Rfl: 2    furosemide (LASIX) 20 MG tablet, Take 1 tablet (20 mg total) by mouth daily as needed. LIMIT TWICE A WEEK AS NEEDED FOR SWELLING, Disp: 30 tablet, Rfl: 1    guaiFENesin (MUCINEX) 600 mg 12 hr tablet, Take 2 tablets (1,200 mg total) by mouth 2 (two) times daily., Disp: , Rfl:     hydrocodone-chlorpheniramine (TUSSIONEX) 10-8 mg/5 mL suspension, Take 5 mLs by mouth every 12 (twelve) hours as needed for Cough., Disp: 473 mL, Rfl: 0    latanoprost 0.005 % ophthalmic solution, INSTILL 1 DROP OU HS,  Disp: , Rfl: 11    lidocaine (LIDODERM) 5 %, Place 1 patch onto the skin once daily. Remove & Discard patch within 12 hours or as directed by MD, Disp: 15 patch, Rfl: 0    moxifloxacin (AVELOX) 400 mg tablet, Take 1 tablet (400 mg total) by mouth once daily., Disp: 7 tablet, Rfl: 0    OMEGA-3S/DHA/EPA/FISH OIL (OMEGA 3 ORAL), Take 1 capsule by mouth once daily. , Disp: , Rfl:     omeprazole (PRILOSEC) 20 MG capsule, Take 1 capsule (20 mg total) by mouth once daily., Disp: 90 capsule, Rfl: 1    polyethylene glycol (GLYCOLAX) 17 gram PwPk, Take 17 g by mouth once daily., Disp: , Rfl:     PROAIR HFA 90 mcg/actuation inhaler, INHALE 2 PUFFS BY MOUTH EVERY 6 HOURS IF NEEDED FOR SHORTNESS OF BREATH OR WHEEZING, Disp: 8.5 g, Rfl: 0    senna-docusate 8.6-50 mg (PERICOLACE) 8.6-50 mg per tablet, Take 2 tablets by mouth 2 (two) times daily., Disp: , Rfl:     traMADol (ULTRAM) 50 mg tablet, Take 1 tablet (50 mg total) by mouth every 6 (six) hours as needed (pain not controlled with tylenol). (Patient not taking: Reported on 9/16/2019), Disp: 60 tablet, Rfl: 0    24h Oral Morphine Equivalents (OME):  n/a    Objective:     Physical Exam:    There is no height or weight on file to calculate BMI.     Physical Exam   Constitutional: She appears well-developed and well-nourished. Mostly non-verbal; speaks only few words  HENT:   Head: Normocephalic and atraumatic.   Eyes: Conjunctivae and EOM are normal.   Neck: Normal range of motion. Neck supple.   Cardiovascular: Regular rhythm. Bradycardia present.   Murmur grade II/VI  Pulmonary/Chest: Effort normal and breath sounds normal.   On 3L O2 via NC   Abdominal: Soft. Bowel sounds are normal. Non-distended, non-tender  Musculoskeletal: Normal range of motion. Right leg has intermittent shaking;   Neurological: She is alert.   AAOx2   Skin: Skin is warm and dry. No wounds, no rash  Psychiatric: She has a normal mood and affect. Her behavior is normal.       Labs:  CBC:   WBC    Date Value Ref Range Status   08/14/2019 6.84 3.90 - 12.70 K/uL Final       Hemoglobin   Date Value Ref Range Status   08/14/2019 12.7 12.0 - 16.0 g/dL Final       Hematocrit   Date Value Ref Range Status   08/14/2019 38.9 37.0 - 48.5 % Final       Mean Corpuscular Volume   Date Value Ref Range Status   08/14/2019 96 82 - 98 fL Final       Platelets   Date Value Ref Range Status   08/14/2019 192 150 - 350 K/uL Final       LFT:   Lab Results   Component Value Date    AST 18 08/14/2019    ALKPHOS 59 08/14/2019    BILITOT 0.6 08/14/2019       Albumin:   Albumin   Date Value Ref Range Status   08/14/2019 3.8 3.5 - 5.2 g/dL Final     Protein:   Total Protein   Date Value Ref Range Status   08/14/2019 6.6 6.0 - 8.4 g/dL Final       Radiology:  I have reviewed all pertinent imaging results/findings within the past 24 hours.      Psychosocial/Cultural/Spiritual:  · F- Donna and Belief:  Nondenominational              · I - Importance: very important  · C - Community: member of [employer] but no longer attends, watches Sunday [employer] with [employer] on TV  · A - Address in Care: daughter is [employer]  on the [facility], can [facility]       Assessment:     1. Palliative care encounter    2. Chronic congestive heart failure, unspecified heart failure type    3. Chronic obstructive pulmonary disease, unspecified COPD type    4. Compression fracture of lumbar spine, non-traumatic, sequela        Plan:       Ethical / Legal: Advance Care Planning   · Surrogate decision maker:  Name Dolly Polk, Relationship: daughter  · Code Status:  DNR  · [place]:  Yes  · Other advance directive:  Yes, Capacity to make medical decisions:  Yes, Conflict none      [place] was seen today for follow-up.    Diagnoses and all orders for this visit:    Palliative care encounter    Chronic congestive heart failure, unspecified heart failure type    Chronic obstructive pulmonary disease, unspecified COPD type    Compression  fracture of lumbar spine, non-traumatic, sequela    Met with patient and daughter Ghazala today. Patient is mostly non-verbal but she is awake, alert, and oriented x 1-2 person, place. No c/o pain. Patient is dressed and daughter states she is going to have her hair done, which she does every Thursday. Patient not hospice appropriate. Continue Palliative Care. She has received the flu vaccine. Pt can get Pneumovax 23; last Pneumovax 13 in 2017    Were controlled substances prescribed?  No    > 50% of 60 min visit spent in chart review, face to face discussion of goals of care,  symptom assessment, coordination of care and emotional support.    No future appointments.    Patient consent obtained for treatment on this visit    Signature:  Sandra Schwartz DNP, FNP-C  [email or web address]

## 2019-10-18 NOTE — PATIENT INSTRUCTIONS
Instructions:  1. Palliative care f/u in 4 weeks  2. Pneumovax 23  3. If SOB or symptoms worsen, call PCP Dr. Quinones's office or call Palliative Care NP  4. Maintain fall and aspiration precautions at all times

## 2019-11-01 RX ORDER — OMEPRAZOLE 20 MG/1
CAPSULE, DELAYED RELEASE ORAL
Qty: 180 CAPSULE | Refills: 0 | Status: SHIPPED | OUTPATIENT
Start: 2019-11-01

## 2019-11-11 RX ORDER — ESCITALOPRAM OXALATE 10 MG/1
TABLET ORAL
Qty: 90 TABLET | Refills: 4 | Status: SHIPPED | OUTPATIENT
Start: 2019-11-11

## 2019-12-10 ENCOUNTER — TELEPHONE (OUTPATIENT)
Dept: PALLIATIVE MEDICINE | Facility: CLINIC | Age: 84
End: 2019-12-10

## 2019-12-10 NOTE — TELEPHONE ENCOUNTER
----- Message from Nevaeh Cartwright sent at 12/10/2019 10:18 AM CST -----  Contact: Dolly (daughter)/[phone] or [phone]  Dolly called to speak with your office in regard to a fall her mother took and they want to seek Pallative care as her mouth is dropping.    Please call [phone] or [phone] to discuss today.

## 2019-12-17 ENCOUNTER — CARE AT HOME (OUTPATIENT)
Dept: HOME HEALTH SERVICES | Facility: CLINIC | Age: 84
End: 2019-12-17
Payer: MEDICARE

## 2019-12-17 DIAGNOSIS — R00.1 BRADYCARDIA: ICD-10-CM
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
## 2019-12-17 DIAGNOSIS — R53.1 GENERALIZED WEAKNESS: ICD-10-CM

## 2019-12-17 DIAGNOSIS — Z51.5 PALLIATIVE CARE ENCOUNTER: Primary | ICD-10-CM

## 2019-12-17 DIAGNOSIS — R53.81 DEBILITY: ICD-10-CM

## 2019-12-17 PROCEDURE — 1159F PR MEDICATION LIST DOCUMENTED IN MEDICAL RECORD: ICD-10-PCS | Mod: S$GLB,,, | Performed by: NURSE PRACTITIONER

## 2019-12-17 PROCEDURE — 99348 HOME/RES VST EST LOW MDM 30: CPT | Mod: ,,, | Performed by: NURSE PRACTITIONER

## 2019-12-17 PROCEDURE — 1126F AMNT PAIN NOTED NONE PRSNT: CPT | Mod: S$GLB,,, | Performed by: NURSE PRACTITIONER

## 2019-12-17 PROCEDURE — 99348 PR HOME VISIT,ESTAB PATIENT,LEVEL II: ICD-10-PCS | Mod: ,,, | Performed by: NURSE PRACTITIONER

## 2019-12-17 PROCEDURE — 1126F PR PAIN SEVERITY QUANTIFIED, NO PAIN PRESENT: ICD-10-PCS | Mod: S$GLB,,, | Performed by: NURSE PRACTITIONER

## 2019-12-17 PROCEDURE — 1159F MED LIST DOCD IN RCRD: CPT | Mod: S$GLB,,, | Performed by: NURSE PRACTITIONER

## 2020-01-02 VITALS
RESPIRATION RATE: 20 BRPM | OXYGEN SATURATION: 98 % | SYSTOLIC BLOOD PRESSURE: 122 MMHG | TEMPERATURE: 98 F | DIASTOLIC BLOOD PRESSURE: 72 MMHG | HEART RATE: 50 BPM

## 2020-01-02 NOTE — PROGRESS NOTES
[facility]  Palliative Care Home Visit    Visit Date: 12/17/2019  Encounter Provider: Sandra Schwartz, WU, FNP-C  PCP:  Blanca Quinones MD    Subjective:      Patient ID: Irina Polk is a 89 y.o. female.    Consult Requested By:  Blanca Quinones M.D.  Reason for Consult:  Palliative Care     Chief Complaint:  Palliative Care follow-up visit    Outpatient Palliative Care Encounter    Ms. Irina Polk is a 89 y/o female with PMHx of Chronic respiratory failure with hypoxia 02 dependent, chronic obstructive pulmonary disease, GERD, essential hypertension, NSTEMI, vertebral compression fracture, bradycardia, hyperlipidemia, aneurysm of thoracic aorta, aspiration and bacterial pneumonia, iron deficiency anemia, sepsis, vitamin deficiency, and urinary tract infections.     Impression:    Ms. Polk is being seen at home for palliative care follow up for palliative care follow-up visit and post fall. Daughter Ghazala and ab Costello present during visit today. Daughter lives upstairs and patient has caregivers in the home 24 hours per day. Patient is AAOx2 and in no acute distress, on 3L O2 via NC. Patient recently fell on 12/10, but she denies hitting her head. She has a history of aspiration and has a suction machine at home but does not use it regularly. Patient denies pain at this time. Speaks only a few words. Follows commands. Good appetite      Goals of Care:  [place] form reviewed; no [place] form on file at this time. Pt has living will/advance directives. Patient wants to be comfortable, Code Status DNR. Will continue to follow patient on palliative care services. Patient would like to continue to go on outings with family and friends.      PLAN:    1. F/u with palliative care in 4-6 weeks  2. Continue all medications  3. Have family complete LaPOST Forms  4. Comfort care  5. Supportive care offered to family       Review of Systems   Constitutional: Positive for fatigue. Negative for  activity change, appetite change and unexpected weight change.   Respiratory: Positive for shortness of breath (mild). Negative for choking and wheezing. Intermittent cough; runny nose  Cardiovascular: Negative for chest pain, palpitations and leg swelling.   Gastrointestinal: Negative for abdominal distention, abdominal pain, constipation and diarrhea.   Genitourinary: Negative for difficulty urinating and dysuria.   Musculoskeletal: Positive for gait problem. Negative for joint swelling and myalgias.   Skin: Negative for rash and wound.   Neurological: Positive for weakness. Negative for dizziness and light-headedness.   Psychiatric/Behavioral: Negative for behavioral problems, confusion, dysphoric mood and suicidal ideas. The patient is not nervous/anxious.          Assessments:  · Environmental: lives on 2nd story of home, daughter lives on 3rd floor, home is clean and uncluttered  · Functional Status: ambulatory with rollator, requires assistance with ADLs  · Safety: sitters present 24 hours per day, no safety concerns   · Nutritional: good appetite, eats 3 meals per day  · Home Health/DME/Supplies: no home health; has home oxygen, suction machine, rollator     Symptom Assessment (ESAS 0-10 scale)      ESAS 0 1 2 3 4 5 6 7 8 9 10   Pain X                       Dyspnea   X                     Anxiety X                       Nausea X                       Depression  X                       Anorexia X                       Fatigue   X                     Insomnia X                       Restlessness  X                       Agitation X                          Constipation    no  Bowel Management Plan (BMP): yes  Diarrhea        no  Comments: Miralax daily; LBM 12/16/2019     Performance Status: PPS Score 50  Karnofsky Score:  50                 History:  Past Medical History:   Diagnosis Date    Chronic respiratory failure with hypoxia, on home O2 therapy     COPD (chronic obstructive pulmonary disease)      GERD (gastroesophageal reflux disease)     Hypertension     NSTEMI (non-ST elevated myocardial infarction) 3/13/2017    UTI (urinary tract infection)     Vertebral compression fracture 8/11/2015     History reviewed. No pertinent family history.  Past Surgical History:   Procedure Laterality Date    APPENDECTOMY      HYSTERECTOMY      TOTAL KNEE ARTHROPLASTY Bilateral      Review of patient's allergies indicates:   Allergen Reactions    Codeine Nausea Only     Intolerance     Penicillins        Medications:    Current Outpatient Medications:     acetaminophen (TYLENOL) 500 mg Cap, Take 2 capsules (1,000 mg total) by mouth every 6 (six) hours as needed (Pain)., Disp: , Rfl: 0    albuterol-ipratropium (DUO-NEB) 2.5 mg-0.5 mg/3 mL nebulizer solution, USE 1 VIAL VIA NEBULIZER EVERY 4 HOURS AS NEEDED FOR WHEEZING. RESCUE, Disp: 1620 mL, Rfl: 1    albuterol-ipratropium (DUO-NEB) 2.5 mg-0.5 mg/3 mL nebulizer solution, USE 1 VIAL VIA NEBULIZER EVERY 4 HOURS AS NEEDED FOR WHEEZING. RESCUE, Disp: 90 mL, Rfl: 0    amLODIPine (NORVASC) 2.5 MG tablet, Take 1 tablet (2.5 mg total) by mouth once daily., Disp: 90 tablet, Rfl: 3    aspirin 81 MG Chew, Take 81 mg by mouth once daily., Disp: , Rfl:     benzonatate (TESSALON) 200 MG capsule, TAKE 1 CAPSULE BY MOUTH THREE TIMES DAILY AS NEEDED FOR COUGH, Disp: 30 capsule, Rfl: 0    calcitonin, salmon, (FORTICAL) 200 unit/actuation nasal spray, INSTILL 1 SPRAY INTO ALTERNATING NOSTRILS ONCE DAILY, Disp: 3.7 mL, Rfl: 0    cetirizine (ZYRTEC) 5 MG tablet, Take 5 mg by mouth once daily., Disp: , Rfl:     cholecalciferol, vitamin D3, (VITAMIN D3) 2,000 unit Cap, Take 1 capsule by mouth once daily., Disp: , Rfl:     COMBIGAN 0.2-0.5 % Drop, Place 1 drop into both eyes 2 (two) times daily. , Disp: , Rfl:     cyanocobalamin 1,000 mcg/mL injection, Inject 1 mL (1,000 mcg total) into the muscle once a week. Dispense syringes as well., Disp: 10 mL, Rfl: 3    diclofenac sodium  (VOLTAREN) 1 % Gel, Apply 2 g topically once daily., Disp: 1 Tube, Rfl: 3    dicyclomine (BENTYL) 10 MG capsule, Take 1 capsule (10 mg total) by mouth 2 (two) times daily., Disp: 30 capsule, Rfl: 0    escitalopram oxalate (LEXAPRO) 10 MG tablet, TAKE 1 TABLET DAILY, Disp: 90 tablet, Rfl: 4    ferrous sulfate (FEOSOL) 325 mg (65 mg iron) Tab tablet, Take 325 mg by mouth once daily., Disp: , Rfl:     fluticasone-salmeterol 100-50 mcg/dose (ADVAIR) 100-50 mcg/dose diskus inhaler, Inhale 1 puff into the lungs 2 (two) times daily. Controller, Disp: 1 each, Rfl: 2    furosemide (LASIX) 20 MG tablet, TAKE 1 TABLET BY MOUTH EVERY DAY AS NEEDED FOR SWELLING; LIMIT TO TWICE A WEEK, Disp: 90 tablet, Rfl: 1    guaiFENesin (MUCINEX) 600 mg 12 hr tablet, Take 2 tablets (1,200 mg total) by mouth 2 (two) times daily., Disp: , Rfl:     hydrocodone-chlorpheniramine (TUSSIONEX) 10-8 mg/5 mL suspension, Take 5 mLs by mouth every 12 (twelve) hours as needed for Cough., Disp: 473 mL, Rfl: 0    latanoprost 0.005 % ophthalmic solution, INSTILL 1 DROP OU HS, Disp: , Rfl: 11    lidocaine (LIDODERM) 5 %, Place 1 patch onto the skin once daily. Remove & Discard patch within 12 hours or as directed by MD, Disp: 15 patch, Rfl: 0    moxifloxacin (AVELOX) 400 mg tablet, Take 1 tablet (400 mg total) by mouth once daily., Disp: 7 tablet, Rfl: 0    OMEGA-3S/DHA/EPA/FISH OIL (OMEGA 3 ORAL), Take 1 capsule by mouth once daily. , Disp: , Rfl:     omeprazole (PRILOSEC) 20 MG capsule, TAKE ONE CAPSULE BY MOUTH TWICE DAILY, Disp: 180 capsule, Rfl: 0    polyethylene glycol (GLYCOLAX) 17 gram PwPk, Take 17 g by mouth once daily., Disp: , Rfl:     PROAIR HFA 90 mcg/actuation inhaler, INHALE 2 PUFFS BY MOUTH EVERY 6 HOURS IF NEEDED FOR SHORTNESS OF BREATH OR WHEEZING, Disp: 8.5 g, Rfl: 0    senna-docusate 8.6-50 mg (PERICOLACE) 8.6-50 mg per tablet, Take 2 tablets by mouth 2 (two) times daily., Disp: , Rfl:     traMADol (ULTRAM) 50 mg tablet,  Take 1 tablet (50 mg total) by mouth every 6 (six) hours as needed (pain not controlled with tylenol). (Patient not taking: Reported on 9/16/2019), Disp: 60 tablet, Rfl: 0    24h Oral Morphine Equivalents (OME):  n/a    Objective:     Physical Exam:    There is no height or weight on file to calculate BMI.     Physical Exam   Constitutional: She appears well-developed and well-nourished. Mostly non-verbal; speaks only few words   Cardiovascular: Regular rhythm. Bradycardia present. Murmur grade II/VI; no edema to BLE  Pulmonary/Chest: Effort normal and breath sounds normal.   On 3L O2 via NC continuous  Abdominal: Soft. Bowel sounds are normal. Non-distended, non-tender  Musculoskeletal: Normal range of motion. Right leg has intermittent shaking;   Neurological: She is alert.   AAOx2   Skin: Skin is warm and dry. No wounds, no rash  Psychiatric: She has a normal mood and affect. Her behavior is normal.       Labs:  CBC:   WBC   Date Value Ref Range Status   08/14/2019 6.84 3.90 - 12.70 K/uL Final   - 98 f  Hemoglobin   Date Value Ref Range Status   08/14/2019 12.7 12.0 - 16.0 g/dL Final   :   Hematocrit   Date Value Ref Range Status   08/14/2019 38.9 37.0 - 48.5 % Final       Mean Corpuscular Volume   Date Value Ref Range Status   08/14/2019 96 82 - 98 fL Final   BILITOT 0.6 08/14/2019       Albumin:   Albumin   Date Value Ref Range Status   08/14/2019 3.8 3.5 - 5.2 g/dL Final     Protein:   Total Protein   Date Value Ref Range Status   08/14/2019 6.6 6.0 - 8.4 g/dL Final       Radiology:  I have reviewed all pertinent imaging results/findings within the past 24 hours.      Psychosocial/Cultural/Spiritual:  · F- Donna and Belief:  Yarsanism              · I - Importance: very important  · C - Community: member of [employer] but no longer attends, watches Sunday mass with [employer] Ila on TV  · A - Address in Care: daughter is Eucharist  on the [facility], can bring  communion       Assessment:     No diagnosis found.    Plan:       Ethical / Legal: Advance Care Planning   · Surrogate decision maker:  Name Dolly Polk, Relationship: daughter  · Code Status:  DNR  · LaPOST:  No record of [place]Post form  · Other advance directive:  Yes, Capacity to make medical decisions:  Yes, Conflict none    Were controlled substances prescribed?  No    > 50% of 60 min visit spent in chart review, face to face discussion of goals of care,  symptom assessment, coordination of care and emotional support.    Future Appointments   Date Time Provider Department Center   1/28/2020  1:00 PM Sandra Schwartz NP [address]       Patient consent obtained for treatment on this visit    Signature:  Sandra Schwartz DNP, FNP-C  [email or web address]

## 2020-01-05 NOTE — PATIENT INSTRUCTIONS
Instructions:  1. Follow-up with palliative care in 4-6 weeks  2. Continue all medications  3. Fall precautions at all times  4. Complete [place]POST form by family/patient (none on record)

## 2020-01-06 ENCOUNTER — HOSPITAL ENCOUNTER (EMERGENCY)
Facility: OTHER | Age: 85
Discharge: HOME | End: 2020-01-07
Attending: EMERGENCY MEDICINE
Payer: MEDICARE

## 2020-01-06 DIAGNOSIS — N39.0 ACUTE UTI: ICD-10-CM

## 2020-01-06 DIAGNOSIS — J44.9 CHRONIC OBSTRUCTIVE PULMONARY DISEASE, UNSPECIFIED COPD TYPE: ICD-10-CM

## 2020-01-06 DIAGNOSIS — R53.81 DEBILITY: ICD-10-CM

## 2020-01-06 DIAGNOSIS — F03.90 DEMENTIA WITHOUT BEHAVIORAL DISTURBANCE, UNSPECIFIED DEMENTIA TYPE: ICD-10-CM

## 2020-01-06 DIAGNOSIS — R55 NEAR SYNCOPE: Primary | ICD-10-CM

## 2020-01-06 DIAGNOSIS — R00.1 BRADYCARDIA: ICD-10-CM

## 2020-01-06 LAB
ALBUMIN SERPL BCP-MCNC: 4.1 G/DL (ref 3.5–5.2)
ALP SERPL-CCNC: 85 U/L (ref 55–135)
ALT SERPL W/O P-5'-P-CCNC: 13 U/L (ref 10–44)
ANION GAP SERPL CALC-SCNC: 12 MMOL/L (ref 8–16)
AST SERPL-CCNC: 21 U/L (ref 10–40)
BACTERIA #/AREA URNS HPF: NORMAL /HPF
BASOPHILS # BLD AUTO: 0.03 K/UL (ref 0–0.2)
BASOPHILS NFR BLD: 0.3 % (ref 0–1.9)
BILIRUB SERPL-MCNC: 0.3 MG/DL (ref 0.1–1)
BILIRUB UR QL STRIP: NEGATIVE
BUN SERPL-MCNC: 20 MG/DL (ref 8–23)
CALCIUM SERPL-MCNC: 10 MG/DL (ref 8.7–10.5)
CHLORIDE SERPL-SCNC: 106 MMOL/L (ref 95–110)
CLARITY UR: CLEAR
CO2 SERPL-SCNC: 27 MMOL/L (ref 23–29)
COLOR UR: YELLOW
CREAT SERPL-MCNC: 0.8 MG/DL (ref 0.5–1.4)
DIFFERENTIAL METHOD: ABNORMAL
EOSINOPHIL # BLD AUTO: 0.2 K/UL (ref 0–0.5)
EOSINOPHIL NFR BLD: 2.1 % (ref 0–8)
ERYTHROCYTE [DISTWIDTH] IN BLOOD BY AUTOMATED COUNT: 15.8 % (ref 11.5–14.5)
EST. GFR  (AFRICAN AMERICAN): >60 ML/MIN/1.73 M^2
EST. GFR  (NON AFRICAN AMERICAN): >60 ML/MIN/1.73 M^2
GLUCOSE SERPL-MCNC: 104 MG/DL (ref 70–110)
GLUCOSE UR QL STRIP: NEGATIVE
HCT VFR BLD AUTO: 44.1 % (ref 37–48.5)
HGB BLD-MCNC: 14.3 G/DL (ref 12–16)
HGB UR QL STRIP: NEGATIVE
IMM GRANULOCYTES # BLD AUTO: 0.06 K/UL (ref 0–0.04)
IMM GRANULOCYTES NFR BLD AUTO: 0.6 % (ref 0–0.5)
KETONES UR QL STRIP: NEGATIVE
LEUKOCYTE ESTERASE UR QL STRIP: NEGATIVE
LYMPHOCYTES # BLD AUTO: 1.5 K/UL (ref 1–4.8)
LYMPHOCYTES NFR BLD: 16.2 % (ref 18–48)
MCH RBC QN AUTO: 31.5 PG (ref 27–31)
MCHC RBC AUTO-ENTMCNC: 32.4 G/DL (ref 32–36)
MCV RBC AUTO: 97 FL (ref 82–98)
MICROSCOPIC COMMENT: NORMAL
MONOCYTES # BLD AUTO: 0.7 K/UL (ref 0.3–1)
MONOCYTES NFR BLD: 7.1 % (ref 4–15)
NEUTROPHILS # BLD AUTO: 6.9 K/UL (ref 1.8–7.7)
NEUTROPHILS NFR BLD: 73.7 % (ref 38–73)
NITRITE UR QL STRIP: POSITIVE
NRBC BLD-RTO: 0 /100 WBC
PH UR STRIP: 6 [PH] (ref 5–8)
PLATELET # BLD AUTO: 224 K/UL (ref 150–350)
PMV BLD AUTO: 9.8 FL (ref 9.2–12.9)
POTASSIUM SERPL-SCNC: 4.5 MMOL/L (ref 3.5–5.1)
PROT SERPL-MCNC: 7.5 G/DL (ref 6–8.4)
PROT UR QL STRIP: NEGATIVE
RBC # BLD AUTO: 4.54 M/UL (ref 4–5.4)
SODIUM SERPL-SCNC: 145 MMOL/L (ref 136–145)
SP GR UR STRIP: >=1.03 (ref 1–1.03)
TROPONIN I SERPL DL<=0.01 NG/ML-MCNC: 0.01 NG/ML (ref 0–0.03)
URN SPEC COLLECT METH UR: ABNORMAL
UROBILINOGEN UR STRIP-ACNC: NEGATIVE EU/DL
WBC # BLD AUTO: 9.42 K/UL (ref 3.9–12.7)

## 2020-01-06 PROCEDURE — 80053 COMPREHEN METABOLIC PANEL: CPT

## 2020-01-06 PROCEDURE — 84484 ASSAY OF TROPONIN QUANT: CPT

## 2020-01-06 PROCEDURE — 96360 HYDRATION IV INFUSION INIT: CPT | Mod: 59

## 2020-01-06 PROCEDURE — 25000242 PHARM REV CODE 250 ALT 637 W/ HCPCS: Performed by: EMERGENCY MEDICINE

## 2020-01-06 PROCEDURE — P9612 CATHETERIZE FOR URINE SPEC: HCPCS

## 2020-01-06 PROCEDURE — 87040 BLOOD CULTURE FOR BACTERIA: CPT | Mod: 59

## 2020-01-06 PROCEDURE — 85025 COMPLETE CBC W/AUTO DIFF WBC: CPT

## 2020-01-06 PROCEDURE — 93005 ELECTROCARDIOGRAM TRACING: CPT

## 2020-01-06 PROCEDURE — 81000 URINALYSIS NONAUTO W/SCOPE: CPT

## 2020-01-06 PROCEDURE — 99285 EMERGENCY DEPT VISIT HI MDM: CPT | Mod: 25

## 2020-01-06 PROCEDURE — 63600175 PHARM REV CODE 636 W HCPCS: Performed by: EMERGENCY MEDICINE

## 2020-01-06 PROCEDURE — 36415 COLL VENOUS BLD VENIPUNCTURE: CPT

## 2020-01-06 PROCEDURE — 93010 EKG 12-LEAD: ICD-10-PCS | Mod: ,,, | Performed by: INTERNAL MEDICINE

## 2020-01-06 PROCEDURE — 94640 AIRWAY INHALATION TREATMENT: CPT

## 2020-01-06 PROCEDURE — 93010 ELECTROCARDIOGRAM REPORT: CPT | Mod: ,,, | Performed by: INTERNAL MEDICINE

## 2020-01-06 PROCEDURE — 27000221 HC OXYGEN, UP TO 24 HOURS

## 2020-01-06 PROCEDURE — 25000003 PHARM REV CODE 250: Performed by: EMERGENCY MEDICINE

## 2020-01-06 PROCEDURE — 96361 HYDRATE IV INFUSION ADD-ON: CPT | Mod: 59

## 2020-01-06 RX ORDER — GABAPENTIN 300 MG/1
300 CAPSULE ORAL 3 TIMES DAILY PRN
Qty: 90 CAPSULE | Refills: 0 | Status: SHIPPED | OUTPATIENT
Start: 2020-01-06

## 2020-01-06 RX ORDER — BENZONATATE 100 MG/1
200 CAPSULE ORAL
Status: COMPLETED | OUTPATIENT
Start: 2020-01-06 | End: 2020-01-06

## 2020-01-06 RX ORDER — IPRATROPIUM BROMIDE AND ALBUTEROL SULFATE 2.5; .5 MG/3ML; MG/3ML
3 SOLUTION RESPIRATORY (INHALATION)
Status: COMPLETED | OUTPATIENT
Start: 2020-01-06 | End: 2020-01-06

## 2020-01-06 RX ADMIN — SODIUM CHLORIDE 1000 ML: 0.9 INJECTION, SOLUTION INTRAVENOUS at 09:01

## 2020-01-06 RX ADMIN — BENZONATATE 200 MG: 100 CAPSULE ORAL at 09:01

## 2020-01-06 RX ADMIN — IPRATROPIUM BROMIDE AND ALBUTEROL SULFATE 3 ML: .5; 3 SOLUTION RESPIRATORY (INHALATION) at 09:01

## 2020-01-07 VITALS
SYSTOLIC BLOOD PRESSURE: 148 MMHG | HEART RATE: 64 BPM | TEMPERATURE: 98 F | RESPIRATION RATE: 21 BRPM | DIASTOLIC BLOOD PRESSURE: 64 MMHG | OXYGEN SATURATION: 99 %

## 2020-01-07 PROCEDURE — 63600175 PHARM REV CODE 636 W HCPCS: Performed by: EMERGENCY MEDICINE

## 2020-01-07 PROCEDURE — 96365 THER/PROPH/DIAG IV INF INIT: CPT | Mod: 59

## 2020-01-07 RX ORDER — LEVOFLOXACIN 750 MG/1
750 TABLET ORAL
Status: DISCONTINUED | OUTPATIENT
Start: 2020-01-07 | End: 2020-01-07

## 2020-01-07 RX ORDER — CEPHALEXIN 500 MG/1
500 CAPSULE ORAL 2 TIMES DAILY
Qty: 10 CAPSULE | Refills: 0 | Status: SHIPPED | OUTPATIENT
Start: 2020-01-07 | End: 2020-01-12

## 2020-01-07 RX ADMIN — CEFTRIAXONE 1 G: 1 INJECTION, SOLUTION INTRAVENOUS at 12:01

## 2020-01-07 NOTE — ED PROVIDER NOTES
Encounter Date: 1/6/2020    SCRIBE #1 NOTE: I, Charlene Calloway, am scribing for, and in the presence of, Dr. Mclean .       History     Chief Complaint   Patient presents with    Syncope     had syncopal episode around 1830 tonight, taking doxycycline for UTI     The history is provided by the patient and medical records.      History provided by the patient, medical records, patient's daughter, patient's primary care physician Dr. Quinones and patient's home care taker.  My history differs from that at triage.  Patient presents with near syncopal episode while having a bowel movement earlier tonight.  Family and caretaker note that the patient has had a cough for about 1 week, started on doxycycline for this 3 days ago, has been coughing fairly constantly.  Cough is productive of white and gray sputum.  No reported fevers.  They note that the patient has had malaise, and perhaps more confused than baseline.    Review of patient's allergies indicates:   Allergen Reactions    Codeine Nausea Only     Intolerance     Penicillins      Past Medical History:   Diagnosis Date    Chronic respiratory failure with hypoxia, on home O2 therapy     COPD (chronic obstructive pulmonary disease)     GERD (gastroesophageal reflux disease)     Hypertension     NSTEMI (non-ST elevated myocardial infarction) 3/13/2017    UTI (urinary tract infection)     Vertebral compression fracture 8/11/2015     Past Surgical History:   Procedure Laterality Date    APPENDECTOMY      HYSTERECTOMY      TOTAL KNEE ARTHROPLASTY Bilateral      History reviewed. No pertinent family history.  Social History     Tobacco Use    Smoking status: Never Smoker    Smokeless tobacco: Never Used   Substance Use Topics    Alcohol use: Yes     Alcohol/week: 1.0 standard drinks     Types: 1 Glasses of wine per week     Comment: occasionally    Drug use: No     Review of Systems  ROS: As per HPI and below:   General: No fever.  Notes malaise.  HENT: No facial  "pain.   Eyes: Negative for eye pain.   Cardiovascular: No chest pain.   Respiratory:  No dyspnea.  Notes cough.  GI: No abdominal pain. No nausea. No vomiting. No diarrhea.   Skin: No rashes.   Neuro:  Near syncope.  No focal deficits.   Musculoskeletal: No extremity pain.  All other systems reviewed and are negative.    Physical Exam     Initial Vitals [01/06/20 2024]   BP Pulse Resp Temp SpO2   119/82 83 18 97.6 °F (36.4 °C) 97 %      MAP       --         Physical Exam  Nursing note and vitals reviewed.  BP (!) 148/64   Pulse 64   Temp 97.8 °F (36.6 °C) (Oral)   Resp (!) 21   SpO2 99%   Constitutional: AAOx3. No distress. Elderly about well-appearing.  Neatly groomed.  Eyes: EOMI. No discharge. Anicteric.  HENT:   Mouth/Throat: Oropharynx is clear. Uvula midline. Mucus membranes moist.  Neck: Normal range of motion. Neck supple.  Cardiovascular: Normal rate. No murmur, no gallop and no friction rub heard.   Pulmonary/Chest: No respiratory distress. Effort normal. Faint scattered wheezes, wheezes, no rales, no rhonchi.   Abdominal: Bowel sounds normal. Soft. No distension and no mass. There is no tenderness. There is no rebound, no guarding, no tenderness at McBurney's point.  Musculoskeletal: Normal range of motion.   Neurological: GCS 14.  Awake, alert, oriented to family. Answers "today" to what day today is. No gross cranial nerve, light touch or strength deficit. Coordination normal.   Skin: Skin is warm and dry.   EXT: 2+ radial pulses.   Psychiatric: Very pleasant.     ED Course   Procedures  Labs Reviewed   CBC W/ AUTO DIFFERENTIAL - Abnormal; Notable for the following components:       Result Value    Mean Corpuscular Hemoglobin 31.5 (*)     RDW 15.8 (*)     Immature Granulocytes 0.6 (*)     Immature Grans (Abs) 0.06 (*)     Gran% 73.7 (*)     Lymph% 16.2 (*)     All other components within normal limits   URINALYSIS, REFLEX TO URINE CULTURE - Abnormal; Notable for the following components:    Specific " Gravity, UA >=1.030 (*)     Nitrite, UA Positive (*)     All other components within normal limits    Narrative:     Preferred Collection Type->Urine, Clean Catch   CULTURE, BLOOD   CULTURE, BLOOD   COMPREHENSIVE METABOLIC PANEL   TROPONIN I   URINALYSIS MICROSCOPIC    Narrative:     Preferred Collection Type->Urine, Clean Catch          Imaging Results          X-Ray Chest AP Portable (Final result)  Result time 01/06/20 22:38:38    Final result by Rd Shankar MD (01/06/20 22:38:38)                 Impression:      No acute cardiopulmonary process identified.      Electronically signed by: Rd Shankar MD  Date:    01/06/2020  Time:    22:38             Narrative:    EXAMINATION:  XR CHEST AP PORTABLE    CLINICAL HISTORY:  cough;    TECHNIQUE:  Single frontal view of the chest was performed.    COMPARISON:  August 2019.    FINDINGS:  Cardiomediastinal silhouette is enlarged but stable in size.  Lungs are symmetrically expanded.  Mild coarse interstitial lung changes are seen.  No evidence of focal consolidative process, pneumothorax, or significant effusion.  No acute osseous abnormality identified.                                 Medical Decision Making:   History:   Old Medical Records: I decided to obtain old medical records.  Independently Interpreted Test(s):   I have ordered and independently interpreted X-rays - see prior notes.  I have ordered and independently interpreted EKG Reading(s) - see prior notes  Clinical Tests:   Lab Tests: Reviewed and Ordered  Radiological Study: Ordered and Reviewed  Medical Tests: Ordered and Reviewed            Scribe Attestation:   Scribe #1: I performed the above scribed service and the documentation accurately describes the services I performed. I attest to the accuracy of the note.    Attending Attestation:           Physician Attestation for Scribe:  Physician Attestation Statement for Scribe #1: I, Dr. Mclean, reviewed documentation, as scribed by Charlene Calloway  in  my presence, and it is both accurate and complete.                 ED Course as of Jan 07 0526   Mon Jan 06, 2020 2129   I independently reviewed and interpreted EKG which shows normal sinus rhythm at 70 beats per minute, no STEMI, no ischemic changes, normal intervals.  No acute change compared to prior tracing.    [BL]   2159 Patient is a 89-year-old female on palliative care (not on hospice) for COPD, CAD status post NSTEMI, hypertension, GERD who presents for evaluation after near syncopal episode while having a bowel movement just prior to arrival in the setting of 1 week persistent productive cough currently on doxycycline and multiple antitussives.  On exam patient with mild diffuse wheezes, appears younger than her stated age, at baseline mental status per caretaker and family, appears euvolemic, afebrile, and not tachycardic with intermittent cough and thick sputum at the bedside.  The initial differential included pneumonia, COPD exacerbation, dehydration, acute coronary syndrome.     [RC]   2234 I independently reviewed and interpreted labs which are unremarkable / unrevealing.   UA pending.     [RC]   Tue Jan 07, 2020   0026 Urinalysis consistent with UTI.  Giving Keflex for additional coverage (fluoroquinolones considered, however the patient has aortic aneurysm history).   I discussed with patient and/or guardian/caretaker that this evaluation in the ED does not suggest any emergent or life threatening medical condition requiring admission or immediate intervention beyond what was provided in the ED. Regardless, an unremarkable evaluation in the ED does not preclude the development or presence of a serious or life threatening condition. As such, patient was instructed to return immediately for any worsening or change in current symptoms.     I had a detailed discussion with patient  and/or guardian/caretaker regarding findings, plan, return precautions, importance of medication adherence, need to  follow-up with a PCP. All questions answered.   Patient's daughter at the bedside for instructions, agrees with plan.  Note was created using voice recognition software. It may have occasional typographical errors not identified and edited despite initial review prior to signing.        [RC]      ED Course User Index  [BL] Chalrene Calloway  [RC] Cristian Mclean MD                Clinical Impression:       ICD-10-CM ICD-9-CM   1. Near syncope R55 780.2   2. Debility R53.81 799.3   3. Dementia without behavioral disturbance, unspecified dementia type F03.90 294.20   4. Bradycardia R00.1 427.89   5. Chronic obstructive pulmonary disease, unspecified COPD type J44.9 496   6. Acute UTI N39.0 599.0                             Cristian Mclean MD  01/07/20 0526

## 2020-01-07 NOTE — DISCHARGE INSTRUCTIONS
Call your primary care doctor to make the first available appointment.     Keep all your medical appointments.     Take your regular medication as prescribed. Contact your primary care provider before running out of medication, or for any problems obtaining them.    Do not drive or operate heavy machinery while taking opioid or muscle relaxing medications. Examples include norco, percocet, xanax, valium, flexeril.     Overuse or long term use of pain and sedating medication may lead to addiction, dependence, organ failure, family and work problems, legal problems, accidental overdose and death.    If you do not have health insurance, you probably qualify for heavily discounted rates:  Call 1-419.858.6342 ([facility] hotline) or go to www.[employer].[place].gov    Your evaluation in the ED does not suggest any emergent or life threatening medical condition requiring admission or immediate intervention beyond that provided in the ED.   However, the signs of a serious problem sometimes take more time to appear.   RETURN TO THE ER if any of the following occur:    Weakness, dizziness, fainting, or loss of consciousness   Fever of 100.4ºF (38ºC) or higher  Any worse symptoms  Any new or concerning symptoms

## 2020-01-07 NOTE — ED TRIAGE NOTES
"Pt presents to ED via EMS with c/o syncopal episode a few hours ago. Pt's daughter witnessed the episode. She reports the pt was sitting in a chair "and she just passed out, her head just dropped. It took a lot to wake her back up". Denies hitting her head. Per pt's family the pt just began taking doxycycline Saturday for an URI. Denies abdominal pain, fever, chest pain, fever, dysuria, and N/V/D.    "

## 2020-01-07 NOTE — ED PROVIDER NOTES
Encounter Date: 1/6/2020    SCRIBE #1 NOTE: I, Charlene Calloway, am scribing for, and in the presence of, Dr. Mclean .       History     Chief Complaint   Patient presents with    Syncope     had syncopal episode around 1830 tonight, taking doxycycline for UTI     HPI  Review of patient's allergies indicates:   Allergen Reactions    Codeine Nausea Only     Intolerance     Penicillins      Past Medical History:   Diagnosis Date    Chronic respiratory failure with hypoxia, on home O2 therapy     COPD (chronic obstructive pulmonary disease)     GERD (gastroesophageal reflux disease)     Hypertension     NSTEMI (non-ST elevated myocardial infarction) 3/13/2017    UTI (urinary tract infection)     Vertebral compression fracture 8/11/2015     Past Surgical History:   Procedure Laterality Date    APPENDECTOMY      HYSTERECTOMY      TOTAL KNEE ARTHROPLASTY Bilateral      History reviewed. No pertinent family history.  Social History     Tobacco Use    Smoking status: Never Smoker    Smokeless tobacco: Never Used   Substance Use Topics    Alcohol use: Yes     Alcohol/week: 1.0 standard drinks     Types: 1 Glasses of wine per week     Comment: occasionally    Drug use: No     Review of Systems    Physical Exam     Initial Vitals [01/06/20 2024]   BP Pulse Resp Temp SpO2   119/82 83 18 97.6 °F (36.4 °C) 97 %      MAP       --         Physical Exam    ED Course   Procedures  Labs Reviewed   CULTURE, BLOOD   CULTURE, BLOOD   CBC W/ AUTO DIFFERENTIAL   COMPREHENSIVE METABOLIC PANEL   TROPONIN I   URINALYSIS, REFLEX TO URINE CULTURE          Imaging Results          X-Ray Chest AP Portable (In process)                             Scribe Attestation:   Scribe #1: I performed the above scribed service and the documentation accurately describes the services I performed. I attest to the accuracy of the note.    Attending Attestation:           Physician Attestation for Scribe:  Physician Attestation Statement for Scribe #1: I,  Dr. Mclean, reviewed documentation, as scribed by Charlene Calloway  in my presence, and it is both accurate and complete.                 ED Course as of Jan 07 0436   Mon Jan 06, 2020 2129 I independently reviewed and interpreted EKG which shows normal sinus rhythm at 70 beats per minute, no STEMI, no ischemic changes, normal intervals.  No acute change compared to prior tracing.        [BL]   2159 Patient is a 89-year-old female on palliative care (not on hospice) for COPD, CAD status post NSTEMI, hypertension, GERD who presents for evaluation after near syncopal episode while having a bowel movement just prior to arrival in the setting of 1 week persistent productive cough currently on doxycycline and multiple antitussives.  On exam patient with mild diffuse wheezes, appears younger than her stated age, at baseline mental status per caretaker and family, appears euvolemic, afebrile, and not tachycardic with intermittent cough and thick sputum at the bedside.  The initial differential included pneumonia, COPD exacerbation, dehydration, acute coronary syndrome.     [RC]   2234 I independently reviewed and interpreted labs which are unremarkable / unrevealing.   UA pending.     [RC]   Tue Jan 07, 2020   0026 Urinalysis consistent with UTI.  Giving Keflex for additional coverage (fluoroquinolones considered, however the patient has aortic aneurysm history).   I discussed with patient and/or guardian/caretaker that this evaluation in the ED does not suggest any emergent or life threatening medical condition requiring admission or immediate intervention beyond what was provided in the ED. Regardless, an unremarkable evaluation in the ED does not preclude the development or presence of a serious or life threatening condition. As such, patient was instructed to return immediately for any worsening or change in current symptoms.     I had a detailed discussion with patient  and/or guardian/caretaker regarding findings, plan,  return precautions, importance of medication adherence, need to follow-up with a PCP. All questions answered.   Patient's daughter at the bedside for instructions, agrees with plan.  Note was created using voice recognition software. It may have occasional typographical errors not identified and edited despite initial review prior to signing.        [RC]      ED Course User Index  [BL] Charlene Calloway  [RC] Cristian Mclean MD                Clinical Impression:     1. Debility    2. Near syncope    3. Dementia without behavioral disturbance, unspecified dementia type    4. Bradycardia    5. Chronic obstructive pulmonary disease, unspecified COPD type

## 2020-01-12 LAB
BACTERIA BLD CULT: NORMAL
BACTERIA BLD CULT: NORMAL

## 2020-01-13 ENCOUNTER — PES CALL (OUTPATIENT)
Dept: ADMINISTRATIVE | Facility: CLINIC | Age: 85
End: 2020-01-13

## 2020-01-20 ENCOUNTER — OUTPATIENT CASE MANAGEMENT (OUTPATIENT)
Dept: ADMINISTRATIVE | Facility: OTHER | Age: 85
End: 2020-01-20

## 2020-01-20 NOTE — PROGRESS NOTES
Outpatient Care Management  Patient Not Qualified    Patient: Irina Polk  MRN:  [id number]  Date of Service:  1/30/2020  Completed by:  Manuela Rodriguez, RN    Chief Complaint   Patient presents with    OPCM RN First Assessment Attempt    OPCM RN Second Follow-Up Attempt    OPCM RN Third Assessment Attempt       Patient Summary     Program:  [facility] High Risk  Qualification Status:  No  Reason Not Qualified:  Unable to reach  Spoke with pt's daughters Dolly and Lilliam. Lilliam requested a call back on tomorrow 1/21/2020.   Call placed on 1/21/2020 with no answer.

## 2020-01-20 NOTE — LETTER
January 21, 2020    Irina Polk  [address] [place] [address]             [facility] [address] [place] [address] Dear Ms. Irina Polk,      I have been assigned as your  with [facility] Outpatient Complex Case Management Department. We received a referral to call you to discuss your medical history. I have attempted to reach you by telephone, but I was unsuccessful. Please call our department so that we can go over some questions with you regarding your health.     The Outpatient Case Management department can be reached at [phone] from 8:00am to 4:30pm on Monday thru Friday. [facility] also has a program where a nurse is available 24/7 to answer questions or provider medical advice. Their number is [phone].     Thanks,      Manuela Rodriguez, RN  Outpatient Case Management  [phone]

## 2020-01-22 ENCOUNTER — CARE AT HOME (OUTPATIENT)
Dept: HOME HEALTH SERVICES | Facility: CLINIC | Age: 85
End: 2020-01-22
Payer: MEDICARE

## 2020-01-22 DIAGNOSIS — Z71.89 ENCOUNTER FOR HOSPICE CARE DISCUSSION: ICD-10-CM

## 2020-01-22 DIAGNOSIS — R53.1 RIGHT SIDED WEAKNESS: ICD-10-CM

## 2020-01-22 DIAGNOSIS — R05.9 COUGH: ICD-10-CM

## 2020-01-22 DIAGNOSIS — F03.90 DEMENTIA WITHOUT BEHAVIORAL DISTURBANCE, UNSPECIFIED DEMENTIA TYPE: ICD-10-CM

## 2020-01-22 DIAGNOSIS — R29.90 STROKE-LIKE SYMPTOMS: ICD-10-CM

## 2020-01-22 DIAGNOSIS — R53.81 DEBILITY: ICD-10-CM

## 2020-01-22 DIAGNOSIS — R53.1 GENERALIZED WEAKNESS: ICD-10-CM

## 2020-01-22 DIAGNOSIS — Z51.5 PALLIATIVE CARE ENCOUNTER: Primary | ICD-10-CM

## 2020-01-22 DIAGNOSIS — R06.02 SOB (SHORTNESS OF BREATH) ON EXERTION: ICD-10-CM

## 2020-01-22 DIAGNOSIS — Z71.89 COUNSELING REGARDING ADVANCE CARE PLANNING AND GOALS OF CARE: ICD-10-CM

## 2020-01-22 PROCEDURE — 99497 ADVNCD CARE PLAN 30 MIN: CPT | Mod: S$GLB,,, | Performed by: NURSE PRACTITIONER

## 2020-01-22 PROCEDURE — 99497 PR ADVNCD CARE PLAN 30 MIN: ICD-10-PCS | Mod: S$GLB,,, | Performed by: NURSE PRACTITIONER

## 2020-01-22 PROCEDURE — 99348 PR HOME VISIT,ESTAB PATIENT,LEVEL II: ICD-10-PCS | Mod: S$GLB,,, | Performed by: NURSE PRACTITIONER

## 2020-01-22 PROCEDURE — 99348 HOME/RES VST EST LOW MDM 30: CPT | Mod: S$GLB,,, | Performed by: NURSE PRACTITIONER

## 2020-02-25 VITALS
DIASTOLIC BLOOD PRESSURE: 78 MMHG | OXYGEN SATURATION: 96 % | SYSTOLIC BLOOD PRESSURE: 102 MMHG | TEMPERATURE: 98 F | RESPIRATION RATE: 18 BRPM | HEART RATE: 62 BPM

## 2020-02-25 NOTE — PATIENT INSTRUCTIONS
INSTRUCTIONS:    1. [facility]  2. If patient/family does not transfer to hospice, then palliative care will f/u in 2-3 weeks  3. Continue all medications  4. Fall precautions at all times  5. F/u with PCP   6. Sit patient up 90 degrees with feedings  7. In an Emergency, call 911 or go to ED; notify PCP's office  8. Comfort measure implemented

## 2020-02-25 NOTE — PROGRESS NOTES
[facility]  Palliative Care Home Visit    Visit Date: 1/22/2020  Encounter Provider: Sandra Schwartz, WU, FNP-C  PCP:  Blanca Quinones MD    Subjective:      Patient ID: Irina Polk is a 89 y.o. female.    Consult Requested By:  Blanca Quinones M.D.  Reason for Consult:  Palliative Care     Chief Complaint:  Palliative Care follow-up visit    Outpatient Palliative Care Encounter    Ms. Irina Polk is a 89 y/o female with PMHx of Chronic respiratory failure with hypoxia 02 dependent, chronic obstructive pulmonary disease, GERD, essential hypertension, NSTEMI, vertebral compression fracture, bradycardia, hyperlipidemia, aneurysm of thoracic aorta, aspiration and bacterial pneumonia, iron deficiency anemia, sepsis, vitamin deficiency, and urinary tract infections.     Patient has recent visit to [facility] on 1/6/2020 for near syncopal episode while sitting on toilet having a bowel movement. Family had reported patient had been having cough for 1 week and was started on Doxycycline 3 days ago on 1/3/2020 by PCP. Patient currently diagnosed with acute UTI; pt has hx of debility, dementia, COPD, bradycardia. She was started on Keflex in addition to doxycycine.      Impression:    NP called night before by patient's PCP Dr. Quinones requesting a visit due to patient having declining health condition and stroke like symptoms. Discussed patient's condition with Dr. Quinones, and patient maybe hospice appropriate at this time.     Patient seen today for follow-up visit. Patient's daughters, Ghazala and Dolly, are both present; patient's care giver Trang is present as well.  Patient is lying in supine position in bed with her eyes closed; she is turned on her right side in the bed. Opens her eyes when her name is called but is minimally verbal. Nods her head yes when asked if she could hear me. She is responsive to her daughters, smiling at them when they speak to her. Patient oriented x 1 person.  Patient denies pain at this time.  Family reports she started shaking on 1/18 and had bilateral UE tremors. Patient has generalized weakness with new onset right sided weakness. No drooling observed or facial droop. Family reports earlier patient was more lethargic with blank staring out, but they feel she is more awake at this time;further, she has decreased appetite.    Discussed hospice and hospice benefits with family, and patient's daughters are in agreement to meet with [facility] today.   Dr. Quinones present to assess patient (home visit by PCP). Everyone in agreement patient has slow decline and is more appropriate for hospice at this time.    Goals of Care:  [place] reviewed; no [place] form on file at this time. Pt has living will/advance directives from 3/2017. Patient wants to be comfortable. Code Status DNR.     Family will meet with [facility] for information and possible transfer of services to hospice for benefits, along with patient meeting hospice criteria.      PLAN:    1. Referral to [facility] (per Dr. Quinones) rep Courtney Ledesma  2. Continue all medications at present  3. Have family complete [place] Forms  4. Comfort care  5. Supportive care offered to family       Review of Systems   Constitutional: Positive for fatigue. Positive activity change (more bedbound), Positive appetite change (decreased appetite) and unexpected weight change.   Respiratory: Positive for shortness of breath (mild). Negative for choking and wheezing. Positive cough; Negative runny nose  Cardiovascular: Negative for chest pain, palpitations and leg swelling.   Gastrointestinal: Negative for abdominal distention, abdominal pain, constipation and diarrhea.   Genitourinary: Negative for difficulty urinating and dysuria.   Musculoskeletal: Positive for gait problem. Positive for right sided weakness. Negative for joint swelling and myalgias.   Skin: Negative for rash and wound.   Neurological:  Positive for generalized weakness. Negative for dizziness and light-headedness. Positive for tremors and shaking  Psychiatric/Behavioral: Negative for behavioral problems, dysphoric mood and suicidal ideas. Positive for confusion. The patient is not nervous/anxious.          Assessments:  · Environmental: lives on 2nd story of home, daughter lives on 3rd floor, home is clean and uncluttered; good lighting  · Functional Status: ambulatory with rollator, requires assistance with ADLs  · Safety: sitters present 24 hours per day, no safety concerns; sit HOB up 90 degrees with feedings/drinking  · Nutritional: fair appetite, recently decreased appetite  · Home Health/DME/Supplies:  has home oxygen, suction machine, rollator     Symptom Assessment (ESAS 0-10 scale)      ESAS 0 1 2 3 4 5 6 7 8 9 10   Pain X                       Dyspnea         X               Anxiety X                       Nausea X                       Depression          X               Anorexia X                       Fatigue             X           Insomnia X                       Restlessness  X                       Agitation X                          Constipation    no  Bowel Management Plan (BMP): yes  Diarrhea        no  Comments: Miralax daily; LBM 1/22/2020     Performance Status: PPS Score 40  Karnofsky Score:  40s           History:  Past Medical History:   Diagnosis Date    Chronic respiratory failure with hypoxia, on home O2 therapy     COPD (chronic obstructive pulmonary disease)     GERD (gastroesophageal reflux disease)     Hypertension     NSTEMI (non-ST elevated myocardial infarction) 3/13/2017    UTI (urinary tract infection)     Vertebral compression fracture 8/11/2015     History reviewed. No pertinent family history.  Past Surgical History:   Procedure Laterality Date    APPENDECTOMY      HYSTERECTOMY      TOTAL KNEE ARTHROPLASTY Bilateral      Review of patient's allergies indicates:   Allergen Reactions    Codeine  Nausea Only     Intolerance     Penicillins        Medications:    Current Outpatient Medications:     acetaminophen (TYLENOL) 500 mg Cap, Take 2 capsules (1,000 mg total) by mouth every 6 (six) hours as needed (Pain)., Disp: , Rfl: 0    albuterol-ipratropium (DUO-NEB) 2.5 mg-0.5 mg/3 mL nebulizer solution, USE 1 VIAL VIA NEBULIZER EVERY 4 HOURS AS NEEDED FOR WHEEZING. RESCUE, Disp: 1620 mL, Rfl: 1    albuterol-ipratropium (DUO-NEB) 2.5 mg-0.5 mg/3 mL nebulizer solution, USE 1 VIAL VIA NEBULIZER EVERY 4 HOURS AS NEEDED FOR WHEEZING. RESCUE, Disp: 90 mL, Rfl: 0    amLODIPine (NORVASC) 2.5 MG tablet, Take 1 tablet (2.5 mg total) by mouth once daily., Disp: 90 tablet, Rfl: 3    aspirin 81 MG Chew, Take 81 mg by mouth once daily., Disp: , Rfl:     benzonatate (TESSALON) 200 MG capsule, TAKE 1 CAPSULE BY MOUTH THREE TIMES DAILY AS NEEDED FOR COUGH, Disp: 30 capsule, Rfl: 0    calcitonin, salmon, (FORTICAL) 200 unit/actuation nasal spray, INSTILL 1 SPRAY INTO ALTERNATING NOSTRILS ONCE DAILY, Disp: 3.7 mL, Rfl: 0    cetirizine (ZYRTEC) 5 MG tablet, Take 5 mg by mouth once daily., Disp: , Rfl:     cholecalciferol, vitamin D3, (VITAMIN D3) 2,000 unit Cap, Take 1 capsule by mouth once daily., Disp: , Rfl:     COMBIGAN 0.2-0.5 % Drop, Place 1 drop into both eyes 2 (two) times daily. , Disp: , Rfl:     cyanocobalamin 1,000 mcg/mL injection, Inject 1 mL (1,000 mcg total) into the muscle once a week. Dispense syringes as well., Disp: 10 mL, Rfl: 3    diclofenac sodium (VOLTAREN) 1 % Gel, Apply 2 g topically once daily., Disp: 1 Tube, Rfl: 3    dicyclomine (BENTYL) 10 MG capsule, Take 1 capsule (10 mg total) by mouth 2 (two) times daily., Disp: 30 capsule, Rfl: 0    escitalopram oxalate (LEXAPRO) 10 MG tablet, TAKE 1 TABLET DAILY, Disp: 90 tablet, Rfl: 4    ferrous sulfate (FEOSOL) 325 mg (65 mg iron) Tab tablet, Take 325 mg by mouth once daily., Disp: , Rfl:     fluticasone-salmeterol 100-50 mcg/dose (ADVAIR)  100-50 mcg/dose diskus inhaler, Inhale 1 puff into the lungs 2 (two) times daily. Controller, Disp: 1 each, Rfl: 2    furosemide (LASIX) 20 MG tablet, TAKE 1 TABLET BY MOUTH EVERY DAY AS NEEDED FOR SWELLING; LIMIT TO TWICE A WEEK, Disp: 90 tablet, Rfl: 1    guaiFENesin (MUCINEX) 600 mg 12 hr tablet, Take 2 tablets (1,200 mg total) by mouth 2 (two) times daily., Disp: , Rfl:     hydrocodone-chlorpheniramine (TUSSIONEX) 10-8 mg/5 mL suspension, Take 5 mLs by mouth every 12 (twelve) hours as needed for Cough., Disp: 473 mL, Rfl: 0    latanoprost 0.005 % ophthalmic solution, INSTILL 1 DROP OU HS, Disp: , Rfl: 11    lidocaine (LIDODERM) 5 %, Place 1 patch onto the skin once daily. Remove & Discard patch within 12 hours or as directed by MD, Disp: 15 patch, Rfl: 0    moxifloxacin (AVELOX) 400 mg tablet, Take 1 tablet (400 mg total) by mouth once daily., Disp: 7 tablet, Rfl: 0    OMEGA-3S/DHA/EPA/FISH OIL (OMEGA 3 ORAL), Take 1 capsule by mouth once daily. , Disp: , Rfl:     omeprazole (PRILOSEC) 20 MG capsule, TAKE ONE CAPSULE BY MOUTH TWICE DAILY, Disp: 180 capsule, Rfl: 0    polyethylene glycol (GLYCOLAX) 17 gram PwPk, Take 17 g by mouth once daily., Disp: , Rfl:     PROAIR HFA 90 mcg/actuation inhaler, INHALE 2 PUFFS BY MOUTH EVERY 6 HOURS IF NEEDED FOR SHORTNESS OF BREATH OR WHEEZING, Disp: 8.5 g, Rfl: 0    senna-docusate 8.6-50 mg (PERICOLACE) 8.6-50 mg per tablet, Take 2 tablets by mouth 2 (two) times daily., Disp: , Rfl:     traMADol (ULTRAM) 50 mg tablet, Take 1 tablet (50 mg total) by mouth every 6 (six) hours as needed (pain not controlled with tylenol). (Patient not taking: Reported on 9/16/2019), Disp: 60 tablet, Rfl: 0    24h Oral Morphine Equivalents (OME):  n/a    Objective:     Physical Exam:    There is no height or weight on file to calculate BMI.     Physical Exam   Constitutional: She appears well-developed and well-nourished. Mostly non-verbal; speaks only few words; appears ill laying in  bed in bedroom  Cardiovascular: Regular rhythm. Bradycardia present. Murmur grade II/VI; no edema to BLE; +productive cough>white mucus  Pulmonary/Chest: Effort normal; BBS CTA. On 3L O2 via NC continuous  Abdominal: Soft. Bowel sounds are normal. Non-distended, non-tender  Musculoskeletal: Normal range of motion. Right leg has intermittent shaking; Right sided weakness; able to lift both legs off the bed on command; hand  weaker on right R>L  Neurological: She is alert. AAOx1-2, person; intermittent confusion; Bilateral UE intermittent fine tremors  Skin: Skin is warm and dry. No wounds, no rash  Psychiatric: She has a normal mood and affect. Her behavior is normal.       Labs:  CBC:   WBC   Date Value Ref Range Status   08/14/2019 6.84 3.90 - 12.70 K/uL Final   - 98 f  Hemoglobin   Date Value Ref Range Status   08/14/2019 12.7 12.0 - 16.0 g/dL Final   :   Hematocrit   Date Value Ref Range Status   08/14/2019 38.9 37.0 - 48.5 % Final       Mean Corpuscular Volume   Date Value Ref Range Status   08/14/2019 96 82 - 98 fL Final   BILITOT 0.6 08/14/2019       Albumin:   Albumin   Date Value Ref Range Status   08/14/2019 3.8 3.5 - 5.2 g/dL Final     Protein:   Total Protein   Date Value Ref Range Status   08/14/2019 6.6 6.0 - 8.4 g/dL Final       Radiology:  I have reviewed all pertinent imaging results/findings within the past 24 hours.      Psychosocial/Cultural/Spiritual:  · F- Donna and Belief:  Sikh              · I - Importance: very important  · C - Community: member of [place] [employer] but no longer attends, watches Sunday [employer] on TV  · A - Address in Care: daughter is Eucharist  on the [facility], can bring communion       Assessment:     1. Palliative care encounter  2. Counseling regarding advance directives and goals of care discussion  3. Encounter for hospice care discussion  4. Generalized weakness  5. Debility  6. SOB with exertion  7. Stroke like  symptoms  8. Cough  9. Right sided weakness  10. Dementia without behavioral disturbance, unspecified type    Plan:       Ethical / Legal: Advance Care Planning   · Surrogate decision maker:  Name Dolly Polk, Relationship: daughter  · Code Status:  DNR  · [place]:  No record of [place] form  · Other advance directive:  Yes, Capacity to make medical decisions:  Yes, Conflict none    Were controlled substances prescribed?  No      >40 mins of 75 min visit spent in chart review,  symptom assessment, evaluation and treatment, and coordination of care and emotional support.    >Remaining 35 mins of 75 min visit spent discussing palliative care, advance directives, goals of care, hospice and hospice benefits; meeting with hospice company          Future Appointments   Date Time Provider Department Center   1/28/2020  1:00 PM Sandra Schwartz NP [address]       Patient consent obtained for treatment on this visit    Signature:  Sandra Schwartz DNP, FNP-C  [email or web address]

## 2020-02-27 ENCOUNTER — OFFICE VISIT (OUTPATIENT)
Dept: HOME HEALTH SERVICES | Facility: CLINIC | Age: 85
End: 2020-02-27
Payer: MEDICARE

## 2020-02-27 VITALS — WEIGHT: 152 LBS | HEIGHT: 60 IN | BODY MASS INDEX: 29.84 KG/M2

## 2020-02-27 DIAGNOSIS — Z99.81 CHRONIC RESPIRATORY FAILURE WITH HYPOXIA, ON HOME O2 THERAPY: ICD-10-CM

## 2020-02-27 DIAGNOSIS — F03.90 DEMENTIA WITHOUT BEHAVIORAL DISTURBANCE, UNSPECIFIED DEMENTIA TYPE: ICD-10-CM

## 2020-02-27 DIAGNOSIS — R26.9 ABNORMALITY OF GAIT AND MOBILITY: ICD-10-CM

## 2020-02-27 DIAGNOSIS — I71.20 THORACIC AORTIC ANEURYSM WITHOUT RUPTURE: ICD-10-CM

## 2020-02-27 DIAGNOSIS — J44.9 CHRONIC OBSTRUCTIVE PULMONARY DISEASE, UNSPECIFIED COPD TYPE: ICD-10-CM

## 2020-02-27 DIAGNOSIS — J96.11 CHRONIC RESPIRATORY FAILURE WITH HYPOXIA, ON HOME O2 THERAPY: ICD-10-CM

## 2020-02-27 DIAGNOSIS — I77.1 TORTUOUS AORTA: ICD-10-CM

## 2020-02-27 DIAGNOSIS — Z87.81 HISTORY OF COMPRESSION FRACTURE OF SPINE: ICD-10-CM

## 2020-02-27 DIAGNOSIS — I10 ESSENTIAL HYPERTENSION: ICD-10-CM

## 2020-02-27 DIAGNOSIS — Z00.00 ENCOUNTER FOR PREVENTIVE HEALTH EXAMINATION: Primary | ICD-10-CM

## 2020-02-27 DIAGNOSIS — Z99.81 DEPENDENCE ON SUPPLEMENTAL OXYGEN: ICD-10-CM

## 2020-02-27 DIAGNOSIS — M85.80 OSTEOPENIA, UNSPECIFIED LOCATION: ICD-10-CM

## 2020-02-27 DIAGNOSIS — R53.81 DEBILITY: ICD-10-CM

## 2020-02-27 DIAGNOSIS — Z74.09 OTHER REDUCED MOBILITY: ICD-10-CM

## 2020-02-27 DIAGNOSIS — Z99.89 DEPENDENCE ON OTHER ENABLING MACHINES AND DEVICES: ICD-10-CM

## 2020-02-27 DIAGNOSIS — H40.9 GLAUCOMA OF BOTH EYES, UNSPECIFIED GLAUCOMA TYPE: ICD-10-CM

## 2020-02-27 DIAGNOSIS — E78.5 HYPERLIPIDEMIA, UNSPECIFIED HYPERLIPIDEMIA TYPE: ICD-10-CM

## 2020-02-27 PROBLEM — J96.01 ACUTE RESPIRATORY FAILURE WITH HYPOXIA: Status: RESOLVED | Noted: 2018-07-22 | Resolved: 2020-02-27

## 2020-02-27 PROBLEM — M48.56XA COMPRESSION FRACTURE OF LUMBAR SPINE, NON-TRAUMATIC, INITIAL ENCOUNTER: Status: RESOLVED | Noted: 2018-01-05 | Resolved: 2020-02-27

## 2020-02-27 PROCEDURE — G0439 PPPS, SUBSEQ VISIT: HCPCS | Mod: GW,S$GLB,ICN, | Performed by: NURSE PRACTITIONER

## 2020-02-27 PROCEDURE — G0439 PR MEDICARE ANNUAL WELLNESS SUBSEQUENT VISIT: ICD-10-PCS | Mod: GW,S$GLB,ICN, | Performed by: NURSE PRACTITIONER

## 2020-02-27 RX ORDER — DOCUSATE SODIUM 100 MG/1
100 CAPSULE, LIQUID FILLED ORAL 2 TIMES DAILY
COMMUNITY

## 2020-02-27 RX ORDER — LORATADINE 10 MG/1
10 TABLET ORAL DAILY
COMMUNITY

## 2020-02-27 RX ORDER — MULTIVITAMIN
1 TABLET ORAL DAILY
COMMUNITY

## 2020-02-27 NOTE — PROGRESS NOTES
Irina Polk presented for a  Medicare AWV and comprehensive Health Risk Assessment today. The following components were reviewed and updated:    · Medical history  · Family History  · Social history  · Allergies and Current Medications  · Health Risk Assessment  · Health Maintenance  · Care Team     ** See Completed Assessments for Annual Wellness Visit within the encounter summary.**       The following assessments were completed:  · Living Situation  · CAGE  · Depression Screening  · Timed Get Up and Go  · Whisper Test  · Cognitive Function Screening  · Nutrition Screening  · ADL Screening  · PAQ Screening    Vitals:    02/27/20 1202   BP: (P) 112/66   Pulse: (P) 94   Weight: 68.9 kg (152 lb)   Height: 5' (1.524 m)     Body mass index is 29.69 kg/m².  Physical Exam   Constitutional: She appears well-developed.   HENT:   Head: Atraumatic.   Eyes: EOM are normal.   Neck: Normal range of motion.   Cardiovascular: Normal rate, regular rhythm and normal heart sounds.   Pulmonary/Chest: Effort normal and breath sounds normal. No respiratory distress.   On O2 per NC 3L   Abdominal: Soft. Bowel sounds are normal. She exhibits no distension.   Musculoskeletal: Normal range of motion. She exhibits no edema.   Unsteady gait   Walker present   Neurological: She is alert.   Oriented to person and place, not time.   Skin: Skin is warm and dry.   Psychiatric: She has a normal mood and affect. Her behavior is normal.         Diagnoses and health risks identified today and associated recommendations/orders:    1. Encounter for preventive health examination  Assessment completed. Preventive measures reviewed with patient and patients daughter Lilliam.    2. Dependence on other enabling machines and devices  Stable, followed by Palliative Care.    3. Dependence on supplemental oxygen  Stable, followed by Palliative Care.    4. Abnormality of gait and mobility  Stable, followed by Palliative Care.    5. Other reduced  mobility  Stable, followed by Palliative Care.    6. Glaucoma of both eyes, unspecified glaucoma type  Stable, followed by Optometry.    7. Essential hypertension  Stable, followed by Palliative Care.    8. Hyperlipidemia, unspecified hyperlipidemia type  Stable, followed by Palliative Care.    9. Thoracic aortic aneurysm without rupture  Stable, followed by Palliative Care.    10. Osteopenia, unspecified location  Stable, followed by Palliative Care.    11. Debility  Stable, followed by Palliative Care.    12. Tortuous aorta  Stable, followed by Palliative Care.    13. Chronic respiratory failure with hypoxia, on home O2 therapy  Stable, followed by Palliative Care.    14. Chronic obstructive pulmonary disease, unspecified COPD type  Stable, followed by Palliative Care.    15. Dementia without behavioral disturbance, unspecified dementia type  Stable, followed by Palliative Care.    16. History of compression fracture of spine  Stable, followed by Palliative Care.    Provided Irina with a 5-10 year written screening schedule and personal prevention plan. Recommendations were developed using the USPSTF age appropriate recommendations. Education, counseling, and referrals were provided as needed. After Visit Summary printed and given to patient which includes a list of additional screenings\tests needed.    No follow-ups on file.    Chante Goff NP  I offered to discuss end of life issues, including information on how to make advance directives that the patient could use to name someone who would make medical decisions on their behalf if they became too ill to make themselves.    ___Patient declined  _X_Patient is interested, I provided paper work and offered to discuss.

## 2020-02-27 NOTE — PATIENT INSTRUCTIONS
Counseling and Referral of Other Preventative  (Italic type indicates deductible and co-insurance are waived)    Patient Name: Irina Polk  Today's Date: 2/27/2020    Health Maintenance       Date Due Completion Date    TETANUS VACCINE 03/09/1947 ---    Shingles Vaccine (2 of 3) 01/13/2012 11/18/2011    Pneumococcal Vaccine (65+ Low/Medium Risk) (2 of 2 - PPSV23) 09/07/2018 9/7/2017    Lipid Panel 03/13/2022 3/13/2017        No orders of the defined types were placed in this encounter.    The following information is provided to all patients.  This information is to help you find resources for any of the problems found today that may be affecting your health:                Living healthy guide: www.[facility].[place].gov      Understanding Diabetes: www.diabetes.org      Eating healthy: www.cdc.gov/healthyweight      [facility] home safety checklist: www.cdc.gov/steadi/patient.html      Agency on Aging: www.goea.[place].gov      Alcoholics anonymous (AA): www.aa.org      Physical Activity: www.jeremie.nih.gov/il1qsuf      Tobacco use: www.quitwithusla.org

## 2020-07-23 ENCOUNTER — TELEPHONE (OUTPATIENT)
Dept: PRIMARY CARE CLINIC | Facility: CLINIC | Age: 85
End: 2020-07-23

## 2021-01-09 ENCOUNTER — IMMUNIZATION (OUTPATIENT)
Dept: PRIMARY CARE CLINIC | Facility: CLINIC | Age: 86
End: 2021-01-09
Payer: MEDICARE

## 2021-01-09 DIAGNOSIS — Z23 NEED FOR VACCINATION: ICD-10-CM

## 2021-01-09 PROCEDURE — 91300 COVID-19, MRNA, LNP-S, PF, 30 MCG/0.3 ML DOSE VACCINE: CPT | Mod: PBBFAC | Performed by: FAMILY MEDICINE

## 2021-01-30 ENCOUNTER — IMMUNIZATION (OUTPATIENT)
Dept: PRIMARY CARE CLINIC | Facility: CLINIC | Age: 86
End: 2021-01-30
Payer: MEDICARE

## 2021-01-30 DIAGNOSIS — Z23 NEED FOR VACCINATION: Primary | ICD-10-CM

## 2021-01-30 PROCEDURE — 0002A COVID-19, MRNA, LNP-S, PF, 30 MCG/0.3 ML DOSE VACCINE: CPT | Mod: PBBFAC,PN

## 2021-01-30 PROCEDURE — 91300 COVID-19, MRNA, LNP-S, PF, 30 MCG/0.3 ML DOSE VACCINE: CPT | Mod: PBBFAC,PN

## 2021-10-23 ENCOUNTER — IMMUNIZATION (OUTPATIENT)
Dept: INTERNAL MEDICINE | Facility: CLINIC | Age: 86
End: 2021-10-23
Payer: MEDICARE

## 2021-10-23 DIAGNOSIS — Z23 NEED FOR VACCINATION: Primary | ICD-10-CM

## 2021-10-23 PROCEDURE — 0003A COVID-19, MRNA, LNP-S, PF, 30 MCG/0.3 ML DOSE VACCINE: CPT | Mod: PBBFAC,CV19

## 2021-10-23 PROCEDURE — 91300 COVID-19, MRNA, LNP-S, PF, 30 MCG/0.3 ML DOSE VACCINE: CPT | Mod: PBBFAC
